# Patient Record
Sex: FEMALE | Race: BLACK OR AFRICAN AMERICAN | NOT HISPANIC OR LATINO | Employment: OTHER | URBAN - METROPOLITAN AREA
[De-identification: names, ages, dates, MRNs, and addresses within clinical notes are randomized per-mention and may not be internally consistent; named-entity substitution may affect disease eponyms.]

---

## 2017-01-27 DIAGNOSIS — Z12.39 BREAST CANCER SCREENING, HIGH RISK PATIENT: Primary | ICD-10-CM

## 2017-07-07 ENCOUNTER — HISTORICAL (OUTPATIENT)
Dept: ADMINISTRATIVE | Facility: HOSPITAL | Age: 62
End: 2017-07-07

## 2017-07-07 LAB
ABS NEUT (OLG): 7.14 X10(3)/MCL (ref 2.1–9.2)
ALBUMIN SERPL-MCNC: 2 GM/DL (ref 3.4–5)
ALBUMIN/GLOB SERPL: 0.5 {RATIO}
ALP SERPL-CCNC: 120 UNIT/L (ref 38–126)
ALT SERPL-CCNC: 16 UNIT/L (ref 12–78)
AST SERPL-CCNC: 29 UNIT/L (ref 15–37)
BASOPHILS # BLD AUTO: 0 X10(3)/MCL (ref 0–0.2)
BASOPHILS NFR BLD AUTO: 0 %
BILIRUB SERPL-MCNC: 0.4 MG/DL (ref 0.2–1)
BILIRUBIN DIRECT+TOT PNL SERPL-MCNC: 0.2 MG/DL (ref 0–0.2)
BILIRUBIN DIRECT+TOT PNL SERPL-MCNC: 0.2 MG/DL (ref 0–0.8)
BUN SERPL-MCNC: 23 MG/DL (ref 7–18)
CALCIUM SERPL-MCNC: 8.4 MG/DL (ref 8.5–10.1)
CHLORIDE SERPL-SCNC: 110 MMOL/L (ref 98–107)
CO2 SERPL-SCNC: 28 MMOL/L (ref 21–32)
CREAT SERPL-MCNC: 1.07 MG/DL (ref 0.55–1.02)
EOSINOPHIL # BLD AUTO: 0.3 X10(3)/MCL (ref 0–0.9)
EOSINOPHIL NFR BLD AUTO: 2 %
ERYTHROCYTE [DISTWIDTH] IN BLOOD BY AUTOMATED COUNT: 15.5 % (ref 11.5–17)
ERYTHROCYTE [SEDIMENTATION RATE] IN BLOOD: 11 MM/HR (ref 0–20)
EST. AVERAGE GLUCOSE BLD GHB EST-MCNC: NORMAL MG/DL
GLOBULIN SER-MCNC: 3.7 GM/DL (ref 2.4–3.5)
GLUCOSE SERPL-MCNC: 92 MG/DL (ref 74–106)
HBA1C MFR BLD: <3.5 % (ref 4.2–6.3)
HCT VFR BLD AUTO: 30.5 % (ref 37–47)
HGB BLD-MCNC: 9.1 GM/DL (ref 12–16)
LYMPHOCYTES # BLD AUTO: 2.2 X10(3)/MCL (ref 0.6–4.6)
LYMPHOCYTES NFR BLD AUTO: 20 %
MCH RBC QN AUTO: 28.6 PG (ref 27–31)
MCHC RBC AUTO-ENTMCNC: 29.8 GM/DL (ref 33–36)
MCV RBC AUTO: 95.9 FL (ref 80–94)
MONOCYTES # BLD AUTO: 1.1 X10(3)/MCL (ref 0.1–1.3)
MONOCYTES NFR BLD AUTO: 10 %
NEUTROPHILS # BLD AUTO: 7.14 X10(3)/MCL (ref 1.4–7.9)
NEUTROPHILS NFR BLD AUTO: 66 %
PLATELET # BLD AUTO: 211 X10(3)/MCL (ref 130–400)
PMV BLD AUTO: 8.4 FL (ref 9.4–12.4)
POTASSIUM SERPL-SCNC: 3.8 MMOL/L (ref 3.5–5.1)
PROT SERPL-MCNC: 5.7 GM/DL (ref 6.4–8.2)
RBC # BLD AUTO: 3.18 X10(6)/MCL (ref 4.2–5.4)
SODIUM SERPL-SCNC: 145 MMOL/L (ref 136–145)
WBC # SPEC AUTO: 10.8 X10(3)/MCL (ref 4.5–11.5)

## 2017-07-11 PROBLEM — I82.411 ACUTE DEEP VEIN THROMBOSIS (DVT) OF FEMORAL VEIN OF RIGHT LOWER EXTREMITY: Status: ACTIVE | Noted: 2017-07-11

## 2017-07-12 PROBLEM — E78.5 HLD (HYPERLIPIDEMIA): Chronic | Status: ACTIVE | Noted: 2017-07-12

## 2017-07-12 PROBLEM — I10 ESSENTIAL HYPERTENSION: Chronic | Status: ACTIVE | Noted: 2017-07-12

## 2017-07-12 PROBLEM — E66.9 OBESE: Status: ACTIVE | Noted: 2017-07-12

## 2017-07-12 PROBLEM — R65.10 SIRS (SYSTEMIC INFLAMMATORY RESPONSE SYNDROME): Status: ACTIVE | Noted: 2017-07-12

## 2017-07-14 PROBLEM — D63.8 ANEMIA OF CHRONIC DISEASE: Status: ACTIVE | Noted: 2017-07-14

## 2017-07-14 PROBLEM — R19.7 DIARRHEA: Status: ACTIVE | Noted: 2017-07-14

## 2017-07-15 PROBLEM — E87.6 HYPOKALEMIA: Status: ACTIVE | Noted: 2017-07-15

## 2017-07-18 ENCOUNTER — HISTORICAL (OUTPATIENT)
Dept: ADMINISTRATIVE | Facility: HOSPITAL | Age: 62
End: 2017-07-18

## 2017-07-25 ENCOUNTER — HISTORICAL (OUTPATIENT)
Dept: ADMINISTRATIVE | Facility: HOSPITAL | Age: 62
End: 2017-07-25

## 2017-08-01 ENCOUNTER — HISTORICAL (OUTPATIENT)
Dept: ADMINISTRATIVE | Facility: HOSPITAL | Age: 62
End: 2017-08-01

## 2017-08-09 ENCOUNTER — HISTORICAL (OUTPATIENT)
Dept: ADMINISTRATIVE | Facility: HOSPITAL | Age: 62
End: 2017-08-09

## 2017-08-09 PROBLEM — R10.32 BILATERAL GROIN PAIN: Status: ACTIVE | Noted: 2017-08-09

## 2017-08-09 PROBLEM — R10.31 BILATERAL GROIN PAIN: Status: ACTIVE | Noted: 2017-08-09

## 2017-08-16 ENCOUNTER — HISTORICAL (OUTPATIENT)
Dept: ADMINISTRATIVE | Facility: HOSPITAL | Age: 62
End: 2017-08-16

## 2017-08-16 LAB — FERRITIN SERPL-MCNC: 94.2 NG/ML (ref 8–388)

## 2017-08-23 ENCOUNTER — HISTORICAL (OUTPATIENT)
Dept: ADMINISTRATIVE | Facility: HOSPITAL | Age: 62
End: 2017-08-23

## 2017-09-06 ENCOUNTER — HISTORICAL (OUTPATIENT)
Dept: ADMINISTRATIVE | Facility: HOSPITAL | Age: 62
End: 2017-09-06

## 2017-10-06 ENCOUNTER — HOSPITAL ENCOUNTER (OUTPATIENT)
Dept: RADIOLOGY | Facility: HOSPITAL | Age: 62
Discharge: HOME | End: 2017-10-06
Attending: OBSTETRICS & GYNECOLOGY
Payer: MEDICARE

## 2017-10-06 VITALS — HEIGHT: 65 IN | WEIGHT: 257 LBS | BODY MASS INDEX: 42.82 KG/M2

## 2017-10-06 DIAGNOSIS — Z12.31 VISIT FOR SCREENING MAMMOGRAM: ICD-10-CM

## 2017-10-06 PROCEDURE — 77067 SCR MAMMO BI INCL CAD: CPT | Mod: 26,,, | Performed by: RADIOLOGY

## 2017-10-06 PROCEDURE — 77067 SCR MAMMO BI INCL CAD: CPT | Mod: TC

## 2018-05-21 PROBLEM — D17.1 LIPOMA OF BUTTOCK: Status: ACTIVE | Noted: 2018-05-21

## 2018-10-10 DIAGNOSIS — Z12.39 SCREENING FOR MALIGNANT NEOPLASM OF BREAST: Primary | ICD-10-CM

## 2018-10-19 ENCOUNTER — HOSPITAL ENCOUNTER (OUTPATIENT)
Dept: RADIOLOGY | Facility: HOSPITAL | Age: 63
Discharge: HOME | End: 2018-10-19
Payer: MEDICARE

## 2018-10-19 DIAGNOSIS — Z12.39 SCREENING FOR MALIGNANT NEOPLASM OF BREAST: ICD-10-CM

## 2018-10-19 PROCEDURE — 77067 SCR MAMMO BI INCL CAD: CPT | Mod: TC

## 2018-10-19 PROCEDURE — 77067 SCR MAMMO BI INCL CAD: CPT | Mod: 26,,, | Performed by: RADIOLOGY

## 2019-06-19 PROBLEM — D50.9 IRON DEFICIENCY ANEMIA: Status: ACTIVE | Noted: 2019-06-19

## 2019-10-07 DIAGNOSIS — Z12.31 ENCOUNTER FOR SCREENING MAMMOGRAM FOR MALIGNANT NEOPLASM OF BREAST: Primary | ICD-10-CM

## 2020-10-06 PROBLEM — G30.0 EARLY ONSET ALZHEIMER'S DEMENTIA WITHOUT BEHAVIORAL DISTURBANCE: Chronic | Status: ACTIVE | Noted: 2020-10-06

## 2020-10-06 PROBLEM — F02.80 EARLY ONSET ALZHEIMER'S DEMENTIA WITHOUT BEHAVIORAL DISTURBANCE: Chronic | Status: ACTIVE | Noted: 2020-10-06

## 2020-10-06 PROBLEM — N17.9 AKI (ACUTE KIDNEY INJURY): Status: ACTIVE | Noted: 2020-10-06

## 2020-10-06 PROBLEM — R42 DIZZINESS: Status: ACTIVE | Noted: 2020-10-06

## 2020-10-08 PROBLEM — N17.9 AKI (ACUTE KIDNEY INJURY): Status: RESOLVED | Noted: 2020-10-06 | Resolved: 2020-10-08

## 2020-10-12 ENCOUNTER — TELEPHONE (OUTPATIENT)
Dept: VASCULAR SURGERY | Facility: CLINIC | Age: 65
End: 2020-10-12

## 2020-10-12 DIAGNOSIS — Z12.31 ENCOUNTER FOR SCREENING MAMMOGRAM FOR MALIGNANT NEOPLASM OF BREAST: Primary | ICD-10-CM

## 2020-10-12 NOTE — TELEPHONE ENCOUNTER
Contacted patient's daughter Padmini in response to message. Notified Padmini that nurse has not yet seen referral come through fax, but that staff can call back to schedule appt when referral received. States patient has already had imaging done at [facility]. Notified daughter that since pt has had previous imaging done nurse would like to have fax in hand to make sure that appropriate studies will be ordered prior to appt.  ----- Message from Erin Curran sent at 10/12/2020  2:25 PM CDT -----  Regarding: NP Appointment  Contact: Dipak Santiago Patient Daugter  Calling to see if patient can be scheduled.  She was diagnosed with Aneurism and Carotid Artery.  Contact daughter at [phone].  Referral with be faxed over on today.

## 2020-10-13 DIAGNOSIS — I72.0 CAROTID ARTERY ANEURYSM: Primary | ICD-10-CM

## 2020-10-21 ENCOUNTER — TELEPHONE (OUTPATIENT)
Dept: VASCULAR SURGERY | Facility: CLINIC | Age: 65
End: 2020-10-21

## 2020-11-24 ENCOUNTER — HISTORICAL (OUTPATIENT)
Dept: ADMINISTRATIVE | Facility: HOSPITAL | Age: 65
End: 2020-11-24

## 2020-11-24 LAB
ABS NEUT (OLG): 2.11 X10(3)/MCL (ref 2.1–9.2)
ALBUMIN SERPL-MCNC: 2.6 GM/DL (ref 3.4–4.8)
ALBUMIN/GLOB SERPL: 0.8 RATIO (ref 1.1–2)
ALP SERPL-CCNC: 136 UNIT/L (ref 40–150)
ALT SERPL-CCNC: 20 UNIT/L (ref 0–55)
AST SERPL-CCNC: 35 UNIT/L (ref 5–34)
AT III ACT/NOR PPP CHRO: 90 % (ref 82–139)
BASOPHILS # BLD AUTO: 0 X10(3)/MCL (ref 0–0.2)
BASOPHILS NFR BLD AUTO: 0.5 %
BILIRUB SERPL-MCNC: 0.5 MG/DL
BILIRUBIN DIRECT+TOT PNL SERPL-MCNC: 0.2 MG/DL (ref 0–0.8)
BILIRUBIN DIRECT+TOT PNL SERPL-MCNC: 0.3 MG/DL (ref 0–0.5)
BUN SERPL-MCNC: 19.2 MG/DL (ref 9.8–20.1)
CALCIUM SERPL-MCNC: 7.7 MG/DL (ref 8.4–10.2)
CHLORIDE SERPL-SCNC: 118 MMOL/L (ref 98–107)
CO2 SERPL-SCNC: 20 MMOL/L (ref 23–31)
CREAT SERPL-MCNC: 1.08 MG/DL (ref 0.55–1.02)
EOSINOPHIL # BLD AUTO: 0.1 X10(3)/MCL (ref 0–0.9)
EOSINOPHIL NFR BLD AUTO: 2.4 %
ERYTHROCYTE [DISTWIDTH] IN BLOOD BY AUTOMATED COUNT: 16.4 % (ref 11.5–17)
FERRITIN SERPL-MCNC: 21.72 NG/ML (ref 4.63–204)
GLOBULIN SER-MCNC: 3.4 GM/DL (ref 2.4–3.5)
GLUCOSE SERPL-MCNC: 65 MG/DL (ref 82–115)
HCT VFR BLD AUTO: 31.6 % (ref 37–47)
HGB BLD-MCNC: 9.7 GM/DL (ref 12–16)
IRON SATN MFR SERPL: 14 % (ref 20–50)
IRON SERPL-MCNC: 34 UG/DL (ref 50–170)
LYMPHOCYTES # BLD AUTO: 2.8 X10(3)/MCL (ref 0.6–4.6)
LYMPHOCYTES NFR BLD AUTO: 47 %
MCH RBC QN AUTO: 28.7 PG (ref 27–31)
MCHC RBC AUTO-ENTMCNC: 30.7 GM/DL (ref 33–36)
MCV RBC AUTO: 93.5 FL (ref 80–94)
MONOCYTES # BLD AUTO: 0.8 X10(3)/MCL (ref 0.1–1.3)
MONOCYTES NFR BLD AUTO: 14.1 %
NEUTROPHILS # BLD AUTO: 2.1 X10(3)/MCL (ref 2.1–9.2)
NEUTROPHILS NFR BLD AUTO: 35.8 %
PLATELET # BLD AUTO: 255 X10(3)/MCL (ref 130–400)
PMV BLD AUTO: 9.1 FL (ref 9.4–12.4)
POTASSIUM SERPL-SCNC: 5.2 MMOL/L (ref 3.5–5.1)
PROT SERPL-MCNC: 6 GM/DL (ref 5.8–7.6)
RBC # BLD AUTO: 3.38 X10(6)/MCL (ref 4.2–5.4)
SODIUM SERPL-SCNC: 144 MMOL/L (ref 136–145)
TIBC SERPL-MCNC: 207 UG/DL (ref 70–310)
TIBC SERPL-MCNC: 241 UG/DL (ref 250–450)
TRANSFERRIN SERPL-MCNC: 211 MG/DL (ref 173–360)
VIT B12 SERPL-MCNC: 1591 PG/ML (ref 213–816)
WBC # SPEC AUTO: 5.9 X10(3)/MCL (ref 4.5–11.5)

## 2020-12-04 ENCOUNTER — HISTORICAL (OUTPATIENT)
Dept: ADMINISTRATIVE | Facility: HOSPITAL | Age: 65
End: 2020-12-04

## 2020-12-08 ENCOUNTER — HISTORICAL (OUTPATIENT)
Dept: ADMINISTRATIVE | Facility: HOSPITAL | Age: 65
End: 2020-12-08

## 2020-12-08 LAB
CREAT UR-MCNC: 28.2 MG/DL (ref 45–106)
PROT UR STRIP-MCNC: <6.8 MG/DL
PROT/CREAT UR-RTO: <0 MG/DL

## 2021-01-05 ENCOUNTER — HISTORICAL (OUTPATIENT)
Dept: ADMINISTRATIVE | Facility: HOSPITAL | Age: 66
End: 2021-01-05

## 2021-01-05 LAB
ABS NEUT (OLG): 3.4 X10(3)/MCL (ref 2.1–9.2)
ANTINUCLEAR ANTIBODY SCREEN (OHS): NEGATIVE
BASOPHILS # BLD AUTO: 0 X10(3)/MCL (ref 0–0.2)
BASOPHILS NFR BLD AUTO: 0.2 %
DSDNA ANTIBODY (OHS): NEGATIVE
EOSINOPHIL # BLD AUTO: 0 X10(3)/MCL (ref 0–0.9)
EOSINOPHIL NFR BLD AUTO: 0.8 %
ERYTHROCYTE [DISTWIDTH] IN BLOOD BY AUTOMATED COUNT: 21 % (ref 11.5–17)
FERRITIN SERPL-MCNC: 34.52 NG/ML (ref 4.63–204)
HCT VFR BLD AUTO: 31.6 % (ref 37–47)
HGB BLD-MCNC: 9.7 GM/DL (ref 12–16)
IRON SATN MFR SERPL: 38 % (ref 20–50)
IRON SERPL-MCNC: 76 UG/DL (ref 50–170)
LYMPHOCYTES # BLD AUTO: 2 X10(3)/MCL (ref 0.6–4.6)
LYMPHOCYTES NFR BLD AUTO: 32.8 %
MCH RBC QN AUTO: 29.6 PG (ref 27–31)
MCHC RBC AUTO-ENTMCNC: 30.7 GM/DL (ref 33–36)
MCV RBC AUTO: 96.3 FL (ref 80–94)
MONOCYTES # BLD AUTO: 0.7 X10(3)/MCL (ref 0.1–1.3)
MONOCYTES NFR BLD AUTO: 11 %
NEUTROPHILS # BLD AUTO: 3.4 X10(3)/MCL (ref 2.1–9.2)
NEUTROPHILS NFR BLD AUTO: 55 %
PLATELET # BLD AUTO: 243 X10(3)/MCL (ref 130–400)
PMV BLD AUTO: 8.6 FL (ref 9.4–12.4)
RBC # BLD AUTO: 3.28 X10(6)/MCL (ref 4.2–5.4)
TIBC SERPL-MCNC: 122 UG/DL (ref 70–310)
TIBC SERPL-MCNC: 198 UG/DL (ref 250–450)
TRANSFERRIN SERPL-MCNC: 158 MG/DL (ref 173–360)
WBC # SPEC AUTO: 6.2 X10(3)/MCL (ref 4.5–11.5)

## 2021-01-13 ENCOUNTER — HOSPITAL ENCOUNTER (OUTPATIENT)
Dept: RADIOLOGY | Facility: HOSPITAL | Age: 66
Discharge: HOME | End: 2021-01-13
Attending: FAMILY MEDICINE
Payer: MEDICARE

## 2021-01-13 VITALS — BODY MASS INDEX: 37.32 KG/M2 | HEIGHT: 65 IN | WEIGHT: 224 LBS

## 2021-01-13 DIAGNOSIS — Z12.31 ENCOUNTER FOR SCREENING MAMMOGRAM FOR MALIGNANT NEOPLASM OF BREAST: ICD-10-CM

## 2021-01-13 PROCEDURE — 77067 SCR MAMMO BI INCL CAD: CPT | Mod: TC

## 2021-01-13 PROCEDURE — 77067 SCR MAMMO BI INCL CAD: CPT | Mod: 26,,, | Performed by: RADIOLOGY

## 2021-01-13 PROCEDURE — 77063 BREAST TOMOSYNTHESIS BI: CPT | Mod: 26,,, | Performed by: RADIOLOGY

## 2021-01-13 PROCEDURE — 77067 MAMMO DIGITAL SCREENING BILAT WITH TOMO: ICD-10-PCS | Mod: 26,,, | Performed by: RADIOLOGY

## 2021-01-13 PROCEDURE — 77063 MAMMO DIGITAL SCREENING BILAT WITH TOMO: ICD-10-PCS | Mod: 26,,, | Performed by: RADIOLOGY

## 2021-01-14 ENCOUNTER — HISTORICAL (OUTPATIENT)
Dept: INFUSION THERAPY | Facility: HOSPITAL | Age: 66
End: 2021-01-14

## 2021-01-21 ENCOUNTER — HISTORICAL (OUTPATIENT)
Dept: INFUSION THERAPY | Facility: HOSPITAL | Age: 66
End: 2021-01-21

## 2021-02-02 PROBLEM — I82.4Y3 ACUTE DEEP VEIN THROMBOSIS (DVT) OF PROXIMAL VEIN OF BOTH LOWER EXTREMITIES: Status: ACTIVE | Noted: 2021-02-02

## 2021-02-03 PROBLEM — E11.9 TYPE 2 DIABETES MELLITUS, WITHOUT LONG-TERM CURRENT USE OF INSULIN: Status: ACTIVE | Noted: 2021-02-03

## 2021-02-03 PROBLEM — I82.4Z3 DVT, LOWER EXTREMITY, DISTAL, ACUTE, BILATERAL: Status: ACTIVE | Noted: 2021-02-03

## 2021-02-03 PROBLEM — Z00.00 HEALTH CARE MAINTENANCE: Status: ACTIVE | Noted: 2021-02-03

## 2021-02-03 PROBLEM — K76.0 NAFLD (NONALCOHOLIC FATTY LIVER DISEASE): Status: ACTIVE | Noted: 2021-02-03

## 2021-02-03 PROBLEM — E66.09 CLASS 2 OBESITY DUE TO EXCESS CALORIES IN ADULT: Status: ACTIVE | Noted: 2017-07-12

## 2021-02-03 PROBLEM — I50.32 CHRONIC HEART FAILURE WITH PRESERVED EJECTION FRACTION (HFPEF): Status: ACTIVE | Noted: 2021-02-03

## 2021-02-03 PROBLEM — F11.20 UNCOMPLICATED OPIOID DEPENDENCE: Status: ACTIVE | Noted: 2021-02-03

## 2021-02-03 PROBLEM — D64.9 NORMOCYTIC ANEMIA: Status: ACTIVE | Noted: 2021-02-03

## 2021-02-03 PROBLEM — I82.509 CHRONIC DEEP VEIN THROMBOSIS (DVT): Status: ACTIVE | Noted: 2021-02-03

## 2021-02-03 PROBLEM — G45.9 TIA (TRANSIENT ISCHEMIC ATTACK): Status: ACTIVE | Noted: 2021-02-03

## 2021-02-03 PROBLEM — E83.51 HYPOCALCEMIA: Status: ACTIVE | Noted: 2021-02-03

## 2021-02-03 PROBLEM — R74.01 TRANSAMINITIS: Status: ACTIVE | Noted: 2021-02-03

## 2021-02-05 PROBLEM — R55 SYNCOPE: Status: ACTIVE | Noted: 2021-02-05

## 2021-02-05 PROBLEM — I20.89 ANGINAL EQUIVALENT: Status: ACTIVE | Noted: 2021-02-05

## 2021-02-11 ENCOUNTER — HISTORICAL (OUTPATIENT)
Dept: ADMINISTRATIVE | Facility: HOSPITAL | Age: 66
End: 2021-02-11

## 2021-02-11 LAB
ABS NEUT (OLG): 2.38 X10(3)/MCL (ref 2.1–9.2)
ALBUMIN SERPL-MCNC: 2.1 GM/DL (ref 3.4–4.8)
ALBUMIN/GLOB SERPL: 0.7 RATIO (ref 1.1–2)
ALP SERPL-CCNC: 118 UNIT/L (ref 40–150)
ALT SERPL-CCNC: 18 UNIT/L (ref 0–55)
AST SERPL-CCNC: 45 UNIT/L (ref 5–34)
BASOPHILS # BLD AUTO: 0 X10(3)/MCL (ref 0–0.2)
BASOPHILS NFR BLD AUTO: 0.6 %
BILIRUB SERPL-MCNC: 0.3 MG/DL
BILIRUBIN DIRECT+TOT PNL SERPL-MCNC: 0.1 MG/DL (ref 0–0.8)
BILIRUBIN DIRECT+TOT PNL SERPL-MCNC: 0.2 MG/DL (ref 0–0.5)
BUN SERPL-MCNC: 12.1 MG/DL (ref 9.8–20.1)
CALCIUM SERPL-MCNC: 6.6 MG/DL (ref 8.4–10.2)
CHLORIDE SERPL-SCNC: 118 MMOL/L (ref 98–107)
CO2 SERPL-SCNC: 19 MMOL/L (ref 23–31)
CREAT SERPL-MCNC: 0.83 MG/DL (ref 0.55–1.02)
EOSINOPHIL # BLD AUTO: 0.1 X10(3)/MCL (ref 0–0.9)
EOSINOPHIL NFR BLD AUTO: 1.2 %
ERYTHROCYTE [DISTWIDTH] IN BLOOD BY AUTOMATED COUNT: 17.5 % (ref 11.5–17)
EST CREAT CLEARANCE SER (OHS): 60.71 ML/MIN
FERRITIN SERPL-MCNC: 386.77 NG/ML (ref 4.63–204)
FOLATE SERPL-MCNC: 14.8 NG/ML (ref 7–31.4)
GLOBULIN SER-MCNC: 3.2 GM/DL (ref 2.4–3.5)
GLUCOSE SERPL-MCNC: 136 MG/DL (ref 82–115)
HAPTOGLOB SERPL-MCNC: <8 MG/DL (ref 63–273)
HCT VFR BLD AUTO: 31.7 % (ref 37–47)
HGB BLD-MCNC: 9.8 GM/DL (ref 12–16)
IRON SATN MFR SERPL: 37 % (ref 20–50)
IRON SERPL-MCNC: 53 UG/DL (ref 50–170)
LYMPHOCYTES # BLD AUTO: 2 X10(3)/MCL (ref 0.6–4.6)
LYMPHOCYTES NFR BLD AUTO: 39.3 %
MCH RBC QN AUTO: 30.8 PG (ref 27–31)
MCHC RBC AUTO-ENTMCNC: 30.9 GM/DL (ref 33–36)
MCV RBC AUTO: 99.7 FL (ref 80–94)
MONOCYTES # BLD AUTO: 0.6 X10(3)/MCL (ref 0.1–1.3)
MONOCYTES NFR BLD AUTO: 11.2 %
NEUTROPHILS # BLD AUTO: 2.4 X10(3)/MCL (ref 2.1–9.2)
NEUTROPHILS NFR BLD AUTO: 47.5 %
PLATELET # BLD AUTO: 195 X10(3)/MCL (ref 130–400)
PMV BLD AUTO: 9.5 FL (ref 9.4–12.4)
POTASSIUM SERPL-SCNC: 3.8 MMOL/L (ref 3.5–5.1)
PROT SERPL-MCNC: 5.3 GM/DL (ref 5.8–7.6)
RBC # BLD AUTO: 3.18 X10(6)/MCL (ref 4.2–5.4)
RET# (OHS): 0.11 X10^6/ML (ref 0.02–0.08)
RETICULOCYTE COUNT AUTOMATED (OLG): 3.5 % (ref 1.1–2.1)
SODIUM SERPL-SCNC: 144 MMOL/L (ref 136–145)
TIBC SERPL-MCNC: 143 UG/DL (ref 250–450)
TIBC SERPL-MCNC: 90 UG/DL (ref 70–310)
TRANSFERRIN SERPL-MCNC: 113 MG/DL (ref 173–360)
TSH SERPL-ACNC: 1.22 UIU/ML (ref 0.35–4.94)
WBC # SPEC AUTO: 5 X10(3)/MCL (ref 4.5–11.5)

## 2021-02-12 LAB — LDH SERPL-CCNC: 340 UNIT/L (ref 140–271)

## 2021-03-04 PROBLEM — R55 SYNCOPE AND COLLAPSE: Status: ACTIVE | Noted: 2021-03-04

## 2021-03-22 ENCOUNTER — HISTORICAL (OUTPATIENT)
Dept: ADMINISTRATIVE | Facility: HOSPITAL | Age: 66
End: 2021-03-22

## 2021-03-22 LAB
ABS NEUT (OLG): 2.99 X10(3)/MCL (ref 2.1–9.2)
ALBUMIN % SPEP (OHS): 43.17 % (ref 48.1–59.5)
ALBUMIN SERPL-MCNC: 2.8 GM/DL (ref 3.4–4.8)
ALBUMIN/GLOB SERPL: 0.8 RATIO (ref 1.1–2)
ALPHA 1 GLOB (OHS): 0.23 GM/DL (ref 0–0.4)
ALPHA 1 GLOB% (OHS): 3.65 % (ref 2.3–4.9)
ALPHA 2 GLOB % (OHS): 10.62 % (ref 6.9–13)
ALPHA 2 GLOB (OHS): 0.68 GM/DL (ref 0.4–1)
BASOPHILS # BLD AUTO: 0 X10(3)/MCL (ref 0–0.2)
BASOPHILS NFR BLD AUTO: 0.5 %
BETA GLOB% (OHS): 20.22 % (ref 13.8–19.7)
EOSINOPHIL # BLD AUTO: 0.1 X10(3)/MCL (ref 0–0.9)
EOSINOPHIL NFR BLD AUTO: 1.8 %
ERYTHROCYTE [DISTWIDTH] IN BLOOD BY AUTOMATED COUNT: 15 % (ref 11.5–17)
FERRITIN SERPL-MCNC: 385.48 NG/ML (ref 4.63–204)
GAMMA GLOBULIN % (OHS): 22.35 % (ref 10.1–21.9)
GAMMA GLOBULIN (OHS): 1.43 GM/DL (ref 0.4–1.8)
GLOBULIN SER-MCNC: 3.6 GM/DL (ref 2.4–3.5)
HCT VFR BLD AUTO: 34.8 % (ref 37–47)
HGB BLD-MCNC: 10.6 GM/DL (ref 12–16)
IRON SATN MFR SERPL: 29 % (ref 20–50)
IRON SERPL-MCNC: 43 UG/DL (ref 50–170)
LDH SERPL-CCNC: 337 UNIT/L (ref 140–271)
LYMPHOCYTES # BLD AUTO: 1.8 X10(3)/MCL (ref 0.6–4.6)
LYMPHOCYTES NFR BLD AUTO: 33 %
M SPIKE % (OHS): ABNORMAL
M SPIKE (OHS): ABNORMAL
MCH RBC QN AUTO: 30.9 PG (ref 27–31)
MCHC RBC AUTO-ENTMCNC: 30.5 GM/DL (ref 33–36)
MCV RBC AUTO: 101.5 FL (ref 80–94)
MONOCYTES # BLD AUTO: 0.6 X10(3)/MCL (ref 0.1–1.3)
MONOCYTES NFR BLD AUTO: 10.2 %
NEUTROPHILS # BLD AUTO: 3 X10(3)/MCL (ref 2.1–9.2)
NEUTROPHILS NFR BLD AUTO: 54.5 %
PLATELET # BLD AUTO: 213 X10(3)/MCL (ref 130–400)
PMV BLD AUTO: 8.8 FL (ref 9.4–12.4)
PROT SERPL-MCNC: 6.4 GM/DL (ref 5.8–7.6)
RBC # BLD AUTO: 3.43 X10(6)/MCL (ref 4.2–5.4)
SPE INTERPRETATION (OHS): ABNORMAL
TIBC SERPL-MCNC: 106 UG/DL (ref 70–310)
TIBC SERPL-MCNC: 149 UG/DL (ref 250–450)
TRANSFERRIN SERPL-MCNC: 123 MG/DL (ref 173–360)
WBC # SPEC AUTO: 5.5 X10(3)/MCL (ref 4.5–11.5)

## 2021-05-10 ENCOUNTER — HISTORICAL (OUTPATIENT)
Dept: ADMINISTRATIVE | Facility: HOSPITAL | Age: 66
End: 2021-05-10

## 2021-05-10 PROBLEM — Z00.00 HEALTH CARE MAINTENANCE: Status: RESOLVED | Noted: 2021-02-03 | Resolved: 2021-05-10

## 2021-05-10 LAB
ABS NEUT (OLG): 4.82 X10(3)/MCL (ref 2.1–9.2)
BASOPHILS # BLD AUTO: 0 X10(3)/MCL (ref 0–0.2)
BASOPHILS NFR BLD AUTO: 0.3 %
EOSINOPHIL # BLD AUTO: 0.1 X10(3)/MCL (ref 0–0.9)
EOSINOPHIL NFR BLD AUTO: 1.2 %
ERYTHROCYTE [DISTWIDTH] IN BLOOD BY AUTOMATED COUNT: 16.8 % (ref 11.5–17)
FERRITIN SERPL-MCNC: 263.87 NG/ML (ref 4.63–204)
HAPTOGLOB SERPL-MCNC: <8 MG/DL (ref 63–273)
HCT VFR BLD AUTO: 33.7 % (ref 37–47)
HGB BLD-MCNC: 10.3 GM/DL (ref 12–16)
IRON SATN MFR SERPL: 17 % (ref 20–50)
IRON SERPL-MCNC: 28 UG/DL (ref 50–170)
LDH SERPL-CCNC: 333 UNIT/L (ref 140–271)
LYMPHOCYTES # BLD AUTO: 2 X10(3)/MCL (ref 0.6–4.6)
LYMPHOCYTES NFR BLD AUTO: 26.6 %
MCH RBC QN AUTO: 30.7 PG (ref 27–31)
MCHC RBC AUTO-ENTMCNC: 30.6 GM/DL (ref 33–36)
MCV RBC AUTO: 100.6 FL (ref 80–94)
MONOCYTES # BLD AUTO: 0.7 X10(3)/MCL (ref 0.1–1.3)
MONOCYTES NFR BLD AUTO: 8.7 %
NEUTROPHILS # BLD AUTO: 4.8 X10(3)/MCL (ref 2.1–9.2)
NEUTROPHILS NFR BLD AUTO: 62.8 %
PLATELET # BLD AUTO: 230 X10(3)/MCL (ref 130–400)
PMV BLD AUTO: 9 FL (ref 9.4–12.4)
RBC # BLD AUTO: 3.35 X10(6)/MCL (ref 4.2–5.4)
RET# (OHS): 0.11 X10^6/ML (ref 0.02–0.08)
RETICULOCYTE COUNT AUTOMATED (OLG): 3.1 % (ref 1.1–2.1)
TIBC SERPL-MCNC: 141 UG/DL (ref 70–310)
TIBC SERPL-MCNC: 169 UG/DL (ref 250–450)
TRANSFERRIN SERPL-MCNC: 142 MG/DL (ref 173–360)
WBC # SPEC AUTO: 7.7 X10(3)/MCL (ref 4.5–11.5)

## 2021-11-15 DIAGNOSIS — Z12.31 ENCOUNTER FOR SCREENING MAMMOGRAM FOR MALIGNANT NEOPLASM OF BREAST: Primary | ICD-10-CM

## 2022-01-24 ENCOUNTER — HOSPITAL ENCOUNTER (OUTPATIENT)
Dept: RADIOLOGY | Facility: HOSPITAL | Age: 67
Discharge: HOME | End: 2022-01-24
Attending: FAMILY MEDICINE
Payer: MEDICARE

## 2022-01-24 VITALS — HEIGHT: 65 IN | BODY MASS INDEX: 39.49 KG/M2 | WEIGHT: 237 LBS

## 2022-01-24 DIAGNOSIS — Z12.31 ENCOUNTER FOR SCREENING MAMMOGRAM FOR MALIGNANT NEOPLASM OF BREAST: ICD-10-CM

## 2022-01-24 PROCEDURE — 77067 SCR MAMMO BI INCL CAD: CPT | Mod: 26,,, | Performed by: RADIOLOGY

## 2022-01-24 PROCEDURE — 77063 MAMMO DIGITAL SCREENING BILAT WITH TOMO: ICD-10-PCS | Mod: 26,,, | Performed by: RADIOLOGY

## 2022-01-24 PROCEDURE — 77067 SCR MAMMO BI INCL CAD: CPT | Mod: TC

## 2022-01-24 PROCEDURE — 77063 BREAST TOMOSYNTHESIS BI: CPT | Mod: TC

## 2022-01-24 PROCEDURE — 77063 BREAST TOMOSYNTHESIS BI: CPT | Mod: 26,,, | Performed by: RADIOLOGY

## 2022-01-24 PROCEDURE — 77067 MAMMO DIGITAL SCREENING BILAT WITH TOMO: ICD-10-PCS | Mod: 26,,, | Performed by: RADIOLOGY

## 2022-04-30 NOTE — PROGRESS NOTES
Patient:   Cherry Santiago             MRN: [id number]            FIN: [id number]               Age:   65 years     Sex:  Female     :  1955   Associated Diagnoses:   Unexplained weight loss; History of DVT in adulthood; Acute embolism and thrombosis of right popliteal vein; Iron deficiency anemia; Hemolytic anemia   Author:   Rebecca Soliman MD      Vascular surgeon: Dr. Kuldip Kaur  PCP: Dr. Amarilys Romero in [place], [place]    1. h/o Recurrent RLE DVT--, ,     Work-up:  10/23/20--antiphospholipid antibody panel--beta 2 glycoprotein IgG, IgA, IgM all <9, phosphatidylserine antibody IgM <20, IgG <10, cardiolipin IgG <14, IgA <11, IgM <12, protein C activity 66%, Protein S activity 42%, homocysteine normal, Factor V Leiden negative, lupus anticoagulant negative.  20--Prothrombin Gene mutation negative, Protein C functional 45% (low), Free Protein S 18%, Functional Protein S <8%, Total Protein S 43% (all low), antithrombin III 90% (normal).  Imaging:  CT C/A/P done 20 to r/o occult malignancy showed no CT evidence of malignancy in the chest, abdomen or pelvis, mild dilatation of the esophagus with a small hiatal hernia.    Treatment history:  RLE  following DARCY/left oophorectomy, treated with Coumadin, changed to Xarelto  RLE  recurrence following back surgery, changed to Eliquis  RLE 10/2020 recurrence, unprovoked while on Eliquis, changed to Lovenox bridge to Coumadin.   RLE 2021 recurrence, unprovoked while on Coumadin. Obtaining records from [facility].     2. Anemia Combination Iron Deficiency and Mark negative hemolysis--Diagnosed 20      Procedures:  1. EGD done by Dr. Urbano Sandhu 3/2/21--normal duodenum, hiatal hernia, biopsy with mild reactive gastropathy, no active inflammation, no H. Pylori, Grade A esophagitis at GE junction, may be some blood loss from hiatal hernia/Eduardo's erosions.  2. Colonoscopy done by Dr. Urbano Sandhu 3/11/21--mild  diverticulosis, otherwise normal and no source of GI bleeding.    Treatment history:  Injectafer x 2 doses on 1/14/21 and 1/21/21.     Current treatment:   Coumadin since 10/2020--recommend to continue for life.  Oral iron twice daily.   Folic acid 1mg daily started 3/22/21      Visit Information   Visit type:  Scheduled follow-up.    Accompanied by:  daughter.       Chief Complaint   3/22/2021 9:39 CDT       back/side pain        Interval History   Current complaint.   Patient presents for follow-up of DVT, anemia. She reports still having intermittent syncopal episodes but work-up has been negative. Patient reports no further blood clots and remains on Coumadin. GI work-up showed hiatal hernia and mild diverticulosis, possible blood loss could have been from Eduardo ulcerations but no definite source. Further work-up revealed elevated LDH/retic and low haptoglobin with negative Mark c/w hemolysis. Patient did not receive folic acid prescription as of yet. She continues with low back pain which is not new and she is s/p surgery in the past with Dr. Toribio. Her daughter plans to contact him soon for appointment.      Review of Systems   Constitutional:  Fatigue, No further weight loss, No fever, No chills, No weakness.    Eye:  No recent visual problem.    Ear/Nose/Mouth/Throat:  No decreased hearing, No nasal congestion, No sore throat.    Respiratory:  No shortness of breath, No cough, No wheezing.    Cardiovascular:  Peripheral edema (RLE from chronic DVT), No chest pain, No palpitations.    Gastrointestinal:  No nausea, No vomiting, No diarrhea, No constipation, No heartburn, No abdominal pain.    Hematology/Lymphatics:  No bruising tendency, No bleeding tendency.    Endocrine:  Cold intolerance.    Musculoskeletal:  No joint pain     Back pain: In the lower region, h/o back surgery.    Integumentary:  No rash, No skin lesion.    Neurologic:  Alert and oriented X4, No confusion, No headache.    Psychiatric:   No anxiety, No depression.    All other systems are negative      Health Status   Allergies:    Allergic Reactions (Selected)  No Known Medication Allergies   Current medications:  (Selected)   Prescriptions  Prescribed  ferrous sulfate 325 mg (65 mg elemental iron) oral enteric coated tablet: See Instructions, TAKE 1 TABLET BY MOUTH TWICE A DAY, # 180 tab(s), 0 Refill(s), Pharmacy: [id number], 165, cm, Height/Length Dosing, 02/11/21 13:03:00 CST, 100.5, kg, Weight Dosing, 02/11/21 13:03:00 CST  Documented Medications  Documented  Hysingla ER 20 mg oral tablet, extended release: 20 mg = 1 tab(s), Oral, q24hr  amLODIPine 10 mg oral tablet: 10 mg = 1 tab(s), Oral, Daily  carvedilol 6.25 mg oral tablet: 6.25 mg = 1 tab(s), Oral, BID  chlorzoxazone 500 mg oral tablet: 500 mg = 1 tab(s), Oral, TID  furosemide 20 mg oral tablet: 20 mg = 1 tab(s), Oral, Daily  memantine 28 mg oral capsule, extended release: 28 mg = 1 cap(s), Oral, Daily  potassium chloride 20 mEq oral TABLET extended release: 20 mEq = 1 tab(s), Oral, Daily  pregabalin 150 mg oral capsule: 150 mg = 1 cap(s), Oral, BID  timolol maleate 0.5% ophthalmic solution: 1 drop(s), OPTH, BID  warfarin 5 mg oral tablet: 5 mg = 1 tab(s), Oral, Daily   Problem list:    All Problems  Obesity / SNOMED CT 8004608366 / Probable  Acute embolism and thrombosis of right popliteal vein / SNOMED CT 069389569 / Confirmed  Anemia / SNOMED CT 994343879 / Confirmed,    Active Problems (3)  Acute embolism and thrombosis of right popliteal vein   Anemia   Obesity         Histories   Past Medical History:    No active or resolved past medical history items have been selected or recorded.   Family History:    Father  Pancreas cancer.......  Mother  Breast cancer  Brother    History is negative.     Procedure history:    Burn nerves on left side lower back on 12/1/2020 at 65 Years.  Mammogram in 2019 at 64 Years.  Colonoscopy in 2015 at 60 Years.   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        Physical Examination   Vital Signs   3/22/2021 9:39 CDT       Temperature Oral          36.8 DegC                             Temperature Oral (calculated)             98.24 DegF                             Peripheral Pulse Rate     62 bpm                             SpO2                      99 %                             Systolic Blood Pressure   145 mmHg  HI                             Diastolic Blood Pressure  95 mmHg  HI     General:  Alert and oriented, No acute distress, pleasant black female.    Eye:  Normal conjunctiva, Vision unchanged.    HENT:  Normocephalic, Normal hearing, Oral mucosa is moist.    Neck:  Supple, Non-tender, No jugular venous distention, No lymphadenopathy, No thyromegaly.    Respiratory:  Lungs are clear to auscultation, Respirations are non-labored, Breath sounds are equal.    Cardiovascular:  Normal rate, Regular rhythm, No murmur, No gallop.    Gastrointestinal:  Soft, Non-tender, Non-distended, Normal bowel sounds, No organomegaly.    Lymphatics:  No lymphadenopathy neck, axilla, groin.    Musculoskeletal:  Normal range of motion, Normal strength, No deformity, RLE +1 pitting edema. LLE trace edema, in a wheelchair.    Integumentary:  Warm,  Intact, No rash.    Neurologic:  Alert, Oriented, Normal sensory, Normal motor function.    Psychiatric:  Cooperative, Appropriate mood & affect.    Cognition and Speech:  Oriented, Speech clear and coherent, Functional cognition intact.    ECOG Performance Scale: 2 - Capable of all self-care but unable to carry out any work activities. Up and about greater than 50 percent of waking hours.      Review / Management   Results review:  Lab results   3/22/2021 9:23 CDT       WBC                       5.5 x10(3)/mcL                             RBC                       3.43 x10(6)/mcL  LOW                             Hgb                       10.6 gm/dL  LOW                             Hct                       34.8 %  LOW                             Platelet                  213 x10(3)/mcL                             MCV                       101.5 fL  HI                             MCH                       30.9 pg                             MCHC                      30.5 gm/dL  LOW                             RDW                       15.0 %                             MPV                       8.8 fL  LOW                             Abs Neut                  2.99 x10(3)/mcL                             NEUT%                     54.5 %  NA                             NEUT#                     3.0 x10(3)/mcL                             LYMPH%                    33.0 %  NA                             LYMPH#                    1.8 x10(3)/mcL                             MONO%                     10.2 %  NA                             MONO#                     0.6 x10(3)/mcL                             EOS%                      1.8 %  NA                             EOS#                      0.1 x10(3)/mcL                             BASO%                     0.5 %  NA                             BASO#                     0.0 x10(3)/mcL  .       Impression and Plan   Diagnosis     Unexplained weight loss (KYP26-UO R63.4).      History of DVT in adulthood (RSQ86-LM Z86.718).     Acute embolism and thrombosis of right popliteal vein (NFZ10-ZK I82.431).     Iron deficiency anemia (JCR82-MD D50).     Hemolytic anemia (ULQ62-QR D58.9).     Plan   Patient with recurrent RLE DVT.  Most recent while on Eliquis, now changed to Coumadin.  Hypercoagulable work-up showed Protein C and S levels low, this was off Coumadin.  Repeat levels show Protein S levels very low. Suspect she likely has Protein S deficiency though difficult to tell while on Coumadin.  Albumin noted to be low with mild renal insufficiency. Spot urine protein/creatinine was normal.     CT done for weight loss 12/4/20 and r/o occult malignancy showed no cancer but +hiatal hernia.  EGD/colonoscopy done 3/2020 by Dr. Urbano Sandhu showed hiatal hernia and mild diverticulosis, possible some blood loss from Eduardo erosions but no definite source.  Completed Injectafer x 2 doses on 1/21/21 with no response.  Further work-up showed nina negative hemolysis.  Sending [facility] work-up and also checking SPEP.  Anemia is improved.  Will start oral folic acid daily.  RTC 4 weeks for follow-up with repeat labs.  She did have recent cardiology work-up and hospitalization for syncope which was unremarkable.  She plans to see neurology soon.    All questions answered at this time.      Rebecca Soliman MD

## 2022-04-30 NOTE — PROGRESS NOTES
Patient:   Cherry Santiago             MRN: [id number]            FIN: [id number]               Age:   65 years     Sex:  Female     :  1955   Associated Diagnoses:   Unexplained weight loss; History of DVT in adulthood; Acute embolism and thrombosis of right popliteal vein; Iron deficiency anemia   Author:   Christian Best      Vascular surgeon: Dr. Kuldip Kaur  PCP: Dr. Amarilys Romero in [place], [place]    1. h/o Recurrent RLE DVT--, ,     Work-up:  10/23/20--antiphospholipid antibody panel--beta 2 glycoprotein IgG, IgA, IgM all <9, phosphatidylserine antibody IgM <20, IgG <10, cardiolipin IgG <14, IgA <11, IgM <12, protein C activity 66%, Protein S activity 42%, homocysteine normal, Factor V Leiden negative, lupus anticoagulant negative.  20--Prothrombin Gene mutation negative, Protein C functional 45% (low), Free Protein S 18%, Functional Protein S <8%, Total Protein S 43% (all low), antithrombin III 90% (normal).  Imaging:  CT C/A/P done 20 to r/o occult malignancy showed no CT evidence of malignancy in the chest, abdomen or pelvis, mild dilatation of the esophagus with a small hiatal hernia.    Treatment history:  RLE  following DARCY/left oophorectomy, treated with Coumadin, changed to Xarelto  RLE  recurrence following back surgery, changed to Eliquis  RLE 10/2020 recurrence, unprovoked while on Eliquis, changed to Lovenox bridge to Coumadin    2. Iron Deficiency Anemia--Diagnosed 20    Current treatment:   Coumadin since 10/2020--recommend to continue for life.  Oral iron twice daily.       Visit Information   Visit type:  Scheduled follow-up.    Accompanied by:  daughter.       Chief Complaint   2021 11:14 CST       No c/o        Interval History   Current complaint.   Patient presents for follow-up of RLE DVT, Protein S deficiency and iron deficiency anemia. Her daughter is on the phone. Labs showed moderate iron deficiency anemia and she was  started on oral iron twice daily which she is taking. She was referred to GI for the iron deficiency anemia and because of findings of hiatal hernia on CT scans. However, the GI physician did not accept her insurance (Medicaid). Repeat labs today show anemia unchanged with Hgb of 9.7 g/dL. Iron studies and ARIELLE are still pending. We discussed referral to [facility] and IV iron infusion and she is in agreement. Her only complaint today is cold intolerance. She is tolerating the oral iron without any problems. No other problems reported today.       Review of Systems   Constitutional:  Fatigue, +weight loss, No fever, No chills, No weakness.    Eye:  No recent visual problem.    Ear/Nose/Mouth/Throat:  No decreased hearing, No nasal congestion, No sore throat.    Respiratory:  No shortness of breath, No cough, No wheezing.    Cardiovascular:  Peripheral edema (RLE from chronic DVT), No chest pain, No palpitations.    Gastrointestinal:  Heartburn, No nausea, No vomiting, No diarrhea, No constipation, No abdominal pain.    Hematology/Lymphatics:  No bruising tendency, No bleeding tendency.    Endocrine:  Cold intolerance.    Musculoskeletal:  No back pain, No joint pain.    Integumentary:  No rash, No skin lesion.    Neurologic:  Alert and oriented X4, No confusion, No headache.    Psychiatric:  No anxiety, No depression.    All other systems are negative      Health Status   Allergies:    Allergic Reactions (Selected)  No Known Medication Allergies   Current medications:  (Selected)   Prescriptions  Prescribed  ferrous sulfate 325 mg (65 mg elemental iron) oral delayed release tablet: 325 mcmol/L = 1 tab(s), Oral, BID, # 60 tab(s), 3 Refill(s), Pharmacy: [facility] #[id number], 165, cm, Height/Length Dosing, 12/08/20 11:05:00 CST, 105, kg, Weight Dosing, 12/08/20 11:05:00 CST  Documented Medications  Documented  Hysingla ER 20 mg oral tablet, extended release: 20 mg = 1 tab(s), Oral, q24hr  amLODIPine 10 mg oral tablet:  10 mg = 1 tab(s), Oral, Daily  carvedilol 6.25 mg oral tablet: 6.25 mg = 1 tab(s), Oral, BID  chlorzoxazone 500 mg oral tablet: 500 mg = 1 tab(s), Oral, TID  furosemide 20 mg oral tablet: 20 mg = 1 tab(s), Oral, Daily  memantine 28 mg oral capsule, extended release: 28 mg = 1 cap(s), Oral, Daily  potassium chloride 20 mEq oral TABLET extended release: 20 mEq = 1 tab(s), Oral, Daily  pregabalin 150 mg oral capsule: 150 mg = 1 cap(s), Oral, BID  timolol maleate 0.5% ophthalmic solution: 1 drop(s), OPTH, BID  warfarin 5 mg oral tablet: 5 mg = 1 tab(s), Oral, Daily      Histories   Past Medical History:    No active or resolved past medical history items have been selected or recorded.   Family History:    Father  Pancreas cancer.......  Mother  Breast cancer  Brother    History is negative.     Procedure history:    Burn nerves on left side lower back on 12/1/2020 at 65 Years.  Mammogram in 2019 at 64 Years.  Colonoscopy in 2015 at 60 Years.   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        Physical Examination   Vital Signs   1/5/2021 11:14 CST       Temperature Oral          37.1 DegC                             Temperature Oral (calculated)             98.78 DegF                             Peripheral Pulse Rate     64 bpm                             SpO2                      99 %                             Systolic Blood Pressure   135 mmHg                             Diastolic Blood Pressure  85 mmHg     General:  Alert and oriented, No acute distress, pleasant black female.    Eye:  Normal conjunctiva, Vision unchanged.    HENT:  Normocephalic, Normal hearing, Oral mucosa is moist.    Neck:  Supple, Non-tender, No jugular venous distention, No lymphadenopathy, No thyromegaly.    Respiratory:  Lungs are clear to  auscultation, Respirations are non-labored, Breath sounds are equal.    Cardiovascular:  Normal rate, Regular rhythm, No murmur, No gallop.    Gastrointestinal:  Soft, Non-tender, Non-distended, Normal bowel sounds, No organomegaly.    Lymphatics:  No lymphadenopathy neck, axilla, groin.    Musculoskeletal:  Normal range of motion, Normal strength, No deformity, Normal gait, RLE with mild edema, compression stocking in place, LLE trace edema.    Integumentary:  Warm, Intact, No rash.    Neurologic:  Alert, Oriented, Normal sensory, Normal motor function.    Psychiatric:  Cooperative, Appropriate mood & affect.    Cognition and Speech:  Oriented, Speech clear and coherent.    ECOG Performance Scale: 1- Strenuous physical activity restricted; fully ambulatory and able to carry out light work.      Review / Management   Results review:  Lab results   1/5/2021 11:11 CST       WBC                       6.2 x10(3)/mcL                             RBC                       3.28 x10(6)/mcL  LOW                             Hgb                       9.7 gm/dL  LOW                             Hct                       31.6 %  LOW                             Platelet                  243 x10(3)/mcL                             MCV                       96.3 fL  HI                             MCH                       29.6 pg                             MCHC                      30.7 gm/dL  LOW                             RDW                       21.0 %  HI                             MPV                       8.6 fL  LOW                             Abs Neut                  3.40 x10(3)/mcL                             NEUT%                     55.0 %  NA                             NEUT#                     3.4 x10(3)/mcL                             LYMPH%                    32.8 %  NA                             LYMPH#                    2.0 x10(3)/mcL                             MONO%                     11.0 %  NA                              MONO#                     0.7 x10(3)/mcL                             EOS%                      0.8 %  NA                             EOS#                      0.0 x10(3)/mcL                             BASO%                     0.2 %  NA                             BASO#                     0.0 x10(3)/mcL  .       Impression and Plan   Diagnosis     Unexplained weight loss (XNE07-HN R63.4).     History of DVT in adulthood (ZPQ39-ZC Z86.718).     Acute embolism and thrombosis of right popliteal vein (WHC68-SH I82.431).     Iron deficiency anemia (MOF91-NQ D50).     Plan   Patient with recurrent RLE DVT.  Most recent while on Eliquis, now changed to Coumadin.  Hypercoagulable work-up showed Protein C and S levels low, this was off Coumadin.  Repeat levels show Protein S levels very low. Suspect she likely has Protein S deficiency though difficult to tell while on Coumadin.  Albumin noted to be low with mild renal insufficiency. Spot urine protein/creatinine was normal.     CT done for weight loss and r/o occult malignancy showed no cancer but +hiatal hernia.  Sent referral to GI for [facility] for EGD/colonoscopy. Last colonoscopy was 5 years ago in [place]. However, GI physician did not accept her insurance so will refer to [facility] clinic.   Labs also showed iron deficiency anemia. Started oral iron twice daily.   CBC today shows anemia is stable at 9.7 g/dL. Iron panel and ARIELLE pending, will follow-up.   Patient likely needs IV iron since her iron levels were noted to be low at her last appointment and no change in anemia with oral iron. Plan for Injectafer x 2 doses to start on 1/7/21 if approved by insurance.   Will have her follow-up in 6 weeks with labs.   All questions answered at this time.      Christian Sifuentes ALMA

## 2022-04-30 NOTE — PROGRESS NOTES
Patient:   Cherry Santiago             MRN: [id number]            FIN: [id number]               Age:   65 years     Sex:  Female     :  1955   Associated Diagnoses:   Unexplained weight loss; History of DVT in adulthood; Acute embolism and thrombosis of right popliteal vein; Iron deficiency anemia; Hemolytic anemia   Author:   Christian Best      Vascular surgeon: Dr. Kuldip Kaur  PCP: Dr. Amarilys Romero in [place], [place]    1. h/o Recurrent RLE DVT--, ,     Work-up:  10/23/20--antiphospholipid antibody panel--beta 2 glycoprotein IgG, IgA, IgM all <9, phosphatidylserine antibody IgM <20, IgG <10, cardiolipin IgG <14, IgA <11, IgM <12, protein C activity 66%, Protein S activity 42%, homocysteine normal, Factor V Leiden negative, lupus anticoagulant negative.  20--Prothrombin Gene mutation negative, Protein C functional 45% (low), Free Protein S 18%, Functional Protein S <8%, Total Protein S 43% (all low), antithrombin III 90% (normal).  Imaging:  CT C/A/P done 20 to r/o occult malignancy showed no CT evidence of malignancy in the chest, abdomen or pelvis, mild dilatation of the esophagus with a small hiatal hernia.    Treatment history:  RLE  following DARCY/left oophorectomy, treated with Coumadin, changed to Xarelto  RLE  recurrence following back surgery, changed to Eliquis  RLE 10/2020 recurrence, unprovoked while on Eliquis, changed to Lovenox bridge to Coumadin.   RLE 2021 recurrence, unprovoked while on Coumadin. Obtaining records from [facility].     2. Anemia Combination Iron Deficiency and Mark negative hemolysis--Diagnosed 20  Elevated LDH/retic and low haptoglobin with negative Mark c/w hemolysis.   Spep negative. PNH negative.     Procedures:  1. EGD done by Dr. Urbano Sandhu 3/2/21--normal duodenum, hiatal hernia, biopsy with mild reactive gastropathy, no active inflammation, no H. Pylori, Grade A esophagitis at GE junction, may be some  blood loss from hiatal hernia/Eduardo's erosions.  2. Colonoscopy done by Dr. Urbano Sandhu 3/11/21--mild diverticulosis, otherwise normal and no source of GI bleeding.    Treatment history:  Injectafer x 2 doses on 1/14/21 and 1/21/21.     Current treatment:   Coumadin since 10/2020--recommend to continue for life.  Oral iron twice daily.   Folic acid 1mg daily started 3/22/21      Visit Information   Visit type:  Scheduled follow-up.    Accompanied by:  daughter.       Chief Complaint   5/10/2021 14:25 CDT      back pain        Interval History   Current complaint.   Patient presents for follow-up of DVT, anemia. Her daughter is with her today. Patient reports no further blood clots and remains on Coumadin. GI work-up showed hiatal hernia and mild diverticulosis, possible blood loss could have been from Eduardo ulcerations but no definite source. Further work-up revealed elevated LDH/retic and low haptoglobin with negative Mark c/w hemolysis. She is on the folic acid and oral iron daily now. Denies any new DVT since her last one in January 2021. Denies any new medications since her last appointment. PNH and Spep all negative. No other problems reported.       Review of Systems   Constitutional:  Fatigue, No further weight loss, No fever, No chills, No weakness.    Eye:  No recent visual problem.    Ear/Nose/Mouth/Throat:  No decreased hearing, No nasal congestion, No sore throat.    Respiratory:  No shortness of breath, No cough, No wheezing.    Cardiovascular:  Peripheral edema (RLE from chronic DVT), No chest pain, No palpitations.    Gastrointestinal:  No nausea, No vomiting, No diarrhea, No constipation, No heartburn, No abdominal pain.    Hematology/Lymphatics:  No bruising tendency, No bleeding tendency.    Endocrine:  Cold intolerance.    Musculoskeletal:  No joint pain     Back pain: In the lower region, h/o back surgery.    Integumentary:  No rash, No skin lesion.    Neurologic:  Alert and oriented X4,  No confusion, No headache.    Psychiatric:  No anxiety, No depression.    All other systems are negative      Health Status   Allergies:    Allergic Reactions (Selected)  No Known Medication Allergies   Current medications:  (Selected)   Prescriptions  Prescribed  ferrous sulfate 325 mg (65 mg elemental iron) oral enteric coated tablet: See Instructions, TAKE 1 TABLET BY MOUTH TWICE A DAY, # 180 tab(s), 0 Refill(s), Pharmacy: [facility] STORE [id number], 165, cm, Height/Length Dosing, 02/11/21 13:03:00 CST, 100.5, kg, Weight Dosing, 02/11/21 13:03:00 CST  folic acid 1 mg oral tablet: 1 mg = 1 tab(s), Oral, Daily, # 30 tab(s), 6 Refill(s), Pharmacy: [facility]/pharmacy #[id number], 165, cm, Height/Length Dosing, 03/22/21 9:44:00 CDT, 102.7, kg, Weight Dosing, 03/22/21 9:44:00 CDT  Documented Medications  Documented  Hysingla ER 20 mg oral tablet, extended release: 20 mg = 1 tab(s), Oral, q24hr  amLODIPine 10 mg oral tablet: 10 mg = 1 tab(s), Oral, Daily  carvedilol 6.25 mg oral tablet: 6.25 mg = 1 tab(s), Oral, BID  chlorzoxazone 500 mg oral tablet: 500 mg = 1 tab(s), Oral, TID  furosemide 20 mg oral tablet: 20 mg = 1 tab(s), Oral, Daily  memantine 28 mg oral capsule, extended release: 28 mg = 1 cap(s), Oral, Daily  potassium chloride 20 mEq oral TABLET extended release: 20 mEq = 1 tab(s), Oral, Daily  pregabalin 150 mg oral capsule: 150 mg = 1 cap(s), Oral, BID  timolol maleate 0.5% ophthalmic solution: 1 drop(s), OPTH, BID  warfarin 5 mg oral tablet: 5 mg = 1 tab(s), Oral, Daily      Histories   Past Medical History:    No active or resolved past medical history items have been selected or recorded.   Family History:    Father  Pancreas cancer.......  Mother  Breast cancer  Brother    History is negative.     Procedure history:    Burn nerves on left side lower back on 12/1/2020 at 65 Years.  Mammogram in 2019 at 64 Years.  Colonoscopy in 2015 at 60 Years.   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        Physical Examination   Vital Signs   5/10/2021 14:25 CDT      Temperature Oral          37 DegC                             Temperature Oral (calculated)             98.60 DegF                             Peripheral Pulse Rate     86 bpm                             SpO2                      99 %                             Systolic Blood Pressure   137 mmHg                             Diastolic Blood Pressure  85 mmHg     General:  Alert and oriented, No acute distress, pleasant black female.    Eye:  Normal conjunctiva, Vision unchanged.    HENT:  Normocephalic, Normal hearing, Oral mucosa is moist.    Neck:  Supple, Non-tender, No jugular venous distention, No lymphadenopathy, No thyromegaly.    Respiratory:  Lungs are clear to auscultation, Respirations are non-labored, Breath sounds are equal.    Cardiovascular:  Normal rate, Regular rhythm, No murmur, No gallop.         Edema: Bilateral, Lower extremity, 2+, Pitting, R > L, chronic.    Gastrointestinal:  Soft, Non-tender, Non-distended, Normal bowel sounds, No organomegaly.    Lymphatics:  No lymphadenopathy neck, axilla, groin.    Musculoskeletal:  Normal range of motion, Normal strength, No deformity,      Mobility/ gait: Able to walk with minimal assistance, Able to walk with a cane.    Integumentary:  Warm, Intact, No rash.    Neurologic:  Alert, Oriented, Normal sensory, Normal motor function.    Psychiatric:  Cooperative, Appropriate mood & affect.    Cognition and Speech:  Oriented, Speech clear and coherent, Functional cognition intact.    ECOG Performance Scale: 2 - Capable of all self-care but unable to carry out any work activities. Up and about greater than 50 percent of waking hours.      Review / Management   Results review:  Lab results   5/10/2021 14:24 CDT      WBC                        7.7 x10(3)/mcL                             RBC                       3.35 x10(6)/mcL  LOW                             Hgb                       10.3 gm/dL  LOW                             Hct                       33.7 %  LOW                             Platelet                  230 x10(3)/mcL                             MCV                       100.6 fL  HI                             MCH                       30.7 pg                             MCHC                      30.6 gm/dL  LOW                             RDW                       16.8 %                             MPV                       9.0 fL  LOW                             Abs Neut                  4.82 x10(3)/mcL                             NEUT%                     62.8 %  NA                             NEUT#                     4.8 x10(3)/mcL                             LYMPH%                    26.6 %  NA                             LYMPH#                    2.0 x10(3)/mcL                             MONO%                     8.7 %  NA                             MONO#                     0.7 x10(3)/mcL                             EOS%                      1.2 %  NA                             EOS#                      0.1 x10(3)/mcL                             BASO%                     0.3 %  NA                             BASO#                     0.0 x10(3)/mcL  .       Impression and Plan   Diagnosis     Unexplained weight loss (DYE37-SJ R63.4).     History of DVT in adulthood (BOM00-BF Z86.718).     Acute embolism and thrombosis of right popliteal vein (PNT10-VG I82.431).     Iron deficiency anemia (OHE08-YW D50).     Hemolytic anemia (FPN74-IK D58.9).     Plan   Patient with recurrent RLE DVT.  Most recent while on Eliquis, now changed to Coumadin.  Hypercoagulable work-up showed Protein C and S levels low, this was off Coumadin.  Repeat levels show Protein S levels very low. Suspect she likely has Protein S deficiency though  difficult to tell while on Coumadin.  Albumin noted to be low with mild renal insufficiency. Spot urine protein/creatinine was normal.     CT done for weight loss 12/4/20 and r/o occult malignancy showed no cancer but +hiatal hernia.  EGD/colonoscopy done 3/2020 by Dr. Urbano Sandhu showed hiatal hernia and mild diverticulosis, possible some blood loss from Eduardo erosions but no definite source.  Completed Injectafer x 2 doses on 1/21/21 with no response.  Further work-up showed Mark negative hemolysis.  She did have cardiology work-up and hospitalization for syncope which was unremarkable.  PNH work-up and  SPEP all negative.   Remains on folic acid daily.     Repeat CBC today shows Hgb stable at 10.3 g/dL.   Haptoglobin, Retic and LDH are still pending today. Glucose dehydrogenase added, pending.   Will continue to follow-up closely. Will see her back in 3 months.   All questions answered at this time.      Christian Sifuentes, SALMA

## 2022-04-30 NOTE — PROGRESS NOTES
Patient:   Cherry Santiago             MRN: [id number]            FIN: [id number]               Age:   65 years     Sex:  Female     :  1955   Associated Diagnoses:   Unexplained weight loss; History of DVT in adulthood; Acute embolism and thrombosis of right popliteal vein   Author:   Rebecca Soliman MD      Referring physician: Dr. Kuldip Kaur  Reason for Referral: h/ DVT, abnormal hypercoagulable panel    PCP: Dr. Amarilys Romero in [place], [place]    h/o Recurrent RLE DVT--, ,     Work-up:  10/23/20--antiphospholipid antibody panel--beta 2 glycoprotein IgG, IgA, IgM all <9, phosphatidylserine antibody IgM <20, IgG <10, cardiolipin IgG <14, IgA <11, IgM <12, protein C activity 66%, Protein S activity 42%, homocysteine normal, Factor V Leiden negative, lupus anticoagulant negative.    Treatment history:  RLE  following DARCY/left oophorectomy, treated with Coumadin, changed to Xarelto  RLE  recurrence following back surgery, changed to Eliquis  RLE 10/2020 recurrence, unprovoked while on Eliquis, changed to Lovenox bridge to Coumadin    Current treatment: Coumadin since 10/2020      Visit Information   Visit type:  New patient evaluation.    Accompanied by:  daughter.       Chief Complaint   2020 13:39 CST     No c/c        Interval History   Current complaint.   64 yo bf found to have recurrence of RLE DVT. She was seeing Dr. Kaur for follow-up of a carotid aneurysm and was having some RLE pain. US in his office showed acute DVT of the right popliteal vein. Patient was originally diagnosed with RLE DVT in  following a hysterectomy. At that time she was treated with coumadin then changed to Xarelto. She reports having recurrence of DVT in  following a back surgery and was changed to Eliquis which she has been on since that time. Due to DVT found in office with Dr. Kaur in 10/2020, patient was changed to Lovenox bridge to coumadin. Since being on coumadin, her RLE pain is  much better. Hypercoagulable work-up showed Protein C and S levels low. Patient reports having a MMG in 2019 due for one now and had colonoscopy about 5 years ago. Reports an unintentional weight loss of about 10-12 lbs over the last several months with intermittent abdominal pain. Patient's mother had breast cancer and her father had pancreatic cancer and prostate cancer.      Review of Systems   Constitutional:  +weight loss, No fever, No chills, No weakness, No fatigue.    Eye:  No recent visual problem.    Ear/Nose/Mouth/Throat:  No decreased hearing, No nasal congestion, No sore throat.    Respiratory:  No shortness of breath, No cough, No wheezing.    Cardiovascular:  No chest pain, No palpitations, No peripheral edema.    Gastrointestinal:  No nausea, No vomiting, No diarrhea, No constipation     Abdominal pain: Upper quadrant.    Hematology/Lymphatics:  No bruising tendency, No bleeding tendency.    Musculoskeletal:  No back pain, No joint pain.    Integumentary:  No rash, No skin lesion.    Neurologic:  No confusion, No headache.    Psychiatric:  No anxiety, No depression.    All other systems are negative      Health Status   Allergies:    Allergies (1) Active Reaction  No Known Medication Allergies None Documented     Current medications:  (Selected)   Documented Medications  Documented  Eliquis Starter Pack for Treatment of DVT and PE 5 mg oral tablet: 5 mg = 1 tab(s), Oral, Daily  Hysingla ER 20 mg oral tablet, extended release: 20 mg = 1 tab(s), Oral, q24hr  amLODIPine 10 mg oral tablet: 10 mg = 1 tab(s), Oral, Daily  carvedilol 6.25 mg oral tablet: 6.25 mg = 1 tab(s), Oral, BID  chlorzoxazone 500 mg oral tablet: 500 mg = 1 tab(s), Oral, TID  furosemide 20 mg oral tablet: 20 mg = 1 tab(s), Oral, Daily  memantine 28 mg oral capsule, extended release: 28 mg = 1 cap(s), Oral, Daily  potassium chloride 20 mEq oral TABLET extended release: 20 mEq = 1 tab(s), Oral, Daily  pregabalin 150 mg oral capsule: 150  mg = 1 cap(s), Oral, BID  timolol maleate 0.5% ophthalmic solution: 1 drop(s), OPTH, BID  warfarin 5 mg oral tablet: 5 mg = 1 tab(s), Oral, Daily   Problem list:    Active Problems (1)  Obesity         Histories   Past Medical History:    No active or resolved past medical history items have been selected or recorded.   Family History:    Father  Pancreas cancer.......  Mother  Breast cancer  Brother    History is negative.     Procedure history:    Mammogram in 2019 at 64 Years.  Colonoscopy in 2015 at 60 Years.   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        Physical Examination      Vital Signs (last 24 hrs)_____  Last Charted___________  Temp Oral     36.9 DegC  (NOV 24 13:34)  Heart Rate Peripheral   60 bpm  (NOV 24 13:34)  Resp Rate         19 br/min  (NOV 24 13:34)  SBP      122 mmHg  (NOV 24 13:34)  DBP      72 mmHg  (NOV 24 13:34)  SpO2      98 %  (NOV 24 13:34)  Weight      104.2 kg  (NOV 24 13:34)  Height      165 cm  (NOV 24 13:34)  BMI      38.27  (NOV 24 13:34)     General:  Alert and oriented, No acute distress, pleasant black female.    Eye:  Vision unchanged.    HENT:  Normocephalic, Normal hearing, Oral mucosa is moist.    Neck:  Supple, No jugular venous distention, No lymphadenopathy, No thyromegaly.    Respiratory:  Lungs are clear to auscultation, Breath sounds are equal.    Cardiovascular:  Normal rate, Regular rhythm, No murmur, No gallop, Normal peripheral perfusion.    Gastrointestinal:  Soft,  Non-tender, Non-distended, Normal bowel sounds, No organomegaly.    Lymphatics:  No lymphadenopathy neck, axilla, groin.    Musculoskeletal:  Normal range of motion, Normal strength, No deformity, Normal gait, RLE with mild edema, compression stocking in place, LLE no edema.    Integumentary:  Warm, Intact.    Neurologic:  Alert, Oriented, Normal sensory, Normal motor function.    Psychiatric:  Cooperative, Appropriate mood & affect.       Review / Management   Laboratory Results   Pending      Impression and Plan   Diagnosis     Unexplained weight loss (EQE67-WJ R63.4).     History of DVT in adulthood (DKH15-FL Z86.718).     Acute embolism and thrombosis of right popliteal vein (AEI28-EM I82.431).     Plan   Patient with recurrent RLE DVT.  Most recent while on Eliquis, now changed to coumadin.  Hypercoagulable work-up showed Protein C and S levels low, this was off coumadin.  Will repeat levels though likely to be low again on coumadin. I don't know that we will know the answer to this since she really can never stop coumadin. Will also check antithrombin III and prothrombin gene mutation since these were not done.  Patient is due for MMG, last colonoscopy 5 years ago.  Recent unexplained weight loss so will obtain CT C/A/P to r/o occult malignancy, +FH pancreatic cancer.    RTC 2 weeks for follow-up of testing.  In any case, would recommend to continue coumadin for life regardless of testing due to DVT recurrent X 3.    All questions answered at this time.    Rebecca Soliman MD

## 2022-04-30 NOTE — PROGRESS NOTES
Patient:   Cherry Santiago             MRN: [id number]            FIN: [id number]               Age:   65 years     Sex:  Female     :  1955   Associated Diagnoses:   Unexplained weight loss; History of DVT in adulthood; Acute embolism and thrombosis of right popliteal vein; Iron deficiency anemia   Author:   Rebecca Soliman MD      Vascular surgeon: Dr. Kuldip Kaur  PCP: Dr. Amarilys Romero in [place], [place]    1. h/o Recurrent RLE DVT--, ,     Work-up:  10/23/20--antiphospholipid antibody panel--beta 2 glycoprotein IgG, IgA, IgM all <9, phosphatidylserine antibody IgM <20, IgG <10, cardiolipin IgG <14, IgA <11, IgM <12, protein C activity 66%, Protein S activity 42%, homocysteine normal, Factor V Leiden negative, lupus anticoagulant negative.  20--Prothrombin Gene mutation negative, Protein C functional 45% (low), Free Protein S 18%, Functional Protein S <8%, Total Protein S 43% (all low), antithrombin III 90% (normal).  Imaging:  CT C/A/P done 20 to r/o occult malignancy showed no CT evidence of malignancy in the chest, abdomen or pelvis, mild dilatation of the esophagus with a small hiatal hernia.    Treatment history:  RLE  following DARCY/left oophorectomy, treated with Coumadin, changed to Xarelto  RLE  recurrence following back surgery, changed to Eliquis  RLE 10/2020 recurrence, unprovoked while on Eliquis, changed to Lovenox bridge to Coumadin    2. Iron Deficiency Anemia--Diagnosed 20    Current treatment: Coumadin since 10/2020--recommend to continue for life      Visit Information   Visit type:  Scheduled follow-up.    Accompanied by:  daughter.       Chief Complaint       2020 11:02 CST      Due for a colonoscopy and a mammogram.    2020 11:00 CST      Due for a colonoscopy and a mammogram.        Interval History   Current complaint.   Patient presents for follow-up of RLE DVT and hypercoagulable work-up. Protein C levels low and Protein S levels  extremely low. Suspect she likely has Protein S deficiency but she is on Coumadin. CT C/A/P done for weight loss showed no malignancy but she did have hiatal hernia. Labs showed moderate iron deficiency anemia. She had colonoscopy but was 5 years ago by GI in [place]. She would prefer to see a GI here in [place]. She has never taken iron pills before. She does have some LUQ pain and frequent heartburn. Discussed referral to GI here for EGD/colonoscopy. Also noted on her labs was low albumin so will check spot urine protein/creatinine.      Review of Systems   Constitutional:  +weight loss, No fever, No chills, No weakness, No fatigue.    Eye:  No recent visual problem.    Ear/Nose/Mouth/Throat:  No decreased hearing, No nasal congestion, No sore throat.    Respiratory:  No shortness of breath, No cough, No wheezing.    Cardiovascular:  Peripheral edema (RLE from chronic DVT), No chest pain, No palpitations.    Gastrointestinal:  Heartburn, No nausea, No vomiting, No diarrhea, No constipation.         Abdominal pain: Upper quadrant.    Hematology/Lymphatics:  No bruising tendency, No bleeding tendency.    Musculoskeletal:  No back pain, No joint pain.    Integumentary:  No rash, No skin lesion.    Neurologic:  No confusion, No headache.    Psychiatric:  No anxiety, No depression.    All other systems are negative      Health Status   Allergies:    Allergies (1) Active Reaction  No Known Medication Allergies None Documented     Current medications:  (Selected)   Documented Medications  Documented  Hysingla ER 20 mg oral tablet, extended release: 20 mg = 1 tab(s), Oral, q24hr  amLODIPine 10 mg oral tablet: 10 mg = 1 tab(s), Oral, Daily  carvedilol 6.25 mg oral tablet: 6.25 mg = 1 tab(s), Oral, BID  chlorzoxazone 500 mg oral tablet: 500 mg = 1 tab(s), Oral, TID  furosemide 20 mg oral tablet: 20 mg = 1 tab(s), Oral, Daily  memantine 28 mg oral capsule, extended release: 28 mg = 1 cap(s), Oral, Daily  potassium chloride  20 mEq oral TABLET extended release: 20 mEq = 1 tab(s), Oral, Daily  pregabalin 150 mg oral capsule: 150 mg = 1 cap(s), Oral, BID  timolol maleate 0.5% ophthalmic solution: 1 drop(s), OPTH, BID  warfarin 5 mg oral tablet: 5 mg = 1 tab(s), Oral, Daily   Problem list:    Active Problems (2)  Acute embolism and thrombosis of right popliteal vein   Obesity         Histories   Past Medical History:    No active or resolved past medical history items have been selected or recorded.   Family History:    Father  Pancreas cancer.......  Mother  Breast cancer  Brother    History is negative.     Procedure history:    Burn nerves on left side lower back on 12/1/2020 at 65 Years.  Mammogram in 2019 at 64 Years.  Colonoscopy in 2015 at 60 Years.   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        Physical Examination      Vital Signs (last 24 hrs)_____  Last Charted___________  Temp Oral     37.0 DegC  (DEC 08 11:02)  Heart Rate Peripheral   68 bpm  (DEC 08 11:02)  Resp Rate         19 br/min  (DEC 08 11:02)  SBP      110 mmHg  (DEC 08 11:02)  DBP      76 mmHg  (DEC 08 11:02)  SpO2      96 %  (DEC 08 11:02)  Weight      105.0 kg  (DEC 08 11:02)  Height      165 cm  (DEC 08 11:02)  BMI      38.57  (DEC 08 11:02)     General:  Alert and oriented, No acute distress, pleasant black female.    Eye:  Vision unchanged.    HENT:  Normocephalic, Normal hearing, Oral mucosa is moist.    Neck:  Supple, No jugular venous distention, No lymphadenopathy,  No thyromegaly.    Respiratory:  Lungs are clear to auscultation, Breath sounds are equal.    Cardiovascular:  Normal rate, Regular rhythm, No murmur, No gallop, Normal peripheral perfusion.    Gastrointestinal:  Soft, Non-tender, Non-distended, Normal bowel sounds, No organomegaly.    Lymphatics:  No lymphadenopathy neck, axilla, groin.    Musculoskeletal:  Normal range of motion, Normal strength, No deformity, Normal gait, RLE with mild edema, compression stocking in place, LLE no edema.    Integumentary:  Warm, Intact.    Neurologic:  Alert, Oriented, Normal sensory, Normal motor function.    Psychiatric:  Cooperative, Appropriate mood & affect.       Review / Management   Laboratory Results   Last 10 Days Lab Results : PowerNote Discrete Results   11/24/2020 15:36 CST     Est Creat Clearance Ser   46.66 mL/min    11/24/2020 13:57 CST     WBC                       5.9 x10(3)/mcL                             RBC                       3.38 x10(6)/mcL  LOW                             Hgb                       9.7 gm/dL  LOW                             Hct                       31.6 %  LOW                             Platelet                  255 x10(3)/mcL                             MCV                       93.5 fL                             MCH                       28.7 pg                             MCHC                      30.7 gm/dL  LOW                             RDW                       16.4 %                             MPV                       9.1 fL  LOW                             Abs Neut                  2.11 x10(3)/mcL                             NEUT%                     35.8 %  NA                             NEUT#                     2.1 x10(3)/mcL                             LYMPH%                    47.0 %  NA                             LYMPH#                    2.8 x10(3)/mcL                             MONO%                     14.1 %  NA                             MONO#                      0.8 x10(3)/mcL                             EOS%                      2.4 %  NA                             EOS#                      0.1 x10(3)/mcL                             BASO%                     0.5 %  NA                             BASO#                     0.0 x10(3)/mcL                             Antithromb III            90 %                             Protein C FunctLC         45 %                             Protein S-Func-LC         <8 %                             Protein S Total-LC        43 %                             Protein S Free-LC         18 %                             Sodium Lvl                144 mmol/L                             Potassium Lvl             5.2 mmol/L  HI                             Chloride                  118 mmol/L  HI                             CO2                       20 mmol/L  LOW                             Calcium Lvl               7.7 mg/dL  LOW                             Glucose Lvl               65 mg/dL  LOW                             BUN                       19.2 mg/dL                             Creatinine                1.08 mg/dL  HI                             eGFR-AA                   >60  NA                             eGFR-BEE                  54 mL/min/1.73 m2  NA                             Bili Total                0.5 mg/dL                             Bili Direct               0.3 mg/dL                             Bili Indirect             0.20 mg/dL                             AST                       35 unit/L  HI                             ALT                       20 unit/L                             Alk Phos                  136 unit/L                             Total Protein             6.0 gm/dL                             Albumin Lvl               2.6 gm/dL  LOW                             Globulin                  3.4 gm/dL                             A/G Ratio                 0.8 ratio  LOW                             Iron  Lvl                  34 ug/dL  LOW                             Transferrin               211 mg/dL                             TIBC                      241 ug/dL  LOW                             Iron Sat                  14 %  LOW                             Ferritin Lvl              21.72 ng/mL                             Vitamin B12 Lvl           1,591 pg/mL  HI                             Factor II DNA-LC          Comment                             UIBC                      207 ug/dL           Impression and Plan   Diagnosis     Unexplained weight loss (LHL43-CD R63.4).     History of DVT in adulthood (UPC86-XR Z86.718).     Acute embolism and thrombosis of right popliteal vein (GSB66-DI I82.431).     Iron deficiency anemia (RIV95-KU D50).     Plan   Patient with recurrent RLE DVT.  Most recent while on Eliquis, now changed to coumadin.  Hypercoagulable work-up showed Protein C and S levels low, this was off coumadin.  Repeat levels show Protein S levels very low. Suspect she likely has Protein S deficiency though difficult to tell while on Coumadin.    Labs showed KRISTAL.   CT done for weight loss and r/o occult malignancy showed no cancer but +hiatal hernia.  Will send referral to GI for KRISTAL for EGD/colonoscopy. Last colonoscopy was 5 years ago in [place].    Will start oral iron BID.  RTC 4 weeks for follow-up with repeat labs.  Albumin noted to be low with mild renal insufficiency. Will check spot urine protein/creatinine. May need nephrology referral.  Will also check ARIELLE on RTC.    All questions answered at this time.    Rebecca Soliman MD

## 2022-04-30 NOTE — PROGRESS NOTES
Patient:   Cherry Santiago             MRN: [id number]            FIN: [id number]               Age:   65 years     Sex:  Female     :  1955   Associated Diagnoses:   Unexplained weight loss; History of DVT in adulthood; Acute embolism and thrombosis of right popliteal vein; Iron deficiency anemia   Author:   Christian Best      Vascular surgeon: Dr. Kuldip Kaur  PCP: Dr. Amarilys Romero in [place], [place]    1. h/o Recurrent RLE DVT--, ,     Work-up:  10/23/20--antiphospholipid antibody panel--beta 2 glycoprotein IgG, IgA, IgM all <9, phosphatidylserine antibody IgM <20, IgG <10, cardiolipin IgG <14, IgA <11, IgM <12, protein C activity 66%, Protein S activity 42%, homocysteine normal, Factor V Leiden negative, lupus anticoagulant negative.  20--Prothrombin Gene mutation negative, Protein C functional 45% (low), Free Protein S 18%, Functional Protein S <8%, Total Protein S 43% (all low), antithrombin III 90% (normal).  Imaging:  CT C/A/P done 20 to r/o occult malignancy showed no CT evidence of malignancy in the chest, abdomen or pelvis, mild dilatation of the esophagus with a small hiatal hernia.    Treatment history:  RLE  following ADRCY/left oophorectomy, treated with Coumadin, changed to Xarelto  RLE  recurrence following back surgery, changed to Eliquis  RLE 10/2020 recurrence, unprovoked while on Eliquis, changed to Lovenox bridge to Coumadin.   RLE 2021 recurrence, unprovoked while on Coumadin. Obtaining records from [facility].     2. Iron Deficiency Anemia--Diagnosed 20  Injectafer x 2 doses on 21 and 21.     Current treatment:   Coumadin since 10/2020--recommend to continue for life.  Oral iron twice daily.       Visit Information   Visit type:  Scheduled follow-up.    Accompanied by:  daughter.       Chief Complaint   2021 12:55 CST      No c/o        Interval History   Current complaint.   Patient presents for follow-up of RLE  DVT, Protein S deficiency and iron deficiency anemia. Her daughter is with her today. Completed 2 doses of Injectafer on 1/21/21. CBC today the same at 9.8 g/dL. Iron studies still pending. She has not heard from GI about an appointment. Mentions she was admitted again at [facility] for another DVT in her right LE and a syncopal episode. She could not tell me if her INR was therapeutic. She was bridged with Lovenox while in the hospital and is back on the Coumadin. Will obtain records. Will also add additional labs today for more thorough anemia evaluation. Noted 2 lbs weight loss since last appointment. No other problems reported today.       Review of Systems   Constitutional:  Fatigue, +weight loss, No fever, No chills, No weakness.    Eye:  No recent visual problem.    Ear/Nose/Mouth/Throat:  No decreased hearing, No nasal congestion, No sore throat.    Respiratory:  No shortness of breath, No cough, No wheezing.    Cardiovascular:  Peripheral edema (RLE from chronic DVT), No chest pain, No palpitations.    Gastrointestinal:  Heartburn, No nausea, No vomiting, No diarrhea, No constipation, No abdominal pain.    Hematology/Lymphatics:  No bruising tendency, No bleeding tendency.    Endocrine:  Cold intolerance.    Musculoskeletal:  No back pain, No joint pain.    Integumentary:  No rash, No skin lesion.    Neurologic:  Alert and oriented X4, No confusion, No headache.    Psychiatric:  No anxiety, No depression.    All other systems are negative      Health Status   Allergies:    Allergic Reactions (Selected)  No Known Medication Allergies   Current medications:  (Selected)   Prescriptions  Prescribed  ferrous sulfate 325 mg (65 mg elemental iron) oral delayed release tablet: 325 mcmol/L = 1 tab(s), Oral, BID, # 60 tab(s), 3 Refill(s), Pharmacy: [facility]/pharmacy #[id number], 165, cm, Height/Length Dosing, 12/08/20 11:05:00 CST, 105, kg, Weight Dosing, 12/08/20 11:05:00 CST  Documented  Medications  Documented  Hysingla ER 20 mg oral tablet, extended release: 20 mg = 1 tab(s), Oral, q24hr  amLODIPine 10 mg oral tablet: 10 mg = 1 tab(s), Oral, Daily  carvedilol 6.25 mg oral tablet: 6.25 mg = 1 tab(s), Oral, BID  chlorzoxazone 500 mg oral tablet: 500 mg = 1 tab(s), Oral, TID  furosemide 20 mg oral tablet: 20 mg = 1 tab(s), Oral, Daily  memantine 28 mg oral capsule, extended release: 28 mg = 1 cap(s), Oral, Daily  potassium chloride 20 mEq oral TABLET extended release: 20 mEq = 1 tab(s), Oral, Daily  pregabalin 150 mg oral capsule: 150 mg = 1 cap(s), Oral, BID  timolol maleate 0.5% ophthalmic solution: 1 drop(s), OPTH, BID  warfarin 5 mg oral tablet: 5 mg = 1 tab(s), Oral, Daily      Histories   Past Medical History:    No active or resolved past medical history items have been selected or recorded.   Family History:    Father  Pancreas cancer.......  Mother  Breast cancer  Brother    History is negative.     Procedure history:    Burn nerves on left side lower back on 12/1/2020 at 65 Years.  Mammogram in 2019 at 64 Years.  Colonoscopy in 2015 at 60 Years.   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        Physical Examination   Vital Signs   2/11/2021 12:55 CST      Temperature Oral          37.1 DegC                             Temperature Oral (calculated)             98.78 DegF                             Peripheral Pulse Rate     76 bpm                             SpO2                      99 %                             Systolic Blood Pressure   109 mmHg                             Diastolic Blood Pressure  69 mmHg     General:  Alert and oriented, No acute distress, pleasant black female.    Eye:  Normal conjunctiva, Vision unchanged.    HENT:  Normocephalic, Normal hearing, Oral mucosa is  moist.    Neck:  Supple, Non-tender, No jugular venous distention, No lymphadenopathy, No thyromegaly.    Respiratory:  Lungs are clear to auscultation, Respirations are non-labored, Breath sounds are equal.    Cardiovascular:  Normal rate, Regular rhythm, No murmur, No gallop.    Gastrointestinal:  Soft, Non-tender, Non-distended, Normal bowel sounds, No organomegaly.    Lymphatics:  No lymphadenopathy neck, axilla, groin.    Musculoskeletal:  Normal range of motion, Normal strength, No deformity, Normal gait, RLE +1 pitting edema. LLE trace edema.    Integumentary:  Warm, Intact, No rash.    Neurologic:  Alert, Oriented, Normal sensory, Normal motor function.    Psychiatric:  Cooperative, Appropriate mood & affect.    Cognition and Speech:  Oriented, Speech clear and coherent, Functional cognition intact.    ECOG Performance Scale: 2 - Capable of all self-care but unable to carry out any work activities. Up and about greater than 50 percent of waking hours.      Review / Management   Results review:  Lab results   2/11/2021 12:38 CST      WBC                       5.0 x10(3)/mcL                             RBC                       3.18 x10(6)/mcL  LOW                             Hgb                       9.8 gm/dL  LOW                             Hct                       31.7 %  LOW                             Platelet                  195 x10(3)/mcL                             MCV                       99.7 fL  HI                             MCH                       30.8 pg                             MCHC                      30.9 gm/dL  LOW                             RDW                       17.5 %  HI                             MPV                       9.5 fL                             Abs Neut                  2.38 x10(3)/mcL                             NEUT%                     47.5 %  NA                             NEUT#                     2.4 x10(3)/mcL                             LYMPH%                     39.3 %  NA                             LYMPH#                    2.0 x10(3)/mcL                             MONO%                     11.2 %  NA                             MONO#                     0.6 x10(3)/mcL                             EOS%                      1.2 %  NA                             EOS#                      0.1 x10(3)/mcL                             BASO%                     0.6 %  NA                             BASO#                     0.0 x10(3)/mcL  .       Impression and Plan   Diagnosis     Unexplained weight loss (FZC39-KF R63.4).     History of DVT in adulthood (QRK03-FK Z86.718).     Acute embolism and thrombosis of right popliteal vein (GUN96-GC I82.431).     Iron deficiency anemia (PPM40-CQ D50).     Plan   Patient with recurrent RLE DVT.  Most recent while on Eliquis, now changed to Coumadin.  Hypercoagulable work-up showed Protein C and S levels low, this was off Coumadin.  Repeat levels show Protein S levels very low. Suspect she likely has Protein S deficiency though difficult to tell while on Coumadin.  Albumin noted to be low with mild renal insufficiency. Spot urine protein/creatinine was normal.     CT done for weight loss and r/o occult malignancy showed no cancer but +hiatal hernia.  Sent referral to GI for KRISTAL for EGD/colonoscopy. Last colonoscopy was 5 years ago in [place]. However, GI physician did not accept her insurance so she has been referred to [facility]. She does have medicaid and medicare so will try again at [facility].   Completed Injectafer x 2 doses on 1/21/21.   CBC today shows macrocytic anemia is stable at 9.8 g/dL. Iron panel still pending.    Will add on additional labs including - retic count, TSH, haptoglobin, Folate level, Mark and peripheral smear.   Patient admitted again for recurrent RLE DVT. Will obtain records.   Will have her follow-up in 4 weeks with repeat CBC.   All questions answered at this time.      Christian  Bear, SALMA

## 2022-08-08 DIAGNOSIS — R56.9 SEIZURE: Primary | ICD-10-CM

## 2022-08-19 ENCOUNTER — LAB VISIT (OUTPATIENT)
Dept: LAB | Facility: HOSPITAL | Age: 67
End: 2022-08-19
Attending: PAIN MEDICINE
Payer: MEDICARE

## 2022-08-19 DIAGNOSIS — R52 GENERALIZED PAIN: ICD-10-CM

## 2022-08-19 DIAGNOSIS — R68.89 MECHANICAL AND MOTOR PROBLEMS WITH INTERNAL ORGANS: ICD-10-CM

## 2022-08-19 DIAGNOSIS — R79.9 ABNORMAL BLOOD CHEMISTRY: Primary | ICD-10-CM

## 2022-08-19 LAB
BACTERIA #/AREA URNS HPF: NEGATIVE /HPF
BASOPHILS # BLD AUTO: 0.02 K/UL (ref 0–0.2)
BASOPHILS NFR BLD: 0.4 % (ref 0–1.9)
BILIRUB UR QL STRIP: NEGATIVE
CLARITY UR: CLEAR
COLOR UR: YELLOW
CRP SERPL-MCNC: 1.5 MG/DL (ref 0–0.75)
DIFFERENTIAL METHOD: ABNORMAL
EOSINOPHIL # BLD AUTO: 0.1 K/UL (ref 0–0.5)
EOSINOPHIL NFR BLD: 1.9 % (ref 0–8)
ERYTHROCYTE [DISTWIDTH] IN BLOOD BY AUTOMATED COUNT: 15.3 % (ref 11.5–14.5)
ERYTHROCYTE [SEDIMENTATION RATE] IN BLOOD: 8 MM/HR (ref 0–20)
GLUCOSE UR QL STRIP: NEGATIVE
HCT VFR BLD AUTO: 31 % (ref 37–48.5)
HGB BLD-MCNC: 9.7 G/DL (ref 12–16)
HGB UR QL STRIP: NEGATIVE
HYALINE CASTS #/AREA URNS LPF: 3.9 /LPF
IMM GRANULOCYTES # BLD AUTO: 0.02 K/UL (ref 0–0.04)
IMM GRANULOCYTES NFR BLD AUTO: 0.4 % (ref 0–0.5)
KETONES UR QL STRIP: NEGATIVE
LEUKOCYTE ESTERASE UR QL STRIP: ABNORMAL
LYMPHOCYTES # BLD AUTO: 1.9 K/UL (ref 1–4.8)
LYMPHOCYTES NFR BLD: 34.6 % (ref 18–48)
MCH RBC QN AUTO: 30.1 PG (ref 27–31)
MCHC RBC AUTO-ENTMCNC: 31.3 G/DL (ref 32–36)
MCV RBC AUTO: 96 FL (ref 82–98)
MICROSCOPIC COMMENT: ABNORMAL
MONOCYTES # BLD AUTO: 0.6 K/UL (ref 0.3–1)
MONOCYTES NFR BLD: 11.8 % (ref 4–15)
NEUTROPHILS # BLD AUTO: 2.7 K/UL (ref 1.8–7.7)
NEUTROPHILS NFR BLD: 50.9 % (ref 38–73)
NITRITE UR QL STRIP: NEGATIVE
NRBC BLD-RTO: 0 /100 WBC
PH UR STRIP: 5 [PH] (ref 5–8)
PLATELET # BLD AUTO: 305 K/UL (ref 150–450)
PMV BLD AUTO: 8.2 FL (ref 9.2–12.9)
PROT UR QL STRIP: NEGATIVE
RBC # BLD AUTO: 3.22 M/UL (ref 4–5.4)
RBC #/AREA URNS HPF: 0 /HPF (ref 0–4)
SP GR UR STRIP: 1.01 (ref 1–1.03)
SQUAMOUS #/AREA URNS HPF: 1 /HPF
URN SPEC COLLECT METH UR: ABNORMAL
UROBILINOGEN UR STRIP-ACNC: NEGATIVE EU/DL
WBC # BLD AUTO: 5.35 K/UL (ref 3.9–12.7)
WBC #/AREA URNS HPF: 4 /HPF (ref 0–5)

## 2022-08-19 PROCEDURE — 85025 COMPLETE CBC W/AUTO DIFF WBC: CPT | Performed by: PAIN MEDICINE

## 2022-08-19 PROCEDURE — 85651 RBC SED RATE NONAUTOMATED: CPT | Performed by: PAIN MEDICINE

## 2022-08-19 PROCEDURE — 36415 COLL VENOUS BLD VENIPUNCTURE: CPT | Performed by: PAIN MEDICINE

## 2022-08-19 PROCEDURE — 81000 URINALYSIS NONAUTO W/SCOPE: CPT | Performed by: PAIN MEDICINE

## 2022-08-19 PROCEDURE — 86140 C-REACTIVE PROTEIN: CPT | Performed by: PAIN MEDICINE

## 2022-08-22 PROBLEM — M54.9 INTRACTABLE BACK PAIN: Status: ACTIVE | Noted: 2022-08-22

## 2022-08-23 PROBLEM — E43 SEVERE PROTEIN-CALORIE MALNUTRITION: Status: ACTIVE | Noted: 2022-08-23

## 2022-09-17 ENCOUNTER — LAB VISIT (OUTPATIENT)
Dept: LAB | Facility: HOSPITAL | Age: 67
End: 2022-09-17
Attending: FAMILY MEDICINE
Payer: MEDICARE

## 2022-09-17 DIAGNOSIS — R19.7 DIARRHEA OF PRESUMED INFECTIOUS ORIGIN: ICD-10-CM

## 2022-09-17 DIAGNOSIS — R05.1 ACUTE COUGH: Primary | ICD-10-CM

## 2022-09-17 DIAGNOSIS — R53.83 FATIGUE: ICD-10-CM

## 2022-09-17 LAB
ALBUMIN SERPL BCP-MCNC: 1.8 G/DL (ref 3.5–5.2)
ALP SERPL-CCNC: 107 U/L (ref 55–135)
ALT SERPL W/O P-5'-P-CCNC: 20 U/L (ref 10–44)
ANION GAP SERPL CALC-SCNC: 7 MMOL/L (ref 8–16)
AST SERPL-CCNC: 49 U/L (ref 10–40)
BASOPHILS # BLD AUTO: 0.01 K/UL (ref 0–0.2)
BASOPHILS NFR BLD: 0.3 % (ref 0–1.9)
BILIRUB SERPL-MCNC: 0.4 MG/DL (ref 0.1–1)
BUN SERPL-MCNC: 14 MG/DL (ref 8–23)
CALCIUM SERPL-MCNC: 7.7 MG/DL (ref 8.7–10.5)
CHLORIDE SERPL-SCNC: 119 MMOL/L (ref 95–110)
CO2 SERPL-SCNC: 22 MMOL/L (ref 23–29)
CREAT SERPL-MCNC: 1.7 MG/DL (ref 0.5–1.4)
DIFFERENTIAL METHOD: ABNORMAL
EOSINOPHIL # BLD AUTO: 0 K/UL (ref 0–0.5)
EOSINOPHIL NFR BLD: 1.4 % (ref 0–8)
ERYTHROCYTE [DISTWIDTH] IN BLOOD BY AUTOMATED COUNT: 15.8 % (ref 11.5–14.5)
EST. GFR  (NO RACE VARIABLE): 32.7 ML/MIN/1.73 M^2
GLUCOSE SERPL-MCNC: 77 MG/DL (ref 70–110)
HCT VFR BLD AUTO: 31.2 % (ref 37–48.5)
HGB BLD-MCNC: 10.2 G/DL (ref 12–16)
IMM GRANULOCYTES # BLD AUTO: 0 K/UL (ref 0–0.04)
IMM GRANULOCYTES NFR BLD AUTO: 0 % (ref 0–0.5)
LYMPHOCYTES # BLD AUTO: 1.2 K/UL (ref 1–4.8)
LYMPHOCYTES NFR BLD: 42.5 % (ref 18–48)
MCH RBC QN AUTO: 29.9 PG (ref 27–31)
MCHC RBC AUTO-ENTMCNC: 32.7 G/DL (ref 32–36)
MCV RBC AUTO: 92 FL (ref 82–98)
MONOCYTES # BLD AUTO: 0.4 K/UL (ref 0.3–1)
MONOCYTES NFR BLD: 13.9 % (ref 4–15)
NEUTROPHILS # BLD AUTO: 1.2 K/UL (ref 1.8–7.7)
NEUTROPHILS NFR BLD: 41.9 % (ref 38–73)
NRBC BLD-RTO: 0 /100 WBC
PLATELET # BLD AUTO: 211 K/UL (ref 150–450)
PMV BLD AUTO: 9.7 FL (ref 9.2–12.9)
POTASSIUM SERPL-SCNC: 2.9 MMOL/L (ref 3.5–5.1)
PROT SERPL-MCNC: 5.1 G/DL (ref 6–8.4)
RBC # BLD AUTO: 3.41 M/UL (ref 4–5.4)
SODIUM SERPL-SCNC: 148 MMOL/L (ref 136–145)
WBC # BLD AUTO: 2.87 K/UL (ref 3.9–12.7)

## 2022-09-17 PROCEDURE — 36415 COLL VENOUS BLD VENIPUNCTURE: CPT | Performed by: FAMILY MEDICINE

## 2022-09-17 PROCEDURE — 85025 COMPLETE CBC W/AUTO DIFF WBC: CPT | Performed by: FAMILY MEDICINE

## 2022-09-17 PROCEDURE — 80053 COMPREHEN METABOLIC PANEL: CPT | Performed by: FAMILY MEDICINE

## 2022-09-17 PROCEDURE — 87449 NOS EACH ORGANISM AG IA: CPT | Performed by: FAMILY MEDICINE

## 2022-09-18 LAB
C DIFF GDH STL QL: NEGATIVE
C DIFF TOX A+B STL QL IA: NEGATIVE

## 2022-09-21 PROBLEM — E87.6 HYPOPOTASSEMIA: Status: ACTIVE | Noted: 2022-09-21

## 2022-09-26 ENCOUNTER — TELEPHONE (OUTPATIENT)
Dept: OBSTETRICS AND GYNECOLOGY | Facility: CLINIC | Age: 67
End: 2022-09-26
Payer: MEDICARE

## 2022-09-26 ENCOUNTER — LAB VISIT (OUTPATIENT)
Dept: LAB | Facility: HOSPITAL | Age: 67
End: 2022-09-26
Attending: FAMILY MEDICINE
Payer: MEDICARE

## 2022-09-26 DIAGNOSIS — R53.83 FATIGUE: Primary | ICD-10-CM

## 2022-09-26 LAB
ANION GAP SERPL CALC-SCNC: 5 MMOL/L (ref 8–16)
BASOPHILS # BLD AUTO: 0.02 K/UL (ref 0–0.2)
BASOPHILS NFR BLD: 0.5 % (ref 0–1.9)
BUN SERPL-MCNC: 11 MG/DL (ref 8–23)
CALCIUM SERPL-MCNC: 7.6 MG/DL (ref 8.7–10.5)
CHLORIDE SERPL-SCNC: 122 MMOL/L (ref 95–110)
CO2 SERPL-SCNC: 23 MMOL/L (ref 23–29)
CREAT SERPL-MCNC: 1.2 MG/DL (ref 0.5–1.4)
DIFFERENTIAL METHOD: ABNORMAL
EOSINOPHIL # BLD AUTO: 0.1 K/UL (ref 0–0.5)
EOSINOPHIL NFR BLD: 1.8 % (ref 0–8)
ERYTHROCYTE [DISTWIDTH] IN BLOOD BY AUTOMATED COUNT: 15.7 % (ref 11.5–14.5)
EST. GFR  (NO RACE VARIABLE): 49.6 ML/MIN/1.73 M^2
GLUCOSE SERPL-MCNC: 72 MG/DL (ref 70–110)
HCT VFR BLD AUTO: 27.9 % (ref 37–48.5)
HGB BLD-MCNC: 9.2 G/DL (ref 12–16)
IMM GRANULOCYTES # BLD AUTO: 0.01 K/UL (ref 0–0.04)
IMM GRANULOCYTES NFR BLD AUTO: 0.2 % (ref 0–0.5)
LYMPHOCYTES # BLD AUTO: 1.8 K/UL (ref 1–4.8)
LYMPHOCYTES NFR BLD: 40.3 % (ref 18–48)
MCH RBC QN AUTO: 28.8 PG (ref 27–31)
MCHC RBC AUTO-ENTMCNC: 33 G/DL (ref 32–36)
MCV RBC AUTO: 87 FL (ref 82–98)
MONOCYTES # BLD AUTO: 0.6 K/UL (ref 0.3–1)
MONOCYTES NFR BLD: 14.3 % (ref 4–15)
NEUTROPHILS # BLD AUTO: 1.9 K/UL (ref 1.8–7.7)
NEUTROPHILS NFR BLD: 42.9 % (ref 38–73)
NRBC BLD-RTO: 0 /100 WBC
PLATELET # BLD AUTO: 156 K/UL (ref 150–450)
PMV BLD AUTO: 10 FL (ref 9.2–12.9)
POTASSIUM SERPL-SCNC: 3.1 MMOL/L (ref 3.5–5.1)
RBC # BLD AUTO: 3.2 M/UL (ref 4–5.4)
SODIUM SERPL-SCNC: 150 MMOL/L (ref 136–145)
WBC # BLD AUTO: 4.42 K/UL (ref 3.9–12.7)

## 2022-09-26 PROCEDURE — 80048 BASIC METABOLIC PNL TOTAL CA: CPT | Performed by: FAMILY MEDICINE

## 2022-09-26 PROCEDURE — 36415 COLL VENOUS BLD VENIPUNCTURE: CPT | Performed by: FAMILY MEDICINE

## 2022-09-26 PROCEDURE — 85025 COMPLETE CBC W/AUTO DIFF WBC: CPT | Performed by: FAMILY MEDICINE

## 2022-09-26 NOTE — TELEPHONE ENCOUNTER
Called pt to schedule- no answer    ----- Message from Nakia Valdes sent at 9/26/2022  2:35 PM CDT -----  Good Afternoon Dr. Hernandez has put in a referral for this patient to see someone at [facility]. Can We please contact patient with next available appointment.     Thanks,   Nakia Valdes

## 2022-09-29 ENCOUNTER — TELEPHONE (OUTPATIENT)
Dept: OBSTETRICS AND GYNECOLOGY | Facility: CLINIC | Age: 67
End: 2022-09-29
Payer: MEDICARE

## 2022-09-29 NOTE — TELEPHONE ENCOUNTER
----- Message from Dayron Ignacio sent at 9/29/2022  9:54 AM CDT -----  Regarding: CAll BAck  Name of Who is Calling: Padmini Villeda              What is the request in detail: Padmini requesting a (NP) appointment nothing available in system also a referral is in the system .Please assist              Can the clinic reply by [facility]: No              What Number to [facility] Back [facility]: Padmini ph. [phone]

## 2022-09-29 NOTE — TELEPHONE ENCOUNTER
Left message on voicemail stating that  is not accepting New pts but we can get her set up with another provider in this office.

## 2022-10-06 ENCOUNTER — LAB VISIT (OUTPATIENT)
Dept: LAB | Facility: HOSPITAL | Age: 67
End: 2022-10-06
Attending: FAMILY MEDICINE
Payer: MEDICARE

## 2022-10-06 DIAGNOSIS — I11.0 HYPERTENSIVE HEART DISEASE WITH CONGESTIVE HEART FAILURE: Primary | ICD-10-CM

## 2022-10-06 LAB
ANION GAP SERPL CALC-SCNC: 5 MMOL/L (ref 8–16)
BUN SERPL-MCNC: 14 MG/DL (ref 8–23)
CALCIUM SERPL-MCNC: 8.1 MG/DL (ref 8.7–10.5)
CHLORIDE SERPL-SCNC: 125 MMOL/L (ref 95–110)
CO2 SERPL-SCNC: 18 MMOL/L (ref 23–29)
CREAT SERPL-MCNC: 1.6 MG/DL (ref 0.5–1.4)
EST. GFR  (NO RACE VARIABLE): 35.1 ML/MIN/1.73 M^2
GLUCOSE SERPL-MCNC: 77 MG/DL (ref 70–110)
POTASSIUM SERPL-SCNC: 4.3 MMOL/L (ref 3.5–5.1)
SODIUM SERPL-SCNC: 148 MMOL/L (ref 136–145)

## 2022-10-06 PROCEDURE — 80048 BASIC METABOLIC PNL TOTAL CA: CPT | Performed by: FAMILY MEDICINE

## 2022-10-06 PROCEDURE — 36415 COLL VENOUS BLD VENIPUNCTURE: CPT | Performed by: FAMILY MEDICINE

## 2022-10-07 PROBLEM — E86.0 DEHYDRATION: Status: ACTIVE | Noted: 2022-10-07

## 2022-10-07 PROBLEM — N18.30 STAGE 3 CHRONIC KIDNEY DISEASE: Status: ACTIVE | Noted: 2022-10-07

## 2022-10-08 PROBLEM — E11.9 TYPE 2 DIABETES MELLITUS, WITHOUT LONG-TERM CURRENT USE OF INSULIN: Chronic | Status: ACTIVE | Noted: 2021-02-03

## 2022-10-08 PROBLEM — E63.9 INADEQUATE DIETARY ENERGY INTAKE: Status: ACTIVE | Noted: 2022-10-08

## 2022-10-10 PROBLEM — E83.42 HYPOMAGNESEMIA: Status: ACTIVE | Noted: 2022-10-10

## 2022-10-10 PROBLEM — K52.9 SECRETORY DIARRHEA: Status: ACTIVE | Noted: 2017-07-14

## 2022-10-13 PROBLEM — R19.4 CHANGE IN BOWEL HABITS: Status: ACTIVE | Noted: 2022-10-13

## 2022-10-13 PROBLEM — Z79.899 POLYPHARMACY: Status: ACTIVE | Noted: 2022-10-13

## 2022-10-13 PROBLEM — K52.832 LYMPHOCYTIC COLITIS: Status: ACTIVE | Noted: 2022-10-07

## 2022-10-13 PROBLEM — N18.31 STAGE 3A CHRONIC KIDNEY DISEASE: Status: ACTIVE | Noted: 2022-10-07

## 2022-10-13 PROBLEM — I73.9 PAD (PERIPHERAL ARTERY DISEASE): Status: ACTIVE | Noted: 2022-10-13

## 2022-10-13 PROBLEM — Z86.718 HISTORY OF DVT (DEEP VEIN THROMBOSIS): Status: ACTIVE | Noted: 2022-10-13

## 2022-10-13 PROBLEM — E87.20 METABOLIC ACIDEMIA: Status: ACTIVE | Noted: 2022-10-13

## 2022-10-13 PROBLEM — E55.9 VITAMIN D DEFICIENCY: Status: ACTIVE | Noted: 2022-10-13

## 2022-10-14 PROBLEM — I50.43 ACUTE ON CHRONIC COMBINED SYSTOLIC AND DIASTOLIC HEART FAILURE: Status: ACTIVE | Noted: 2022-10-14

## 2022-10-14 PROBLEM — E16.2 HYPOGLYCEMIA: Status: ACTIVE | Noted: 2022-10-14

## 2022-10-15 PROBLEM — E34.0 DIARRHEA CONCURRENT WITH AND DUE TO CARCINOID SYNDROME: Status: ACTIVE | Noted: 2022-10-07

## 2022-10-15 PROBLEM — K52.9 DIARRHEA CONCURRENT WITH AND DUE TO CARCINOID SYNDROME: Status: ACTIVE | Noted: 2022-10-07

## 2022-10-16 PROBLEM — I95.89 CHRONIC HYPOTENSION: Status: ACTIVE | Noted: 2022-10-16

## 2022-10-28 ENCOUNTER — LAB VISIT (OUTPATIENT)
Dept: LAB | Facility: HOSPITAL | Age: 67
End: 2022-10-28
Attending: FAMILY MEDICINE
Payer: MEDICARE

## 2022-10-28 DIAGNOSIS — N18.31 CHRONIC KIDNEY DISEASE (CKD) STAGE G3A/A1, MODERATELY DECREASED GLOMERULAR FILTRATION RATE (GFR) BETWEEN 45-59 ML/MIN/1.73 SQUARE METER AND ALBUMINURIA CREATININE RATIO LESS THAN 30 MG/G: Primary | ICD-10-CM

## 2022-10-28 DIAGNOSIS — E34.0 CARCINOID SYNDROME: ICD-10-CM

## 2022-10-28 LAB
ANION GAP SERPL CALC-SCNC: 8 MMOL/L (ref 8–16)
BUN SERPL-MCNC: 12 MG/DL (ref 8–23)
CALCIUM SERPL-MCNC: 7.4 MG/DL (ref 8.7–10.5)
CHLORIDE SERPL-SCNC: 113 MMOL/L (ref 95–110)
CO2 SERPL-SCNC: 26 MMOL/L (ref 23–29)
CREAT SERPL-MCNC: 1.7 MG/DL (ref 0.5–1.4)
EST. GFR  (NO RACE VARIABLE): 32.7 ML/MIN/1.73 M^2
GLUCOSE SERPL-MCNC: 71 MG/DL (ref 70–110)
POTASSIUM SERPL-SCNC: 2.5 MMOL/L (ref 3.5–5.1)
SODIUM SERPL-SCNC: 147 MMOL/L (ref 136–145)

## 2022-10-28 PROCEDURE — 80048 BASIC METABOLIC PNL TOTAL CA: CPT | Performed by: FAMILY MEDICINE

## 2022-10-28 PROCEDURE — 36415 COLL VENOUS BLD VENIPUNCTURE: CPT | Performed by: FAMILY MEDICINE

## 2022-10-31 ENCOUNTER — LAB VISIT (OUTPATIENT)
Dept: LAB | Facility: HOSPITAL | Age: 67
End: 2022-10-31
Attending: FAMILY MEDICINE
Payer: MEDICARE

## 2022-10-31 DIAGNOSIS — N18.30 CHRONIC KIDNEY DISEASE, STAGE III (MODERATE): Primary | ICD-10-CM

## 2022-10-31 LAB
ANION GAP SERPL CALC-SCNC: 11 MMOL/L (ref 8–16)
BUN SERPL-MCNC: 13 MG/DL (ref 8–23)
CALCIUM SERPL-MCNC: 7.5 MG/DL (ref 8.7–10.5)
CHLORIDE SERPL-SCNC: 122 MMOL/L (ref 95–110)
CO2 SERPL-SCNC: 20 MMOL/L (ref 23–29)
CREAT SERPL-MCNC: 1.8 MG/DL (ref 0.5–1.4)
EST. GFR  (NO RACE VARIABLE): 30.5 ML/MIN/1.73 M^2
GLUCOSE SERPL-MCNC: 108 MG/DL (ref 70–110)
POTASSIUM SERPL-SCNC: 2.3 MMOL/L (ref 3.5–5.1)
SODIUM SERPL-SCNC: 153 MMOL/L (ref 136–145)

## 2022-10-31 PROCEDURE — 36415 COLL VENOUS BLD VENIPUNCTURE: CPT | Performed by: FAMILY MEDICINE

## 2022-10-31 PROCEDURE — 80048 BASIC METABOLIC PNL TOTAL CA: CPT | Performed by: FAMILY MEDICINE

## 2022-11-01 ENCOUNTER — TELEPHONE (OUTPATIENT)
Dept: HEMATOLOGY/ONCOLOGY | Facility: CLINIC | Age: 67
End: 2022-11-01
Payer: MEDICARE

## 2022-11-03 ENCOUNTER — TELEPHONE (OUTPATIENT)
Dept: SURGERY | Facility: CLINIC | Age: 67
End: 2022-11-03
Payer: MEDICARE

## 2022-11-04 ENCOUNTER — TELEPHONE (OUTPATIENT)
Dept: GYNECOLOGIC ONCOLOGY | Facility: CLINIC | Age: 67
End: 2022-11-04
Payer: MEDICARE

## 2022-11-04 PROBLEM — N89.8 VAGINAL CYST: Status: ACTIVE | Noted: 2022-11-04

## 2022-11-04 NOTE — TELEPHONE ENCOUNTER
----- Message from Shilpa Barnes MA sent at 11/4/2022  3:40 PM CDT -----    ----- Message -----  From: Nakia Valdes  Sent: 11/4/2022   3:36 PM CDT  To: Melida Hanson RN, Bob Ferrer Staff    Good Afternoon, Dr. Hernandez has put in a referral for the patient to see Dr. Rojas. Can we please contact patient with next available appointment.     Thanks ,   Nakia Valdes

## 2022-11-04 NOTE — TELEPHONE ENCOUNTER
Spoke with our patient daughter about her insurance, schedule appointment she voiced understanding of the date, time and location. All questions answered appointment mail. Provider Scheduling Coord.  Gynecologic Oncology MA/PAR /Preceptor Abhijit Fair

## 2022-11-06 ENCOUNTER — HOSPITAL ENCOUNTER (INPATIENT)
Facility: HOSPITAL | Age: 67
LOS: 18 days | Discharge: HOME HEALTH | DRG: 391 | End: 2022-11-25
Attending: EMERGENCY MEDICINE | Admitting: HOSPITALIST
Payer: MEDICARE

## 2022-11-06 DIAGNOSIS — D64.9 NORMOCYTIC ANEMIA: ICD-10-CM

## 2022-11-06 DIAGNOSIS — I95.9 HYPOTENSION, UNSPECIFIED HYPOTENSION TYPE: ICD-10-CM

## 2022-11-06 DIAGNOSIS — R11.10 VOMITING: ICD-10-CM

## 2022-11-06 DIAGNOSIS — E83.39 HYPOPHOSPHATEMIA: ICD-10-CM

## 2022-11-06 DIAGNOSIS — E43 SEVERE PROTEIN-CALORIE MALNUTRITION: ICD-10-CM

## 2022-11-06 DIAGNOSIS — N18.31 STAGE 3A CHRONIC KIDNEY DISEASE: ICD-10-CM

## 2022-11-06 DIAGNOSIS — Z79.899 POLYPHARMACY: ICD-10-CM

## 2022-11-06 DIAGNOSIS — I95.1 ORTHOSTATIC HYPOTENSION: ICD-10-CM

## 2022-11-06 DIAGNOSIS — K90.9 DIARRHEA DUE TO MALABSORPTION: ICD-10-CM

## 2022-11-06 DIAGNOSIS — Z20.89 CRYPTOSPORIDIUM EXPOSURE: ICD-10-CM

## 2022-11-06 DIAGNOSIS — K52.9 DIARRHEA CONCURRENT WITH AND DUE TO CARCINOID SYNDROME: ICD-10-CM

## 2022-11-06 DIAGNOSIS — I73.9 PAD (PERIPHERAL ARTERY DISEASE): ICD-10-CM

## 2022-11-06 DIAGNOSIS — I95.9 HYPOTENSION: ICD-10-CM

## 2022-11-06 DIAGNOSIS — N30.00 ACUTE CYSTITIS WITHOUT HEMATURIA: ICD-10-CM

## 2022-11-06 DIAGNOSIS — R68.89 MULTIPLE COMPLAINTS: ICD-10-CM

## 2022-11-06 DIAGNOSIS — K76.0 NAFLD (NONALCOHOLIC FATTY LIVER DISEASE): ICD-10-CM

## 2022-11-06 DIAGNOSIS — E87.6 HYPOKALEMIA: Primary | ICD-10-CM

## 2022-11-06 DIAGNOSIS — E83.42 HYPOMAGNESEMIA: ICD-10-CM

## 2022-11-06 DIAGNOSIS — R19.7 DIARRHEA DUE TO MALABSORPTION: ICD-10-CM

## 2022-11-06 DIAGNOSIS — I48.0 PAF (PAROXYSMAL ATRIAL FIBRILLATION): ICD-10-CM

## 2022-11-06 DIAGNOSIS — E34.0 DIARRHEA CONCURRENT WITH AND DUE TO CARCINOID SYNDROME: ICD-10-CM

## 2022-11-06 DIAGNOSIS — E83.51 HYPOCALCEMIA: ICD-10-CM

## 2022-11-06 DIAGNOSIS — K52.9 CHRONIC DIARRHEA: ICD-10-CM

## 2022-11-06 DIAGNOSIS — R42 DIZZINESS: ICD-10-CM

## 2022-11-06 DIAGNOSIS — R19.7 NAUSEA VOMITING AND DIARRHEA: ICD-10-CM

## 2022-11-06 DIAGNOSIS — M54.9 INTRACTABLE BACK PAIN: ICD-10-CM

## 2022-11-06 DIAGNOSIS — I10 ESSENTIAL HYPERTENSION: Chronic | ICD-10-CM

## 2022-11-06 DIAGNOSIS — R11.2 NAUSEA VOMITING AND DIARRHEA: ICD-10-CM

## 2022-11-06 DIAGNOSIS — R06.02 SOB (SHORTNESS OF BREATH): ICD-10-CM

## 2022-11-06 PROBLEM — N39.0 UTI (URINARY TRACT INFECTION): Status: ACTIVE | Noted: 2022-11-06

## 2022-11-06 PROBLEM — R56.9 SEIZURES: Status: ACTIVE | Noted: 2022-11-06

## 2022-11-06 PROBLEM — E11.9 TYPE 2 DIABETES MELLITUS, WITHOUT LONG-TERM CURRENT USE OF INSULIN: Chronic | Status: RESOLVED | Noted: 2021-02-03 | Resolved: 2022-11-06

## 2022-11-06 LAB
ALBUMIN SERPL BCP-MCNC: 1.5 G/DL (ref 3.5–5.2)
ALBUMIN SERPL BCP-MCNC: 1.5 G/DL (ref 3.5–5.2)
ALP SERPL-CCNC: 89 U/L (ref 55–135)
ALT SERPL W/O P-5'-P-CCNC: 10 U/L (ref 10–44)
ANION GAP SERPL CALC-SCNC: 8 MMOL/L (ref 8–16)
ANION GAP SERPL CALC-SCNC: 9 MMOL/L (ref 8–16)
AST SERPL-CCNC: 40 U/L (ref 10–40)
BACTERIA #/AREA URNS AUTO: ABNORMAL /HPF
BASOPHILS # BLD AUTO: 0.02 K/UL (ref 0–0.2)
BASOPHILS NFR BLD: 0.3 % (ref 0–1.9)
BILIRUB SERPL-MCNC: 0.9 MG/DL (ref 0.1–1)
BILIRUB UR QL STRIP: NEGATIVE
BNP SERPL-MCNC: 82 PG/ML (ref 0–99)
BUN SERPL-MCNC: 13 MG/DL (ref 8–23)
BUN SERPL-MCNC: 13 MG/DL (ref 8–23)
CALCIUM SERPL-MCNC: 7 MG/DL (ref 8.7–10.5)
CALCIUM SERPL-MCNC: 7 MG/DL (ref 8.7–10.5)
CHLORIDE SERPL-SCNC: 117 MMOL/L (ref 95–110)
CHLORIDE SERPL-SCNC: 119 MMOL/L (ref 95–110)
CLARITY UR REFRACT.AUTO: ABNORMAL
CO2 SERPL-SCNC: 16 MMOL/L (ref 23–29)
CO2 SERPL-SCNC: 18 MMOL/L (ref 23–29)
COLOR UR AUTO: YELLOW
CREAT SERPL-MCNC: 1.5 MG/DL (ref 0.5–1.4)
CREAT SERPL-MCNC: 1.6 MG/DL (ref 0.5–1.4)
D DIMER PPP IA.FEU-MCNC: 0.71 MG/L FEU
DIFFERENTIAL METHOD: ABNORMAL
EOSINOPHIL # BLD AUTO: 0 K/UL (ref 0–0.5)
EOSINOPHIL NFR BLD: 0 % (ref 0–8)
ERYTHROCYTE [DISTWIDTH] IN BLOOD BY AUTOMATED COUNT: 20.4 % (ref 11.5–14.5)
EST. GFR  (NO RACE VARIABLE): 35.1 ML/MIN/1.73 M^2
EST. GFR  (NO RACE VARIABLE): 38 ML/MIN/1.73 M^2
GLUCOSE SERPL-MCNC: 115 MG/DL (ref 70–110)
GLUCOSE SERPL-MCNC: 95 MG/DL (ref 70–110)
GLUCOSE UR QL STRIP: NEGATIVE
HCT VFR BLD AUTO: 27.5 % (ref 37–48.5)
HCV AB SERPL QL IA: NORMAL
HGB BLD-MCNC: 9.2 G/DL (ref 12–16)
HGB UR QL STRIP: ABNORMAL
HYALINE CASTS UR QL AUTO: 5 /LPF
IMM GRANULOCYTES # BLD AUTO: 0.05 K/UL (ref 0–0.04)
IMM GRANULOCYTES NFR BLD AUTO: 0.7 % (ref 0–0.5)
KETONES UR QL STRIP: NEGATIVE
LEUKOCYTE ESTERASE UR QL STRIP: ABNORMAL
LIPASE SERPL-CCNC: <4 U/L (ref 4–60)
LYMPHOCYTES # BLD AUTO: 1 K/UL (ref 1–4.8)
LYMPHOCYTES NFR BLD: 14.2 % (ref 18–48)
MAGNESIUM SERPL-MCNC: 1.5 MG/DL (ref 1.6–2.6)
MCH RBC QN AUTO: 29.6 PG (ref 27–31)
MCHC RBC AUTO-ENTMCNC: 33.5 G/DL (ref 32–36)
MCV RBC AUTO: 88 FL (ref 82–98)
MICROSCOPIC COMMENT: ABNORMAL
MONOCYTES # BLD AUTO: 0.7 K/UL (ref 0.3–1)
MONOCYTES NFR BLD: 10.1 % (ref 4–15)
NEUTROPHILS # BLD AUTO: 5.4 K/UL (ref 1.8–7.7)
NEUTROPHILS NFR BLD: 74.7 % (ref 38–73)
NITRITE UR QL STRIP: NEGATIVE
NRBC BLD-RTO: 0 /100 WBC
PH UR STRIP: 6 [PH] (ref 5–8)
PHOSPHATE SERPL-MCNC: 2.6 MG/DL (ref 2.7–4.5)
PLATELET # BLD AUTO: 228 K/UL (ref 150–450)
PMV BLD AUTO: 10.5 FL (ref 9.2–12.9)
POTASSIUM SERPL-SCNC: 2.4 MMOL/L (ref 3.5–5.1)
POTASSIUM SERPL-SCNC: 2.4 MMOL/L (ref 3.5–5.1)
PROT SERPL-MCNC: 4.6 G/DL (ref 6–8.4)
PROT UR QL STRIP: NEGATIVE
PTH-INTACT SERPL-MCNC: 113.6 PG/ML (ref 9–77)
RBC # BLD AUTO: 3.11 M/UL (ref 4–5.4)
RBC #/AREA URNS AUTO: 29 /HPF (ref 0–4)
SODIUM SERPL-SCNC: 143 MMOL/L (ref 136–145)
SODIUM SERPL-SCNC: 144 MMOL/L (ref 136–145)
SP GR UR STRIP: 1.01 (ref 1–1.03)
SQUAMOUS #/AREA URNS AUTO: 2 /HPF
TROPONIN I SERPL DL<=0.01 NG/ML-MCNC: 0.02 NG/ML (ref 0–0.03)
TROPONIN I SERPL DL<=0.01 NG/ML-MCNC: 0.02 NG/ML (ref 0–0.03)
URN SPEC COLLECT METH UR: ABNORMAL
WBC # BLD AUTO: 7.16 K/UL (ref 3.9–12.7)
WBC #/AREA URNS AUTO: >100 /HPF (ref 0–5)
WBC CLUMPS UR QL AUTO: ABNORMAL

## 2022-11-06 PROCEDURE — 80053 COMPREHEN METABOLIC PANEL: CPT | Performed by: EMERGENCY MEDICINE

## 2022-11-06 PROCEDURE — 87088 URINE BACTERIA CULTURE: CPT | Performed by: EMERGENCY MEDICINE

## 2022-11-06 PROCEDURE — 93010 ELECTROCARDIOGRAM REPORT: CPT | Mod: ,,, | Performed by: INTERNAL MEDICINE

## 2022-11-06 PROCEDURE — 96365 THER/PROPH/DIAG IV INF INIT: CPT

## 2022-11-06 PROCEDURE — 87449 NOS EACH ORGANISM AG IA: CPT | Performed by: HOSPITALIST

## 2022-11-06 PROCEDURE — 85025 COMPLETE CBC W/AUTO DIFF WBC: CPT | Performed by: EMERGENCY MEDICINE

## 2022-11-06 PROCEDURE — 25000003 PHARM REV CODE 250: Performed by: HOSPITALIST

## 2022-11-06 PROCEDURE — 87086 URINE CULTURE/COLONY COUNT: CPT | Performed by: EMERGENCY MEDICINE

## 2022-11-06 PROCEDURE — G0378 HOSPITAL OBSERVATION PER HR: HCPCS

## 2022-11-06 PROCEDURE — 87186 SC STD MICRODIL/AGAR DIL: CPT | Performed by: EMERGENCY MEDICINE

## 2022-11-06 PROCEDURE — 80069 RENAL FUNCTION PANEL: CPT | Performed by: HOSPITALIST

## 2022-11-06 PROCEDURE — 81001 URINALYSIS AUTO W/SCOPE: CPT | Performed by: EMERGENCY MEDICINE

## 2022-11-06 PROCEDURE — 83880 ASSAY OF NATRIURETIC PEPTIDE: CPT | Performed by: EMERGENCY MEDICINE

## 2022-11-06 PROCEDURE — 83735 ASSAY OF MAGNESIUM: CPT | Performed by: EMERGENCY MEDICINE

## 2022-11-06 PROCEDURE — 96361 HYDRATE IV INFUSION ADD-ON: CPT

## 2022-11-06 PROCEDURE — 99291 PR CRITICAL CARE, E/M 30-74 MINUTES: ICD-10-PCS | Mod: ,,, | Performed by: EMERGENCY MEDICINE

## 2022-11-06 PROCEDURE — 99291 CRITICAL CARE FIRST HOUR: CPT

## 2022-11-06 PROCEDURE — 99291 CRITICAL CARE FIRST HOUR: CPT | Mod: ,,, | Performed by: EMERGENCY MEDICINE

## 2022-11-06 PROCEDURE — 93010 EKG 12-LEAD: ICD-10-PCS | Mod: ,,, | Performed by: INTERNAL MEDICINE

## 2022-11-06 PROCEDURE — 87077 CULTURE AEROBIC IDENTIFY: CPT | Performed by: EMERGENCY MEDICINE

## 2022-11-06 PROCEDURE — 84484 ASSAY OF TROPONIN QUANT: CPT | Mod: 91 | Performed by: EMERGENCY MEDICINE

## 2022-11-06 PROCEDURE — 93005 ELECTROCARDIOGRAM TRACING: CPT

## 2022-11-06 PROCEDURE — 83970 ASSAY OF PARATHORMONE: CPT | Performed by: EMERGENCY MEDICINE

## 2022-11-06 PROCEDURE — 96366 THER/PROPH/DIAG IV INF ADDON: CPT

## 2022-11-06 PROCEDURE — 96367 TX/PROPH/DG ADDL SEQ IV INF: CPT

## 2022-11-06 PROCEDURE — 63600175 PHARM REV CODE 636 W HCPCS: Performed by: EMERGENCY MEDICINE

## 2022-11-06 PROCEDURE — 96375 TX/PRO/DX INJ NEW DRUG ADDON: CPT

## 2022-11-06 PROCEDURE — 99220 PR INITIAL OBSERVATION CARE,LEVL III: ICD-10-PCS | Mod: ,,, | Performed by: HOSPITALIST

## 2022-11-06 PROCEDURE — 99220 PR INITIAL OBSERVATION CARE,LEVL III: CPT | Mod: ,,, | Performed by: HOSPITALIST

## 2022-11-06 PROCEDURE — 89055 LEUKOCYTE ASSESSMENT FECAL: CPT | Performed by: HOSPITALIST

## 2022-11-06 PROCEDURE — 86803 HEPATITIS C AB TEST: CPT | Performed by: PHYSICIAN ASSISTANT

## 2022-11-06 PROCEDURE — 85379 FIBRIN DEGRADATION QUANT: CPT | Performed by: HOSPITALIST

## 2022-11-06 PROCEDURE — 87427 SHIGA-LIKE TOXIN AG IA: CPT | Mod: 59 | Performed by: HOSPITALIST

## 2022-11-06 PROCEDURE — 25000003 PHARM REV CODE 250: Performed by: EMERGENCY MEDICINE

## 2022-11-06 PROCEDURE — 87046 STOOL CULTR AEROBIC BACT EA: CPT | Performed by: HOSPITALIST

## 2022-11-06 PROCEDURE — 83690 ASSAY OF LIPASE: CPT | Performed by: EMERGENCY MEDICINE

## 2022-11-06 PROCEDURE — 87045 FECES CULTURE AEROBIC BACT: CPT | Performed by: HOSPITALIST

## 2022-11-06 RX ORDER — SODIUM CHLORIDE 9 MG/ML
1000 INJECTION, SOLUTION INTRAVENOUS
Status: COMPLETED | OUTPATIENT
Start: 2022-11-06 | End: 2022-11-06

## 2022-11-06 RX ORDER — DICYCLOMINE HYDROCHLORIDE 10 MG/1
20 CAPSULE ORAL
Status: COMPLETED | OUTPATIENT
Start: 2022-11-06 | End: 2022-11-06

## 2022-11-06 RX ORDER — GLUCAGON 1 MG
1 KIT INJECTION
Status: DISCONTINUED | OUTPATIENT
Start: 2022-11-06 | End: 2022-11-25 | Stop reason: HOSPADM

## 2022-11-06 RX ORDER — ASPIRIN 81 MG/1
81 TABLET ORAL DAILY
Status: DISCONTINUED | OUTPATIENT
Start: 2022-11-07 | End: 2022-11-25 | Stop reason: HOSPADM

## 2022-11-06 RX ORDER — ATORVASTATIN CALCIUM 20 MG/1
40 TABLET, FILM COATED ORAL NIGHTLY
Status: DISCONTINUED | OUTPATIENT
Start: 2022-11-06 | End: 2022-11-25 | Stop reason: HOSPADM

## 2022-11-06 RX ORDER — MAGNESIUM SULFATE HEPTAHYDRATE 40 MG/ML
2 INJECTION, SOLUTION INTRAVENOUS ONCE
Status: COMPLETED | OUTPATIENT
Start: 2022-11-06 | End: 2022-11-06

## 2022-11-06 RX ORDER — IBUPROFEN 200 MG
24 TABLET ORAL
Status: DISCONTINUED | OUTPATIENT
Start: 2022-11-06 | End: 2022-11-25 | Stop reason: HOSPADM

## 2022-11-06 RX ORDER — METOPROLOL TARTRATE 25 MG/1
25 TABLET, FILM COATED ORAL 2 TIMES DAILY
Status: DISCONTINUED | OUTPATIENT
Start: 2022-11-06 | End: 2022-11-19

## 2022-11-06 RX ORDER — ONDANSETRON 2 MG/ML
4 INJECTION INTRAMUSCULAR; INTRAVENOUS
Status: COMPLETED | OUTPATIENT
Start: 2022-11-06 | End: 2022-11-06

## 2022-11-06 RX ORDER — LANOLIN ALCOHOL/MO/W.PET/CERES
1 CREAM (GRAM) TOPICAL DAILY
Status: DISCONTINUED | OUTPATIENT
Start: 2022-11-07 | End: 2022-11-25 | Stop reason: HOSPADM

## 2022-11-06 RX ORDER — NALOXONE HCL 0.4 MG/ML
0.02 VIAL (ML) INJECTION
Status: DISCONTINUED | OUTPATIENT
Start: 2022-11-06 | End: 2022-11-25 | Stop reason: HOSPADM

## 2022-11-06 RX ORDER — IBUPROFEN 200 MG
16 TABLET ORAL
Status: DISCONTINUED | OUTPATIENT
Start: 2022-11-06 | End: 2022-11-25 | Stop reason: HOSPADM

## 2022-11-06 RX ORDER — PANTOPRAZOLE SODIUM 40 MG/1
40 TABLET, DELAYED RELEASE ORAL DAILY
Status: DISCONTINUED | OUTPATIENT
Start: 2022-11-07 | End: 2022-11-09

## 2022-11-06 RX ORDER — POTASSIUM CHLORIDE 7.45 MG/ML
10 INJECTION INTRAVENOUS
Status: COMPLETED | OUTPATIENT
Start: 2022-11-06 | End: 2022-11-06

## 2022-11-06 RX ORDER — LEVETIRACETAM 500 MG/1
500 TABLET ORAL 2 TIMES DAILY
Status: DISCONTINUED | OUTPATIENT
Start: 2022-11-06 | End: 2022-11-14

## 2022-11-06 RX ORDER — POTASSIUM CHLORIDE 20 MEQ/1
40 TABLET, EXTENDED RELEASE ORAL ONCE
Status: COMPLETED | OUTPATIENT
Start: 2022-11-06 | End: 2022-11-06

## 2022-11-06 RX ORDER — MAGNESIUM SULFATE HEPTAHYDRATE 40 MG/ML
2 INJECTION, SOLUTION INTRAVENOUS ONCE
Status: DISCONTINUED | OUTPATIENT
Start: 2022-11-06 | End: 2022-11-06

## 2022-11-06 RX ADMIN — POTASSIUM CHLORIDE EXTENDED-RELEASE 40 MEQ: 1500 TABLET ORAL at 05:11

## 2022-11-06 RX ADMIN — ONDANSETRON 4 MG: 2 INJECTION INTRAMUSCULAR; INTRAVENOUS at 11:11

## 2022-11-06 RX ADMIN — DICYCLOMINE HYDROCHLORIDE 20 MG: 10 CAPSULE ORAL at 01:11

## 2022-11-06 RX ADMIN — CEFTRIAXONE 1 G: 1 INJECTION, SOLUTION INTRAVENOUS at 01:11

## 2022-11-06 RX ADMIN — POTASSIUM CHLORIDE EXTENDED-RELEASE 40 MEQ: 1500 TABLET ORAL at 07:11

## 2022-11-06 RX ADMIN — POTASSIUM CHLORIDE 10 MEQ: 7.46 INJECTION, SOLUTION INTRAVENOUS at 03:11

## 2022-11-06 RX ADMIN — MAGNESIUM SULFATE 2 G: 2 INJECTION INTRAVENOUS at 05:11

## 2022-11-06 RX ADMIN — APIXABAN 5 MG: 5 TABLET, FILM COATED ORAL at 10:11

## 2022-11-06 RX ADMIN — SODIUM CHLORIDE 1000 ML: 0.9 INJECTION, SOLUTION INTRAVENOUS at 01:11

## 2022-11-06 RX ADMIN — LEVETIRACETAM 500 MG: 500 TABLET, FILM COATED ORAL at 10:11

## 2022-11-06 NOTE — ED PROVIDER NOTES
Encounter Date: 11/6/2022       History     Chief Complaint   Patient presents with    Diarrhea     Since aug 26 ,        67-year-old past medical history of recently diagnosed carcinoid syndrome, AFib on Eliquis, CHF, arthritis, GERD, glaucoma, hyperlipidemia, hypertension, prior MI, TIA presenting with persistent diarrhea.  Patient mentions having diarrhea intermittently since August 2022 more recently diarrhea has become worse over past few days has been having BM of loose stool every 1-2 hours.  Patient also endorses associated nausea with intermittent vomiting and lower cramping abdominal pain. Currently 5/10 with no aggravating alleviating factors.+  dizziness ,fatigue and shortness of breath.  Of note symptoms concerned likely secondary to carcinoid syndrome patient evaluated by Heme-Onc at [facility] with PET scan done recently showing foci in pancreatic head and tail.  As per patient and patient's daughter at bedside referred to [facility] for further workup, patient has scheduled appointment at Heme-Onc at [facility] this upcoming week.CT scan done 11/4 noted for enterocolitis.         Review of patient's allergies indicates:  No Known Allergies  Past Medical History:   Diagnosis Date    Anticoagulant long-term use     Arthritis     Atrial fibrillation     CHF (congestive heart failure)     Diabetes mellitus     Type2 resolved after weight loss surgery    DVT (deep venous thrombosis)     Encounter for blood transfusion     GERD (gastroesophageal reflux disease)     Glaucoma     Hypercholesterolemia     Hyperlipemia     Hypertension     Memory changes     Myocardial infarction     Renal failure     resolved    TIA (transient ischemic attack)      Past Surgical History:   Procedure Laterality Date    BACK SURGERY  06/21/2017    lumbar fusion 6/21/17 and surgery for hematoma to site on 6/26/17    CHOLECYSTECTOMY  1978    COLONOSCOPY N/A 10/3/2022    Procedure: COLONOSCOPY;  Surgeon:  Jourdan Parks MD;  Location: [facility] ENDO;  Service: General;  Laterality: N/A;    COLONOSCOPY N/A 10/11/2022    Procedure: COLONOSCOPY;  Surgeon: Stephen Cooper MD;  Location: [facility] ENDO;  Service: Endoscopy;  Laterality: N/A;    ESOPHAGOGASTRODUODENOSCOPY N/A 10/3/2022    Procedure: EGD (ESOPHAGOGASTRODUODENOSCOPY);  Surgeon: Jourdan Parks MD;  Location: [facility] ENDO;  Service: General;  Laterality: N/A;    GASTRIC BYPASS      HYSTERECTOMY  2012    JOINT REPLACEMENT      TKR    LEFT HEART CATHETERIZATION Left 2/5/2021    Procedure: Left heart cath;  Surgeon: Shane Pacheco MD;  Location: [facility] CATH;  Service: Cardiovascular;  Laterality: Left;    PELVIC LAPAROSCOPY Left     OOPH    RENAL BIOPSY N/A 10/5/2022    Procedure: BIOPSY, KIDNEY;  Surgeon: Velia Diagnostic Provider;  Location: [facility] OR;  Service: General;  Laterality: N/A;    RIGHT HEART CATHETERIZATION Right 2/5/2021    Procedure: INSERTION, CATHETER, RIGHT HEART. via left radial and left brachial;  Surgeon: Shane Pacheco MD;  Location: [facility] CATH;  Service: Cardiovascular;  Laterality: Right;    TOTAL ABDOMINAL HYSTERECTOMY W/ BILATERAL SALPINGOOPHORECTOMY       Family History   Problem Relation Age of Onset    Arthritis Mother     Breast cancer Mother     Heart disease Mother     Hypertension Mother     Cancer Father     Heart disease Father     Hypertension Father     Diabetes Daughter      Social History     Tobacco Use    Smoking status: Never    Smokeless tobacco: Never   Substance Use Topics    Alcohol use: Yes     Comment: occasional    Drug use: No     Review of Systems   Constitutional:  Positive for fatigue. Negative for fever.   HENT:  Negative for congestion.    Respiratory:  Positive for shortness of breath. Negative for cough.    Cardiovascular:  Negative for chest pain.   Gastrointestinal:  Positive for abdominal pain, diarrhea, nausea and vomiting.   Genitourinary:  Negative for dysuria.   Musculoskeletal: Negative.    Skin:  Negative  for color change.   Neurological:  Negative for dizziness.   Psychiatric/Behavioral: Negative.       Physical Exam     Initial Vitals [11/06/22 1052]   BP Pulse Resp Temp SpO2   111/63 61 18 98.7 °F (37.1 °C) 99 %      MAP       --         Physical Exam    Constitutional: She appears well-developed and well-nourished. She is not diaphoretic. No distress.   HENT:   Head: Normocephalic and atraumatic.   Eyes: Conjunctivae and EOM are normal. Pupils are equal, round, and reactive to light. Right eye exhibits no discharge. Left eye exhibits no discharge. No scleral icterus.   Neck: Neck supple.   Normal range of motion.  Cardiovascular:  Normal rate and regular rhythm.     Exam reveals no friction rub.       No murmur heard.  Pulmonary/Chest: Breath sounds normal. No respiratory distress. She has no wheezes. She has no rales.   Abdominal: Abdomen is soft. Bowel sounds are normal. She exhibits no distension. There is abdominal tenderness.   Mild tenderness palpation lower abdomen. There is no rebound and no guarding.   Musculoskeletal:         General: Normal range of motion.      Cervical back: Normal range of motion and neck supple.     Neurological: She is alert and oriented to person, place, and time. No cranial nerve deficit or sensory deficit. GCS score is 15. GCS eye subscore is 4. GCS verbal subscore is 5. GCS motor subscore is 6.   Skin: Skin is warm and dry. No erythema. No pallor.   Psychiatric: She has a normal mood and affect. Her behavior is normal. Judgment and thought content normal.       ED Course   Procedures  Labs Reviewed   CBC W/ AUTO DIFFERENTIAL - Abnormal; Notable for the following components:       Result Value    RBC 3.11 (*)     Hemoglobin 9.2 (*)     Hematocrit 27.5 (*)     RDW 20.4 (*)     Immature Granulocytes 0.7 (*)     Immature Grans (Abs) 0.05 (*)     Gran % 74.7 (*)     Lymph % 14.2 (*)     All other components within normal limits   URINALYSIS, REFLEX TO URINE CULTURE - Abnormal;  Notable for the following components:    Appearance, UA Hazy (*)     Occult Blood UA 3+ (*)     Leukocytes, UA 3+ (*)     All other components within normal limits    Narrative:     Specimen Source->Urine   URINALYSIS MICROSCOPIC - Abnormal; Notable for the following components:    RBC, UA 29 (*)     WBC, UA >100 (*)     WBC Clumps, UA Few (*)     Hyaline Casts, UA 5 (*)     All other components within normal limits    Narrative:     Specimen Source->Urine   COMPREHENSIVE METABOLIC PANEL - Abnormal; Notable for the following components:    Potassium 2.4 (*)     Chloride 117 (*)     CO2 18 (*)     Glucose 115 (*)     Creatinine 1.6 (*)     Calcium 7.0 (*)     Total Protein 4.6 (*)     Albumin 1.5 (*)     eGFR 35.1 (*)     All other components within normal limits   MAGNESIUM - Abnormal; Notable for the following components:    Magnesium 1.5 (*)     All other components within normal limits   CULTURE, URINE   HEPATITIS C ANTIBODY    Narrative:     Release to patient->Immediate   TROPONIN I   B-TYPE NATRIURETIC PEPTIDE   LIPASE   LIPASE    Narrative:     add on cmp lipase and mg per dr.rushi pace /order#[id number]  [id number]  [id number] @ 11/06/2022  14:02    TROPONIN I   HIV 1 / 2 ANTIBODY   D DIMER, QUANTITATIVE   RENAL FUNCTION PANEL   ISTAT CHEM8          Imaging Results              X-Ray Chest AP Portable (Final result)  Result time 11/06/22 12:17:06      Final result by Jarvis Delaney MD (11/06/22 12:17:06)                   Impression:      Grossly stable chronic findings as above without detrimental change or convincing radiographic evidence of pneumonia or other source of shortness of breath on this single view, noting that early/mild viral pneumonia may be radiographically occult.      Electronically signed by: Jarvis Delaney MD  Date:    11/06/2022  Time:    12:17               Narrative:    EXAMINATION:  XR CHEST AP PORTABLE    CLINICAL HISTORY:  Shortness of breath    TECHNIQUE:  Single frontal view of the  chest was performed.    COMPARISON:  Chest radiograph 10/31/2022 and CT abdomen and pelvis 11/04/2022    FINDINGS:  Patient is slightly rotated.  Resolution is limited by body habitus with underpenetration.    Cardiomediastinal silhouette is midline and prominent without evidence of failure, stable.  Similar nonspecific elevation of the right hemidiaphragm.  Pulmonary vasculature and hilar contours are within normal limits.    Left basilar platelike scarring versus atelectasis unchanged.  The lungs are otherwise well expanded without large consolidation, pleural effusion or pneumothorax.    Osseous structures appear stable without acute process seen.  PA and lateral views can be obtained.                                       Medications   potassium chloride SA CR tablet 40 mEq (has no administration in time range)   magnesium sulfate 2g in water 50mL IVPB (premix) (has no administration in time range)   magnesium sulfate 2g in water 50mL IVPB (premix) (has no administration in time range)   potassium chloride SA CR tablet 40 mEq (has no administration in time range)   cefTRIAXone (ROCEPHIN) 1 g/50 mL D5W IVPB (has no administration in time range)   naloxone 0.4 mg/mL injection 0.02 mg (has no administration in time range)   glucose chewable tablet 16 g (has no administration in time range)   glucose chewable tablet 24 g (has no administration in time range)   glucagon (human recombinant) injection 1 mg (has no administration in time range)   dextrose 10% bolus 125 mL (has no administration in time range)   dextrose 10% bolus 250 mL (has no administration in time range)   ondansetron injection 4 mg (4 mg Intravenous Given 11/6/22 1145)   0.9%  NaCl infusion (0 mLs Intravenous Stopped 11/6/22 1423)   dicyclomine capsule 20 mg (20 mg Oral Given 11/6/22 1334)   cefTRIAXone (ROCEPHIN) 1 g/50 mL D5W IVPB (0 g Intravenous Stopped 11/6/22 1422)   potassium chloride 10 mEq in 100 mL IVPB (10 mEq Intravenous New Bag 11/6/22  2531)     Medical Decision Making:   History:   Old Medical Records: I decided to obtain old medical records.  Initial Assessment:   67-year-old presenting with persistent diarrhea since August 2022 recently diagnosed carcinoid syndrome.  Differential Diagnosis:   DX includes dehydration, electrolyte derangement, gastroenteritis, gastritis, carcinoid syndrome, ACS, CHF, XAVI  Clinical Tests:   Lab Tests: Ordered and Reviewed  Radiological Study: Ordered and Reviewed  Medical Tests: Reviewed and Ordered  ED Management:  Plan:  Obtain CBC, CMP, tropes, BNP, chest x-ray, fluids and reassess.          Attending Attestation:         Attending Critical Care:   Critical Care Times:   Direct Patient Care (initial evaluation, reassessments, and time considering the case)................................................................15 minutes.   Additional History from reviewing old medical records or taking additional history from the family, EMS, PCP, etc.......................6 minutes.   Ordering, Reviewing, and Interpreting Diagnostic Studies...............................................................................................................7 minutes.   Documentation..................................................................................................................................................................................6 minutes.   Consultation with the patient's family directly relating to the patient's condition, care, and DNR status (when patient unable)......5 minutes.   ==============================================================  Total Critical Care Time - exclusive of procedural time: 39 minutes.  ==============================================================  Critical Care Condition: potentially life-threatening         ED Course as of 11/06/22 1616   Sun Nov 06, 2022   1405 Hemoglobin(!): 9.2  Hgb at baseline  [DC]   1405 WBC, UA(!): >100  UTI noted, given ceftriaxone  [DC]    1528 Potassium(!!): 2.4 [DC]   1528 Creatinine(!): 1.6 [DC]   1528 Calcium(!): 7.0  Noted electrolyte derangement plan for hospital medicine for further evaluation. [DC]      ED Course User Index  [DC] Caty Villar Jr., MD                 Clinical Impression:   Final diagnoses:  [R68.89] Multiple complaints  [R42] Dizziness  [R06.02] SOB (shortness of breath)  [E87.6] Hypokalemia (Primary)  [E83.42] Hypomagnesemia  [R11.2, R19.7] Nausea vomiting and diarrhea        ED Disposition Condition    Observation                 Caty Villar Jr., MD  11/06/22 1207       Caty Villar Jr., MD  11/06/22 1615       Caty Villar Jr., MD  11/06/22 1612

## 2022-11-06 NOTE — ASSESSMENT & PLAN NOTE
seems to be at baseline. Creatine stable for now. BMP reviewed- noted Estimated Creatinine Clearance: 37.5 mL/min (A) (based on SCr of 1.6 mg/dL (H)). according to latest data. Monitor UOP and serial BMP and adjust therapy as needed. Renally dose meds.    Recent Labs   Lab 11/01/22  0515 11/04/22  1052 11/06/22  1435   BUN 14 16 13   CREATININE 1.61* 1.77* 1.6*

## 2022-11-06 NOTE — H&P
[facility] - Emergency Dept  [facility]  History & Physical    Patient Name: Cherry Santiago  MRN: [id number]  Patient Class: OP- Observation  Admission Date: 11/6/2022  Attending Physician: Kyle Vera MD   Primary Care Provider: Amarilys Romero MD         Patient information was obtained from patient, past medical records and ER records.     Subjective:     Principal Problem:Diarrhea concurrent with and due to carcinoid syndrome    Chief Complaint:   Chief Complaint   Patient presents with    Diarrhea     Since aug 26 ,         HPI: Cherry Pozo is a 67-year-old past medical history of recently diagnosed carcinoid syndrome, AFib on Eliquis, CHF, arthritis, GERD, glaucoma, hyperlipidemia, hypertension, prior MI, TIA presenting with persistent diarrhea.      AAOx 3 . accompanied by the daughter at the bedside. Patient mentions having diarrhea intermittently since August 2022 more recently diarrhea has become worse over past few days -several episodes daily  associated nausea with intermittent vomiting. c/o lower cramping abdominal pain - 5/10.   symptoms concerned likely though to be secondary to carcinoid syndrome patient evaluated by Heme-Onc at [facility] with PET scan done 10/18 - Focal nodular foci of radiotracer uptake at the pancreatic tail and near the region of the pancreatic head above liver background activity could indicate somatostatin receptor avid lesions, but no corresponding mass is seen on CT portion of the study.  Recommend follow-up contrast enhanced pancreas protocol CT to further assess. 5HIAA levels on 10/21 WNL . CT AP W contrast done 11/4 shows No pancreatic lesion and Enterocolitis,. recent EGD -Non-bleeding gastric ulcers with no stigmata of  bleeding. Biopsied. Treated with a monopolar probe. colonoscopy with biopsy -No significant pathologic abnormality. No morphologic evidence of active inflammatory bowel disease, microscopic colitis, or  neoplasia. Patient was  referred to [facility] for further workup and has scheduled appointment at Heme-Onc at [facility] this upcoming week. . c/o  dizziness,  and shortness of breath on minimal exertion    ER course -electrolyte derangement with hypokalemia to 2.4, hypomagsemia of 1.5 . replaced . UA + started on IV cefriaxone       Past Medical History:   Diagnosis Date    Anticoagulant long-term use      Arthritis      Atrial fibrillation      CHF (congestive heart failure)      Diabetes mellitus       Type2 resolved after weight loss surgery    DVT (deep venous thrombosis)      Encounter for blood transfusion      GERD (gastroesophageal reflux disease)      Glaucoma      Hypercholesterolemia      Hyperlipemia      Hypertension      Memory changes      Myocardial infarction      Renal failure       resolved    TIA (transient ischemic attack)              Past Surgical History:   Procedure Laterality Date    BACK SURGERY   06/21/2017     lumbar fusion 6/21/17 and surgery for hematoma to site on 6/26/17    CHOLECYSTECTOMY   1978    COLONOSCOPY N/A 10/3/2022     Procedure: COLONOSCOPY;  Surgeon: Jourdan Parks MD;  Location: [facility];  Service: General;  Laterality: N/A;    COLONOSCOPY N/A 10/11/2022     Procedure: COLONOSCOPY;  Surgeon: Stephen Cooper MD;  Location: [facility];  Service: Endoscopy;  Laterality: N/A;    ESOPHAGOGASTRODUODENOSCOPY N/A 10/3/2022     Procedure: EGD (ESOPHAGOGASTRODUODENOSCOPY);  Surgeon: Jourdan Parks MD;  Location: [facility];  Service: General;  Laterality: N/A;    GASTRIC BYPASS        HYSTERECTOMY   2012    JOINT REPLACEMENT         TKR    LEFT HEART CATHETERIZATION Left 2/5/2021     Procedure: Left heart cath;  Surgeon: Shane Pacheco MD;  Location: [facility] CATH;  Service: Cardiovascular;  Laterality: Left;    PELVIC LAPAROSCOPY Left       OOPH    RENAL BIOPSY N/A 10/5/2022     Procedure: BIOPSY, KIDNEY;  Surgeon: Velia Diagnostic Provider;  Location: [facility] OR;   Service: General;  Laterality: N/A;    RIGHT HEART CATHETERIZATION Right 2/5/2021     Procedure: INSERTION, CATHETER, RIGHT HEART. via left radial and left brachial;  Surgeon: Shane Pacheco MD;  Location: [facility] CATH;  Service: Cardiovascular;  Laterality: Right;    TOTAL ABDOMINAL HYSTERECTOMY W/ BILATERAL SALPINGOOPHORECTOMY                Family History   Problem Relation Age of Onset    Arthritis Mother      Breast cancer Mother      Heart disease Mother      Hypertension Mother      Cancer Father      Heart disease Father      Hypertension Father      Diabetes Daughter        Social History            Tobacco Use    Smoking status: Never    Smokeless tobacco: Never   Substance Use Topics    Alcohol use: Yes       Comment: occasional    Drug use: No          Review of Systems:   Pain scale:    Constitutional:  fever,  chills, headache, vision loss, hearing loss, weight loss 30lb , Generalized weakness, falls, loss of smell, loss of taste, poor appetite,  sore throat  Respiratory: cough, shortness of breath.   Cardiovascular: chest pain, dizziness, palpitations, orthopnea, swelling of feet, syncope  Gastrointestinal: nausea, vomiting, abdominal pain, diarrhea, black stool,  blood in stool, change in bowel habits  Genitourinary: hematuria, dysuria, urgency, frequency  Integument/Breast: rash,  pruritis  Hematologic/Lymphatic: easy bruising, lymphadenopathy  Musculoskeletal: arthralgias , myalgias, back pain, neck pain, knee pain  Neurological: confusion, seizures, tremors, slurred speech  Behavioral/Psych:  depression, anxiety, auditory or visual hallucinations     OBJECTIVE:     Physical Exam:  Body mass index is 32.45 kg/m².    Constitutional: Appears well-developed and well-nourished. obesity  Head: Normocephalic and atraumatic.   Neck: Normal range of motion. Neck supple.   Cardiovascular: Normal heart rate.  Regular heart rhythm.  Pulmonary/Chest: Effort normal.   Abdominal: No distension. +  tenderness-  LLQ  Musculoskeletal: Normal range of motion. ++edema.   Neurological: Alert and oriented to person, place, and time.   Skin: Skin is warm and dry.   Psychiatric: Normal mood and affect. Behavior is normal.                  Vital Signs  Temp: 98.7 °F (37.1 °C) (11/06/22 1052)  Pulse: 102 (11/06/22 1732)  Resp: 18 (11/06/22 1722)  BP: 113/65 (11/06/22 1732)  SpO2: 100 % (11/06/22 1732)     24 Hour VS Range    Temp:  [98.7 °F (37.1 °C)]   Pulse:  []   Resp:  [18-20]   BP: (111-138)/(54-78)   SpO2:  [96 %-100 %]     Intake/Output Summary (Last 24 hours) at 11/6/2022 1925  Last data filed at 11/6/2022 1701  Gross per 24 hour   Intake 100 ml   Output no documentation   Net 100 ml         I/O This Shift:  No intake/output data recorded.    Wt Readings from Last 3 Encounters:   11/06/22 88.5 kg (195 lb)   11/01/22 95.4 kg (210 lb 5.1 oz)   10/07/22 89.7 kg (197 lb 12.8 oz)       I have personally reviewed the vitals and recorded Intake/Output     Laboratory/Diagnostic Data:    CBC/Anemia Labs: Coags:    Recent Labs   Lab 11/01/22 0515 11/04/22  1052 11/06/22  1251   WBC 6.10 8.10 7.16   HGB 8.6* 9.0* 9.2*   HCT 26.1* 27.5* 27.5*    211 228   MCV 88 89 88   RDW 17.2* 16.8* 20.4*    No results for input(s): PT, INR, APTT in the last 168 hours.     Chemistries: ABG:   Recent Labs   Lab 10/31/22  2013 11/01/22  0515 11/04/22  1052 11/06/22  1435    142 141 144   K 2.3* 3.6 2.5* 2.4*   * 117* 114* 117*   CO2 22* 22* 21* 18*   BUN 13 14 16 13   CREATININE 1.77* 1.61* 1.77* 1.6*   CALCIUM 7.8* 7.5* 7.7* 7.0*   PROT 5.4* 5.0*  --  4.6*   BILITOT 0.7 0.9  --  0.9   ALKPHOS 105 96  --  89   ALT 21 18  --  10   AST 61* 61*  --  40   MG 1.7  --   --  1.5*    No results for input(s): PH, PCO2, PO2, HCO3, POCSATURATED, BE in the last 168 hours.     POCT Glucose: HbA1c:    No results for input(s): POCTGLUCOSE in the last 168 hours. Hemoglobin A1C   Date Value Ref Range Status   10/13/2022 4.7 4.0 - 6.0 % Final    04/12/2021 4.1 (L) 4.7 - 5.6 % Final     Hemoglobin A1c   Date Value Ref Range Status   07/07/2017 <3.50 4.2 - 6.3 % Final        Cardiac Enzymes: Ejection Fractions:    Recent Labs     11/06/22  1251 11/06/22  1435   TROPONINI 0.016 0.019    No results found for: EF       Recent Labs   Lab 11/06/22  1157   COLORU Yellow   APPEARANCEUA Hazy*   PHUR 6.0   SPECGRAV 1.015   PROTEINUA Negative   GLUCUA Negative   KETONESU Negative   BILIRUBINUA Negative   OCCULTUA 3+*   NITRITE Negative   LEUKOCYTESUR 3+*   RBCUA 29*   WBCUA >100*   BACTERIA Occasional   SQUAMEPITHEL 2   HYALINECASTS 5*       Procalcitonin (ng/mL)   Date Value   10/12/2022 0.15   08/26/2022 0.13   08/22/2022 0.09     Lactate (Lactic Acid) (mmol/L)   Date Value   10/12/2022 1.7   07/12/2017 1.0     BNP (pg/mL)   Date Value   11/06/2022 82     CRP   Date Value   10/12/2022 18.0 mg/L (H)   10/07/2022 24.4 mg/L (H)   08/22/2022 19.0 mg/L (H)   08/19/2022 1.50 mg/dL (H)     Sed Rate   Date Value   10/12/2022 <1 mm/Hr   10/07/2022 <1 mm/Hr   08/19/2022 8 mm/Hr   07/08/2019 22 mm/Hr   07/07/2017 11 mm/hr     D-Dimer   Date Value   11/06/2022 0.71 mg/L FEU (H)   02/25/2021 2.21 ug/mL FEU (H)   02/03/2021 1.19 ug/mL FEU (H)     Ferritin (ng/mL)   Date Value   09/21/2022 272.0 (H)   08/27/2022 262.0   06/19/2019 14 (L)   07/12/2017 106.0     Ferritin Level (ng/mL)   Date Value   05/10/2021 263.87 (H)   03/22/2021 385.48 (H)   02/11/2021 386.77 (H)   01/05/2021 34.52   11/24/2020 21.72   08/16/2017 94.2     LD (U/L)   Date Value   08/23/2022 235     Lactate Dehydrogenase (unit/L)   Date Value   05/10/2021 333 (H)   03/22/2021 337 (H)   02/12/2021 340 (H)     Troponin I (ng/mL)   Date Value   11/06/2022 0.019   11/06/2022 0.016   10/31/2022 0.022   05/11/2022 0.015   03/05/2021 0.016   03/04/2021 <0.012   03/04/2021 0.016   02/06/2021 0.138 ()     Results for orders placed or performed during the hospital encounter of 08/22/22   Vitamin D   Result Value Ref  Range    Vit D, 25-Hydroxy <12.8 (L) 30.0 - 100.0 ng/mL   Results for orders placed or performed in visit on 08/04/11   Vitamin D 25 hydroxy   Result Value Ref Range    Vit D, 25-Hydroxy 13 (L) 30 - 100 ng/mL     SARS-CoV2 (COVID-19) Qualitative PCR (no units)   Date Value   10/16/2020 NOT DETECTED     SARS-CoV-2 RNA, Amplification, Qual (no units)   Date Value   05/11/2022 Negative   03/01/2021 Negative   02/05/2021 Negative       Microbiology labs for the last week  Microbiology Results (last 7 days)       Procedure Component Value Units Date/Time    Clostridium difficile EIA [[id number]]     Order Status: No result Specimen: Stool     Stool culture [[id number]]     Order Status: No result Specimen: Stool     Urine culture [[id number]] Collected: 11/06/22 1157    Order Status: No result Specimen: Urine Updated: 11/06/22 1304            Reviewed and noted in plan where applicable- Please see chart for full lab data.    Lines/Drains:       Peripheral IV - Single Lumen 11/06/22 1300 22 G Left;Posterior;Proximal Forearm (Active)   Number of days: 0       Imaging      No results found for this or any previous visit.      X-Ray Chest AP Portable  Narrative: EXAMINATION:  XR CHEST AP PORTABLE    CLINICAL HISTORY:  Shortness of breath    TECHNIQUE:  Single frontal view of the chest was performed.    COMPARISON:  Chest radiograph 10/31/2022 and CT abdomen and pelvis 11/04/2022    FINDINGS:  Patient is slightly rotated.  Resolution is limited by body habitus with underpenetration.    Cardiomediastinal silhouette is midline and prominent without evidence of failure, stable.  Similar nonspecific elevation of the right hemidiaphragm.  Pulmonary vasculature and hilar contours are within normal limits.    Left basilar platelike scarring versus atelectasis unchanged.  The lungs are otherwise well expanded without large consolidation, pleural effusion or pneumothorax.    Osseous structures appear stable without acute process seen.   PA and lateral views can be obtained.  Impression: Grossly stable chronic findings as above without detrimental change or convincing radiographic evidence of pneumonia or other source of shortness of breath on this single view, noting that early/mild viral pneumonia may be radiographically occult.    Electronically signed by: Jarvis Delaney MD  Date:    11/06/2022  Time:    12:17    EKG- sinus tachycardia with PACs    Labs, Imaging, EKG and Diagnostic results from 11/6/2022 were reviewed.    Medications:  Medication list was reviewed and changes noted under Assessment/Plan.  No current facility-administered medications on file prior to encounter.     Current Outpatient Medications on File Prior to Encounter   Medication Sig Dispense Refill    apixaban (ELIQUIS) 5 mg Tab Take 1 tablet (5 mg total) by mouth 2 (two) times daily.      aspirin (ECOTRIN) 81 MG EC tablet Take 81 mg by mouth once daily.      atorvastatin (LIPITOR) 40 MG tablet Take 1 tablet (40 mg total) by mouth every evening. (Patient not taking: Reported on 11/4/2022) 90 tablet 3    citric acid-potassium citrate (POLYCITRA) 1,100-334 mg/5 mL solution potassium citrate-citric acid 1,100 mg-334 mg/5 mL oral solution   TAKE 5 MLS (10 MEQ TOTAL) BY MOUTH ONCE. FOR 1 DOSE ONLY      diphenoxylate-atropine 2.5-0.025 mg (LOMOTIL) 2.5-0.025 mg per tablet Take 1 tablet by mouth 4 (four) times daily as needed for Diarrhea (unreleived with Imodium). (Patient not taking: Reported on 11/4/2022) 30 tablet 0    ferrous sulfate (FEOSOL) 325 mg (65 mg iron) Tab tablet Take 325 mg by mouth daily with breakfast.      furosemide (LASIX) 20 MG tablet Take 20 mg by mouth once daily.      levETIRAcetam (KEPPRA) 250 MG Tab Take 500 mg by mouth 2 (two) times daily.      loperamide (IMODIUM) 2 mg capsule Take 1 capsule (2 mg total) by mouth 4 (four) times daily as needed for Diarrhea. (Patient not taking: Reported on 11/4/2022) 180 capsule 0    metoclopramide HCl (REGLAN) 10 MG  tablet Take 1 tablet (10 mg total) by mouth every 6 (six) hours as needed (Nausea). (Patient not taking: Reported on 11/4/2022) 14 tablet 0    metoprolol tartrate (LOPRESSOR) 25 MG tablet Take 1 tablet (25 mg total) by mouth 2 (two) times daily. (Patient not taking: Reported on 11/4/2022) 60 tablet 11    midodrine (PROAMATINE) 5 MG Tab Take 1 tablet (5 mg total) by mouth 3 (three) times daily with meals. 90 tablet 0    pantoprazole (PROTONIX) 40 MG tablet Take 40 mg by mouth once daily.      potassium chloride SA (K-DUR,KLOR-CON) 20 MEQ tablet Take 1 tablet (20 mEq total) by mouth 2 (two) times daily. 60 tablet 0    sodium bicarbonate 650 MG tablet Take 1 tablet (650 mg total) by mouth 2 (two) times daily. for 5 days 10 tablet 0    timolol maleate 0.5% (TIMOPTIC) 0.5 % Drop Place 1 drop into both eyes 2 (two) times daily.       Scheduled Medications:  apixaban, 5 mg, Oral, BID  [START ON 11/7/2022] aspirin, 81 mg, Oral, Daily  atorvastatin, 40 mg, Oral, QHS  [START ON 11/7/2022] cefTRIAXone (ROCEPHIN) IVPB, 1 g, Intravenous, Q24H  [START ON 11/7/2022] ferrous sulfate, 1 tablet, Oral, Daily  levETIRAcetam, 500 mg, Oral, BID  metoprolol tartrate, 25 mg, Oral, BID  [START ON 11/7/2022] pantoprazole, 40 mg, Oral, Daily  potassium chloride, 40 mEq, Oral, Once    PRN: dextrose 10%, dextrose 10%, glucagon (human recombinant), glucose, glucose, naloxone  Infusions:   Estimated Creatinine Clearance: 37.5 mL/min (A) (based on SCr of 1.6 mg/dL (H)).  Assessment/Plan:     * Carcinoid syndrome related chronic diarrhea   ? 67-year-old past medical history of recently diagnosed carcinoid syndrome,  having diarrhea intermittently since August 2022 more recently diarrhea has become worse over past few days -several episodes daily  associated nausea with intermittent vomiting.     symptoms concerned likely though to be secondary to carcinoid syndrome patient evaluated by Heme-Onc at [facility] with PET scan done  10/18 - Focal nodular foci of radiotracer uptake at the pancreatic tail and near the region of the pancreatic head above liver background activity could indicate somatostatin receptor avid lesions, but no corresponding mass is seen on CT portion of the study.  Recommend follow-up contrast enhanced pancreas protocol CT to further assess. 5HIAA levels on 10/21 WNL . CT AP W contrast done 11/4 shows No pancreatic lesion and Enterocolitis,. recent EGD -Non-bleeding gastric ulcers with no stigmata of  bleeding. Biopsied. Treated with a monopolar probe. colonoscopy with biopsy -No significant pathologic abnormality. No morphologic evidence of active inflammatory bowel disease, microscopic colitis, or neoplasia. Patient was  referred to [facility] for further workup and has scheduled appointment at Heme-Onc at [facility] this upcoming week.    obtain stool studies    Seizures  continue with keppra BID      UTI (urinary tract infection)     urinalysis positive . Culture, Urine pending  continue with anitbiotic IV ceftriaxone      Hypomagnesemia  replaced      Stage 3a chronic kidney disease  seems to be at baseline. Creatine stable for now. BMP reviewed- noted Estimated Creatinine Clearance: 37.5 mL/min (A) (based on SCr of 1.6 mg/dL (H)). according to latest data. Monitor UOP and serial BMP and adjust therapy as needed. Renally dose meds.    Recent Labs   Lab 11/01/22  0515 11/04/22  1052 11/06/22  1435   BUN 14 16 13   CREATININE 1.61* 1.77* 1.6*          Chronic heart failure with preserved ejection fraction (HFpEF) NICM NYHA2   Patient admitted without  signs and symptoms of CHF exacerbation    Results for orders placed or performed during the hospital encounter of 11/06/22   Brain natriuretic peptide   Result Value Ref Range    BNP 82 0 - 99 pg/mL   . Telemetry monitoring. Strict input/ Output monitor, Daily weights.    denies chestpain.  Obtain serial troponins.  Cardiac Enzymes trend  Recent Labs   Lab  10/31/22  2013 11/06/22  1251   TROPONINI 0.022 0.016     No results found for this or any previous visit.  echo 10/7/22 left ventricle is normal in size. Global left ventricular   systolic function is normal. The left ventricular ejection fraction is   55%. Left ventricular diastolic function is abnormal (stage I impaired   relaxation). Noted left ventricular hypertrophy. Concentric left   ventricular hypertrophy is present. It is mild.     4. Mild (1+) tricuspid regurgitation.   5. The right ventricle is normal in size. Right ventricular systolic   function is normal.          Chronic deep vein thrombosis (DVT): right common femoral vein  DVT sonogram 8/22 and 10/22 negative for DVT      Dizziness  likely secondary to diarrhea. orthostasis. IV hydration      Hypokalemia    - Patient with potassium Recent Labs   Lab 11/01/22 0515 11/04/22 1052 11/06/22  1435   K 3.6 2.5* 2.4*   . replaced. monitor    Anemia of chronic disease    Patient's with Normocytic anemia.. Hemoglobin stable. Etiology likely due to chronic disease .  Current CBC reviewed-  Recent Labs   Lab 11/01/22 0515 11/04/22 1052 11/06/22  1251   HGB 8.6* 9.0* 9.2*    Monitor CBC and transfuse if H/H drops below 7/21.       Essential hypertension    Chronic, controlled.  Latest blood pressure and vitals reviewed-   Temp:  [98.7 °F (37.1 °C)]   Pulse:  []   Resp:  [18-20]   BP: (111-138)/(54-78)   SpO2:  [96 %-100 %] .   Home meds for hypertension were reviewed and hold furosemide and metoprolol for now as with diarrhea. PRN meds if BP> 180/110 mm HG      Class 1 obesity due to excess calories with serious comorbidity and body mass index (BMI) of 32.0 to 32.9 in adult  Body mass index is 32.45 kg/m². Morbid obesity complicates all aspects of disease management from diagnostic modalities to treatment. Weight loss encouraged       VTE Risk Mitigation (From admission, onward)           Ordered     apixaban tablet 5 mg  2 times daily          11/06/22 1711     IP VTE HIGH RISK PATIENT  Once         11/06/22 1614     Place sequential compression device  Until discontinued         11/06/22 1614                       Kyle Vera MD  Department of Hospital Medicine   [facility] - Emergency Dept

## 2022-11-06 NOTE — ASSESSMENT & PLAN NOTE
Body mass index is 32.45 kg/m². Morbid obesity complicates all aspects of disease management from diagnostic modalities to treatment. Weight loss encouraged

## 2022-11-06 NOTE — ASSESSMENT & PLAN NOTE
Patient's with Normocytic anemia.. Hemoglobin stable. Etiology likely due to chronic disease .  Current CBC reviewed-  Recent Labs   Lab 11/01/22  0515 11/04/22  1052 11/06/22  1251   HGB 8.6* 9.0* 9.2*    Monitor CBC and transfuse if H/H drops below 7/21.

## 2022-11-06 NOTE — ASSESSMENT & PLAN NOTE
- Patient with potassium Recent Labs   Lab 11/01/22  0515 11/04/22  1052 11/06/22  1435   K 3.6 2.5* 2.4*   . replaced. monitor

## 2022-11-06 NOTE — ASSESSMENT & PLAN NOTE
? 67-year-old past medical history of recently diagnosed carcinoid syndrome,  having diarrhea intermittently since August 2022 more recently diarrhea has become worse over past few days -several episodes daily  associated nausea with intermittent vomiting.     symptoms concerned likely though to be secondary to carcinoid syndrome patient evaluated by [facility] at [facility] with PET scan done 10/18 - Focal nodular foci of radiotracer uptake at the pancreatic tail and near the region of the pancreatic head above liver background activity could indicate somatostatin receptor avid lesions, but no corresponding mass is seen on CT portion of the study.  Recommend follow-up contrast enhanced pancreas protocol CT to further assess. 5HIAA levels on 10/21 WNL . CT AP W contrast done 11/4 shows No pancreatic lesion and Enterocolitis,. recent EGD -Non-bleeding gastric ulcers with no stigmata of  bleeding. Biopsied. Treated with a monopolar probe. colonoscopy with biopsy -No significant pathologic abnormality. No morphologic evidence of active inflammatory bowel disease, microscopic colitis, or neoplasia. Patient was  referred to [facility] for further workup and has scheduled appointment at [facility] at [facility] this upcoming week.    obtain stool studies

## 2022-11-06 NOTE — Clinical Note
Diagnosis: Hypokalemia [606241]   Future Attending Provider: LAMONTE JIMENEZ [[id number]]   Admitting Provider:: LAMONTE JIMENEZ [[id number]]

## 2022-11-06 NOTE — ASSESSMENT & PLAN NOTE
Chronic, controlled.  Latest blood pressure and vitals reviewed-   Temp:  [98.7 °F (37.1 °C)]   Pulse:  []   Resp:  [18-20]   BP: (111-138)/(54-78)   SpO2:  [96 %-100 %] .   Home meds for hypertension were reviewed and hold furosemide and metoprolol for now as with diarrhea. PRN meds if BP> 180/110 mm HG

## 2022-11-06 NOTE — ASSESSMENT & PLAN NOTE
Patient admitted without  signs and symptoms of CHF exacerbation    Results for orders placed or performed during the hospital encounter of 11/06/22   Brain natriuretic peptide   Result Value Ref Range    BNP 82 0 - 99 pg/mL   . Telemetry monitoring. Strict input/ Output monitor, Daily weights.    denies chestpain.  Obtain serial troponins.  Cardiac Enzymes trend  Recent Labs   Lab 10/31/22  2013 11/06/22  1251   TROPONINI 0.022 0.016     No results found for this or any previous visit.  echo 10/7/22 left ventricle is normal in size. Global left ventricular   systolic function is normal. The left ventricular ejection fraction is   55%. Left ventricular diastolic function is abnormal (stage I impaired   relaxation). Noted left ventricular hypertrophy. Concentric left   ventricular hypertrophy is present. It is mild.     4. Mild (1+) tricuspid regurgitation.   5. The right ventricle is normal in size. Right ventricular systolic   function is normal.

## 2022-11-06 NOTE — HPI
Cherry Pozo is a 67-year-old past medical history of recently diagnosed carcinoid syndrome, AFib on Eliquis, CHF, arthritis, GERD, glaucoma, hyperlipidemia, hypertension, prior MI, TIA presenting with persistent diarrhea.      AAOx 3 . accompanied by the daughter at the bedside. Patient mentions having diarrhea intermittently since August 2022 more recently diarrhea has become worse over past few days -several episodes daily  associated nausea with intermittent vomiting. c/o lower cramping abdominal pain - 5/10.   symptoms concerned likely though to be secondary to carcinoid syndrome patient evaluated by Heme-Onc at [facility] with PET scan done 10/18 - Focal nodular foci of radiotracer uptake at the pancreatic tail and near the region of the pancreatic head above liver background activity could indicate somatostatin receptor avid lesions, but no corresponding mass is seen on CT portion of the study.  Recommend follow-up contrast enhanced pancreas protocol CT to further assess. 5HIAA levels on 10/21 WNL . CT AP W contrast done 11/4 shows No pancreatic lesion and Enterocolitis,. recent EGD -Non-bleeding gastric ulcers with no stigmata of  bleeding. Biopsied. Treated with a monopolar probe. colonoscopy with biopsy -No significant pathologic abnormality. No morphologic evidence of active inflammatory bowel disease, microscopic colitis, or neoplasia. Patient was  referred to [facility] for further workup and has scheduled appointment at Heme-Onc at [facility] this upcoming week. . c/o  dizziness,  and shortness of breath on minimal exertion    ER course -electrolyte derangement with hypokalemia to 2.4, hypomagsemia of 1.5 . replaced . UA + started on IV cefriaxone

## 2022-11-06 NOTE — ASSESSMENT & PLAN NOTE
Patient's FSGs are not controlled on current hypoglycemics.   Last A1c reviewed-   Lab Results   Component Value Date    HGBA1C 4.7 10/13/2022    HGBA1C 4.1 (L) 04/12/2021    HGBA1C <3.50 07/07/2017     Will hold PO hypoglycemics .controlled

## 2022-11-07 PROBLEM — I95.9 HYPOTENSION: Status: ACTIVE | Noted: 2022-11-07

## 2022-11-07 PROBLEM — I95.1 ORTHOSTATIC HYPOTENSION: Status: ACTIVE | Noted: 2022-11-07

## 2022-11-07 LAB
25(OH)D3+25(OH)D2 SERPL-MCNC: 11 NG/ML (ref 30–96)
ALBUMIN SERPL BCP-MCNC: 1.5 G/DL (ref 3.5–5.2)
ALBUMIN SERPL BCP-MCNC: 1.5 G/DL (ref 3.5–5.2)
ALP SERPL-CCNC: 91 U/L (ref 55–135)
ALT SERPL W/O P-5'-P-CCNC: 10 U/L (ref 10–44)
ANION GAP SERPL CALC-SCNC: 11 MMOL/L (ref 8–16)
ANION GAP SERPL CALC-SCNC: 12 MMOL/L (ref 8–16)
AST SERPL-CCNC: 39 U/L (ref 10–40)
BASOPHILS # BLD AUTO: 0.02 K/UL (ref 0–0.2)
BASOPHILS NFR BLD: 0.4 % (ref 0–1.9)
BILIRUB SERPL-MCNC: 0.7 MG/DL (ref 0.1–1)
BUN SERPL-MCNC: 12 MG/DL (ref 8–23)
BUN SERPL-MCNC: 12 MG/DL (ref 8–23)
C DIFF GDH STL QL: NEGATIVE
C DIFF TOX A+B STL QL IA: NEGATIVE
CA-I BLDV-SCNC: 1.12 MMOL/L (ref 1.06–1.42)
CALCIUM SERPL-MCNC: 7.4 MG/DL (ref 8.7–10.5)
CALCIUM SERPL-MCNC: 7.7 MG/DL (ref 8.7–10.5)
CHLORIDE SERPL-SCNC: 119 MMOL/L (ref 95–110)
CHLORIDE SERPL-SCNC: 122 MMOL/L (ref 95–110)
CO2 SERPL-SCNC: 14 MMOL/L (ref 23–29)
CO2 SERPL-SCNC: 17 MMOL/L (ref 23–29)
CREAT SERPL-MCNC: 1.6 MG/DL (ref 0.5–1.4)
CREAT SERPL-MCNC: 1.7 MG/DL (ref 0.5–1.4)
DIFFERENTIAL METHOD: ABNORMAL
EOSINOPHIL # BLD AUTO: 0 K/UL (ref 0–0.5)
EOSINOPHIL NFR BLD: 0.4 % (ref 0–8)
ERYTHROCYTE [DISTWIDTH] IN BLOOD BY AUTOMATED COUNT: 19 % (ref 11.5–14.5)
EST. GFR  (NO RACE VARIABLE): 32.7 ML/MIN/1.73 M^2
EST. GFR  (NO RACE VARIABLE): 35.1 ML/MIN/1.73 M^2
GLUCOSE SERPL-MCNC: 101 MG/DL (ref 70–110)
GLUCOSE SERPL-MCNC: 68 MG/DL (ref 70–110)
HCT VFR BLD AUTO: 24 % (ref 37–48.5)
HGB BLD-MCNC: 8 G/DL (ref 12–16)
IMM GRANULOCYTES # BLD AUTO: 0.03 K/UL (ref 0–0.04)
IMM GRANULOCYTES NFR BLD AUTO: 0.6 % (ref 0–0.5)
LYMPHOCYTES # BLD AUTO: 1.3 K/UL (ref 1–4.8)
LYMPHOCYTES NFR BLD: 26.5 % (ref 18–48)
MAGNESIUM SERPL-MCNC: 1.9 MG/DL (ref 1.6–2.6)
MCH RBC QN AUTO: 29.2 PG (ref 27–31)
MCHC RBC AUTO-ENTMCNC: 33.3 G/DL (ref 32–36)
MCV RBC AUTO: 88 FL (ref 82–98)
MONOCYTES # BLD AUTO: 0.7 K/UL (ref 0.3–1)
MONOCYTES NFR BLD: 13.7 % (ref 4–15)
NEUTROPHILS # BLD AUTO: 2.9 K/UL (ref 1.8–7.7)
NEUTROPHILS NFR BLD: 58.4 % (ref 38–73)
NRBC BLD-RTO: 0 /100 WBC
PHOSPHATE SERPL-MCNC: 2.6 MG/DL (ref 2.7–4.5)
PHOSPHATE SERPL-MCNC: 2.6 MG/DL (ref 2.7–4.5)
PLATELET # BLD AUTO: 217 K/UL (ref 150–450)
PMV BLD AUTO: 9.8 FL (ref 9.2–12.9)
POTASSIUM SERPL-SCNC: 2.5 MMOL/L (ref 3.5–5.1)
POTASSIUM SERPL-SCNC: 3.7 MMOL/L (ref 3.5–5.1)
PROT SERPL-MCNC: 4.7 G/DL (ref 6–8.4)
RBC # BLD AUTO: 2.74 M/UL (ref 4–5.4)
SODIUM SERPL-SCNC: 147 MMOL/L (ref 136–145)
SODIUM SERPL-SCNC: 148 MMOL/L (ref 136–145)
WBC # BLD AUTO: 4.95 K/UL (ref 3.9–12.7)
WBC #/AREA STL HPF: NORMAL /[HPF]

## 2022-11-07 PROCEDURE — 82306 VITAMIN D 25 HYDROXY: CPT | Performed by: HOSPITALIST

## 2022-11-07 PROCEDURE — 87329 GIARDIA AG IA: CPT | Performed by: HOSPITALIST

## 2022-11-07 PROCEDURE — 99223 1ST HOSP IP/OBS HIGH 75: CPT | Mod: GC,,, | Performed by: INTERNAL MEDICINE

## 2022-11-07 PROCEDURE — 63600175 PHARM REV CODE 636 W HCPCS: Mod: JB | Performed by: STUDENT IN AN ORGANIZED HEALTH CARE EDUCATION/TRAINING PROGRAM

## 2022-11-07 PROCEDURE — 63600175 PHARM REV CODE 636 W HCPCS: Performed by: STUDENT IN AN ORGANIZED HEALTH CARE EDUCATION/TRAINING PROGRAM

## 2022-11-07 PROCEDURE — 36410 VNPNXR 3YR/> PHY/QHP DX/THER: CPT

## 2022-11-07 PROCEDURE — 85025 COMPLETE CBC W/AUTO DIFF WBC: CPT | Performed by: HOSPITALIST

## 2022-11-07 PROCEDURE — C1751 CATH, INF, PER/CENT/MIDLINE: HCPCS

## 2022-11-07 PROCEDURE — 36415 COLL VENOUS BLD VENIPUNCTURE: CPT | Performed by: HOSPITALIST

## 2022-11-07 PROCEDURE — 83735 ASSAY OF MAGNESIUM: CPT | Performed by: HOSPITALIST

## 2022-11-07 PROCEDURE — 99223 PR INITIAL HOSPITAL CARE,LEVL III: ICD-10-PCS | Mod: GC,,, | Performed by: INTERNAL MEDICINE

## 2022-11-07 PROCEDURE — 82330 ASSAY OF CALCIUM: CPT | Performed by: HOSPITALIST

## 2022-11-07 PROCEDURE — 63600175 PHARM REV CODE 636 W HCPCS: Performed by: HOSPITALIST

## 2022-11-07 PROCEDURE — 20600001 HC STEP DOWN PRIVATE ROOM

## 2022-11-07 PROCEDURE — 80053 COMPREHEN METABOLIC PANEL: CPT | Performed by: HOSPITALIST

## 2022-11-07 PROCEDURE — 25000003 PHARM REV CODE 250: Performed by: HOSPITALIST

## 2022-11-07 PROCEDURE — 99233 PR SUBSEQUENT HOSPITAL CARE,LEVL III: ICD-10-PCS | Mod: ,,, | Performed by: HOSPITALIST

## 2022-11-07 PROCEDURE — 99233 SBSQ HOSP IP/OBS HIGH 50: CPT | Mod: ,,, | Performed by: HOSPITALIST

## 2022-11-07 PROCEDURE — 96372 THER/PROPH/DIAG INJ SC/IM: CPT | Performed by: STUDENT IN AN ORGANIZED HEALTH CARE EDUCATION/TRAINING PROGRAM

## 2022-11-07 PROCEDURE — 80069 RENAL FUNCTION PANEL: CPT | Performed by: HOSPITALIST

## 2022-11-07 PROCEDURE — 76937 US GUIDE VASCULAR ACCESS: CPT

## 2022-11-07 PROCEDURE — 84100 ASSAY OF PHOSPHORUS: CPT | Performed by: HOSPITALIST

## 2022-11-07 RX ORDER — OCTREOTIDE ACETATE 100 UG/ML
200 INJECTION, SOLUTION INTRAVENOUS; SUBCUTANEOUS EVERY 12 HOURS
Status: DISCONTINUED | OUTPATIENT
Start: 2022-11-07 | End: 2022-11-11

## 2022-11-07 RX ORDER — POTASSIUM CHLORIDE 20 MEQ/1
40 TABLET, EXTENDED RELEASE ORAL ONCE
Status: COMPLETED | OUTPATIENT
Start: 2022-11-07 | End: 2022-11-07

## 2022-11-07 RX ORDER — SODIUM CHLORIDE, SODIUM LACTATE, POTASSIUM CHLORIDE, CALCIUM CHLORIDE 600; 310; 30; 20 MG/100ML; MG/100ML; MG/100ML; MG/100ML
INJECTION, SOLUTION INTRAVENOUS CONTINUOUS
Status: DISCONTINUED | OUTPATIENT
Start: 2022-11-07 | End: 2022-11-07

## 2022-11-07 RX ORDER — POTASSIUM CHLORIDE 7.45 MG/ML
10 INJECTION INTRAVENOUS
Status: COMPLETED | OUTPATIENT
Start: 2022-11-07 | End: 2022-11-07

## 2022-11-07 RX ORDER — SODIUM BICARBONATE 650 MG/1
1300 TABLET ORAL 2 TIMES DAILY
Status: DISCONTINUED | OUTPATIENT
Start: 2022-11-07 | End: 2022-11-07

## 2022-11-07 RX ORDER — SODIUM,POTASSIUM PHOSPHATES 280-250MG
2 POWDER IN PACKET (EA) ORAL
Status: COMPLETED | OUTPATIENT
Start: 2022-11-07 | End: 2022-11-07

## 2022-11-07 RX ORDER — CHOLECALCIFEROL (VITAMIN D3) 25 MCG
2000 TABLET ORAL DAILY
Status: DISCONTINUED | OUTPATIENT
Start: 2022-11-07 | End: 2022-11-25 | Stop reason: HOSPADM

## 2022-11-07 RX ADMIN — POTASSIUM CHLORIDE 40 MEQ: 1500 TABLET, EXTENDED RELEASE ORAL at 01:11

## 2022-11-07 RX ADMIN — SODIUM CHLORIDE 500 ML: 0.9 INJECTION, SOLUTION INTRAVENOUS at 04:11

## 2022-11-07 RX ADMIN — PANTOPRAZOLE SODIUM 40 MG: 40 TABLET, DELAYED RELEASE ORAL at 08:11

## 2022-11-07 RX ADMIN — POTASSIUM CHLORIDE 40 MEQ: 1500 TABLET, EXTENDED RELEASE ORAL at 08:11

## 2022-11-07 RX ADMIN — CEFTRIAXONE 1 G: 1 INJECTION, SOLUTION INTRAVENOUS at 08:11

## 2022-11-07 RX ADMIN — ASPIRIN 81 MG: 81 TABLET, COATED ORAL at 08:11

## 2022-11-07 RX ADMIN — OCTREOTIDE ACETATE 200 MCG: 100 INJECTION, SOLUTION INTRAVENOUS; SUBCUTANEOUS at 09:11

## 2022-11-07 RX ADMIN — OCTREOTIDE ACETATE 200 MCG: 100 INJECTION, SOLUTION INTRAVENOUS; SUBCUTANEOUS at 02:11

## 2022-11-07 RX ADMIN — CHOLECALCIFEROL TAB 25 MCG (1000 UNIT) 2000 UNITS: 25 TAB at 12:11

## 2022-11-07 RX ADMIN — SODIUM CHLORIDE, SODIUM LACTATE, POTASSIUM CHLORIDE, AND CALCIUM CHLORIDE 1000 ML: .6; .31; .03; .02 INJECTION, SOLUTION INTRAVENOUS at 05:11

## 2022-11-07 RX ADMIN — SODIUM CHLORIDE, SODIUM LACTATE, POTASSIUM CHLORIDE, AND CALCIUM CHLORIDE 500 ML: .6; .31; .03; .02 INJECTION, SOLUTION INTRAVENOUS at 08:11

## 2022-11-07 RX ADMIN — POTASSIUM CHLORIDE 10 MEQ: 7.46 INJECTION, SOLUTION INTRAVENOUS at 10:11

## 2022-11-07 RX ADMIN — POTASSIUM & SODIUM PHOSPHATES POWDER PACK 280-160-250 MG 2 PACKET: 280-160-250 PACK at 04:11

## 2022-11-07 RX ADMIN — LEVETIRACETAM 500 MG: 500 TABLET, FILM COATED ORAL at 08:11

## 2022-11-07 RX ADMIN — POTASSIUM CHLORIDE 10 MEQ: 7.46 INJECTION, SOLUTION INTRAVENOUS at 11:11

## 2022-11-07 RX ADMIN — POTASSIUM CHLORIDE 10 MEQ: 7.46 INJECTION, SOLUTION INTRAVENOUS at 12:11

## 2022-11-07 RX ADMIN — APIXABAN 5 MG: 5 TABLET, FILM COATED ORAL at 08:11

## 2022-11-07 RX ADMIN — FERROUS SULFATE TAB 325 MG (65 MG ELEMENTAL FE) 1 EACH: 325 (65 FE) TAB at 08:11

## 2022-11-07 RX ADMIN — ATORVASTATIN CALCIUM 40 MG: 20 TABLET, FILM COATED ORAL at 08:11

## 2022-11-07 RX ADMIN — METOPROLOL TARTRATE 25 MG: 25 TABLET, FILM COATED ORAL at 08:11

## 2022-11-07 RX ADMIN — SODIUM BICARBONATE 650 MG TABLET 1300 MG: at 08:11

## 2022-11-07 RX ADMIN — SODIUM BICARBONATE: 84 INJECTION, SOLUTION INTRAVENOUS at 09:11

## 2022-11-07 RX ADMIN — POTASSIUM & SODIUM PHOSPHATES POWDER PACK 280-160-250 MG 2 PACKET: 280-160-250 PACK at 11:11

## 2022-11-07 RX ADMIN — SODIUM CHLORIDE, SODIUM LACTATE, POTASSIUM CHLORIDE, AND CALCIUM CHLORIDE 1000 ML: .6; .31; .03; .02 INJECTION, SOLUTION INTRAVENOUS at 08:11

## 2022-11-07 RX ADMIN — SODIUM CHLORIDE, SODIUM LACTATE, POTASSIUM CHLORIDE, AND CALCIUM CHLORIDE: .6; .31; .03; .02 INJECTION, SOLUTION INTRAVENOUS at 12:11

## 2022-11-07 NOTE — ASSESSMENT & PLAN NOTE
DVT sonogram 8/22 and 10/22 negative for DVT  11/7 DVT sonogram -Nonocclusive thrombus/DVT in the superior-mid right femoral vein.continue eliquis . S/p IVC filter

## 2022-11-07 NOTE — SUBJECTIVE & OBJECTIVE
Oncology Treatment Plan:   [No matching plan found]    Medications:  Continuous Infusions:   lactated ringers 75 mL/hr at 11/07/22 1244     Scheduled Meds:   apixaban  5 mg Oral BID    aspirin  81 mg Oral Daily    atorvastatin  40 mg Oral QHS    cefTRIAXone (ROCEPHIN) IVPB  1 g Intravenous Q24H    ferrous sulfate  1 tablet Oral Daily    levETIRAcetam  500 mg Oral BID    metoprolol tartrate  25 mg Oral BID    octreotide  200 mcg Subcutaneous Q12H    pantoprazole  40 mg Oral Daily    potassium, sodium phosphates  2 packet Oral QID (AC & HS)    sodium bicarbonate  1,300 mg Oral BID    vitamin D  2,000 Units Oral Daily     PRN Meds:dextrose 10%, dextrose 10%, glucagon (human recombinant), glucose, glucose, naloxone     Review of patient's allergies indicates:  No Known Allergies     Past Medical History:   Diagnosis Date    Anticoagulant long-term use     Arthritis     Atrial fibrillation     CHF (congestive heart failure)     Diabetes mellitus     Type2 resolved after weight loss surgery    DVT (deep venous thrombosis)     Encounter for blood transfusion     GERD (gastroesophageal reflux disease)     Glaucoma     Hypercholesterolemia     Hyperlipemia     Hypertension     Memory changes     Myocardial infarction     Renal failure     resolved    TIA (transient ischemic attack)      Past Surgical History:   Procedure Laterality Date    BACK SURGERY  06/21/2017    lumbar fusion 6/21/17 and surgery for hematoma to site on 6/26/17    CHOLECYSTECTOMY  1978    COLONOSCOPY N/A 10/3/2022    Procedure: COLONOSCOPY;  Surgeon: Jourdan Parks MD;  Location: [facility];  Service: General;  Laterality: N/A;    COLONOSCOPY N/A 10/11/2022    Procedure: COLONOSCOPY;  Surgeon: Stephen Cooper MD;  Location: [facility];  Service: Endoscopy;  Laterality: N/A;    ESOPHAGOGASTRODUODENOSCOPY N/A 10/3/2022    Procedure: EGD (ESOPHAGOGASTRODUODENOSCOPY);  Surgeon: Jourdan Parks MD;  Location: [facility];  Service: General;  Laterality:  N/A;    GASTRIC BYPASS      HYSTERECTOMY  2012    JOINT REPLACEMENT      TKR    LEFT HEART CATHETERIZATION Left 2/5/2021    Procedure: Left heart cath;  Surgeon: Shane Pacheco MD;  Location: [facility] CATH;  Service: Cardiovascular;  Laterality: Left;    PELVIC LAPAROSCOPY Left     OOPH    RENAL BIOPSY N/A 10/5/2022    Procedure: BIOPSY, KIDNEY;  Surgeon: Velia Diagnostic Provider;  Location: [facility] OR;  Service: General;  Laterality: N/A;    RIGHT HEART CATHETERIZATION Right 2/5/2021    Procedure: INSERTION, CATHETER, RIGHT HEART. via left radial and left brachial;  Surgeon: Shane Pacheco MD;  Location: [facility] CATH;  Service: Cardiovascular;  Laterality: Right;    TOTAL ABDOMINAL HYSTERECTOMY W/ BILATERAL SALPINGOOPHORECTOMY       Family History       Problem Relation (Age of Onset)    Arthritis Mother    Breast cancer Mother    Cancer Father    Diabetes Daughter    Heart disease Mother, Father    Hypertension Mother, Father          Tobacco Use    Smoking status: Never    Smokeless tobacco: Never   Substance and Sexual Activity    Alcohol use: Yes     Comment: occasional    Drug use: No    Sexual activity: Not Currently       Review of Systems   Constitutional:  Positive for appetite change and unexpected weight change. Negative for chills, fatigue and fever.   HENT:  Negative for congestion and dental problem.    Eyes:  Negative for photophobia and visual disturbance.   Respiratory:  Negative for cough and shortness of breath.    Cardiovascular:  Negative for chest pain and leg swelling.   Gastrointestinal:  Positive for abdominal pain, diarrhea, nausea and vomiting.   Genitourinary:  Negative for difficulty urinating and dysuria.   Musculoskeletal:  Negative for arthralgias and back pain.   Skin:  Negative for color change and rash.   Neurological:  Negative for light-headedness and headaches.   Hematological:  Negative for adenopathy. Does not bruise/bleed easily.   Psychiatric/Behavioral:  Negative for agitation and  behavioral problems.    Objective:     Vital Signs (Most Recent):  Temp: 97.9 °F (36.6 °C) (11/07/22 1124)  Pulse: 72 (11/07/22 1138)  Resp: 18 (11/07/22 1124)  BP: (!) 98/57 (11/07/22 1124)  SpO2: 100 % (11/07/22 1124)   Vital Signs (24h Range):  Temp:  [97.9 °F (36.6 °C)-98.3 °F (36.8 °C)] 97.9 °F (36.6 °C)  Pulse:  [] 72  Resp:  [16-18] 18  SpO2:  [97 %-100 %] 100 %  BP: ()/(54-78) 98/57     Weight: 88.5 kg (195 lb)  Body mass index is 32.45 kg/m².  Body surface area is 2.01 meters squared.      Intake/Output Summary (Last 24 hours) at 11/7/2022 1439  Last data filed at 11/7/2022 1412  Gross per 24 hour   Intake 1010 ml   Output 600 ml   Net 410 ml       Physical Exam  Constitutional:       General: She is not in acute distress.     Appearance: She is well-developed.   HENT:      Head: Normocephalic and atraumatic.      Nose: Nose normal. No congestion.      Mouth/Throat:      Mouth: Mucous membranes are dry.      Pharynx: No oropharyngeal exudate.   Eyes:      General: No scleral icterus.     Extraocular Movements: Extraocular movements intact.   Cardiovascular:      Rate and Rhythm: Normal rate and regular rhythm.      Heart sounds: No murmur heard.  Pulmonary:      Effort: Pulmonary effort is normal. No respiratory distress.      Breath sounds: Normal breath sounds.   Abdominal:      General: There is no distension.      Palpations: Abdomen is soft.      Tenderness: There is abdominal tenderness (mid, lower abdomen).   Musculoskeletal:         General: No swelling. Normal range of motion.      Cervical back: Normal range of motion and neck supple.   Skin:     General: Skin is warm and dry.   Neurological:      Mental Status: She is alert and oriented to person, place, and time. Mental status is at baseline.   Psychiatric:         Mood and Affect: Mood normal.         Behavior: Behavior normal.       Significant Labs:   CBC:   Recent Labs   Lab 11/06/22  1251 11/07/22  0652   WBC 7.16 4.95   HGB  9.2* 8.0*   HCT 27.5* 24.0*    217    and CMP:   Recent Labs   Lab 11/06/22  1435 11/06/22  2045 11/07/22  0652    143 147*   K 2.4* 2.4* 2.5*   * 119* 119*   CO2 18* 16* 17*   * 95 68*   BUN 13 13 12   CREATININE 1.6* 1.5* 1.6*   CALCIUM 7.0* 7.0* 7.4*   PROT 4.6*  --  4.7*   ALBUMIN 1.5* 1.5* 1.5*   BILITOT 0.9  --  0.7   ALKPHOS 89  --  91   AST 40  --  39   ALT 10  --  10   ANIONGAP 9 8 11       Diagnostic Results:  I have reviewed all pertinent imaging results/findings within the past 24 hours.

## 2022-11-07 NOTE — PLAN OF CARE
[facility] (Hospital)  Initial Discharge Assessment       Primary Care Provider: Amarilys Romero MD    Admission Diagnosis: Hypokalemia [E87.6]  Hypomagnesemia [E83.42]  Dizziness [R42]  SOB (shortness of breath) [R06.02]  Multiple complaints [R68.89]  Nausea vomiting and diarrhea [R11.2, R19.7]    Admission Date: 11/6/2022  Expected Discharge Date: 11/9/2022         Payor: [employer]ET[employer] MANAGED MEDICARE / Plan: AETNA MEDICARE DUAL DSNP / Product Type: Medicare Advantage /     Extended Emergency Contact Information  Primary Emergency Contact: Padmini Santiago   [place]  Mobile Phone: [phone]  Relation: Daughter    Discharge Plan A: Home with family  Discharge Plan B: Home Health      [id number] - [place], [place] - [address] [address]  [address] [address]  Phone: [phone] Fax: [phone]      Initial Assessment (most recent)       Adult Discharge Assessment - 11/07/22 1036          Discharge Assessment    Confirmed/corrected address, phone number and insurance Yes     Confirmed Demographics Correct on Facesheet     Source of Information patient;family     When was your last doctors appointment? 10/24/22     Communicated MELYSSA with patient/caregiver Date not available/Unable to determine     Reason For Admission hypokalemia     Lives With child(selena), adult;grandchild(selena)     Facility Arrived From: home     Do you have help at home or someone to help you manage your care at home? Yes     Who are your caregiver(s) and their phone number(s)? santosh [place] [phone]     Prior to hospitilization cognitive status: No Deficits     Current cognitive status: No Deficits     Walking or Climbing Stairs Difficulty ambulation difficulty, dependent     Mobility Management FWW, WC     Dressing/Bathing Difficulty bathing difficulty, assistance 1 person     Do you have any problems with: Errands/Grocery     Home Accessibility wheelchair accessible     Home Layout Able to live on 1st  floor     Equipment Currently Used at Home wheelchair;walker, standard;hospital bed     Readmission within 30 days? Yes     Patient currently being followed by outpatient case management? No     Do you currently have service(s) that help you manage your care at home? No     Do you take prescription medications? Yes     Do you have prescription coverage? Yes     Do you have any problems affording any of your prescribed medications? No     Is the patient taking medications as prescribed? yes     Who is going to help you get home at discharge? Daughter Padmini     How do you get to doctors appointments? family or friend will provide     Are you on dialysis? No     Do you take coumadin? No   Eliquis    Discharge Plan A Home with family     Discharge Plan B Home Health     Discharge Plan discussed with: Adult children;Patient        Physical Activity    On average, how many days per week do you engage in moderate to strenuous exercise (like a brisk walk)? 0 days     On average, how many minutes do you engage in exercise at this level? 0 min        Financial Resource Strain    How hard is it for you to pay for the very basics like food, housing, medical care, and heating? Not hard at all        Housing Stability    In the last 12 months, was there a time when you were not able to pay the mortgage or rent on time? Yes     In the last 12 months, how many places have you lived? 1     In the last 12 months, was there a time when you did not have a steady place to sleep or slept in a shelter (including now)? No        Transportation Needs    In the past 12 months, has lack of transportation kept you from medical appointments or from getting medications? No     In the past 12 months, has lack of transportation kept you from meetings, work, or from getting things needed for daily living? No        Food Insecurity    Within the past 12 months, you worried that your food would run out before you got the money to buy more. Never  true     Within the past 12 months, the food you bought just didn't last and you didn't have money to get more. Never true        Relationship/Environment    Name(s) of Who Lives With Patient Daughter Padmini [phone]                     SW met with patient and her dtr Padmini at bedside.  Patient was transferred from home due to persistent diarrhea.  She as last seen at [facility], where she was diagnosed with Carcinoid syndrome.  Patient lives in a mobile home with her dtr Padmini and her grandtr.  There are no steps, she has a ramp leading up to the home.  Patient has a FWW, WC and hospital bed at home.  Her dtr cares for her at home and she previously received RN services through [facility].  They checked her vitals and took labs if needed.  Patient will likely discharge home with her daughter with HH.

## 2022-11-07 NOTE — ASSESSMENT & PLAN NOTE
- Patient with potassium   Recent Labs   Lab 11/06/22  1435 11/06/22  2045 11/07/22  0652   K 2.4* 2.4* 2.5*   . replaced. monitor

## 2022-11-07 NOTE — HOSPITAL COURSE
67-year-old past medical history of recently diagnosed carcinoid syndrome, having diarrhea intermittently since August 2022 more recently diarrhea has become worse over past few days -several episodes daily  associated nausea with intermittent vomiting. 9/30 CT AP showed partial small bowel resection. There are some thickened segments of large and small bowel, some with adjacent mesenteric edema. Symptoms concerned likely though to be secondary to carcinoid syndrome patient evaluated by Heme-Onc at [facility] with PET scan done 10/18 - Focal nodular foci of radiotracer uptake at the pancreatic tail and near the region of the pancreatic head above liver background activity could indicate somatostatin receptor avid lesions, but no corresponding mass is seen on CT portion of the study.  Recommend follow-up contrast enhanced pancreas protocol CT to further assess. 5HIAA levels on 10/21 WNL. CT AP W contrast done 11/4 shows No pancreatic lesion and Enterocolitis,. recent EGD -Non-bleeding gastric ulcers with no stigmata of  bleeding. Biopsy neg for H pylori. Treated with a monopolar probe. Colonoscopy with biopsy -No significant pathologic abnormality. No morphologic evidence of active inflammatory bowel disease, microscopic colitis, or neoplasia. Patient was  referred to [facility] for further workup and has scheduled appointment at [facility] at [facility] this upcoming week. Chromogranin A elevated at 2000s. C diff negative. Fecal fat level normal. Fecal elastase low. AES consulted for EUS and potential biopsy given that her PET scan showed uptake but no mass was seen on CT pancreas protocol. Started and discontinued octreotide since it failed to help. Repeat cryptosporidium Ag negative. 11/9 EUS - sleeve gastrectomy was found, characterized by healthy appearing mucosa. Widely patent duodenoenterostomy, characterized  by healthy appearing mucosa was found. few cystic lesions were seen in  the pancreatic head, genu of the pancreas and pancreatic body highly suspicious for a branched intraductal papillary mucinous neoplasm. Quite small for sampling. Pancreatic head and pancreatic tail showed no mass. Oncology reviewed results on 11/12 and feel patient with low suspicion for neuroendocrine tumor at this point. CT chest with contrast 11/13 significant for subcentimeter nodules bilaterally. Surgery oncology consulted - testing thus far not convincing for need for surgical exploration. Will present to tumor board 11/21. Chomogranin A level elevated due to PPI. Somatostatin level normal. Tryptase negative. Normal IgG and negative Tissue Transglutaminase Iga. Insulin and proinsulin levels normal. VIPoma level normal  Will need repeat gastrin and chromogranin level once off PPI for at least 2-4 weeks. EGD and small bowel biopsies done 11/17 and pending. Increase questran to TID. Continue loperamide 2 mg TID. Add lomotil 1 cap QID. Improved to 1-2 BMs a day. Still improved when questran discontinued. Will see how she does off loperamide. Patient with Low fecal elastase and ferrtin slightly elevated 272 and high iron saturation 83. Discussed with GI - will discuss significance with GI. As responding to anti-diarrheal medication, not need to start pancreatic enzymes at this time. Follow up with GI as outpatient after discharge. Patient with intermittent hypotension treated with fluids. . Patient intermittently treated with furosemide due to symptomatic BLE peripheral edema.   Diarrhea eventually stopped.  Patient was taken off of TPN and tolerated PO.

## 2022-11-07 NOTE — ASSESSMENT & PLAN NOTE
11/7 orthostatics + with drop in SBP to 80s on standing. RL bolus 1L and 75ml/h .SBP in 70-80s this PM on IVF. NS bolus 500ml x1. critical care consulted

## 2022-11-07 NOTE — HPI
Ms. Cherry Santiago is a 67 year old woman with A.fib, HFpEF who presented on 11/06 with persistent diarrhea. She was admitted to hospital medicine for IVF and electrolyte replacement. She is currently being worked up for possible neuroendocrine tumor. She states that she has been having diarrhea since the last week of August. It has been worsening over the past couple of months. She has been having more than 10 episodes of diarrhea per day. She states that it is yellow colored with no blood. She has associated occasional abdominal cramping, nausea and vomiting. She has lost about 40lbs in the past month. She has been seen Dr. Hernandez at [facility]. Dr. Hernandez has been working her up for possible neuroendocrine tumor. Patient had normal 5-HIAA levels with elevated chromogramin A level at 2000. She also had a PET 68 Ga scan on 11/03 that showed focular nodular uptake at pancreatic tail and head of pancreatic tail, however, dedicated CT pancreas did not show a mass. Patient's family history is notable for mother who had breast cancer, father had pancreatic/prostate, an aunt had RCC and another aunt had colon cancer. Patient denies smoking or history of smoking.     Medical Oncology was consulted for possible neuroendocrine tumor.

## 2022-11-07 NOTE — CONSULTS
Placed 18 g x 8 cm midline in Right cephalic vein using realtime ultrasound guidance.  Lot#[id number].  Max dwell date 12/6/22.  Imagery recorded and saved.

## 2022-11-07 NOTE — ED NOTES
Pt placed on telemetry box [id number] . Rhythm and rate confirmed by telemetry technician: *Afib* at a rate of ##

## 2022-11-07 NOTE — CONSULTS
[facility]  Advanced Endoscopy Service  Consult Note    Patient Name: Cherry Santiago  MRN: [id number]  Admission Date: 11/6/2022  Hospital Length of Stay: 0 days  Code Status: Full Code   Attending Provider: Kyle Vera MD   Consulting Provider: Raquel Noyola MD  Primary Care Physician: Amarilys Romero MD  Principal Problem:Diarrhea concurrent with and due to carcinoid syndrome    Inpatient consult to Advanced Endoscopy Service (AES)  Consult performed by: Raquel Noyola MD  Consult ordered by: Kyle Vera MD      Subjective:     HPI: Cherry Santiago is a 67 y.o. female with history of afib on eliquis, gastric bypass, CHF, CAD, who presents for diarrhea.  She initially started having diarrhea in August, reports having over 10 bowel movements a day, all watery, no blood.  Associated with fatigue and 40 lb weight loss over the past month.  Associated with nausea but no vomiting.  No abdominal pain.  She was evaluated outpatient and was diagnosed with carcinoid syndrome based on elevated chromogranin.  PET-CT scan showed hypermetabolic area in the pancreas.  No correlating mass in the pancreas on CT.  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 They recommended another CT scan and to be seen by oncology at [facility] however the patient presented to the ED with hypokalemia and dehydration.   AES consulted for evaluation of the pancreas and potential sampling.       Past Medical History:   Diagnosis Date    Anticoagulant long-term use     Arthritis     Atrial fibrillation     CHF (congestive heart failure)     Diabetes mellitus     Type2 resolved after weight loss surgery    DVT (deep venous thrombosis)     Encounter for blood transfusion     GERD (gastroesophageal reflux disease)     Glaucoma     Hypercholesterolemia     Hyperlipemia     Hypertension     Memory changes     Myocardial infarction     Renal failure      resolved    TIA (transient ischemic attack)        Past Surgical History:   Procedure Laterality Date    BACK SURGERY  06/21/2017    lumbar fusion 6/21/17 and surgery for hematoma to site on 6/26/17    CHOLECYSTECTOMY  1978    COLONOSCOPY N/A 10/3/2022    Procedure: COLONOSCOPY;  Surgeon: Jourdan Parks MD;  Location: [facility] ENDO;  Service: General;  Laterality: N/A;    COLONOSCOPY N/A 10/11/2022    Procedure: COLONOSCOPY;  Surgeon: Stephen Cooper MD;  Location: [facility] ENDO;  Service: Endoscopy;  Laterality: N/A;    ESOPHAGOGASTRODUODENOSCOPY N/A 10/3/2022    Procedure: EGD (ESOPHAGOGASTRODUODENOSCOPY);  Surgeon: Jourdan Parks MD;  Location: [facility] ENDO;  Service: General;  Laterality: N/A;    GASTRIC BYPASS      HYSTERECTOMY  2012    JOINT REPLACEMENT      TKR    LEFT HEART CATHETERIZATION Left 2/5/2021    Procedure: Left heart cath;  Surgeon: Shane Pacheco MD;  Location: [facility] CATH;  Service: Cardiovascular;  Laterality: Left;    PELVIC LAPAROSCOPY Left     OOPH    RENAL BIOPSY N/A 10/5/2022    Procedure: BIOPSY, KIDNEY;  Surgeon: Velia Diagnostic Provider;  Location: [facility] OR;  Service: General;  Laterality: N/A;    RIGHT HEART CATHETERIZATION Right 2/5/2021    Procedure: INSERTION, CATHETER, RIGHT HEART. via left radial and left brachial;  Surgeon: Shane Pacheco MD;  Location: [facility] CATH;  Service: Cardiovascular;  Laterality: Right;    TOTAL ABDOMINAL HYSTERECTOMY W/ BILATERAL SALPINGOOPHORECTOMY         Family History   Problem Relation Age of Onset    Arthritis Mother     Breast cancer Mother     Heart disease Mother     Hypertension Mother     Cancer Father     Heart disease Father     Hypertension Father     Diabetes Daughter        Social History     Socioeconomic History    Marital status: Single   Tobacco Use    Smoking status: Never    Smokeless tobacco: Never   Substance and Sexual Activity    Alcohol use: Yes     Comment: occasional    Drug use: No    Sexual activity: Not Currently     Social  Determinants of Health     Financial Resource Strain: Low Risk     Difficulty of Paying Living Expenses: Not hard at all   Food Insecurity: No Food Insecurity    Worried About Running Out of Food in the Last Year: Never true    Ran Out of Food in the Last Year: Never true   Transportation Needs: No Transportation Needs    Lack of Transportation (Medical): No    Lack of Transportation (Non-Medical): No   Physical Activity: Inactive    Days of Exercise per Week: 0 days    Minutes of Exercise per Session: 0 min   Stress: No Stress Concern Present    Feeling of Stress : Not at all   Social Connections: Moderately Integrated    Frequency of Communication with Friends and Family: More than three times a week    Frequency of Social Gatherings with Friends and Family: More than three times a week    Attends Voodoo Services: More than 4 times per year    Active Member of Clubs or Organizations: Yes    Attends Club or Organization Meetings: More than 4 times per year    Marital Status: Never    Housing Stability: High Risk    Unable to Pay for Housing in the Last Year: Yes    Number of Places Lived in the Last Year: 1    Unstable Housing in the Last Year: No       No current facility-administered medications on file prior to encounter.     Current Outpatient Medications on File Prior to Encounter   Medication Sig Dispense Refill    apixaban (ELIQUIS) 5 mg Tab Take 1 tablet (5 mg total) by mouth 2 (two) times daily.      aspirin (ECOTRIN) 81 MG EC tablet Take 81 mg by mouth once daily.      ferrous sulfate (FEOSOL) 325 mg (65 mg iron) Tab tablet Take 325 mg by mouth daily with breakfast.      furosemide (LASIX) 20 MG tablet Take 20 mg by mouth once daily.      levETIRAcetam (KEPPRA) 250 MG Tab Take 500 mg by mouth 2 (two) times daily.      loperamide (IMODIUM) 2 mg capsule Take 1 capsule (2 mg total) by mouth 4 (four) times daily as needed for Diarrhea. 180 capsule 0    midodrine (PROAMATINE) 5 MG Tab Take 1 tablet  (5 mg total) by mouth 3 (three) times daily with meals. 90 tablet 0    pantoprazole (PROTONIX) 40 MG tablet Take 40 mg by mouth once daily.      potassium chloride SA (K-DUR,KLOR-CON) 20 MEQ tablet Take 1 tablet (20 mEq total) by mouth 2 (two) times daily. 60 tablet 0    timolol maleate 0.5% (TIMOPTIC) 0.5 % Drop Place 1 drop into both eyes 2 (two) times daily.      atorvastatin (LIPITOR) 40 MG tablet Take 1 tablet (40 mg total) by mouth every evening. (Patient not taking: Reported on 11/4/2022) 90 tablet 3    citric acid-potassium citrate (POLYCITRA) 1,100-334 mg/5 mL solution potassium citrate-citric acid 1,100 mg-334 mg/5 mL oral solution   TAKE 5 MLS (10 MEQ TOTAL) BY MOUTH ONCE. FOR 1 DOSE ONLY      diphenoxylate-atropine 2.5-0.025 mg (LOMOTIL) 2.5-0.025 mg per tablet Take 1 tablet by mouth 4 (four) times daily as needed for Diarrhea (unreleived with Imodium). (Patient not taking: Reported on 11/4/2022) 30 tablet 0    metoclopramide HCl (REGLAN) 10 MG tablet Take 1 tablet (10 mg total) by mouth every 6 (six) hours as needed (Nausea). (Patient not taking: Reported on 11/4/2022) 14 tablet 0    metoprolol tartrate (LOPRESSOR) 25 MG tablet Take 1 tablet (25 mg total) by mouth 2 (two) times daily. (Patient not taking: Reported on 11/4/2022) 60 tablet 11    sodium bicarbonate 650 MG tablet Take 1 tablet (650 mg total) by mouth 2 (two) times daily. for 5 days 10 tablet 0       Review of patient's allergies indicates:  No Known Allergies    Review of Systems   Constitutional:  Positive for malaise/fatigue and weight loss. Negative for chills and fever.   HENT: Negative.     Eyes: Negative.    Respiratory: Negative.     Cardiovascular: Negative.    Gastrointestinal:  Positive for diarrhea and nausea. Negative for abdominal pain, blood in stool and vomiting.   Genitourinary: Negative.    Musculoskeletal: Negative.    Neurological: Negative.    Psychiatric/Behavioral: Negative.        Objective:     Vitals:    11/07/22  1138   BP:    Pulse: 72   Resp:    Temp:          Constitutional:  not in acute distress and well developed  HENT: Head: Normal, normocephalic, atraumatic.  Eyes: conjunctiva clear and sclera nonicteric  Cardiovascular: regular rate and rhythm  Respiratory: normal chest expansion & respiratory effort   and no accessory muscle use  GI: soft, mild epigastric tenderness  Musculoskeletal: no muscular tenderness noted  Skin: normal color  Neurological: alert, oriented x3  Psychiatric: mood and affect are within normal limits, pt is a good historian; no memory problems were noted    Significant Labs:  Recent Labs   Lab 11/04/22  1052 11/06/22  1251 11/07/22  0652   HGB 9.0* 9.2* 8.0*       Lab Results   Component Value Date    WBC 4.95 11/07/2022    HGB 8.0 (L) 11/07/2022    HCT 24.0 (L) 11/07/2022    MCV 88 11/07/2022     11/07/2022       Lab Results   Component Value Date     (H) 11/07/2022    K 2.5 (LL) 11/07/2022     (H) 11/07/2022    CO2 17 (L) 11/07/2022    BUN 12 11/07/2022    CREATININE 1.6 (H) 11/07/2022    CALCIUM 7.4 (L) 11/07/2022    ANIONGAP 11 11/07/2022    ESTGFRAFRICA 51 (A) 05/11/2022    EGFRNONAA 44 (A) 05/11/2022       Lab Results   Component Value Date    ALT 10 11/07/2022    AST 39 11/07/2022    ALKPHOS 91 11/07/2022    BILITOT 0.7 11/07/2022       Lab Results   Component Value Date    INR 1.1 09/30/2022    INR 2.4 (H) 05/19/2021    INR 1.1 03/04/2021       Significant Imaging:  Reviewed pertinent radiology findings.       Assessment/Plan:     Cherry Santiago is a 67 y.o. female with history of afib on eliquis, gastric bypass, CHF, CAD, who presents for diarrhea.    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 Hypermetabolic focus in the pancreatic head and  tail although no corresponding mass on CT. Will plan on further evaluation and sampling with EUS, however the history of gastric bypass may render than challenging. We may be able to sample the pancreatic tail. Will attempt that on Wednesday after 48 hours off eliquis.    Recommendations:  - please discontinue eliquis   - will tentatively plan for EUS on Wednesday  - NPO at midnight on Tuesday  - agree with heme/onc consult    Thank you for involving us in the care of Cherry Santiago. Please call with any additional questions, concerns or changes in the patient's clinical status. We will continue to follow.    Raquel Noyola MD  Gastroenterology Fellow PGY VI   [facility]

## 2022-11-07 NOTE — ASSESSMENT & PLAN NOTE
Patient is a 67 year old woman who presents with persistent diarrhea, currently being worked up for possible pancreatic neuroendocrine tumor. Other differentials include VIPoma, insulinoma.     Recommendations:  - Will obtain labs to evaluate for VIPoma, insulinoma  - Please obtain AES consulted for possible EUS/biopsy as patient with PET 68Ga uptake at pancreatic tail and head of pancreatic head but no mass corresponding on CT  - As patient with symptomatic diarrhea, will begin octreotide 200mcg BID and adjust as needed  - Follow up with Neuroendocrine specialist upon discharge

## 2022-11-07 NOTE — PROGRESS NOTES
Armando Jenkins - Oncology ([facility])  [facility] Medicine  Progress Note    Patient Name: Cherry Santiago  MRN: [id number]  Patient Class: IP- Inpatient   Admission Date: 11/6/2022  Length of Stay: 0 days  Attending Physician: Kyle Vera MD  Primary Care Provider: Amarilys Romero MD        Subjective:     Principal Problem:Diarrhea concurrent with and due to carcinoid syndrome        HPI:  Cherry Pozo is a 67-year-old past medical history of recently diagnosed carcinoid syndrome, AFib on Eliquis, CHF, arthritis, GERD, glaucoma, hyperlipidemia, hypertension, prior MI, TIA presenting with persistent diarrhea.      AAOx 3 . accompanied by the daughter at the bedside. Patient mentions having diarrhea intermittently since August 2022 more recently diarrhea has become worse over past few days -several episodes daily  associated nausea with intermittent vomiting. c/o lower cramping abdominal pain - 5/10.   symptoms concerned likely though to be secondary to carcinoid syndrome patient evaluated by Heme-Onc at [facility] with PET scan done 10/18 - Focal nodular foci of radiotracer uptake at the pancreatic tail and near the region of the pancreatic head above liver background activity could indicate somatostatin receptor avid lesions, but no corresponding mass is seen on CT portion of the study.  Recommend follow-up contrast enhanced pancreas protocol CT to further assess. 5HIAA levels on 10/21 WNL . CT AP W contrast done 11/4 shows No pancreatic lesion and Enterocolitis,. recent EGD -Non-bleeding gastric ulcers with no stigmata of  bleeding. Biopsied. Treated with a monopolar probe. colonoscopy with biopsy -No significant pathologic abnormality. No morphologic evidence of active inflammatory bowel disease, microscopic colitis, or neoplasia. Patient was  referred to [facility] for further workup and has scheduled appointment at Heme-Onc at [facility] this upcoming week. . c/o   dizziness,  and shortness of breath on minimal exertion    ER course -electrolyte derangement with hypokalemia to 2.4, hypomagsemia of 1.5 . replaced . UA + started on IV cefriaxone       Overview/Hospital Course:  11/7 Chromogranin A elevated at 2000s.  stool studies in process. DVT sonogram -Nonocclusive thrombus/DVT in the superior-mid right femoral vein. K replaced.  corrected calcium of 8.4.  PTH elevated 114 .started on sodium bicarbonate 1300mg BID .orthostatics + with drop in SBP to 80s on standing. RL bolus 1L and 75ml/h . C diff negative. UC with GRAM NEGATIVE PAIGE   >100,000 cfu/ml ,Identification and susceptibility pending. Hematology working her up for neuroendocrine tumor vs VIPoma.  AES consulted for EUS and potential biopsy given that her PET scan showed uptake but no mass was seen on CT pancreas protocol. discontinued eliquis.  plan for EUS on Wednesday.  started start octreotide.  SBP in 70-80s this PM on IVF. NS bolus 500ml x1. critical care consulted . ID consulted for positive cryptosporidium antigen          Review of Systems:   Pain scale:  Constitutional:  fever,  chills, headache, vision loss, hearing loss, weight loss 30lb , Generalized weakness, falls, loss of smell, loss of taste, poor appetite,  sore throat  Respiratory: cough, shortness of breath.   Cardiovascular: chest pain, dizziness, palpitations, orthopnea, swelling of feet, syncope  Gastrointestinal: nausea, vomiting, abdominal pain, diarrhea, black stool,  blood in stool, change in bowel habits  Genitourinary: hematuria, dysuria, urgency, frequency  Integument/Breast: rash,  pruritis  Hematologic/Lymphatic: easy bruising, lymphadenopathy  Musculoskeletal: arthralgias , myalgias, back pain, neck pain, knee pain  Neurological: confusion, seizures, tremors, slurred speech  Behavioral/Psych:  depression, anxiety, auditory or visual hallucinations     OBJECTIVE:     Physical Exam:  Body mass index is 32.45 kg/m².    Constitutional:  Appears well-developed and well-nourished. obesity  Head: Normocephalic and atraumatic.   Neck: Normal range of motion. Neck supple.   Cardiovascular: Normal heart rate.  Regular heart rhythm.  Pulmonary/Chest: Effort normal.   Abdominal: No distension. +  tenderness- LLQ  Musculoskeletal: Normal range of motion. ++edema.   Neurological: Alert and oriented to person, place, and time.   Skin: Skin is warm and dry.   Psychiatric: Normal mood and affect. Behavior is normal.       Vital Signs  Temp: 97.2 °F (36.2 °C) (11/07/22 1627)  Pulse: 85 (11/07/22 1732)  Resp: 18 (11/07/22 1732)  BP: (Abnormal) 94/52 (11/07/22 1732)  SpO2: 96 % (11/07/22 1732)     24 Hour VS Range    Temp:  [97.2 °F (36.2 °C)-98.5 °F (36.9 °C)]   Pulse:  []   Resp:  [16-18]   BP: ()/(48-65)   SpO2:  [96 %-100 %]     Intake/Output Summary (Last 24 hours) at 11/7/2022 1847  Last data filed at 11/7/2022 1743  Gross per 24 hour   Intake 1290 ml   Output 750 ml   Net 540 ml         I/O This Shift:  I/O this shift:  In: 1240 [P.O.:1240]  Out: 750 [Urine:750]    Wt Readings from Last 3 Encounters:   11/06/22 88.5 kg (195 lb)   11/01/22 95.4 kg (210 lb 5.1 oz)   10/07/22 89.7 kg (197 lb 12.8 oz)       I have personally reviewed the vitals and recorded Intake/Output     Laboratory/Diagnostic Data:    CBC/Anemia Labs: Coags:    Recent Labs   Lab 11/04/22  1052 11/06/22  1251 11/07/22  0652   WBC 8.10 7.16 4.95   HGB 9.0* 9.2* 8.0*   HCT 27.5* 27.5* 24.0*    228 217   MCV 89 88 88   RDW 16.8* 20.4* 19.0*    No results for input(s): PT, INR, APTT in the last 168 hours.     Chemistries: ABG:   Recent Labs   Lab 10/31/22  2013 11/01/22  0515 11/04/22  1052 11/06/22  1435 11/06/22  2045 11/07/22  0652 11/07/22  1550    142   < > 144 143 147* 148*   K 2.3* 3.6   < > 2.4* 2.4* 2.5* 3.7   * 117*   < > 117* 119* 119* 122*   CO2 22* 22*   < > 18* 16* 17* 14*   BUN 13 14   < > 13 13 12 12   CREATININE 1.77* 1.61*   < > 1.6* 1.5* 1.6*  1.7*   CALCIUM 7.8* 7.5*   < > 7.0* 7.0* 7.4* 7.7*   PROT 5.4* 5.0*  --  4.6*  --  4.7*  --    BILITOT 0.7 0.9  --  0.9  --  0.7  --    ALKPHOS 105 96  --  89  --  91  --    ALT 21 18  --  10  --  10  --    AST 61* 61*  --  40  --  39  --    MG 1.7  --   --  1.5*  --  1.9  --    PHOS  --   --   --   --  2.6* 2.6* 2.6*    < > = values in this interval not displayed.    No results for input(s): PH, PCO2, PO2, HCO3, POCSATURATED, BE in the last 168 hours.     POCT Glucose: HbA1c:    No results for input(s): POCTGLUCOSE in the last 168 hours. Hemoglobin A1C   Date Value Ref Range Status   10/13/2022 4.7 4.0 - 6.0 % Final   04/12/2021 4.1 (L) 4.7 - 5.6 % Final     Hemoglobin A1c   Date Value Ref Range Status   07/07/2017 <3.50 4.2 - 6.3 % Final        Cardiac Enzymes: Ejection Fractions:    Recent Labs     11/06/22  1251 11/06/22  1435   TROPONINI 0.016 0.019    No results found for: EF       Recent Labs   Lab 11/06/22  1157   COLORU Yellow   APPEARANCEUA Hazy*   PHUR 6.0   SPECGRAV 1.015   PROTEINUA Negative   GLUCUA Negative   KETONESU Negative   BILIRUBINUA Negative   OCCULTUA 3+*   NITRITE Negative   LEUKOCYTESUR 3+*   RBCUA 29*   WBCUA >100*   BACTERIA Occasional   SQUAMEPITHEL 2   HYALINECASTS 5*       Procalcitonin (ng/mL)   Date Value   10/12/2022 0.15   08/26/2022 0.13   08/22/2022 0.09     Lactate (Lactic Acid) (mmol/L)   Date Value   10/12/2022 1.7   07/12/2017 1.0     BNP (pg/mL)   Date Value   11/06/2022 82     CRP   Date Value   10/12/2022 18.0 mg/L (H)   10/07/2022 24.4 mg/L (H)   08/22/2022 19.0 mg/L (H)   08/19/2022 1.50 mg/dL (H)     Sed Rate   Date Value   10/12/2022 <1 mm/Hr   10/07/2022 <1 mm/Hr   08/19/2022 8 mm/Hr   07/08/2019 22 mm/Hr   07/07/2017 11 mm/hr     D-Dimer   Date Value   11/06/2022 0.71 mg/L FEU (H)   02/25/2021 2.21 ug/mL FEU (H)   02/03/2021 1.19 ug/mL FEU (H)     Ferritin (ng/mL)   Date Value   09/21/2022 272.0 (H)   08/27/2022 262.0   06/19/2019 14 (L)   07/12/2017 106.0      Ferritin Level (ng/mL)   Date Value   05/10/2021 263.87 (H)   03/22/2021 385.48 (H)   02/11/2021 386.77 (H)   01/05/2021 34.52   11/24/2020 21.72   08/16/2017 94.2     LD (U/L)   Date Value   08/23/2022 235     Lactate Dehydrogenase (unit/L)   Date Value   05/10/2021 333 (H)   03/22/2021 337 (H)   02/12/2021 340 (H)     Troponin I (ng/mL)   Date Value   11/06/2022 0.019   11/06/2022 0.016   10/31/2022 0.022   05/11/2022 0.015   03/05/2021 0.016   03/04/2021 <0.012   03/04/2021 0.016   02/06/2021 0.138 (HH)     Results for orders placed or performed during the hospital encounter of 11/06/22   Vitamin D   Result Value Ref Range    Vit D, 25-Hydroxy 11 (L) 30 - 96 ng/mL   Results for orders placed or performed during the hospital encounter of 08/22/22   Vitamin D   Result Value Ref Range    Vit D, 25-Hydroxy <12.8 (L) 30.0 - 100.0 ng/mL   Results for orders placed or performed in visit on 08/04/11   Vitamin D 25 hydroxy   Result Value Ref Range    Vit D, 25-Hydroxy 13 (L) 30 - 100 ng/mL     SARS-CoV2 (COVID-19) Qualitative PCR (no units)   Date Value   10/16/2020 NOT DETECTED     SARS-CoV-2 RNA, Amplification, Qual (no units)   Date Value   05/11/2022 Negative   03/01/2021 Negative   02/05/2021 Negative       Microbiology labs for the last week  Microbiology Results (last 7 days)       Procedure Component Value Units Date/Time    Urine culture [[id number]]  (Abnormal) Collected: 11/06/22 1157    Order Status: Completed Specimen: Urine Updated: 11/07/22 1017     Urine Culture, Routine GRAM NEGATIVE PAIGE  >100,000 cfu/ml  Identification and susceptibility pending      Narrative:      Specimen Source->Urine    Clostridium difficile EIA [[id number]] Collected: 11/06/22 2030    Order Status: Completed Specimen: Stool Updated: 11/07/22 0250     C. diff Antigen Negative     C difficile Toxins A+B, EIA Negative     Comment: Testing not recommended for children <24 months old.       Stool culture [[id number]] Collected:  11/06/22 2030    Order Status: Sent Specimen: Stool Updated: 11/06/22 2053    E. coli 0157 antigen [[id number]] Collected: 11/06/22 2030    Order Status: No result Specimen: Stool Updated: 11/06/22 2053            Reviewed and noted in plan where applicable- Please see chart for full lab data.    Lines/Drains:       Peripheral IV - Single Lumen 11/06/22 1300 22 G Left;Posterior;Proximal Forearm (Active)   Number of days: 0       Imaging  ECG Results              EKG 12-lead (Final result)  Result time 11/07/22 12:42:19      Final result by Interface, Lab In [facility] (11/07/22 12:42:19)               Narrative:    Test Reason : R68.89,    Vent. Rate : 113 BPM     Atrial Rate : 113 BPM     P-R Int : 164 ms          QRS Dur : 106 ms      QT Int : 334 ms       P-R-T Axes : 051 -37 164 degrees     QTc Int : 458 ms    Sinus tachycardia with Premature atrial complexes  Left axis deviation  Possible Anterior infarct (cited on or before 31-OCT-2022)  Abnormal ECG  When compared with ECG of 31-OCT-2022 19:33,  Premature atrial complexes are now Present  Confirmed by Nadja Barney MD ([id number]) on 11/7/2022 12:42:07 PM    Referred By: [email or web address]   SELF           Confirmed By:Nadja Barney MD                                  No results found for this or any previous visit.      US Lower Extremity Veins Bilateral  Narrative: EXAMINATION:  US LOWER EXTREMITY VEINS BILATERAL    CLINICAL HISTORY:  edema;    TECHNIQUE:  Duplex and color flow Doppler and dynamic compression was performed of the bilateral lower extremity veins was performed.    COMPARISON:  None    FINDINGS:  Technically difficult study.    Right thigh veins: Nonocclusive thrombus/DVT in the superior-mid right femoral vein.    Right common femoral vein is patent.  The popliteal, upper greater saphenous, and profunda femoral veins are patent.    Right calf veins: The visualized calf veins are patent.    Left thigh veins: The common femoral, femoral, popliteal, upper  greater saphenous, and deep femoral veins are patent and free of thrombus. The veins are normally compressible and have normal phasic flow and augmentation response.    Left calf veins: The visualized calf veins are patent.    Miscellaneous: None  Impression: Nonocclusive thrombus/DVT in the superior-mid right femoral vein.  Recommend follow-up.    This report was flagged in Epic as abnormal.    Electronically signed by: Rafita Brand  Date:    11/06/2022  Time:    19:42  X-Ray Chest AP Portable  Narrative: EXAMINATION:  XR CHEST AP PORTABLE    CLINICAL HISTORY:  Shortness of breath    TECHNIQUE:  Single frontal view of the chest was performed.    COMPARISON:  Chest radiograph 10/31/2022 and CT abdomen and pelvis 11/04/2022    FINDINGS:  Patient is slightly rotated.  Resolution is limited by body habitus with underpenetration.    Cardiomediastinal silhouette is midline and prominent without evidence of failure, stable.  Similar nonspecific elevation of the right hemidiaphragm.  Pulmonary vasculature and hilar contours are within normal limits.    Left basilar platelike scarring versus atelectasis unchanged.  The lungs are otherwise well expanded without large consolidation, pleural effusion or pneumothorax.    Osseous structures appear stable without acute process seen.  PA and lateral views can be obtained.  Impression: Grossly stable chronic findings as above without detrimental change or convincing radiographic evidence of pneumonia or other source of shortness of breath on this single view, noting that early/mild viral pneumonia may be radiographically occult.    Electronically signed by: Jarvis Delaney MD  Date:    11/06/2022  Time:    12:17      Labs, Imaging, EKG and Diagnostic results from 11/7/2022 were reviewed.    Medications:  Medication list was reviewed and changes noted under Assessment/Plan.  No current facility-administered medications on file prior to encounter.     Current Outpatient Medications on  File Prior to Encounter   Medication Sig Dispense Refill    apixaban (ELIQUIS) 5 mg Tab Take 1 tablet (5 mg total) by mouth 2 (two) times daily.      aspirin (ECOTRIN) 81 MG EC tablet Take 81 mg by mouth once daily.      ferrous sulfate (FEOSOL) 325 mg (65 mg iron) Tab tablet Take 325 mg by mouth daily with breakfast.      furosemide (LASIX) 20 MG tablet Take 20 mg by mouth once daily.      levETIRAcetam (KEPPRA) 250 MG Tab Take 500 mg by mouth 2 (two) times daily.      loperamide (IMODIUM) 2 mg capsule Take 1 capsule (2 mg total) by mouth 4 (four) times daily as needed for Diarrhea. 180 capsule 0    midodrine (PROAMATINE) 5 MG Tab Take 1 tablet (5 mg total) by mouth 3 (three) times daily with meals. 90 tablet 0    pantoprazole (PROTONIX) 40 MG tablet Take 40 mg by mouth once daily.      potassium chloride SA (K-DUR,KLOR-CON) 20 MEQ tablet Take 1 tablet (20 mEq total) by mouth 2 (two) times daily. 60 tablet 0    timolol maleate 0.5% (TIMOPTIC) 0.5 % Drop Place 1 drop into both eyes 2 (two) times daily.      atorvastatin (LIPITOR) 40 MG tablet Take 1 tablet (40 mg total) by mouth every evening. (Patient not taking: Reported on 11/4/2022) 90 tablet 3    citric acid-potassium citrate (POLYCITRA) 1,100-334 mg/5 mL solution potassium citrate-citric acid 1,100 mg-334 mg/5 mL oral solution   TAKE 5 MLS (10 MEQ TOTAL) BY MOUTH ONCE. FOR 1 DOSE ONLY      diphenoxylate-atropine 2.5-0.025 mg (LOMOTIL) 2.5-0.025 mg per tablet Take 1 tablet by mouth 4 (four) times daily as needed for Diarrhea (unreleived with Imodium). (Patient not taking: Reported on 11/4/2022) 30 tablet 0    metoclopramide HCl (REGLAN) 10 MG tablet Take 1 tablet (10 mg total) by mouth every 6 (six) hours as needed (Nausea). (Patient not taking: Reported on 11/4/2022) 14 tablet 0    metoprolol tartrate (LOPRESSOR) 25 MG tablet Take 1 tablet (25 mg total) by mouth 2 (two) times daily. (Patient not taking: Reported on 11/4/2022) 60 tablet 11    sodium bicarbonate  650 MG tablet Take 1 tablet (650 mg total) by mouth 2 (two) times daily. for 5 days 10 tablet 0     Scheduled Medications:  aspirin, 81 mg, Oral, Daily  atorvastatin, 40 mg, Oral, QHS  cefTRIAXone (ROCEPHIN) IVPB, 1 g, Intravenous, Q24H  ferrous sulfate, 1 tablet, Oral, Daily  levETIRAcetam, 500 mg, Oral, BID  metoprolol tartrate, 25 mg, Oral, BID  octreotide, 200 mcg, Subcutaneous, Q12H  pantoprazole, 40 mg, Oral, Daily  sodium bicarbonate, 1,300 mg, Oral, BID  vitamin D, 2,000 Units, Oral, Daily    PRN: dextrose 10%, dextrose 10%, glucagon (human recombinant), glucose, glucose, naloxone  Infusions:       Estimated Creatinine Clearance: 35.3 mL/min (A) (based on SCr of 1.7 mg/dL (H)).    Assessment/Plan:      * Carcinoid syndrome related chronic diarrhea   ? 67-year-old past medical history of recently diagnosed carcinoid syndrome,  having diarrhea intermittently since August 2022 more recently diarrhea has become worse over past few days -several episodes daily  associated nausea with intermittent vomiting.     symptoms concerned likely though to be secondary to carcinoid syndrome patient evaluated by Heme[facility]Onc at [facility] with PET scan done 10/18 - Focal nodular foci of radiotracer uptake at the pancreatic tail and near the region of the pancreatic head above liver background activity could indicate somatostatin receptor avid lesions, but no corresponding mass is seen on CT portion of the study.  Recommend follow-up contrast enhanced pancreas protocol CT to further assess. 5HIAA levels on 10/21 WNL . CT AP W contrast done 11/4 shows No pancreatic lesion and Enterocolitis,. recent EGD -Non-bleeding gastric ulcers with no stigmata of  bleeding. Biopsied. Treated with a monopolar probe. colonoscopy with biopsy -No significant pathologic abnormality. No morphologic evidence of active inflammatory bowel disease, microscopic colitis, or neoplasia. Patient was  referred to [facility] for further  workup and has scheduled appointment at Heme-Onc at [facility] this upcoming week.    obtain stool studies  11/7 Chromogranin A elevated at 2000s.  stool studies in process. C diff negative. Hematology working her up for neuroendocrine tumor vs VIPoma.  AES consulted for EUS and potential biopsy given that her PET scan showed uptake but no mass was seen on CT pancreas protocol.discontinued eliquis.  plan for EUS on Wednesday.  started octreotide.      Orthostatic hypotension  11/7 orthostatics + with drop in SBP to 80s on standing. RL bolus 1L and 75ml/h .SBP in 70-80s this PM on IVF. NS bolus 500ml x1. critical care consulted       Seizures  continue with keppra BID      UTI (urinary tract infection)     urinalysis positive . Culture, Urine pending  continue with anitbiotic IV ceftriaxone      Hypomagnesemia  replaced      Stage 3a chronic kidney disease  seems to be at baseline. Creatine stable for now. BMP reviewed- noted Estimated Creatinine Clearance: 37.5 mL/min (A) (based on SCr of 1.6 mg/dL (H)). according to latest data. Monitor UOP and serial BMP and adjust therapy as needed. Renally dose meds.    Recent Labs   Lab 11/01/22  0515 11/04/22  1052 11/06/22  1435   BUN 14 16 13   CREATININE 1.61* 1.77* 1.6*          Hypocalcemia  11/6   corrected calcium of 8.4.  PTH elevated 114        Chronic heart failure with preserved ejection fraction (HFpEF) NICM NYHA2   Patient admitted without  signs and symptoms of CHF exacerbation    Results for orders placed or performed during the hospital encounter of 11/06/22   Brain natriuretic peptide   Result Value Ref Range    BNP 82 0 - 99 pg/mL   . Telemetry monitoring. Strict input/ Output monitor, Daily weights.    denies chestpain.  Obtain serial troponins.  Cardiac Enzymes trend  Recent Labs   Lab 10/31/22  2013 11/06/22  1251   TROPONINI 0.022 0.016     No results found for this or any previous visit.  echo 10/7/22 left ventricle is normal in size. Global  left ventricular   systolic function is normal. The left ventricular ejection fraction is   55%. Left ventricular diastolic function is abnormal (stage I impaired   relaxation). Noted left ventricular hypertrophy. Concentric left   ventricular hypertrophy is present. It is mild.     4. Mild (1+) tricuspid regurgitation.   5. The right ventricle is normal in size. Right ventricular systolic   function is normal.          Chronic deep vein thrombosis (DVT): right common femoral vein  DVT sonogram 8/22 and 10/22 negative for DVT  11/7 DVT sonogram -Nonocclusive thrombus/DVT in the superior-mid right femoral vein.continue eliquis . S/p IVC filter     Dizziness  likely secondary to diarrhea. orthostasis. IV hydration      Hypokalemia    - Patient with potassium   Recent Labs   Lab 11/06/22  1435 11/06/22  2045 11/07/22  0652   K 2.4* 2.4* 2.5*   . replaced. monitor    Anemia of chronic disease    Patient's with Normocytic anemia.. Hemoglobin stable. Etiology likely due to chronic disease .  Current CBC reviewed-  Recent Labs   Lab 11/01/22  0515 11/04/22  1052 11/06/22  1251   HGB 8.6* 9.0* 9.2*    Monitor CBC and transfuse if H/H drops below 7/21.       Essential hypertension    Chronic, controlled.  Latest blood pressure and vitals reviewed-   Temp:  [98.7 °F (37.1 °C)]   Pulse:  []   Resp:  [18-20]   BP: (111-138)/(54-78)   SpO2:  [96 %-100 %] .   Home meds for hypertension were reviewed and hold furosemide and metoprolol for now as with diarrhea. PRN meds if BP> 180/110 mm HG      Class 1 obesity due to excess calories with serious comorbidity and body mass index (BMI) of 32.0 to 32.9 in adult  Body mass index is 32.45 kg/m². Morbid obesity complicates all aspects of disease management from diagnostic modalities to treatment. Weight loss encouraged       VTE Risk Mitigation (From admission, onward)           Ordered     IP VTE HIGH RISK PATIENT  Once         11/06/22 1614     Place sequential compression device   Until discontinued         11/06/22 1614                    Discharge Planning   MELYSSA: 11/9/2022     Code Status: Full Code   Is the patient medically ready for discharge?: No    Reason for patient still in hospital (select all that apply): Treatment  Discharge Plan A: Home with family                  Kyle Vera MD  Department of Hospital Medicine   [facility] - Oncology ([facility])

## 2022-11-07 NOTE — CONSULTS
Armando Jenkins - Oncology ([facility])  Hematology/Oncology  Consult Note    Patient Name: Cherry Santiago  MRN: [id number]  Admission Date: 11/6/2022  Hospital Length of Stay: 0 days  Code Status: Full Code   Attending Provider: Kyle Vera MD  Consulting Provider: Sonal Larson MD  Primary Care Physician: Amarilys Romero MD  Principal Problem:Diarrhea concurrent with and due to carcinoid syndrome    Inpatient consult to Hematology/Oncology  Consult performed by: Sonal Larson MD  Consult ordered by: Kyle Vera MD        Subjective:     HPI:  Ms. Cherry Santiago is a 67 year old woman with A.fib, HFpEF who presented on 11/06 with persistent diarrhea. She was admitted to hospital medicine for IVF and electrolyte replacement. She is currently being worked up for possible neuroendocrine tumor. She states that she has been having diarrhea since the last week of August. It has been worsening over the past couple of months. She has been having more than 10 episodes of diarrhea per day. She states that it is yellow colored with no blood. She has associated occasional abdominal cramping, nausea and vomiting. She has lost about 40lbs in the past month. She has been seen Dr. Hernandez at [facility]. Dr. Hernandez has been working her up for possible neuroendocrine tumor. Patient had normal 5-HIAA levels with elevated chromogramin A level at 2000. She also had a PET 68 Ga scan on 11/03 that showed focular nodular uptake at pancreatic tail and head of pancreatic tail, however, dedicated CT pancreas did not show a mass. Patient's family history is notable for mother who had breast cancer, father had pancreatic/prostate, an aunt had RCC and another aunt had colon cancer. Patient denies smoking or history of smoking.     Medical Oncology was consulted for possible neuroendocrine tumor.      Oncology Treatment Plan:   [No matching plan found]    Medications:  Continuous Infusions:   lactated ringers 75 mL/hr at 11/07/22  1244     Scheduled Meds:   apixaban  5 mg Oral BID    aspirin  81 mg Oral Daily    atorvastatin  40 mg Oral QHS    cefTRIAXone (ROCEPHIN) IVPB  1 g Intravenous Q24H    ferrous sulfate  1 tablet Oral Daily    levETIRAcetam  500 mg Oral BID    metoprolol tartrate  25 mg Oral BID    octreotide  200 mcg Subcutaneous Q12H    pantoprazole  40 mg Oral Daily    potassium, sodium phosphates  2 packet Oral QID (AC & HS)    sodium bicarbonate  1,300 mg Oral BID    vitamin D  2,000 Units Oral Daily     PRN Meds:dextrose 10%, dextrose 10%, glucagon (human recombinant), glucose, glucose, naloxone     Review of patient's allergies indicates:  No Known Allergies     Past Medical History:   Diagnosis Date    Anticoagulant long-term use     Arthritis     Atrial fibrillation     CHF (congestive heart failure)     Diabetes mellitus     Type2 resolved after weight loss surgery    DVT (deep venous thrombosis)     Encounter for blood transfusion     GERD (gastroesophageal reflux disease)     Glaucoma     Hypercholesterolemia     Hyperlipemia     Hypertension     Memory changes     Myocardial infarction     Renal failure     resolved    TIA (transient ischemic attack)      Past Surgical History:   Procedure Laterality Date    BACK SURGERY  06/21/2017    lumbar fusion 6/21/17 and surgery for hematoma to site on 6/26/17    CHOLECYSTECTOMY  1978    COLONOSCOPY N/A 10/3/2022    Procedure: COLONOSCOPY;  Surgeon: Jourdan Parks MD;  Location: [facility] [place], [place];  Service: General;  Laterality: N/A;    COLONOSCOPY N/A 10/11/2022    Procedure: COLONOSCOPY;  Surgeon: Stephen Cooper MD;  Location: [facility] [place], [place];  Service: Endoscopy;  Laterality: N/A;    ESOPHAGOGASTRODUODENOSCOPY N/A 10/3/2022    Procedure: EGD (ESOPHAGOGASTRODUODENOSCOPY);  Surgeon: Jourdan Parks MD;  Location: [facility] [place], [place];  Service: General;  Laterality: N/A;    GASTRIC BYPASS      HYSTERECTOMY  2012    JOINT REPLACEMENT      TKR    LEFT HEART  CATHETERIZATION Left 2/5/2021    Procedure: Left heart cath;  Surgeon: Shane Pacheco MD;  Location: [facility] CATH;  Service: Cardiovascular;  Laterality: Left;    PELVIC LAPAROSCOPY Left     OOPH    RENAL BIOPSY N/A 10/5/2022    Procedure: BIOPSY, KIDNEY;  Surgeon: Velia Diagnostic Provider;  Location: [facility] OR;  Service: General;  Laterality: N/A;    RIGHT HEART CATHETERIZATION Right 2/5/2021    Procedure: INSERTION, CATHETER, RIGHT HEART. via left radial and left brachial;  Surgeon: Shane Pacheco MD;  Location: [facility] CATH;  Service: Cardiovascular;  Laterality: Right;    TOTAL ABDOMINAL HYSTERECTOMY W/ BILATERAL SALPINGOOPHORECTOMY       Family History       Problem Relation (Age of Onset)    Arthritis Mother    Breast cancer Mother    Cancer Father    Diabetes Daughter    Heart disease Mother, Father    Hypertension Mother, Father          Tobacco Use    Smoking status: Never    Smokeless tobacco: Never   Substance and Sexual Activity    Alcohol use: Yes     Comment: occasional    Drug use: No    Sexual activity: Not Currently       Review of Systems   Constitutional:  Positive for appetite change and unexpected weight change. Negative for chills, fatigue and fever.   HENT:  Negative for congestion and dental problem.    Eyes:  Negative for photophobia and visual disturbance.   Respiratory:  Negative for cough and shortness of breath.    Cardiovascular:  Negative for chest pain and leg swelling.   Gastrointestinal:  Positive for abdominal pain, diarrhea, nausea and vomiting.   Genitourinary:  Negative for difficulty urinating and dysuria.   Musculoskeletal:  Negative for arthralgias and back pain.   Skin:  Negative for color change and rash.   Neurological:  Negative for light-headedness and headaches.   Hematological:  Negative for adenopathy. Does not bruise/bleed easily.   Psychiatric/Behavioral:  Negative for agitation and behavioral problems.    Objective:     Vital Signs (Most Recent):  Temp: 97.9 °F  (36.6 °C) (11/07/22 1124)  Pulse: 72 (11/07/22 1138)  Resp: 18 (11/07/22 1124)  BP: (!) 98/57 (11/07/22 1124)  SpO2: 100 % (11/07/22 1124)   Vital Signs (24h Range):  Temp:  [97.9 °F (36.6 °C)-98.3 °F (36.8 °C)] 97.9 °F (36.6 °C)  Pulse:  [] 72  Resp:  [16-18] 18  SpO2:  [97 %-100 %] 100 %  BP: ()/(54-78) 98/57     Weight: 88.5 kg (195 lb)  Body mass index is 32.45 kg/m².  Body surface area is 2.01 meters squared.      Intake/Output Summary (Last 24 hours) at 11/7/2022 1439  Last data filed at 11/7/2022 1412  Gross per 24 hour   Intake 1010 ml   Output 600 ml   Net 410 ml       Physical Exam  Constitutional:       General: She is not in acute distress.     Appearance: She is well-developed.   HENT:      Head: Normocephalic and atraumatic.      Nose: Nose normal. No congestion.      Mouth/Throat:      Mouth: Mucous membranes are dry.      Pharynx: No oropharyngeal exudate.   Eyes:      General: No scleral icterus.     Extraocular Movements: Extraocular movements intact.   Cardiovascular:      Rate and Rhythm: Normal rate and regular rhythm.      Heart sounds: No murmur heard.  Pulmonary:      Effort: Pulmonary effort is normal. No respiratory distress.      Breath sounds: Normal breath sounds.   Abdominal:      General: There is no distension.      Palpations: Abdomen is soft.      Tenderness: There is abdominal tenderness (mid, lower abdomen).   Musculoskeletal:         General: No swelling. Normal range of motion.      Cervical back: Normal range of motion and neck supple.   Skin:     General: Skin is warm and dry.   Neurological:      Mental Status: She is alert and oriented to person, place, and time. Mental status is at baseline.   Psychiatric:         Mood and Affect: Mood normal.         Behavior: Behavior normal.       Significant Labs:   CBC:   Recent Labs   Lab 11/06/22  1251 11/07/22  0652   WBC 7.16 4.95   HGB 9.2* 8.0*   HCT 27.5* 24.0*    217    and CMP:   Recent Labs   Lab  11/06/22  1435 11/06/22  2045 11/07/22  0652    143 147*   K 2.4* 2.4* 2.5*   * 119* 119*   CO2 18* 16* 17*   * 95 68*   BUN 13 13 12   CREATININE 1.6* 1.5* 1.6*   CALCIUM 7.0* 7.0* 7.4*   PROT 4.6*  --  4.7*   ALBUMIN 1.5* 1.5* 1.5*   BILITOT 0.9  --  0.7   ALKPHOS 89  --  91   AST 40  --  39   ALT 10  --  10   ANIONGAP 9 8 11       Diagnostic Results:  I have reviewed all pertinent imaging results/findings within the past 24 hours.    Assessment/Plan:     * Carcinoid syndrome related chronic diarrhea   Patient is a 67 year old woman who presents with persistent diarrhea, currently being worked up for possible pancreatic neuroendocrine tumor. Other differentials include VIPoma, insulinoma.     Recommendations:  - Will obtain labs to evaluate for VIPoma, insulinoma  - Please obtain AES consulted for possible EUS/biopsy as patient with PET 68Ga uptake at pancreatic tail and head of pancreatic head but no mass corresponding on CT  - As patient with symptomatic diarrhea, will begin octreotide 200mcg BID and adjust as needed  - Follow up with Neuroendocrine specialist upon discharge    Discussed with Dr. Baron.    Thank you for your consult. I will follow-up with patient. Please contact us if you have any additional questions.    Sonal Larson MD  Hematology/Oncology  [facility] - Oncology ([facility])

## 2022-11-07 NOTE — ASSESSMENT & PLAN NOTE
? 67-year-old past medical history of recently diagnosed carcinoid syndrome,  having diarrhea intermittently since August 2022 more recently diarrhea has become worse over past few days -several episodes daily  associated nausea with intermittent vomiting.     symptoms concerned likely though to be secondary to carcinoid syndrome patient evaluated by Heme-Onc at [facility] with PET scan done 10/18 - Focal nodular foci of radiotracer uptake at the pancreatic tail and near the region of the pancreatic head above liver background activity could indicate somatostatin receptor avid lesions, but no corresponding mass is seen on CT portion of the study.  Recommend follow-up contrast enhanced pancreas protocol CT to further assess. 5HIAA levels on 10/21 WNL . CT AP W contrast done 11/4 shows No pancreatic lesion and Enterocolitis,. recent EGD -Non-bleeding gastric ulcers with no stigmata of  bleeding. Biopsied. Treated with a monopolar probe. colonoscopy with biopsy -No significant pathologic abnormality. No morphologic evidence of active inflammatory bowel disease, microscopic colitis, or neoplasia. Patient was  referred to [facility] for further workup and has scheduled appointment at [facility] this upcoming week.    obtain stool studies  11/7 Chromogranin A elevated at 2000s.  stool studies in process. C diff negative. Hematology working her up for neuroendocrine tumor vs VIPoma.  AES consulted for EUS and potential biopsy given that her PET scan showed uptake but no mass was seen on CT pancreas protocol.discontinued eliquis.  plan for EUS on Wednesday.  started octreotide.

## 2022-11-07 NOTE — ED NOTES
MD notified of second order of Magnesium and Potassium medications. MD states to give pt potassium.

## 2022-11-08 ENCOUNTER — TELEPHONE (OUTPATIENT)
Dept: HEMATOLOGY/ONCOLOGY | Facility: CLINIC | Age: 67
End: 2022-11-08
Payer: MEDICARE

## 2022-11-08 ENCOUNTER — ANESTHESIA EVENT (OUTPATIENT)
Dept: ENDOSCOPY | Facility: HOSPITAL | Age: 67
DRG: 391 | End: 2022-11-08
Payer: MEDICARE

## 2022-11-08 PROBLEM — Z20.89: Status: ACTIVE | Noted: 2022-11-08

## 2022-11-08 LAB
ALBUMIN SERPL BCP-MCNC: 1.4 G/DL (ref 3.5–5.2)
ALBUMIN SERPL BCP-MCNC: 1.4 G/DL (ref 3.5–5.2)
ALP SERPL-CCNC: 85 U/L (ref 55–135)
ALT SERPL W/O P-5'-P-CCNC: 8 U/L (ref 10–44)
ANION GAP SERPL CALC-SCNC: 11 MMOL/L (ref 8–16)
ANION GAP SERPL CALC-SCNC: 8 MMOL/L (ref 8–16)
AST SERPL-CCNC: 33 U/L (ref 10–40)
BACTERIA UR CULT: ABNORMAL
BASOPHILS # BLD AUTO: 0.02 K/UL (ref 0–0.2)
BASOPHILS # BLD AUTO: 0.02 K/UL (ref 0–0.2)
BASOPHILS NFR BLD: 0.4 % (ref 0–1.9)
BASOPHILS NFR BLD: 0.4 % (ref 0–1.9)
BILIRUB SERPL-MCNC: 0.5 MG/DL (ref 0.1–1)
BUN SERPL-MCNC: 9 MG/DL (ref 8–23)
BUN SERPL-MCNC: 9 MG/DL (ref 8–23)
C PEPTIDE SERPL-MCNC: 0.91 NG/ML (ref 0.78–5.19)
CALCIUM SERPL-MCNC: 7 MG/DL (ref 8.7–10.5)
CALCIUM SERPL-MCNC: 7.1 MG/DL (ref 8.7–10.5)
CHLORIDE SERPL-SCNC: 115 MMOL/L (ref 95–110)
CHLORIDE SERPL-SCNC: 118 MMOL/L (ref 95–110)
CO2 SERPL-SCNC: 18 MMOL/L (ref 23–29)
CO2 SERPL-SCNC: 19 MMOL/L (ref 23–29)
CREAT SERPL-MCNC: 1.3 MG/DL (ref 0.5–1.4)
CREAT SERPL-MCNC: 1.5 MG/DL (ref 0.5–1.4)
CRYPTOSP AG STL QL IA: NEGATIVE
DIFFERENTIAL METHOD: ABNORMAL
DIFFERENTIAL METHOD: ABNORMAL
E COLI SXT1 STL QL IA: NEGATIVE
E COLI SXT2 STL QL IA: NEGATIVE
EOSINOPHIL # BLD AUTO: 0 K/UL (ref 0–0.5)
EOSINOPHIL # BLD AUTO: 0.1 K/UL (ref 0–0.5)
EOSINOPHIL NFR BLD: 0.7 % (ref 0–8)
EOSINOPHIL NFR BLD: 1.2 % (ref 0–8)
ERYTHROCYTE [DISTWIDTH] IN BLOOD BY AUTOMATED COUNT: 19.6 % (ref 11.5–14.5)
ERYTHROCYTE [DISTWIDTH] IN BLOOD BY AUTOMATED COUNT: 19.8 % (ref 11.5–14.5)
EST. GFR  (NO RACE VARIABLE): 38 ML/MIN/1.73 M^2
EST. GFR  (NO RACE VARIABLE): 45.1 ML/MIN/1.73 M^2
G LAMBLIA AG STL QL IA: NEGATIVE
GLUCOSE SERPL-MCNC: 216 MG/DL (ref 70–110)
GLUCOSE SERPL-MCNC: 226 MG/DL (ref 70–110)
HCT VFR BLD AUTO: 21.6 % (ref 37–48.5)
HCT VFR BLD AUTO: 22.5 % (ref 37–48.5)
HGB BLD-MCNC: 7.4 G/DL (ref 12–16)
HGB BLD-MCNC: 7.5 G/DL (ref 12–16)
IMM GRANULOCYTES # BLD AUTO: 0.02 K/UL (ref 0–0.04)
IMM GRANULOCYTES # BLD AUTO: 0.04 K/UL (ref 0–0.04)
IMM GRANULOCYTES NFR BLD AUTO: 0.4 % (ref 0–0.5)
IMM GRANULOCYTES NFR BLD AUTO: 0.9 % (ref 0–0.5)
INSULIN COLLECTION INTERVAL: NORMAL
INSULIN SERPL-ACNC: <1.6 UU/ML
LYMPHOCYTES # BLD AUTO: 1.2 K/UL (ref 1–4.8)
LYMPHOCYTES # BLD AUTO: 1.3 K/UL (ref 1–4.8)
LYMPHOCYTES NFR BLD: 25.8 % (ref 18–48)
LYMPHOCYTES NFR BLD: 26.6 % (ref 18–48)
MAGNESIUM SERPL-MCNC: 1.7 MG/DL (ref 1.6–2.6)
MCH RBC QN AUTO: 29.4 PG (ref 27–31)
MCH RBC QN AUTO: 30.1 PG (ref 27–31)
MCHC RBC AUTO-ENTMCNC: 33.3 G/DL (ref 32–36)
MCHC RBC AUTO-ENTMCNC: 34.3 G/DL (ref 32–36)
MCV RBC AUTO: 88 FL (ref 82–98)
MCV RBC AUTO: 88 FL (ref 82–98)
MONOCYTES # BLD AUTO: 0.6 K/UL (ref 0.3–1)
MONOCYTES # BLD AUTO: 0.7 K/UL (ref 0.3–1)
MONOCYTES NFR BLD: 12.8 % (ref 4–15)
MONOCYTES NFR BLD: 13.2 % (ref 4–15)
NEUTROPHILS # BLD AUTO: 2.7 K/UL (ref 1.8–7.7)
NEUTROPHILS # BLD AUTO: 2.9 K/UL (ref 1.8–7.7)
NEUTROPHILS NFR BLD: 58.2 % (ref 38–73)
NEUTROPHILS NFR BLD: 59.4 % (ref 38–73)
NRBC BLD-RTO: 0 /100 WBC
NRBC BLD-RTO: 0 /100 WBC
PHOSPHATE SERPL-MCNC: 2.3 MG/DL (ref 2.7–4.5)
PHOSPHATE SERPL-MCNC: 2.3 MG/DL (ref 2.7–4.5)
PLATELET # BLD AUTO: 178 K/UL (ref 150–450)
PLATELET # BLD AUTO: 214 K/UL (ref 150–450)
PMV BLD AUTO: 9.4 FL (ref 9.2–12.9)
PMV BLD AUTO: 9.7 FL (ref 9.2–12.9)
POTASSIUM SERPL-SCNC: 2.6 MMOL/L (ref 3.5–5.1)
POTASSIUM SERPL-SCNC: 2.8 MMOL/L (ref 3.5–5.1)
PROT SERPL-MCNC: 4.3 G/DL (ref 6–8.4)
RBC # BLD AUTO: 2.46 M/UL (ref 4–5.4)
RBC # BLD AUTO: 2.55 M/UL (ref 4–5.4)
SODIUM SERPL-SCNC: 144 MMOL/L (ref 136–145)
SODIUM SERPL-SCNC: 145 MMOL/L (ref 136–145)
WBC # BLD AUTO: 4.46 K/UL (ref 3.9–12.7)
WBC # BLD AUTO: 4.93 K/UL (ref 3.9–12.7)

## 2022-11-08 PROCEDURE — 83735 ASSAY OF MAGNESIUM: CPT | Performed by: HOSPITALIST

## 2022-11-08 PROCEDURE — 84206 ASSAY OF PROINSULIN: CPT | Performed by: HOSPITALIST

## 2022-11-08 PROCEDURE — 20600001 HC STEP DOWN PRIVATE ROOM

## 2022-11-08 PROCEDURE — 83525 ASSAY OF INSULIN: CPT | Performed by: HOSPITALIST

## 2022-11-08 PROCEDURE — 80053 COMPREHEN METABOLIC PANEL: CPT | Performed by: HOSPITALIST

## 2022-11-08 PROCEDURE — 63600175 PHARM REV CODE 636 W HCPCS: Mod: JB | Performed by: STUDENT IN AN ORGANIZED HEALTH CARE EDUCATION/TRAINING PROGRAM

## 2022-11-08 PROCEDURE — 84586 ASSAY OF VIP: CPT | Performed by: HOSPITALIST

## 2022-11-08 PROCEDURE — 84307 ASSAY OF SOMATOSTATIN: CPT | Performed by: HOSPITALIST

## 2022-11-08 PROCEDURE — 99233 SBSQ HOSP IP/OBS HIGH 50: CPT | Mod: ,,, | Performed by: HOSPITALIST

## 2022-11-08 PROCEDURE — 82943 ASSAY OF GLUCAGON: CPT | Performed by: HOSPITALIST

## 2022-11-08 PROCEDURE — 99223 1ST HOSP IP/OBS HIGH 75: CPT | Mod: GC,,, | Performed by: INTERNAL MEDICINE

## 2022-11-08 PROCEDURE — 84681 ASSAY OF C-PEPTIDE: CPT | Performed by: HOSPITALIST

## 2022-11-08 PROCEDURE — 85025 COMPLETE CBC W/AUTO DIFF WBC: CPT | Mod: 91 | Performed by: HOSPITALIST

## 2022-11-08 PROCEDURE — 84100 ASSAY OF PHOSPHORUS: CPT | Performed by: HOSPITALIST

## 2022-11-08 PROCEDURE — 99223 PR INITIAL HOSPITAL CARE,LEVL III: ICD-10-PCS | Mod: GC,,, | Performed by: INTERNAL MEDICINE

## 2022-11-08 PROCEDURE — 63600175 PHARM REV CODE 636 W HCPCS: Performed by: HOSPITALIST

## 2022-11-08 PROCEDURE — 99233 PR SUBSEQUENT HOSPITAL CARE,LEVL III: ICD-10-PCS | Mod: ,,, | Performed by: HOSPITALIST

## 2022-11-08 PROCEDURE — 80069 RENAL FUNCTION PANEL: CPT | Performed by: HOSPITALIST

## 2022-11-08 PROCEDURE — 25000003 PHARM REV CODE 250: Performed by: HOSPITALIST

## 2022-11-08 RX ORDER — SODIUM,POTASSIUM PHOSPHATES 280-250MG
2 POWDER IN PACKET (EA) ORAL
Status: DISPENSED | OUTPATIENT
Start: 2022-11-08 | End: 2022-11-09

## 2022-11-08 RX ORDER — SODIUM BICARBONATE 650 MG/1
1300 TABLET ORAL 2 TIMES DAILY
Status: DISCONTINUED | OUTPATIENT
Start: 2022-11-08 | End: 2022-11-20

## 2022-11-08 RX ORDER — POTASSIUM CHLORIDE 20 MEQ/1
40 TABLET, EXTENDED RELEASE ORAL ONCE
Status: COMPLETED | OUTPATIENT
Start: 2022-11-08 | End: 2022-11-08

## 2022-11-08 RX ORDER — POTASSIUM CHLORIDE 20 MEQ/1
20 TABLET, EXTENDED RELEASE ORAL ONCE
Status: COMPLETED | OUTPATIENT
Start: 2022-11-08 | End: 2022-11-08

## 2022-11-08 RX ORDER — SODIUM,POTASSIUM PHOSPHATES 280-250MG
2 POWDER IN PACKET (EA) ORAL ONCE
Status: COMPLETED | OUTPATIENT
Start: 2022-11-08 | End: 2022-11-08

## 2022-11-08 RX ORDER — POTASSIUM CHLORIDE 20 MEQ/1
40 TABLET, EXTENDED RELEASE ORAL ONCE
Status: COMPLETED | OUTPATIENT
Start: 2022-11-09 | End: 2022-11-09

## 2022-11-08 RX ADMIN — PANTOPRAZOLE SODIUM 40 MG: 40 TABLET, DELAYED RELEASE ORAL at 08:11

## 2022-11-08 RX ADMIN — OCTREOTIDE ACETATE 200 MCG: 100 INJECTION, SOLUTION INTRAVENOUS; SUBCUTANEOUS at 08:11

## 2022-11-08 RX ADMIN — POTASSIUM & SODIUM PHOSPHATES POWDER PACK 280-160-250 MG 2 PACKET: 280-160-250 PACK at 08:11

## 2022-11-08 RX ADMIN — METOPROLOL TARTRATE 25 MG: 25 TABLET, FILM COATED ORAL at 08:11

## 2022-11-08 RX ADMIN — LEVETIRACETAM 500 MG: 500 TABLET, FILM COATED ORAL at 08:11

## 2022-11-08 RX ADMIN — POTASSIUM CHLORIDE 40 MEQ: 1500 TABLET, EXTENDED RELEASE ORAL at 04:11

## 2022-11-08 RX ADMIN — CEFTRIAXONE 1 G: 1 INJECTION, SOLUTION INTRAVENOUS at 08:11

## 2022-11-08 RX ADMIN — FERROUS SULFATE TAB 325 MG (65 MG ELEMENTAL FE) 1 EACH: 325 (65 FE) TAB at 08:11

## 2022-11-08 RX ADMIN — ATORVASTATIN CALCIUM 40 MG: 20 TABLET, FILM COATED ORAL at 08:11

## 2022-11-08 RX ADMIN — CHOLECALCIFEROL TAB 25 MCG (1000 UNIT) 2000 UNITS: 25 TAB at 08:11

## 2022-11-08 RX ADMIN — POTASSIUM CHLORIDE 40 MEQ: 1500 TABLET, EXTENDED RELEASE ORAL at 10:11

## 2022-11-08 RX ADMIN — ASPIRIN 81 MG: 81 TABLET, COATED ORAL at 08:11

## 2022-11-08 RX ADMIN — POTASSIUM CHLORIDE 40 MEQ: 1500 TABLET, EXTENDED RELEASE ORAL at 08:11

## 2022-11-08 RX ADMIN — POTASSIUM & SODIUM PHOSPHATES POWDER PACK 280-160-250 MG 2 PACKET: 280-160-250 PACK at 03:11

## 2022-11-08 RX ADMIN — SODIUM BICARBONATE 650 MG TABLET 1300 MG: at 03:11

## 2022-11-08 RX ADMIN — SODIUM BICARBONATE 650 MG TABLET 1300 MG: at 08:11

## 2022-11-08 RX ADMIN — POTASSIUM CHLORIDE 40 MEQ: 1500 TABLET, EXTENDED RELEASE ORAL at 03:11

## 2022-11-08 RX ADMIN — POTASSIUM CHLORIDE 20 MEQ: 1500 TABLET, EXTENDED RELEASE ORAL at 06:11

## 2022-11-08 RX ADMIN — OCTREOTIDE ACETATE 200 MCG: 100 INJECTION, SOLUTION INTRAVENOUS; SUBCUTANEOUS at 02:11

## 2022-11-08 NOTE — ASSESSMENT & PLAN NOTE
Undergoing further evaluation for neuroendocrine tumor/VIPoma.    -Further management per heme/onc and GI, appreciate their recs

## 2022-11-08 NOTE — ASSESSMENT & PLAN NOTE
67yoF w PMHx recently diagnosed carcinoid syndrome, + cryptosporidium Ag (10/'22), AFib on Eliquis, CHF, arthritis, GERD, glaucoma, hyperlipidemia, hypertension, prior MI, TIA presented with persistent diarrhea. Per pt, it is constant. Hypotension is likely 2/2 to such. Fluid responsive. SBP s/p 500 cc bolus was 107/60 mmHg, MAP 77. Getting another liter of LR     - Stable for the floor for now as pt is fluid responsive, not requiring supplemental oxygen.   - Would monitor diarrhea and give PRN fluid boluses to keep up with alimentary losses.   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 GI to do EUS on Wednesday. Heme/Onc recommended octreotide   - Positive Cryptosporidium since October; consider ID consult   - Please notify MICU if pt is HD unstable refractory to fluid bolus

## 2022-11-08 NOTE — HPI
67yoF w PMHx recently diagnosed carcinoid syndrome, + cryptosporidium Ag (10/'22), AFib on Eliquis, CHF, arthritis, GERD, glaucoma, hyperlipidemia, hypertension, prior MI, TIA presented with persistent diarrhea. Per ED records:      August 2022 more recently diarrhea has become worse over past few days -several episodes daily  associated nausea with intermittent vomiting. c/o lower cramping abdominal pain - 5/10.   symptoms concerned likely though to be secondary to carcinoid syndrome patient evaluated by Heme-Onc at [facility] with PET scan done 10/18 - Focal nodular foci of radiotracer uptake at the pancreatic tail and near the region of the pancreatic head above liver background activity could indicate somatostatin receptor avid lesions, but no corresponding mass is seen on CT portion of the study.  Recommend follow-up contrast enhanced pancreas protocol CT to further assess. 5HIAA levels on 10/21 WNL . CT AP W contrast done 11/4 shows No pancreatic lesion and Enterocolitis,. recent EGD -Non-bleeding gastric ulcers with no stigmata of  bleeding. Biopsied. Treated with a monopolar probe. colonoscopy with biopsy -No significant pathologic abnormality. No morphologic evidence of active inflammatory bowel disease, microscopic colitis, or neoplasia. Patient was  referred to [facility] for further workup and has scheduled appointment at Heme-Onc at [facility] this upcoming week. . c/o  dizziness,  and shortness of breath on minimal exertion     ER course -electrolyte derangement with hypokalemia to 2.4, hypomagsemia of 1.5 . replaced . UA + started on IV cefriaxone     Critical care consulted on the night of 11/19 for hypotension despite fluid resuscitation. She has had a prolonged hospital course which she has been evaluated by GI, surgical oncology, and oncology. She is to be presented to tumor board 11/21. AES consulted for EUS and potential biopsy given that her PET scan showed  uptake but no mass was seen on CT pancreas protocolCourse complicated by nonocclusive DVT - on A/C. Additionally she had positive cryptosporidium antigens. Persistent diarrhea evaluated by GI. Has had upper and lower endoscopic interventions. Fluid volume excess in the setting of severe malnutrition and malabsorption. She was treated for a Klebsiella UTI.     At the time of evaluation, BP 77/54 while lying in bed. She reports intermittent lightheadedness. She has 2+ pitting edema bilaterally, and rales on exam. Her diuresis had not been resumed. Today alone she was given IVF bolus as well as continuous IVF in addition to her TPN/Fat Emulsion. She will transfer to the ICU for vasopressor support.

## 2022-11-08 NOTE — PROGRESS NOTES
Armando Jenkins - Oncology ([facility])  [facility] Medicine  Progress Note    Patient Name: Cherry Santiago  MRN: [id number]  Patient Class: IP- Inpatient   Admission Date: 11/6/2022  Length of Stay: 1 days  Attending Physician: Kyle Vera MD  Primary Care Provider: Amarilys Romero MD        Subjective:     Principal Problem:Diarrhea concurrent with and due to carcinoid syndrome        HPI:  Cherry Pozo is a 67-year-old past medical history of recently diagnosed carcinoid syndrome, AFib on Eliquis, CHF, arthritis, GERD, glaucoma, hyperlipidemia, hypertension, prior MI, TIA presenting with persistent diarrhea.      AAOx 3 . accompanied by the daughter at the bedside. Patient mentions having diarrhea intermittently since August 2022 more recently diarrhea has become worse over past few days -several episodes daily  associated nausea with intermittent vomiting. c/o lower cramping abdominal pain - 5/10.   symptoms concerned likely though to be secondary to carcinoid syndrome patient evaluated by Heme-Onc at [facility] with PET scan done 10/18 - Focal nodular foci of radiotracer uptake at the pancreatic tail and near the region of the pancreatic head above liver background activity could indicate somatostatin receptor avid lesions, but no corresponding mass is seen on CT portion of the study.  Recommend follow-up contrast enhanced pancreas protocol CT to further assess. 5HIAA levels on 10/21 WNL . CT AP W contrast done 11/4 shows No pancreatic lesion and Enterocolitis,. recent EGD -Non-bleeding gastric ulcers with no stigmata of  bleeding. Biopsied. Treated with a monopolar probe. colonoscopy with biopsy -No significant pathologic abnormality. No morphologic evidence of active inflammatory bowel disease, microscopic colitis, or neoplasia. Patient was  referred to [facility] for further workup and has scheduled appointment at Heme-Onc at [facility] this upcoming week. . c/o   dizziness,  and shortness of breath on minimal exertion    ER course -electrolyte derangement with hypokalemia to 2.4, hypomagsemia of 1.5 . replaced . UA + started on IV cefriaxone       Overview/Hospital Course:  11/7 Chromogranin A elevated at 2000s.  stool studies in process. DVT sonogram -Nonocclusive thrombus/DVT in the superior-mid right femoral vein. K replaced.  corrected calcium of 8.4.  PTH elevated 114 .started on sodium bicarbonate 1300mg BID .orthostatics + with drop in SBP to 80s on standing. RL bolus 1L and 75ml/h . C diff negative. UC with GRAM NEGATIVE PAIGE   >100,000 cfu/ml ,Identification and susceptibility pending. Hematology working her up for neuroendocrine tumor vs VIPoma.  AES consulted for EUS and potential biopsy given that her PET scan showed uptake but no mass was seen on CT pancreas protocol. discontinued eliquis.  plan for EUS on Wednesday.  started start octreotide.  SBP in 70-80s this PM on IVF. NS bolus 500ml x1. critical care consulted . ID consulted for positive cryptosporidium antigen  11/8 UC with klebsiella . PICC team reconsulted  for access as unable to obtain labs this AM           Review of Systems:   Pain scale:  Constitutional:  fever,  chills, headache, vision loss, hearing loss, weight loss 30lb , Generalized weakness, falls, loss of smell, loss of taste, poor appetite,  sore throat  Respiratory: cough, shortness of breath.   Cardiovascular: chest pain, dizziness, palpitations, orthopnea, swelling of feet, syncope  Gastrointestinal: nausea, vomiting, abdominal pain, diarrhea, black stool,  blood in stool, change in bowel habits  Genitourinary: hematuria, dysuria, urgency, frequency  Integument/Breast: rash,  pruritis  Hematologic/Lymphatic: easy bruising, lymphadenopathy  Musculoskeletal: arthralgias , myalgias, back pain, neck pain, knee pain  Neurological: confusion, seizures, tremors, slurred speech  Behavioral/Psych:  depression, anxiety, auditory or visual  hallucinations     OBJECTIVE:     Physical Exam:  Body mass index is 32.45 kg/m².    Constitutional: Appears well-developed and well-nourished. obesity  Head: Normocephalic and atraumatic.   Neck: Normal range of motion. Neck supple.   Cardiovascular: Normal heart rate.  Regular heart rhythm.  Pulmonary/Chest: Effort normal.   Abdominal: No distension. +  tenderness- LLQ  Musculoskeletal: Normal range of motion. ++edema.   Neurological: Alert and oriented to person, place, and time.   Skin: Skin is warm and dry.   Psychiatric: Normal mood and affect. Behavior is normal.       Vital Signs  Temp: 97.6 °F (36.4 °C) (11/08/22 0719)  Pulse: 71 (11/08/22 0751)  Resp: 17 (11/08/22 0719)  BP: 113/70 (11/08/22 0719)  SpO2: 97 % (11/08/22 0719)     24 Hour VS Range    Temp:  [97.1 °F (36.2 °C)-98.5 °F (36.9 °C)]   Pulse:  [71-85]   Resp:  [16-18]   BP: ()/(48-70)   SpO2:  [95 %-100 %]     Intake/Output Summary (Last 24 hours) at 11/8/2022 0947  Last data filed at 11/8/2022 0719  Gross per 24 hour   Intake 2359.64 ml   Output 1000 ml   Net 1359.64 ml         I/O This Shift:  I/O this shift:  In: 50 [IV Piggyback:50]  Out: -     Wt Readings from Last 3 Encounters:   11/06/22 88.5 kg (195 lb)   11/01/22 95.4 kg (210 lb 5.1 oz)   10/07/22 89.7 kg (197 lb 12.8 oz)       I have personally reviewed the vitals and recorded Intake/Output     Laboratory/Diagnostic Data:    CBC/Anemia Labs: Coags:    Recent Labs   Lab 11/04/22  1052 11/06/22  1251 11/07/22  0652   WBC 8.10 7.16 4.95   HGB 9.0* 9.2* 8.0*   HCT 27.5* 27.5* 24.0*    228 217   MCV 89 88 88   RDW 16.8* 20.4* 19.0*    No results for input(s): PT, INR, APTT in the last 168 hours.     Chemistries: ABG:   Recent Labs   Lab 11/06/22  1435 11/06/22  2045 11/07/22  0652 11/07/22  1550    143 147* 148*   K 2.4* 2.4* 2.5* 3.7   * 119* 119* 122*   CO2 18* 16* 17* 14*   BUN 13 13 12 12   CREATININE 1.6* 1.5* 1.6* 1.7*   CALCIUM 7.0* 7.0* 7.4* 7.7*   PROT 4.6*   --  4.7*  --    BILITOT 0.9  --  0.7  --    ALKPHOS 89  --  91  --    ALT 10  --  10  --    AST 40  --  39  --    MG 1.5*  --  1.9  --    PHOS  --  2.6* 2.6* 2.6*    No results for input(s): PH, PCO2, PO2, HCO3, POCSATURATED, BE in the last 168 hours.     POCT Glucose: HbA1c:    No results for input(s): POCTGLUCOSE in the last 168 hours. Hemoglobin A1C   Date Value Ref Range Status   10/13/2022 4.7 4.0 - 6.0 % Final   04/12/2021 4.1 (L) 4.7 - 5.6 % Final     Hemoglobin A1c   Date Value Ref Range Status   07/07/2017 <3.50 4.2 - 6.3 % Final        Cardiac Enzymes: Ejection Fractions:    Recent Labs     11/06/22  1251 11/06/22  1435   TROPONINI 0.016 0.019    No results found for: EF       Recent Labs   Lab 11/06/22  1157   COLORU Yellow   APPEARANCEUA Hazy*   PHUR 6.0   SPECGRAV 1.015   PROTEINUA Negative   GLUCUA Negative   KETONESU Negative   BILIRUBINUA Negative   OCCULTUA 3+*   NITRITE Negative   LEUKOCYTESUR 3+*   RBCUA 29*   WBCUA >100*   BACTERIA Occasional   SQUAMEPITHEL 2   HYALINECASTS 5*       Procalcitonin (ng/mL)   Date Value   10/12/2022 0.15   08/26/2022 0.13   08/22/2022 0.09     Lactate (Lactic Acid) (mmol/L)   Date Value   10/12/2022 1.7   07/12/2017 1.0     BNP (pg/mL)   Date Value   11/06/2022 82     CRP   Date Value   10/12/2022 18.0 mg/L (H)   10/07/2022 24.4 mg/L (H)   08/22/2022 19.0 mg/L (H)   08/19/2022 1.50 mg/dL (H)     Sed Rate   Date Value   10/12/2022 <1 mm/Hr   10/07/2022 <1 mm/Hr   08/19/2022 8 mm/Hr   07/08/2019 22 mm/Hr   07/07/2017 11 mm/hr     D-Dimer   Date Value   11/06/2022 0.71 mg/L FEU (H)   02/25/2021 2.21 ug/mL FEU (H)   02/03/2021 1.19 ug/mL FEU (H)     Ferritin (ng/mL)   Date Value   09/21/2022 272.0 (H)   08/27/2022 262.0   06/19/2019 14 (L)   07/12/2017 106.0     Ferritin Level (ng/mL)   Date Value   05/10/2021 263.87 (H)   03/22/2021 385.48 (H)   02/11/2021 386.77 (H)   01/05/2021 34.52   11/24/2020 21.72   08/16/2017 94.2     LD (U/L)   Date Value   08/23/2022 235      Lactate Dehydrogenase (unit/L)   Date Value   05/10/2021 333 (H)   03/22/2021 337 (H)   02/12/2021 340 (H)     Troponin I (ng/mL)   Date Value   11/06/2022 0.019   11/06/2022 0.016   10/31/2022 0.022   05/11/2022 0.015   03/05/2021 0.016   03/04/2021 <0.012   03/04/2021 0.016   02/06/2021 0.138 (HH)     Results for orders placed or performed during the hospital encounter of 11/06/22   Vitamin D   Result Value Ref Range    Vit D, 25-Hydroxy 11 (L) 30 - 96 ng/mL   Results for orders placed or performed during the hospital encounter of 08/22/22   Vitamin D   Result Value Ref Range    Vit D, 25-Hydroxy <12.8 (L) 30.0 - 100.0 ng/mL   Results for orders placed or performed in visit on 08/04/11   Vitamin D 25 hydroxy   Result Value Ref Range    Vit D, 25-Hydroxy 13 (L) 30 - 100 ng/mL     SARS-CoV2 (COVID-19) Qualitative PCR (no units)   Date Value   10/16/2020 NOT DETECTED     SARS-CoV-2 RNA, Amplification, Qual (no units)   Date Value   05/11/2022 Negative   03/01/2021 Negative   02/05/2021 Negative       Microbiology labs for the last week  Microbiology Results (last 7 days)       Procedure Component Value Units Date/Time    Urine culture [[id number]]  (Abnormal)  (Susceptibility) Collected: 11/06/22 1157    Order Status: Completed Specimen: Urine Updated: 11/08/22 0815     Urine Culture, Routine KLEBSIELLA PNEUMONIAE  >100,000 cfu/ml      Narrative:      Specimen Source->Urine    Clostridium difficile EIA [[id number]] Collected: 11/06/22 2030    Order Status: Completed Specimen: Stool Updated: 11/07/22 0250     C. diff Antigen Negative     C difficile Toxins A+B, EIA Negative     Comment: Testing not recommended for children <24 months old.       Stool culture [[id number]] Collected: 11/06/22 2030    Order Status: Sent Specimen: Stool Updated: 11/06/22 2053    E. coli 0157 antigen [[id number]] Collected: 11/06/22 2030    Order Status: No result Specimen: Stool Updated: 11/06/22 2053            Reviewed and noted in plan  where applicable- Please see chart for full lab data.    Lines/Drains:       Peripheral IV - Single Lumen 11/06/22 1300 22 G Left;Posterior;Proximal Forearm (Active)   Number of days: 0       Imaging  ECG Results              EKG 12-lead (Final result)  Result time 11/07/22 12:42:19      Final result by Interface, Lab In [facility] (11/07/22 12:42:19)               Narrative:    Test Reason : R68.89,    Vent. Rate : 113 BPM     Atrial Rate : 113 BPM     P-R Int : 164 ms          QRS Dur : 106 ms      QT Int : 334 ms       P-R-T Axes : 051 -37 164 degrees     QTc Int : 458 ms    Sinus tachycardia with Premature atrial complexes  Left axis deviation  Possible Anterior infarct (cited on or before 31-OCT-2022)  Abnormal ECG  When compared with ECG of 31-OCT-2022 19:33,  Premature atrial complexes are now Present  Confirmed by Nadja Barney MD ([id number]) on 11/7/2022 12:42:07 PM    Referred By: [email or web address]   SELF           Confirmed By:Nadja Barney MD                                  No results found for this or any previous visit.      US Lower Extremity Veins Bilateral  Narrative: EXAMINATION:  US LOWER EXTREMITY VEINS BILATERAL    CLINICAL HISTORY:  edema;    TECHNIQUE:  Duplex and color flow Doppler and dynamic compression was performed of the bilateral lower extremity veins was performed.    COMPARISON:  None    FINDINGS:  Technically difficult study.    Right thigh veins: Nonocclusive thrombus/DVT in the superior-mid right femoral vein.    Right common femoral vein is patent.  The popliteal, upper greater saphenous, and profunda femoral veins are patent.    Right calf veins: The visualized calf veins are patent.    Left thigh veins: The common femoral, femoral, popliteal, upper greater saphenous, and deep femoral veins are patent and free of thrombus. The veins are normally compressible and have normal phasic flow and augmentation response.    Left calf veins: The visualized calf veins are patent.    Miscellaneous:  None  Impression: Nonocclusive thrombus/DVT in the superior-mid right femoral vein.  Recommend follow-up.    This report was flagged in Epic as abnormal.    Electronically signed by: Rafita Brand  Date:    11/06/2022  Time:    19:42  X-Ray Chest AP Portable  Narrative: EXAMINATION:  XR CHEST AP PORTABLE    CLINICAL HISTORY:  Shortness of breath    TECHNIQUE:  Single frontal view of the chest was performed.    COMPARISON:  Chest radiograph 10/31/2022 and CT abdomen and pelvis 11/04/2022    FINDINGS:  Patient is slightly rotated.  Resolution is limited by body habitus with underpenetration.    Cardiomediastinal silhouette is midline and prominent without evidence of failure, stable.  Similar nonspecific elevation of the right hemidiaphragm.  Pulmonary vasculature and hilar contours are within normal limits.    Left basilar platelike scarring versus atelectasis unchanged.  The lungs are otherwise well expanded without large consolidation, pleural effusion or pneumothorax.    Osseous structures appear stable without acute process seen.  PA and lateral views can be obtained.  Impression: Grossly stable chronic findings as above without detrimental change or convincing radiographic evidence of pneumonia or other source of shortness of breath on this single view, noting that early/mild viral pneumonia may be radiographically occult.    Electronically signed by: Jarvis Delaney MD  Date:    11/06/2022  Time:    12:17      Labs, Imaging, EKG and Diagnostic results from 11/8/2022 were reviewed.    Medications:  Medication list was reviewed and changes noted under Assessment/Plan.  No current facility-administered medications on file prior to encounter.     Current Outpatient Medications on File Prior to Encounter   Medication Sig Dispense Refill    apixaban (ELIQUIS) 5 mg Tab Take 1 tablet (5 mg total) by mouth 2 (two) times daily.      aspirin (ECOTRIN) 81 MG EC tablet Take 81 mg by mouth once daily.      ferrous sulfate  (FEOSOL) 325 mg (65 mg iron) Tab tablet Take 325 mg by mouth daily with breakfast.      furosemide (LASIX) 20 MG tablet Take 20 mg by mouth once daily.      levETIRAcetam (KEPPRA) 250 MG Tab Take 500 mg by mouth 2 (two) times daily.      loperamide (IMODIUM) 2 mg capsule Take 1 capsule (2 mg total) by mouth 4 (four) times daily as needed for Diarrhea. 180 capsule 0    midodrine (PROAMATINE) 5 MG Tab Take 1 tablet (5 mg total) by mouth 3 (three) times daily with meals. 90 tablet 0    pantoprazole (PROTONIX) 40 MG tablet Take 40 mg by mouth once daily.      potassium chloride SA (K-DUR,KLOR-CON) 20 MEQ tablet Take 1 tablet (20 mEq total) by mouth 2 (two) times daily. 60 tablet 0    timolol maleate 0.5% (TIMOPTIC) 0.5 % Drop Place 1 drop into both eyes 2 (two) times daily.      atorvastatin (LIPITOR) 40 MG tablet Take 1 tablet (40 mg total) by mouth every evening. (Patient not taking: Reported on 11/4/2022) 90 tablet 3    citric acid-potassium citrate (POLYCITRA) 1,100-334 mg/5 mL solution potassium citrate-citric acid 1,100 mg-334 mg/5 mL oral solution   TAKE 5 MLS (10 MEQ TOTAL) BY MOUTH ONCE. FOR 1 DOSE ONLY      diphenoxylate-atropine 2.5-0.025 mg (LOMOTIL) 2.5-0.025 mg per tablet Take 1 tablet by mouth 4 (four) times daily as needed for Diarrhea (unreleived with Imodium). (Patient not taking: Reported on 11/4/2022) 30 tablet 0    metoclopramide HCl (REGLAN) 10 MG tablet Take 1 tablet (10 mg total) by mouth every 6 (six) hours as needed (Nausea). (Patient not taking: Reported on 11/4/2022) 14 tablet 0    metoprolol tartrate (LOPRESSOR) 25 MG tablet Take 1 tablet (25 mg total) by mouth 2 (two) times daily. (Patient not taking: Reported on 11/4/2022) 60 tablet 11    sodium bicarbonate 650 MG tablet Take 1 tablet (650 mg total) by mouth 2 (two) times daily. for 5 days 10 tablet 0     Scheduled Medications:  alteplase, 2 mg, Other, Once  aspirin, 81 mg, Oral, Daily  atorvastatin, 40 mg, Oral, QHS  [START ON 11/9/2022]  cefTRIAXone (ROCEPHIN) IVPB, 1 g, Intravenous, Q24H  ferrous sulfate, 1 tablet, Oral, Daily  levETIRAcetam, 500 mg, Oral, BID  metoprolol tartrate, 25 mg, Oral, BID  octreotide, 200 mcg, Subcutaneous, Q12H  pantoprazole, 40 mg, Oral, Daily  vitamin D, 2,000 Units, Oral, Daily    PRN: dextrose 10%, dextrose 10%, glucagon (human recombinant), glucose, glucose, naloxone  Infusions:       Estimated Creatinine Clearance: 35.3 mL/min (A) (based on SCr of 1.7 mg/dL (H)).    Assessment/Plan:      * Carcinoid syndrome related chronic diarrhea   ? 67-year-old past medical history of recently diagnosed carcinoid syndrome,  having diarrhea intermittently since August 2022 more recently diarrhea has become worse over past few days -several episodes daily  associated nausea with intermittent vomiting.     symptoms concerned likely though to be secondary to carcinoid syndrome patient evaluated by Heme-Onc at [facility] with PET scan done 10/18 - Focal nodular foci of radiotracer uptake at the pancreatic tail and near the region of the pancreatic head above liver background activity could indicate somatostatin receptor avid lesions, but no corresponding mass is seen on CT portion of the study.  Recommend follow-up contrast enhanced pancreas protocol CT to further assess. 5HIAA levels on 10/21 WNL . CT AP W contrast done 11/4 shows No pancreatic lesion and Enterocolitis,. recent EGD -Non-bleeding gastric ulcers with no stigmata of  bleeding. Biopsied. Treated with a monopolar probe. colonoscopy with biopsy -No significant pathologic abnormality. No morphologic evidence of active inflammatory bowel disease, microscopic colitis, or neoplasia. Patient was  referred to [facility] for further workup and has scheduled appointment at Heme-Onc at [facility] this upcoming week.    obtain stool studies  11/7 Chromogranin A elevated at 2000s.  stool studies in process. C diff negative. Hematology working her up  for neuroendocrine tumor vs VIPoma.  AES consulted for EUS and potential biopsy given that her PET scan showed uptake but no mass was seen on CT pancreas protocol.discontinued eliquis.  plan for EUS on Wednesday.  started octreotide.      Orthostatic hypotension  11/7 orthostatics + with drop in SBP to 80s on standing. RL bolus 1L and 75ml/h .SBP in 70-80s this PM on IVF. NS bolus 500ml x1. critical care consulted       Seizures  continue with keppra BID      UTI (urinary tract infection)     urinalysis positive . Culture, Urine pending  continue with anitbiotic IV ceftriaxone  11/8 UC with klebsiella .       Hypomagnesemia  replaced      Stage 3a chronic kidney disease  seems to be at baseline. Creatine stable for now. BMP reviewed- noted Estimated Creatinine Clearance: 37.5 mL/min (A) (based on SCr of 1.6 mg/dL (H)). according to latest data. Monitor UOP and serial BMP and adjust therapy as needed. Renally dose meds.    Recent Labs   Lab 11/01/22  0515 11/04/22  1052 11/06/22  1435   BUN 14 16 13   CREATININE 1.61* 1.77* 1.6*          Hypocalcemia  11/6   corrected calcium of 8.4.  PTH elevated 114        Chronic heart failure with preserved ejection fraction (HFpEF) NICM NYHA2   Patient admitted without  signs and symptoms of CHF exacerbation    Results for orders placed or performed during the hospital encounter of 11/06/22   Brain natriuretic peptide   Result Value Ref Range    BNP 82 0 - 99 pg/mL   . Telemetry monitoring. Strict input/ Output monitor, Daily weights.    denies chestpain.  Obtain serial troponins.  Cardiac Enzymes trend  Recent Labs   Lab 10/31/22  2013 11/06/22  1251   TROPONINI 0.022 0.016     No results found for this or any previous visit.  echo 10/7/22 left ventricle is normal in size. Global left ventricular   systolic function is normal. The left ventricular ejection fraction is   55%. Left ventricular diastolic function is abnormal (stage I impaired   relaxation). Noted left ventricular  hypertrophy. Concentric left   ventricular hypertrophy is present. It is mild.     4. Mild (1+) tricuspid regurgitation.   5. The right ventricle is normal in size. Right ventricular systolic   function is normal.          Chronic deep vein thrombosis (DVT): right common femoral vein  DVT sonogram 8/22 and 10/22 negative for DVT  11/7 DVT sonogram -Nonocclusive thrombus/DVT in the superior-mid right femoral vein.continue eliquis . S/p IVC filter     Dizziness  likely secondary to diarrhea. orthostasis. IV hydration      Hypokalemia    - Patient with potassium   Recent Labs   Lab 11/06/22  1435 11/06/22  2045 11/07/22  0652   K 2.4* 2.4* 2.5*   . replaced. monitor    Anemia of chronic disease    Patient's with Normocytic anemia.. Hemoglobin stable. Etiology likely due to chronic disease .  Current CBC reviewed-  Recent Labs   Lab 11/01/22  0515 11/04/22  1052 11/06/22  1251   HGB 8.6* 9.0* 9.2*    Monitor CBC and transfuse if H/H drops below 7/21.       Essential hypertension    Chronic, controlled.  Latest blood pressure and vitals reviewed-   Temp:  [98.7 °F (37.1 °C)]   Pulse:  []   Resp:  [18-20]   BP: (111-138)/(54-78)   SpO2:  [96 %-100 %] .   Home meds for hypertension were reviewed and hold furosemide and metoprolol for now as with diarrhea. PRN meds if BP> 180/110 mm HG      Class 1 obesity due to excess calories with serious comorbidity and body mass index (BMI) of 32.0 to 32.9 in adult  Body mass index is 32.45 kg/m². Morbid obesity complicates all aspects of disease management from diagnostic modalities to treatment. Weight loss encouraged       VTE Risk Mitigation (From admission, onward)           Ordered     IP VTE HIGH RISK PATIENT  Once         11/06/22 1614     Place sequential compression device  Until discontinued         11/06/22 1614                    Discharge Planning   MELYSSA: 11/9/2022     Code Status: Full Code   Is the patient medically ready for discharge?: No    Reason for patient  still in hospital (select all that apply): Treatment  Discharge Plan A: Home with family                  Kyle Vera MD  Department of Hospital Medicine   [facility] - Oncology ([facility])

## 2022-11-08 NOTE — PLAN OF CARE
Plan of care reviewed with patient and daughter .  Fall precautions maintained, side rails up x2, call light in reach, bed in low position and locked, nonskid socks on.  Patient has had k-riders given today, potassium po and na phos packets given , and several boluses given today.  She vomited x1 today.  Received a midline in her rt forearm.  No complaints voiced.  Patient has purewic urine and stool mixed together.

## 2022-11-08 NOTE — ASSESSMENT & PLAN NOTE
67-year-old female with HTN, HF, Afib on Eliquis, CKD, GERD, chronic diarrhea since 8/2022, recently diagnosed carcinoid syndrome presents with persistent, nonbloody, mucous-like diarrhea.  She is undergoing further evaluation for neuroendocrine tumor/VIPoma by Heme/onc.  Of note, cryptosporidium Ag checked at [place] was positive on 10/13/22.  It does not appear this has been addressed or treated since then.  ID has been consulted for the cryptosporidium Ag.    Diarrhea if significant enough to cause electrolyte abnormalities, with hypokalemia and slight hypernatremia.  Cryptosporidium is generally self-limiting in immunocompetent patients, however would be worth treating as it might help alleviate, but unlikely to fully resolve, her diarrhea.    Recommendations:  -Nitazoxinide 500mg BID for 3 days  -Nonformulary and discussed with ID pharmacist.  Estimated to have inhouse in 1-2 days

## 2022-11-08 NOTE — CONSULTS
ML flushed easily and was able to give blood return.    RN instructed to flush line then tie tourniquet above ML before attempting blood collection.

## 2022-11-08 NOTE — CONSULTS
[facility] - Oncology ([facility])  Critical Care Medicine  Consult Note    Patient Name: Cherry Santiago  MRN: [id number]  Admission Date: 11/6/2022  Hospital Length of Stay: 0 days  Code Status: Full Code  Attending Physician: Kyle Vera MD   Primary Care Provider: Amarilys Romero MD   Principal Problem: Diarrhea concurrent with and due to carcinoid syndrome    Inpatient consult to Critical Care Medicine  Consult performed by: Faye Ray DO  Consult ordered by: Kyle Vera MD        Subjective:     HPI:  67yoF w PMHx recently diagnosed carcinoid syndrome, + cryptosporidium Ag (10/'22), AFib on Eliquis, CHF, arthritis, GERD, glaucoma, hyperlipidemia, hypertension, prior MI, TIA presented with persistent diarrhea. Per ED records:      August 2022 more recently diarrhea has become worse over past few days -several episodes daily  associated nausea with intermittent vomiting. c/o lower cramping abdominal pain - 5/10.   symptoms concerned likely though to be secondary to carcinoid syndrome patient evaluated by Heme-Onc at [facility] with PET scan done 10/18 - Focal nodular foci of radiotracer uptake at the pancreatic tail and near the region of the pancreatic head above liver background activity could indicate somatostatin receptor avid lesions, but no corresponding mass is seen on CT portion of the study.  Recommend follow-up contrast enhanced pancreas protocol CT to further assess. 5HIAA levels on 10/21 WNL . CT AP W contrast done 11/4 shows No pancreatic lesion and Enterocolitis,. recent EGD -Non-bleeding gastric ulcers with no stigmata of  bleeding. Biopsied. Treated with a monopolar probe. colonoscopy with biopsy -No significant pathologic abnormality. No morphologic evidence of active inflammatory bowel disease, microscopic colitis, or neoplasia. Patient was  referred to [facility] for further workup and has scheduled appointment at Heme-Onc at [facility] this upcoming  week. . c/o  dizziness,  and shortness of breath on minimal exertion     ER course -electrolyte derangement with hypokalemia to 2.4, hypomagsemia of 1.5 . replaced . UA + started on IV cefriaxone     VIP pending. Onc following. MICU consulted for hypotension (75/50), improving with IVF      Hospital/ICU Course:  No notes on file    Past Medical History:   Diagnosis Date    Anticoagulant long-term use     Arthritis     Atrial fibrillation     CHF (congestive heart failure)     Diabetes mellitus     Type2 resolved after weight loss surgery    DVT (deep venous thrombosis)     Encounter for blood transfusion     GERD (gastroesophageal reflux disease)     Glaucoma     Hypercholesterolemia     Hyperlipemia     Hypertension     Memory changes     Myocardial infarction     Renal failure     resolved    TIA (transient ischemic attack)        Past Surgical History:   Procedure Laterality Date    BACK SURGERY  06/21/2017    lumbar fusion 6/21/17 and surgery for hematoma to site on 6/26/17    CHOLECYSTECTOMY  1978    COLONOSCOPY N/A 10/3/2022    Procedure: COLONOSCOPY;  Surgeon: Jourdan Parks MD;  Location: [facility];  Service: General;  Laterality: N/A;    COLONOSCOPY N/A 10/11/2022    Procedure: COLONOSCOPY;  Surgeon: Stephen Cooper MD;  Location: [facility];  Service: Endoscopy;  Laterality: N/A;    ESOPHAGOGASTRODUODENOSCOPY N/A 10/3/2022    Procedure: EGD (ESOPHAGOGASTRODUODENOSCOPY);  Surgeon: Jourdan Parks MD;  Location: [facility];  Service: General;  Laterality: N/A;    GASTRIC BYPASS      HYSTERECTOMY  2012    JOINT REPLACEMENT      TKR    LEFT HEART CATHETERIZATION Left 2/5/2021    Procedure: Left heart cath;  Surgeon: Shane Pacheco MD;  Location: [facility] CATH;  Service: Cardiovascular;  Laterality: Left;    PELVIC LAPAROSCOPY Left     OOPH    RENAL BIOPSY N/A 10/5/2022    Procedure: BIOPSY, KIDNEY;  Surgeon: Velia Diagnostic Provider;  Location: [facility] OR;  Service: General;   Laterality: N/A;    RIGHT HEART CATHETERIZATION Right 2/5/2021    Procedure: INSERTION, CATHETER, RIGHT HEART. via left radial and left brachial;  Surgeon: Shane Pacheco MD;  Location: [facility] CATH;  Service: Cardiovascular;  Laterality: Right;    TOTAL ABDOMINAL HYSTERECTOMY W/ BILATERAL SALPINGOOPHORECTOMY         Review of patient's allergies indicates:  No Known Allergies    Family History       Problem Relation (Age of Onset)    Arthritis Mother    Breast cancer Mother    Cancer Father    Diabetes Daughter    Heart disease Mother, Father    Hypertension Mother, Father          Tobacco Use    Smoking status: Never    Smokeless tobacco: Never   Substance and Sexual Activity    Alcohol use: Yes     Comment: occasional    Drug use: No    Sexual activity: Not Currently      Review of Systems   Constitutional:  Positive for appetite change and unexpected weight change. Negative for chills, fatigue and fever.   HENT:  Negative for congestion and dental problem.    Eyes:  Negative for photophobia and visual disturbance.   Respiratory:  Negative for cough and shortness of breath.    Cardiovascular:  Negative for chest pain and leg swelling.   Gastrointestinal:  Positive for abdominal pain, diarrhea, nausea and vomiting.   Genitourinary:  Negative for difficulty urinating and dysuria.   Musculoskeletal:  Negative for arthralgias and back pain.   Skin:  Negative for color change and rash.   Neurological:  Negative for light-headedness and headaches.   Hematological:  Negative for adenopathy. Does not bruise/bleed easily.   Psychiatric/Behavioral:  Negative for agitation and behavioral problems.    Objective:     Vital Signs (Most Recent):  Temp: 97.2 °F (36.2 °C) (11/07/22 1627)  Pulse: 85 (11/07/22 1732)  Resp: 18 (11/07/22 1732)  BP: (!) 94/52 (11/07/22 1732)  SpO2: 96 % (11/07/22 1732)   Vital Signs (24h Range):  Temp:  [97.2 °F (36.2 °C)-98.5 °F (36.9 °C)] 97.2 °F (36.2 °C)  Pulse:  [] 85  Resp:  [16-18]  18  SpO2:  [96 %-100 %] 96 %  BP: ()/(48-65) 94/52   Weight: 88.5 kg (195 lb)  Body mass index is 32.45 kg/m².      Intake/Output Summary (Last 24 hours) at 11/7/2022 1812  Last data filed at 11/7/2022 1743  Gross per 24 hour   Intake 1290 ml   Output 750 ml   Net 540 ml       Physical Exam  Constitutional:       General: She is not in acute distress.     Appearance: She is well-developed.   HENT:      Head: Normocephalic and atraumatic.      Nose: Nose normal. No congestion.      Mouth/Throat:      Mouth: Mucous membranes are dry.      Pharynx: No oropharyngeal exudate.   Eyes:      General: No scleral icterus.     Extraocular Movements: Extraocular movements intact.   Cardiovascular:      Rate and Rhythm: Normal rate and regular rhythm.      Heart sounds: No murmur heard.  Pulmonary:      Effort: Pulmonary effort is normal. No respiratory distress.      Breath sounds: Normal breath sounds.   Abdominal:      General: There is no distension.      Palpations: Abdomen is soft.      Tenderness: There is abdominal tenderness (mid, lower abdomen).   Musculoskeletal:         General: No swelling. Normal range of motion.      Cervical back: Normal range of motion and neck supple.   Skin:     General: Skin is warm and dry.   Neurological:      Mental Status: She is alert and oriented to person, place, and time. Mental status is at baseline.   Psychiatric:         Mood and Affect: Mood normal.         Behavior: Behavior normal.       Vents:     Lines/Drains/Airways       Drain  Duration             Female External Urinary Catheter 11/06/22 0300 1 day              Peripheral Intravenous Line  Duration                  Midline Catheter Insertion/Assessment  - Single Lumen 11/07/22 1540 Right cephalic vein (lateral side of arm) 18g x 8cm <1 day                  Significant Labs:    CBC/Anemia Profile:  Recent Labs   Lab 11/06/22  1251 11/07/22  0652   WBC 7.16 4.95   HGB 9.2* 8.0*   HCT 27.5* 24.0*    217   MCV 88  88   RDW 20.4* 19.0*        Chemistries:  Recent Labs   Lab 11/06/22  1435 11/06/22  2045 11/07/22  0652 11/07/22  1550    143 147* 148*   K 2.4* 2.4* 2.5* 3.7   * 119* 119* 122*   CO2 18* 16* 17* 14*   BUN 13 13 12 12   CREATININE 1.6* 1.5* 1.6* 1.7*   CALCIUM 7.0* 7.0* 7.4* 7.7*   ALBUMIN 1.5* 1.5* 1.5* 1.5*   PROT 4.6*  --  4.7*  --    BILITOT 0.9  --  0.7  --    ALKPHOS 89  --  91  --    ALT 10  --  10  --    AST 40  --  39  --    MG 1.5*  --  1.9  --    PHOS  --  2.6* 2.6* 2.6*       All pertinent labs within the past 24 hours have been reviewed.    Significant Imaging: I have reviewed all pertinent imaging results/findings within the past 24 hours.      ABG  No results for input(s): PH, PO2, PCO2, HCO3, BE in the last 168 hours.  Assessment/Plan:     Cardiac/Vascular  Hypotension  67yoF w PMHx recently diagnosed carcinoid syndrome, + cryptosporidium Ag (10/'22), AFib on Eliquis, CHF, arthritis, GERD, glaucoma, hyperlipidemia, hypertension, prior MI, TIA presented with persistent diarrhea. Per pt, it is constant. Hypotension is likely 2/2 to such. Fluid responsive. SBP s/p 500 cc bolus was 107/60 mmHg, MAP 77. Getting another liter of LR     - Stable for the floor for now as pt is fluid responsive, not requiring supplemental oxygen.   - Would monitor diarrhea and give PRN fluid boluses to keep up with alimentary losses.   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 GI to do EUS on Wednesday. Heme/Onc recommended octreotide   - Positive Cryptosporidium since October; consider ID consult   - Please notify MICU if pt is HD unstable refractory to fluid bolus          Critical secondary to Patient has a condition that poses threat to life and bodily function: Hypotension, Diarrhea      Critical care was time spent personally by me on the following activities: development of treatment plan with patient or surrogate and bedside caregivers,  discussions with consultants, evaluation of patient's response to treatment, examination of patient, ordering and performing treatments and interventions, ordering and review of laboratory studies, ordering and review of radiographic studies, pulse oximetry, re-evaluation of patient's condition. This critical care time did not overlap with that of any other provider or involve time for any procedures.    Thank you for your consult. I will sign off. Please contact us if you have any additional questions.     Faye Ray, DO  Critical Care Medicine  [facility] - Oncology ([facility])

## 2022-11-08 NOTE — CONSULTS
[facility] - Oncology ([facility])  Infectious Disease  Consult Note    Patient Name: Cherry Santiago  MRN: [id number]  Admission Date: 11/6/2022  Hospital Length of Stay: 1 days  Attending Physician: Kyle Vear MD  Primary Care Provider: Amarilys Romero MD     Isolation Status: No active isolations    Patient information was obtained from patient and ER records.      Inpatient consult to Infectious Diseases  Consult performed by: Krishan Wallace MD  Consult ordered by: Kyle Vera MD        Assessment/Plan:     * Carcinoid syndrome related chronic diarrhea   Undergoing further evaluation for neuroendocrine tumor/VIPoma.    -Further management per heme/onc and GI, appreciate their recs    Cryptosporidium exposure  67-year-old female with HTN, HF, Afib on Eliquis, CKD, GERD, chronic diarrhea since 8/2022, recently diagnosed carcinoid syndrome presents with persistent, nonbloody, mucous-like diarrhea.  She is undergoing further evaluation for neuroendocrine tumor/VIPoma by Heme/onc.  Of note, cryptosporidium Ag checked at [place] was positive on 10/13/22.  It does not appear this has been addressed or treated since then.  ID has been consulted for the cryptosporidium Ag.    Diarrhea is significant enough to cause electrolyte abnormalities, with hypokalemia and slight hypernatremia.  Cryptosporidium is generally self-limiting in immunocompetent patients, however would be worth treating as it might help improve the severity of her illness.  Treatment is unlikely to fully resolve her diarrhea.    Recommendations:  -Nitazoxinide 500mg BID for 3 days  -Nonformulary and discussed with ID pharmacist.  Estimated to have inhouse in 1-2 days        Thank you for your consult. I will sign off. Please contact us if you have any additional questions.    Krishan Wallace MD  Infectious Disease  [facility] - Oncology ([facility])    Subjective:     Principal Problem: Diarrhea concurrent with and due to carcinoid syndrome    HPI:  67-year-old female with HTN, HF, Afib on Eliquis, CKD, GERD, chronic diarrhea since 8/2022, recently diagnosed carcinoid syndrome presents with persistent, nonbloody, mucous-like diarrhea.  She is undergoing further evaluation for neuroendocrine tumor/VIPoma by Heme/onc.  Also found to have Klebsiella UTI which primary team is treating with ceftriaxone.  Of note, cryptosporidium Ag checked at [place] was positive on 10/13/22.  It does not appear this has been addressed or treated since then.  ID has been consulted for the cryptosporidium Ag.    Lives in [place], [place].  Receives [place] water.  No outdoor hobbies, not recent travel, has not been in swimming pools.  No pets.  There is one 4-year-old child in the household who isn't fully potty-trained, but does not have diarrhea.      Past Medical History:   Diagnosis Date    Anticoagulant long-term use     Arthritis     Atrial fibrillation     CHF (congestive heart failure)     Diabetes mellitus     Type2 resolved after weight loss surgery    DVT (deep venous thrombosis)     Encounter for blood transfusion     GERD (gastroesophageal reflux disease)     Glaucoma     Hypercholesterolemia     Hyperlipemia     Hypertension     Memory changes     Myocardial infarction     Renal failure     resolved    TIA (transient ischemic attack)        Past Surgical History:   Procedure Laterality Date    BACK SURGERY  06/21/2017    lumbar fusion 6/21/17 and surgery for hematoma to site on 6/26/17    CHOLECYSTECTOMY  1978    COLONOSCOPY N/A 10/3/2022    Procedure: COLONOSCOPY;  Surgeon: Jourdan Parks MD;  Location: [facility];  Service: General;  Laterality: N/A;    COLONOSCOPY N/A 10/11/2022    Procedure: COLONOSCOPY;  Surgeon: Stephen Cooper MD;  Location: [facility];  Service: Endoscopy;  Laterality: N/A;    ESOPHAGOGASTRODUODENOSCOPY N/A 10/3/2022    Procedure: EGD (ESOPHAGOGASTRODUODENOSCOPY);  Surgeon: Jourdan Parks MD;  Location: [facility];   Service: General;  Laterality: N/A;    GASTRIC BYPASS      HYSTERECTOMY  2012    JOINT REPLACEMENT      TKR    LEFT HEART CATHETERIZATION Left 2/5/2021    Procedure: Left heart cath;  Surgeon: Shane Pacheco MD;  Location: [facility] CATH;  Service: Cardiovascular;  Laterality: Left;    PELVIC LAPAROSCOPY Left     OOPH    RENAL BIOPSY N/A 10/5/2022    Procedure: BIOPSY, KIDNEY;  Surgeon: Velia Diagnostic Provider;  Location: [facility] OR;  Service: General;  Laterality: N/A;    RIGHT HEART CATHETERIZATION Right 2/5/2021    Procedure: INSERTION, CATHETER, RIGHT HEART. via left radial and left brachial;  Surgeon: Shane Pacheco MD;  Location: [facility] CATH;  Service: Cardiovascular;  Laterality: Right;    TOTAL ABDOMINAL HYSTERECTOMY W/ BILATERAL SALPINGOOPHORECTOMY         Review of patient's allergies indicates:  No Known Allergies    Medications:  Medications Prior to Admission   Medication Sig    apixaban (ELIQUIS) 5 mg Tab Take 1 tablet (5 mg total) by mouth 2 (two) times daily.    aspirin (ECOTRIN) 81 MG EC tablet Take 81 mg by mouth once daily.    ferrous sulfate (FEOSOL) 325 mg (65 mg iron) Tab tablet Take 325 mg by mouth daily with breakfast.    furosemide (LASIX) 20 MG tablet Take 20 mg by mouth once daily.    levETIRAcetam (KEPPRA) 250 MG Tab Take 500 mg by mouth 2 (two) times daily.    loperamide (IMODIUM) 2 mg capsule Take 1 capsule (2 mg total) by mouth 4 (four) times daily as needed for Diarrhea.    midodrine (PROAMATINE) 5 MG Tab Take 1 tablet (5 mg total) by mouth 3 (three) times daily with meals.    pantoprazole (PROTONIX) 40 MG tablet Take 40 mg by mouth once daily.    potassium chloride SA (K-DUR,KLOR-CON) 20 MEQ tablet Take 1 tablet (20 mEq total) by mouth 2 (two) times daily.    timolol maleate 0.5% (TIMOPTIC) 0.5 % Drop Place 1 drop into both eyes 2 (two) times daily.    atorvastatin (LIPITOR) 40 MG tablet Take 1 tablet (40 mg total) by mouth every evening. (Patient not taking: Reported on  11/4/2022)    citric acid-potassium citrate (POLYCITRA) 1,100-334 mg/5 mL solution potassium citrate-citric acid 1,100 mg-334 mg/5 mL oral solution   TAKE 5 MLS (10 MEQ TOTAL) BY MOUTH ONCE. FOR 1 DOSE ONLY    diphenoxylate-atropine 2.5-0.025 mg (LOMOTIL) 2.5-0.025 mg per tablet Take 1 tablet by mouth 4 (four) times daily as needed for Diarrhea (unreleived with Imodium). (Patient not taking: Reported on 11/4/2022)    metoclopramide HCl (REGLAN) 10 MG tablet Take 1 tablet (10 mg total) by mouth every 6 (six) hours as needed (Nausea). (Patient not taking: Reported on 11/4/2022)    metoprolol tartrate (LOPRESSOR) 25 MG tablet Take 1 tablet (25 mg total) by mouth 2 (two) times daily. (Patient not taking: Reported on 11/4/2022)    sodium bicarbonate 650 MG tablet Take 1 tablet (650 mg total) by mouth 2 (two) times daily. for 5 days     Antibiotics (From admission, onward)      Start     Stop Route Frequency Ordered    11/09/22 0900  cefTRIAXone (ROCEPHIN) 1 g/50 mL D5W IVPB         11/10 0859 IV Every 24 hours (non-standard times) 11/08/22 0946          Antifungals (From admission, onward)      None          Antivirals (From admission, onward)      None             Immunization History   Administered Date(s) Administered    COVID-19 Vaccine 04/05/2021    COVID-19, MRNA, LN-S, PF (Pfizer) (Purple Cap) 12/28/2021    COVID-19, vector-nr, rS-Ad26, PF (Krystian) 03/22/2021    Influenza 03/05/2020    Influenza - Quadrivalent - High Dose - PF (65 years and older) 09/30/2020    Pneumococcal Conjugate - 13 Valent 01/25/2021, 04/04/2022    Tdap 10/05/2020       Family History       Problem Relation (Age of Onset)    Arthritis Mother    Breast cancer Mother    Cancer Father    Diabetes Daughter    Heart disease Mother, Father    Hypertension Mother, Father          Social History     Socioeconomic History    Marital status: Single   Tobacco Use    Smoking status: Never    Smokeless tobacco: Never   Substance and  Sexual Activity    Alcohol use: Yes     Comment: occasional    Drug use: No    Sexual activity: Not Currently     Social Determinants of Health     Financial Resource Strain: Low Risk     Difficulty of Paying Living Expenses: Not hard at all   Food Insecurity: No Food Insecurity    Worried About Running Out of Food in the Last Year: Never true    Ran Out of Food in the Last Year: Never true   Transportation Needs: No Transportation Needs    Lack of Transportation (Medical): No    Lack of Transportation (Non-Medical): No   Physical Activity: Inactive    Days of Exercise per Week: 0 days    Minutes of Exercise per Session: 0 min   Stress: No Stress Concern Present    Feeling of Stress : Not at all   Social Connections: Moderately Integrated    Frequency of Communication with Friends and Family: More than three times a week    Frequency of Social Gatherings with Friends and Family: More than three times a week    Attends Jehovah's witness Services: More than 4 times per year    Active Member of Clubs or Organizations: Yes    Attends Club or Organization Meetings: More than 4 times per year    Marital Status: Never    Housing Stability: High Risk    Unable to Pay for Housing in the Last Year: Yes    Number of Places Lived in the Last Year: 1    Unstable Housing in the Last Year: No     Review of Systems   Constitutional:  Negative for chills and fever.   HENT:  Negative for sore throat.    Respiratory:  Negative for cough and shortness of breath.    Cardiovascular:  Negative for chest pain.   Gastrointestinal:  Positive for diarrhea. Negative for abdominal distention, blood in stool, constipation, nausea and vomiting.   Genitourinary:  Negative for dysuria.   Skin:  Negative for rash and wound.   Allergic/Immunologic: Negative for immunocompromised state.   Neurological:  Negative for headaches.   Psychiatric/Behavioral:  Negative for behavioral problems and confusion.    Objective:     Vital Signs  (Most Recent):  Temp: 97.9 °F (36.6 °C) (11/08/22 1537)  Pulse: 73 (11/08/22 1537)  Resp: 18 (11/08/22 1537)  BP: (!) 117/58 (11/08/22 1537)  SpO2: 99 % (11/08/22 1537)   Vital Signs (24h Range):  Temp:  [97.1 °F (36.2 °C)-98 °F (36.7 °C)] 97.9 °F (36.6 °C)  Pulse:  [71-85] 73  Resp:  [16-18] 18  SpO2:  [95 %-99 %] 99 %  BP: ()/(50-70) 117/58     Weight: 88.5 kg (195 lb)  Body mass index is 32.45 kg/m².    Estimated Creatinine Clearance: 40 mL/min (A) (based on SCr of 1.5 mg/dL (H)).    Physical Exam  Constitutional:       General: She is not in acute distress.     Appearance: She is obese. She is not ill-appearing, toxic-appearing or diaphoretic.   HENT:      Head: Normocephalic and atraumatic.   Eyes:      General: No scleral icterus.        Right eye: No discharge.         Left eye: No discharge.      Extraocular Movements: Extraocular movements intact.      Conjunctiva/sclera: Conjunctivae normal.   Abdominal:      General: Abdomen is flat. There is no distension.      Palpations: Abdomen is soft.   Musculoskeletal:         General: No deformity.   Skin:     General: Skin is warm and dry.      Coloration: Skin is not jaundiced.   Neurological:      Mental Status: She is alert and oriented to person, place, and time. Mental status is at baseline.   Psychiatric:         Mood and Affect: Mood normal.         Behavior: Behavior normal.         Thought Content: Thought content normal.         Judgment: Judgment normal.       Significant Labs: All pertinent labs within the past 24 hours have been reviewed.    Significant Imaging: I have reviewed all pertinent imaging results/findings within the past 24 hours.

## 2022-11-08 NOTE — ANESTHESIA PREPROCEDURE EVALUATION
[facility]  Anesthesia Pre-Operative Evaluation         Patient Name: Cherry Santiago  YOB: 1955  MRN: [id number]    SUBJECTIVE:     Pre-operative evaluation for Procedure(s) (LRB):  ULTRASOUND, UPPER GI TRACT, ENDOSCOPIC (N/A)     11/09/2022    Cherry Santiago is a 67 y.o. female w/ recently diagnosed carcinoid syndrome, AFib on Eliquis, CHF, arthritis, GERD, glaucoma, hyperlipidemia, hypertension, prior MI, TIA presenting with persistent diarrhea. 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 Hypermetabolic focus in the pancreatic head and tail although no corresponding mass on CT.  She now presents for the above procedure.    Prev airway:   Present Prior to Hospital Arrival?: No; Placement Date: 05/21/18; Placement Time: 1044; Inserted by: CRNA; Airway Device: LMA; Mask Ventilation: Easy; Intubated: Postinduction; Airway Device Size: 4.0; Cuff Inflation: Minimal occlusive pressure; Placement Verified By: Capnometry; Complicating Factors: None; Intubation Findings: Positive EtCO2, Atraumatic/Condition of teeth unchanged; Securment: Lips; Complications: None;       Patient Active Problem List   Diagnosis    Acute deep vein thrombosis (DVT) of femoral vein of right lower extremity    Class 1 obesity due to excess calories with serious comorbidity and body mass index (BMI) of 32.0 to 32.9 in adult    SIRS (systemic inflammatory response syndrome)    Essential hypertension    Anemia of chronic disease    Hypokalemia    Bilateral groin pain    Lipoma of buttock    Iron deficiency anemia    Edema    Dizziness    Early onset Alzheimer's dementia without behavioral disturbance    Aneurysm, cerebral, nonruptured    Acute bilateral non-occlusive distal DVT    Chronic deep vein thrombosis (DVT): right common femoral vein    Chronic heart failure with preserved ejection fraction (HFpEF) NICM NYHA2     TIA (transient ischemic attack)     Normocytic anemia    Hypocalcemia    Transaminasemia    NAFLD (nonalcoholic fatty liver disease)    Uncomplicated opioid dependence    Syncope    Anginal equivalent    Syncope and collapse    Intractable back pain    Severe protein-calorie malnutrition     Hypopotassemia    Carcinoid syndrome related chronic diarrhea     Stage 3a chronic kidney disease    Inadequate dietary energy intake    Hypomagnesemia    Polypharmacy    Metabolic acidemia    Vitamin D deficiency    PAD (peripheral artery disease)    History of DVT (deep vein thrombosis)    Chronic asymptomatic hypotension    2.8 cm complex cyst of vaginal wall     UTI (urinary tract infection)    Seizures    Orthostatic hypotension    Hypotension    Cryptosporidium exposure    Hypophosphatemia       Review of patient's allergies indicates:  No Known Allergies    Current Inpatient Medications:   alteplase  2 mg Other Once    aspirin  81 mg Oral Daily    atorvastatin  40 mg Oral QHS    cefTRIAXone (ROCEPHIN) IVPB  1 g Intravenous Q24H    EPINEPHrine (PF)        ferrous sulfate  1 tablet Oral Daily    levETIRAcetam  500 mg Oral BID    metoprolol tartrate  25 mg Oral BID    nitazoxanide  500 mg Oral Q12H    octreotide  200 mcg Subcutaneous Q12H    pantoprozole (PROTONIX) IV  40 mg Intravenous Q12H    potassium, sodium phosphates  2 packet Oral QID (AC & HS)    sodium bicarbonate  1,300 mg Oral BID    vitamin D  2,000 Units Oral Daily       No current facility-administered medications on file prior to encounter.     Current Outpatient Medications on File Prior to Encounter   Medication Sig Dispense Refill    apixaban (ELIQUIS) 5 mg Tab Take 1 tablet (5 mg total) by mouth 2 (two) times daily.      aspirin (ECOTRIN) 81 MG EC tablet Take 81 mg by mouth once daily.      ferrous sulfate (FEOSOL) 325 mg (65 mg iron) Tab tablet Take 325 mg by mouth daily with breakfast.      furosemide (LASIX) 20 MG tablet Take 20 mg by mouth  once daily.      levETIRAcetam (KEPPRA) 250 MG Tab Take 500 mg by mouth 2 (two) times daily.      loperamide (IMODIUM) 2 mg capsule Take 1 capsule (2 mg total) by mouth 4 (four) times daily as needed for Diarrhea. 180 capsule 0    midodrine (PROAMATINE) 5 MG Tab Take 1 tablet (5 mg total) by mouth 3 (three) times daily with meals. 90 tablet 0    pantoprazole (PROTONIX) 40 MG tablet Take 40 mg by mouth once daily.      potassium chloride SA (K-DUR,KLOR-CON) 20 MEQ tablet Take 1 tablet (20 mEq total) by mouth 2 (two) times daily. 60 tablet 0    timolol maleate 0.5% (TIMOPTIC) 0.5 % Drop Place 1 drop into both eyes 2 (two) times daily.      atorvastatin (LIPITOR) 40 MG tablet Take 1 tablet (40 mg total) by mouth every evening. (Patient not taking: Reported on 11/4/2022) 90 tablet 3    citric acid-potassium citrate (POLYCITRA) 1,100-334 mg/5 mL solution potassium citrate-citric acid 1,100 mg-334 mg/5 mL oral solution   TAKE 5 MLS (10 MEQ TOTAL) BY MOUTH ONCE. FOR 1 DOSE ONLY      diphenoxylate-atropine 2.5-0.025 mg (LOMOTIL) 2.5-0.025 mg per tablet Take 1 tablet by mouth 4 (four) times daily as needed for Diarrhea (unreleived with Imodium). (Patient not taking: Reported on 11/4/2022) 30 tablet 0    metoclopramide HCl (REGLAN) 10 MG tablet Take 1 tablet (10 mg total) by mouth every 6 (six) hours as needed (Nausea). (Patient not taking: Reported on 11/4/2022) 14 tablet 0    metoprolol tartrate (LOPRESSOR) 25 MG tablet Take 1 tablet (25 mg total) by mouth 2 (two) times daily. (Patient not taking: Reported on 11/4/2022) 60 tablet 11    sodium bicarbonate 650 MG tablet Take 1 tablet (650 mg total) by mouth 2 (two) times daily. for 5 days 10 tablet 0       Past Surgical History:   Procedure Laterality Date    BACK SURGERY  06/21/2017    lumbar fusion 6/21/17 and surgery for hematoma to site on 6/26/17    CHOLECYSTECTOMY  1978    COLONOSCOPY N/A 10/3/2022    Procedure: COLONOSCOPY;  Surgeon: Jourdan Parks MD;   Location: [facility] ENDO;  Service: General;  Laterality: N/A;    COLONOSCOPY N/A 10/11/2022    Procedure: COLONOSCOPY;  Surgeon: Stephen Cooper MD;  Location: [facility] ENDO;  Service: Endoscopy;  Laterality: N/A;    ESOPHAGOGASTRODUODENOSCOPY N/A 10/3/2022    Procedure: EGD (ESOPHAGOGASTRODUODENOSCOPY);  Surgeon: Jourdan Parks MD;  Location: [facility] ENDO;  Service: General;  Laterality: N/A;    GASTRIC BYPASS      HYSTERECTOMY  2012    JOINT REPLACEMENT      TKR    LEFT HEART CATHETERIZATION Left 2/5/2021    Procedure: Left heart cath;  Surgeon: Shane Pacheco MD;  Location: [facility] CATH;  Service: Cardiovascular;  Laterality: Left;    PELVIC LAPAROSCOPY Left     OOPH    RENAL BIOPSY N/A 10/5/2022    Procedure: BIOPSY, KIDNEY;  Surgeon: Velia Diagnostic Provider;  Location: [facility] OR;  Service: General;  Laterality: N/A;    RIGHT HEART CATHETERIZATION Right 2/5/2021    Procedure: INSERTION, CATHETER, RIGHT HEART. via left radial and left brachial;  Surgeon: Shane Pacheco MD;  Location: [facility] CATH;  Service: Cardiovascular;  Laterality: Right;    TOTAL ABDOMINAL HYSTERECTOMY W/ BILATERAL SALPINGOOPHORECTOMY         Social History     Socioeconomic History    Marital status: Single   Tobacco Use    Smoking status: Never    Smokeless tobacco: Never   Substance and Sexual Activity    Alcohol use: Yes     Comment: occasional    Drug use: No    Sexual activity: Not Currently     Social Determinants of Health     Financial Resource Strain: Low Risk     Difficulty of Paying Living Expenses: Not hard at all   Food Insecurity: No Food Insecurity    Worried About Running Out of Food in the Last Year: Never true    Ran Out of Food in the Last Year: Never true   Transportation Needs: No Transportation Needs    Lack of Transportation (Medical): No    Lack of Transportation (Non-Medical): No   Physical Activity: Inactive    Days of Exercise per Week: 0 days    Minutes of Exercise per Session: 0 min   Stress: No  Stress Concern Present    Feeling of Stress : Not at all   Social Connections: Moderately Integrated    Frequency of Communication with Friends and Family: More than three times a week    Frequency of Social Gatherings with Friends and Family: More than three times a week    Attends Jew Services: More than 4 times per year    Active Member of Clubs or Organizations: Yes    Attends Club or Organization Meetings: More than 4 times per year    Marital Status: Never    Housing Stability: High Risk    Unable to Pay for Housing in the Last Year: Yes    Number of Places Lived in the Last Year: 1    Unstable Housing in the Last Year: No       OBJECTIVE:     Vital Signs Range (Last 24H):  Temp:  [36.6 °C (97.8 °F)-37.3 °C (99.1 °F)]   Pulse:  [68-82]   Resp:  [12-18]   BP: ()/(50-73)   SpO2:  [94 %-99 %]       CBC:   Recent Labs     11/08/22  1800 11/09/22  0405   WBC 4.93 4.52   RBC 2.55* 2.28*   HGB 7.5* 6.7*   HCT 22.5* 20.1*    191   MCV 88 88   MCH 29.4 29.4   MCHC 33.3 33.3       CMP:   Recent Labs     11/08/22  1203 11/08/22  1800 11/09/22  0405    144 145   K 2.6* 2.8* 3.4*   * 115* 120*   CO2 19* 18* 20*   BUN 9 9 8   CREATININE 1.5* 1.3 1.3   * 216* 144*   MG 1.7  --  1.6   PHOS 2.3* 2.3* 2.0*   CALCIUM 7.0* 7.1* 6.9*   ALBUMIN 1.4* 1.4* 1.3*   PROT 4.3*  --  3.9*   ALKPHOS 85  --  75   ALT 8*  --  7*   AST 33  --  29   BILITOT 0.5  --  0.5       INR:  No results for input(s): PT, INR, PROTIME, APTT in the last 72 hours.    Diagnostic Studies: No relevant studies.    EKG:   Results for orders placed or performed during the hospital encounter of 11/06/22   EKG 12-lead    Collection Time: 11/06/22 10:57 AM    Narrative    Test Reason : R68.89,    Vent. Rate : 113 BPM     Atrial Rate : 113 BPM     P-R Int : 164 ms          QRS Dur : 106 ms      QT Int : 334 ms       P-R-T Axes : 051 -37 164 degrees     QTc Int : 458 ms    Sinus tachycardia with Premature atrial  complexes  Left axis deviation  Possible Anterior infarct (cited on or before 31-OCT-2022)  Abnormal ECG  When compared with ECG of 31-OCT-2022 19:33,  Premature atrial complexes are now Present  Confirmed by Nadja Barney MD ([id number]) on 11/7/2022 12:42:07 PM    Referred By: CLAIR   SELF           Confirmed By:Nadja Barney MD        2D ECHO: 10/7/22  Hemodynamics   Heart rate: 83 beats/min   Blood pressure:  105/53 mmHg   ECG: Normal Sinus Rhythm     Final Impressions   1. The study quality is average.   2. Patient technically difficult to image due to body habitus.   3. The left ventricle is normal in size. Global left ventricular   systolic function is normal. The left ventricular ejection fraction is   55%. Left ventricular diastolic function is abnormal (stage I impaired   relaxation). Noted left ventricular hypertrophy. Concentric left   ventricular hypertrophy is present. It is mild.     4. Mild (1+) tricuspid regurgitation.   5. The right ventricle is normal in size. Right ventricular systolic   function is normal.       Intra-modality comparison (Echocardiogram)   This echocardiogram when compared with the latest echocardiogram-TTE   ([facility] [place]) dated 8/6/2019 shows:     1. Ejection fraction remains unchanged (55%).   2. Left atrium size changed from moderately increased to normal and   left ventricle size changed from mildly increased to normal is noted   in the current study.           ASSESSMENT/PLAN:         Pre-op Assessment    I have reviewed the Patient Summary Reports.     I have reviewed the Nursing Notes. I have reviewed the NPO Status.   I have reviewed the Medications.     Review of Systems  Anesthesia Hx:  No problems with previous Anesthesia Denies Hx of Anesthetic complications  History of prior surgery of interest to airway management or planning: gastric bypass. Denies Family Hx of Anesthesia complications.   Denies Personal Hx of Anesthesia complications.   Social:  Non-Smoker, No  Alcohol Use    Hematology/Oncology:         -- Denies Anemia: Denies Current/Recent Cancer   EENT/Dental:EENT/Dental Normal   Cardiovascular:   Exercise tolerance: good Hypertension Past MI Denies CAD.    Denies Dysrhythmias.   Denies Angina.  Denies CHF. no hyperlipidemia ECG has been reviewed. Hx: DVT Deep Venous Thrombosis (DVT) (Rx Eliquis), right lower extremity, Chronic Venous Insufficiency, bilateral lower extremity  Disorder of Cardiac Rhythm, Atrial Fibrillation, Paroxysmal Atrial Fibrillation, controlled on medical Rx    Pulmonary:   Denies COPD.  Denies Asthma.  Denies Sleep Apnea.    Renal/:   Denies Chronic Renal Disease.   Kidney Function/Disease  Acute Renal Failure (ARF), hx of ARF, now resolved    Hepatic/GI:   Denies GERD. Denies Liver Disease.    Musculoskeletal:   Denies Arthritis.     Neurological:   TIA, Denies CVA. Denies Neuromuscular Disease.  Denies Seizures.   Denies Chronic Pain Syndrome   Endocrine:   Denies Diabetes. Denies Hyperthyroidism.  Diabetes  Denies Obesity / BMI > 30  Dermatological:  Skin Normal    Psych:   Psychiatric History denies anxiety denies depression          Physical Exam  General: Well nourished    Airway:  Mallampati: II / I  Mouth Opening: Normal  TM Distance: Normal  Tongue: Normal  Neck ROM: Normal ROM    Dental:  Intact        Anesthesia Plan  Type of Anesthesia, risks & benefits discussed:    Anesthesia Type: MAC, Gen Natural Airway, Gen ETT  Intra-op Monitoring Plan: Standard ASA Monitors  Post Op Pain Control Plan: multimodal analgesia and IV/PO Opioids PRN  Induction:  IV  Informed Consent: Informed consent signed with the Patient and all parties understand the risks and agree with anesthesia plan.  All questions answered.   ASA Score: 3  Day of Surgery Review of History & Physical: H&P Update referred to the surgeon/provider.    Ready For Surgery From Anesthesia Perspective.     .

## 2022-11-08 NOTE — ASSESSMENT & PLAN NOTE
urinalysis positive . Culture, Urine pending  continue with anitbiotic IV ceftriaxone  11/8 UC with klebsiella; s/p x5 doses CTX - sensitive

## 2022-11-08 NOTE — SUBJECTIVE & OBJECTIVE
Past Medical History:   Diagnosis Date    Anticoagulant long-term use     Arthritis     Atrial fibrillation     CHF (congestive heart failure)     Diabetes mellitus     Type2 resolved after weight loss surgery    DVT (deep venous thrombosis)     Encounter for blood transfusion     GERD (gastroesophageal reflux disease)     Glaucoma     Hypercholesterolemia     Hyperlipemia     Hypertension     Memory changes     Myocardial infarction     Renal failure     resolved    TIA (transient ischemic attack)        Past Surgical History:   Procedure Laterality Date    BACK SURGERY  06/21/2017    lumbar fusion 6/21/17 and surgery for hematoma to site on 6/26/17    CHOLECYSTECTOMY  1978    COLONOSCOPY N/A 10/3/2022    Procedure: COLONOSCOPY;  Surgeon: Jourdan Parks MD;  Location: [facility] ENDO;  Service: General;  Laterality: N/A;    COLONOSCOPY N/A 10/11/2022    Procedure: COLONOSCOPY;  Surgeon: Stephen Cooper MD;  Location: [facility] ENDO;  Service: Endoscopy;  Laterality: N/A;    ESOPHAGOGASTRODUODENOSCOPY N/A 10/3/2022    Procedure: EGD (ESOPHAGOGASTRODUODENOSCOPY);  Surgeon: Jourdan Parks MD;  Location: [facility] ENDO;  Service: General;  Laterality: N/A;    GASTRIC BYPASS      HYSTERECTOMY  2012    JOINT REPLACEMENT      TKR    LEFT HEART CATHETERIZATION Left 2/5/2021    Procedure: Left heart cath;  Surgeon: Shane Pacheco MD;  Location: [facility] CATH;  Service: Cardiovascular;  Laterality: Left;    PELVIC LAPAROSCOPY Left     OOPH    RENAL BIOPSY N/A 10/5/2022    Procedure: BIOPSY, KIDNEY;  Surgeon: Velia Diagnostic Provider;  Location: [facility] OR;  Service: General;  Laterality: N/A;    RIGHT HEART CATHETERIZATION Right 2/5/2021    Procedure: INSERTION, CATHETER, RIGHT HEART. via left radial and left brachial;  Surgeon: Shane Pacheco MD;  Location: [facility] CATH;  Service: Cardiovascular;  Laterality: Right;    TOTAL ABDOMINAL HYSTERECTOMY W/ BILATERAL SALPINGOOPHORECTOMY         Review of patient's allergies indicates:  No Known  Allergies    Medications:  Medications Prior to Admission   Medication Sig    apixaban (ELIQUIS) 5 mg Tab Take 1 tablet (5 mg total) by mouth 2 (two) times daily.    aspirin (ECOTRIN) 81 MG EC tablet Take 81 mg by mouth once daily.    ferrous sulfate (FEOSOL) 325 mg (65 mg iron) Tab tablet Take 325 mg by mouth daily with breakfast.    furosemide (LASIX) 20 MG tablet Take 20 mg by mouth once daily.    levETIRAcetam (KEPPRA) 250 MG Tab Take 500 mg by mouth 2 (two) times daily.    loperamide (IMODIUM) 2 mg capsule Take 1 capsule (2 mg total) by mouth 4 (four) times daily as needed for Diarrhea.    midodrine (PROAMATINE) 5 MG Tab Take 1 tablet (5 mg total) by mouth 3 (three) times daily with meals.    pantoprazole (PROTONIX) 40 MG tablet Take 40 mg by mouth once daily.    potassium chloride SA (K-DUR,KLOR-CON) 20 MEQ tablet Take 1 tablet (20 mEq total) by mouth 2 (two) times daily.    timolol maleate 0.5% (TIMOPTIC) 0.5 % Drop Place 1 drop into both eyes 2 (two) times daily.    atorvastatin (LIPITOR) 40 MG tablet Take 1 tablet (40 mg total) by mouth every evening. (Patient not taking: Reported on 11/4/2022)    citric acid-potassium citrate (POLYCITRA) 1,100-334 mg/5 mL solution potassium citrate-citric acid 1,100 mg-334 mg/5 mL oral solution   TAKE 5 MLS (10 MEQ TOTAL) BY MOUTH ONCE. FOR 1 DOSE ONLY    diphenoxylate-atropine 2.5-0.025 mg (LOMOTIL) 2.5-0.025 mg per tablet Take 1 tablet by mouth 4 (four) times daily as needed for Diarrhea (unreleived with Imodium). (Patient not taking: Reported on 11/4/2022)    metoclopramide HCl (REGLAN) 10 MG tablet Take 1 tablet (10 mg total) by mouth every 6 (six) hours as needed (Nausea). (Patient not taking: Reported on 11/4/2022)    metoprolol tartrate (LOPRESSOR) 25 MG tablet Take 1 tablet (25 mg total) by mouth 2 (two) times daily. (Patient not taking: Reported on 11/4/2022)    sodium bicarbonate 650 MG tablet Take 1 tablet (650 mg total) by mouth 2 (two) times daily. for 5 days      Antibiotics (From admission, onward)      Start     Stop Route Frequency Ordered    11/09/22 0900  cefTRIAXone (ROCEPHIN) 1 g/50 mL D5W IVPB         11/10 0859 IV Every 24 hours (non-standard times) 11/08/22 0946          Antifungals (From admission, onward)      None          Antivirals (From admission, onward)      None             Immunization History   Administered Date(s) Administered    COVID-19 Vaccine 04/05/2021    COVID-19, MRNA, LN-S, PF (Pfizer) (Purple Cap) 12/28/2021    COVID-19, vector-nr, rS-Ad26, PF (ZMP) 03/22/2021    Influenza 03/05/2020    Influenza - Quadrivalent - High Dose - PF (65 years and older) 09/30/2020    Pneumococcal Conjugate - 13 Valent 01/25/2021, 04/04/2022    Tdap 10/05/2020       Family History       Problem Relation (Age of Onset)    Arthritis Mother    Breast cancer Mother    Cancer Father    Diabetes Daughter    Heart disease Mother, Father    Hypertension Mother, Father          Social History     Socioeconomic History    Marital status: Single   Tobacco Use    Smoking status: Never    Smokeless tobacco: Never   Substance and Sexual Activity    Alcohol use: Yes     Comment: occasional    Drug use: No    Sexual activity: Not Currently     Social Determinants of Health     Financial Resource Strain: Low Risk     Difficulty of Paying Living Expenses: Not hard at all   Food Insecurity: No Food Insecurity    Worried About Running Out of Food in the Last Year: Never true    Ran Out of Food in the Last Year: Never true   Transportation Needs: No Transportation Needs    Lack of Transportation (Medical): No    Lack of Transportation (Non-Medical): No   Physical Activity: Inactive    Days of Exercise per Week: 0 days    Minutes of Exercise per Session: 0 min   Stress: No Stress Concern Present    Feeling of Stress : Not at all   Social Connections: Moderately Integrated    Frequency of Communication with Friends and Family: More than three times a week    Frequency of Social  Gatherings with Friends and Family: More than three times a week    Attends Sabianism Services: More than 4 times per year    Active Member of Clubs or Organizations: Yes    Attends Club or Organization Meetings: More than 4 times per year    Marital Status: Never    Housing Stability: High Risk    Unable to Pay for Housing in the Last Year: Yes    Number of Places Lived in the Last Year: 1    Unstable Housing in the Last Year: No     Review of Systems   Constitutional:  Negative for chills and fever.   HENT:  Negative for sore throat.    Respiratory:  Negative for cough and shortness of breath.    Cardiovascular:  Negative for chest pain.   Gastrointestinal:  Positive for diarrhea. Negative for abdominal distention, blood in stool, constipation, nausea and vomiting.   Genitourinary:  Negative for dysuria.   Skin:  Negative for rash and wound.   Allergic/Immunologic: Negative for immunocompromised state.   Neurological:  Negative for headaches.   Psychiatric/Behavioral:  Negative for behavioral problems and confusion.    Objective:     Vital Signs (Most Recent):  Temp: 97.9 °F (36.6 °C) (11/08/22 1537)  Pulse: 73 (11/08/22 1537)  Resp: 18 (11/08/22 1537)  BP: (!) 117/58 (11/08/22 1537)  SpO2: 99 % (11/08/22 1537)   Vital Signs (24h Range):  Temp:  [97.1 °F (36.2 °C)-98 °F (36.7 °C)] 97.9 °F (36.6 °C)  Pulse:  [71-85] 73  Resp:  [16-18] 18  SpO2:  [95 %-99 %] 99 %  BP: ()/(50-70) 117/58     Weight: 88.5 kg (195 lb)  Body mass index is 32.45 kg/m².    Estimated Creatinine Clearance: 40 mL/min (A) (based on SCr of 1.5 mg/dL (H)).    Physical Exam  Constitutional:       General: She is not in acute distress.     Appearance: She is obese. She is not ill-appearing, toxic-appearing or diaphoretic.   HENT:      Head: Normocephalic and atraumatic.   Eyes:      General: No scleral icterus.        Right eye: No discharge.         Left eye: No discharge.      Extraocular Movements: Extraocular movements intact.       Conjunctiva/sclera: Conjunctivae normal.   Abdominal:      General: Abdomen is flat. There is no distension.      Palpations: Abdomen is soft.   Musculoskeletal:         General: No deformity.   Skin:     General: Skin is warm and dry.      Coloration: Skin is not jaundiced.   Neurological:      Mental Status: She is alert and oriented to person, place, and time. Mental status is at baseline.   Psychiatric:         Mood and Affect: Mood normal.         Behavior: Behavior normal.         Thought Content: Thought content normal.         Judgment: Judgment normal.       Significant Labs: All pertinent labs within the past 24 hours have been reviewed.    Significant Imaging: I have reviewed all pertinent imaging results/findings within the past 24 hours.

## 2022-11-08 NOTE — PLAN OF CARE
Patient A/O x4 during shift. Afebrile. VSS on RAIR. Blood pressure continues to be soft. 500ml bolus LR given this shift. Continuous IV fluids started. BM x3 this shift. Purewick changed with each BM. Patient able to turn and reposition self q2h to promote skin integrity. Family remains at bedside. Call light and personal belongings within reach. Bed locked and in lowest position throughout shift.

## 2022-11-08 NOTE — HPI
67-year-old female with HTN, HF, Afib on Eliquis, CKD, GERD, chronic diarrhea since 8/2022, recently diagnosed carcinoid syndrome presents with persistent, nonbloody, mucous-like diarrhea.  She is undergoing further evaluation for neuroendocrine tumor/VIPoma by Heme/onc.  Also found to have Klebsiella UTI which primary team is treating with ceftriaxone.  Of note, cryptosporidium Ag checked at [place] was positive on 10/13/22.  It does not appear this has been addressed or treated since then.  ID has been consulted for the cryptosporidium Ag.    Lives in [place], [place].  Receives [employer] water.  No outdoor hobbies, not recent travel, has not been in swimming pools.  No pets.  There is one 4-year-old child in the household who isn't fully potty-trained, but does not have diarrhea.

## 2022-11-09 ENCOUNTER — ANESTHESIA (OUTPATIENT)
Dept: ENDOSCOPY | Facility: HOSPITAL | Age: 67
DRG: 391 | End: 2022-11-09
Payer: MEDICARE

## 2022-11-09 PROBLEM — E83.39 HYPOPHOSPHATEMIA: Status: ACTIVE | Noted: 2022-11-09

## 2022-11-09 LAB
ABO + RH BLD: NORMAL
ALBUMIN SERPL BCP-MCNC: 1.3 G/DL (ref 3.5–5.2)
ALBUMIN SERPL BCP-MCNC: 1.5 G/DL (ref 3.5–5.2)
ALP SERPL-CCNC: 75 U/L (ref 55–135)
ALT SERPL W/O P-5'-P-CCNC: 7 U/L (ref 10–44)
ANION GAP SERPL CALC-SCNC: 5 MMOL/L (ref 8–16)
ANION GAP SERPL CALC-SCNC: 6 MMOL/L (ref 8–16)
AST SERPL-CCNC: 29 U/L (ref 10–40)
BASOPHILS # BLD AUTO: 0.02 K/UL (ref 0–0.2)
BASOPHILS # BLD AUTO: 0.03 K/UL (ref 0–0.2)
BASOPHILS NFR BLD: 0.4 % (ref 0–1.9)
BASOPHILS NFR BLD: 0.4 % (ref 0–1.9)
BILIRUB SERPL-MCNC: 0.5 MG/DL (ref 0.1–1)
BLD GP AB SCN CELLS X3 SERPL QL: NORMAL
BLD PROD TYP BPU: NORMAL
BLOOD UNIT EXPIRATION DATE: NORMAL
BLOOD UNIT TYPE CODE: 6200
BLOOD UNIT TYPE: NORMAL
BUN SERPL-MCNC: 7 MG/DL (ref 8–23)
BUN SERPL-MCNC: 8 MG/DL (ref 8–23)
CALCIUM SERPL-MCNC: 6.9 MG/DL (ref 8.7–10.5)
CALCIUM SERPL-MCNC: 7.8 MG/DL (ref 8.7–10.5)
CHLORIDE SERPL-SCNC: 119 MMOL/L (ref 95–110)
CHLORIDE SERPL-SCNC: 120 MMOL/L (ref 95–110)
CO2 SERPL-SCNC: 18 MMOL/L (ref 23–29)
CO2 SERPL-SCNC: 20 MMOL/L (ref 23–29)
CODING SYSTEM: NORMAL
CREAT SERPL-MCNC: 1.2 MG/DL (ref 0.5–1.4)
CREAT SERPL-MCNC: 1.3 MG/DL (ref 0.5–1.4)
DIFFERENTIAL METHOD: ABNORMAL
DIFFERENTIAL METHOD: ABNORMAL
DISPENSE STATUS: NORMAL
EOSINOPHIL # BLD AUTO: 0.1 K/UL (ref 0–0.5)
EOSINOPHIL # BLD AUTO: 0.1 K/UL (ref 0–0.5)
EOSINOPHIL NFR BLD: 0.6 % (ref 0–8)
EOSINOPHIL NFR BLD: 1.3 % (ref 0–8)
ERYTHROCYTE [DISTWIDTH] IN BLOOD BY AUTOMATED COUNT: 19.4 % (ref 11.5–14.5)
ERYTHROCYTE [DISTWIDTH] IN BLOOD BY AUTOMATED COUNT: 20.3 % (ref 11.5–14.5)
EST. GFR  (NO RACE VARIABLE): 45.1 ML/MIN/1.73 M^2
EST. GFR  (NO RACE VARIABLE): 49.6 ML/MIN/1.73 M^2
GLUCOSE SERPL-MCNC: 133 MG/DL (ref 70–110)
GLUCOSE SERPL-MCNC: 144 MG/DL (ref 70–110)
HCT VFR BLD AUTO: 20.1 % (ref 37–48.5)
HCT VFR BLD AUTO: 25.7 % (ref 37–48.5)
HGB BLD-MCNC: 6.7 G/DL (ref 12–16)
HGB BLD-MCNC: 8.6 G/DL (ref 12–16)
IMM GRANULOCYTES # BLD AUTO: 0.02 K/UL (ref 0–0.04)
IMM GRANULOCYTES # BLD AUTO: 0.07 K/UL (ref 0–0.04)
IMM GRANULOCYTES NFR BLD AUTO: 0.4 % (ref 0–0.5)
IMM GRANULOCYTES NFR BLD AUTO: 0.9 % (ref 0–0.5)
LYMPHOCYTES # BLD AUTO: 1.3 K/UL (ref 1–4.8)
LYMPHOCYTES # BLD AUTO: 1.3 K/UL (ref 1–4.8)
LYMPHOCYTES NFR BLD: 16.3 % (ref 18–48)
LYMPHOCYTES NFR BLD: 29 % (ref 18–48)
MAGNESIUM SERPL-MCNC: 1.6 MG/DL (ref 1.6–2.6)
MCH RBC QN AUTO: 29.4 PG (ref 27–31)
MCH RBC QN AUTO: 29.6 PG (ref 27–31)
MCHC RBC AUTO-ENTMCNC: 33.3 G/DL (ref 32–36)
MCHC RBC AUTO-ENTMCNC: 33.5 G/DL (ref 32–36)
MCV RBC AUTO: 88 FL (ref 82–98)
MCV RBC AUTO: 88 FL (ref 82–98)
MONOCYTES # BLD AUTO: 0.6 K/UL (ref 0.3–1)
MONOCYTES # BLD AUTO: 0.7 K/UL (ref 0.3–1)
MONOCYTES NFR BLD: 14.6 % (ref 4–15)
MONOCYTES NFR BLD: 7.3 % (ref 4–15)
NEUTROPHILS # BLD AUTO: 2.5 K/UL (ref 1.8–7.7)
NEUTROPHILS # BLD AUTO: 6 K/UL (ref 1.8–7.7)
NEUTROPHILS NFR BLD: 54.3 % (ref 38–73)
NEUTROPHILS NFR BLD: 74.5 % (ref 38–73)
NRBC BLD-RTO: 0 /100 WBC
NRBC BLD-RTO: 0 /100 WBC
OB PNL STL: NEGATIVE
PHOSPHATE SERPL-MCNC: 2 MG/DL (ref 2.7–4.5)
PHOSPHATE SERPL-MCNC: 2.4 MG/DL (ref 2.7–4.5)
PLATELET # BLD AUTO: 135 K/UL (ref 150–450)
PLATELET # BLD AUTO: 191 K/UL (ref 150–450)
PMV BLD AUTO: 9.5 FL (ref 9.2–12.9)
PMV BLD AUTO: 9.6 FL (ref 9.2–12.9)
POCT GLUCOSE: 125 MG/DL (ref 70–110)
POTASSIUM SERPL-SCNC: 3.4 MMOL/L (ref 3.5–5.1)
POTASSIUM SERPL-SCNC: 4.2 MMOL/L (ref 3.5–5.1)
PROT SERPL-MCNC: 3.9 G/DL (ref 6–8.4)
RBC # BLD AUTO: 2.28 M/UL (ref 4–5.4)
RBC # BLD AUTO: 2.91 M/UL (ref 4–5.4)
SODIUM SERPL-SCNC: 143 MMOL/L (ref 136–145)
SODIUM SERPL-SCNC: 145 MMOL/L (ref 136–145)
TRANS ERYTHROCYTES VOL PATIENT: NORMAL ML
WBC # BLD AUTO: 4.52 K/UL (ref 3.9–12.7)
WBC # BLD AUTO: 8.09 K/UL (ref 3.9–12.7)

## 2022-11-09 PROCEDURE — 80053 COMPREHEN METABOLIC PANEL: CPT | Performed by: HOSPITALIST

## 2022-11-09 PROCEDURE — D9220A PRA ANESTHESIA: Mod: ANES,,, | Performed by: STUDENT IN AN ORGANIZED HEALTH CARE EDUCATION/TRAINING PROGRAM

## 2022-11-09 PROCEDURE — D9220A PRA ANESTHESIA: ICD-10-PCS | Mod: CRNA,,, | Performed by: NURSE ANESTHETIST, CERTIFIED REGISTERED

## 2022-11-09 PROCEDURE — 43259 EGD US EXAM DUODENUM/JEJUNUM: CPT | Performed by: INTERNAL MEDICINE

## 2022-11-09 PROCEDURE — 20600001 HC STEP DOWN PRIVATE ROOM

## 2022-11-09 PROCEDURE — D9220A PRA ANESTHESIA: ICD-10-PCS | Mod: ANES,,, | Performed by: STUDENT IN AN ORGANIZED HEALTH CARE EDUCATION/TRAINING PROGRAM

## 2022-11-09 PROCEDURE — 85025 COMPLETE CBC W/AUTO DIFF WBC: CPT | Performed by: HOSPITALIST

## 2022-11-09 PROCEDURE — 37000008 HC ANESTHESIA 1ST 15 MINUTES: Performed by: INTERNAL MEDICINE

## 2022-11-09 PROCEDURE — 99233 SBSQ HOSP IP/OBS HIGH 50: CPT | Mod: ,,, | Performed by: HOSPITALIST

## 2022-11-09 PROCEDURE — 63600175 PHARM REV CODE 636 W HCPCS: Performed by: NURSE ANESTHETIST, CERTIFIED REGISTERED

## 2022-11-09 PROCEDURE — P9021 RED BLOOD CELLS UNIT: HCPCS | Performed by: HOSPITALIST

## 2022-11-09 PROCEDURE — C9113 INJ PANTOPRAZOLE SODIUM, VIA: HCPCS | Performed by: HOSPITALIST

## 2022-11-09 PROCEDURE — 37000009 HC ANESTHESIA EA ADD 15 MINS: Performed by: INTERNAL MEDICINE

## 2022-11-09 PROCEDURE — 84100 ASSAY OF PHOSPHORUS: CPT | Performed by: HOSPITALIST

## 2022-11-09 PROCEDURE — 25000242 PHARM REV CODE 250 ALT 637 W/ HCPCS: Performed by: INTERNAL MEDICINE

## 2022-11-09 PROCEDURE — 25000003 PHARM REV CODE 250: Performed by: HOSPITALIST

## 2022-11-09 PROCEDURE — 25000003 PHARM REV CODE 250: Performed by: NURSE ANESTHETIST, CERTIFIED REGISTERED

## 2022-11-09 PROCEDURE — 63600175 PHARM REV CODE 636 W HCPCS: Mod: JB | Performed by: STUDENT IN AN ORGANIZED HEALTH CARE EDUCATION/TRAINING PROGRAM

## 2022-11-09 PROCEDURE — 43259 EGD US EXAM DUODENUM/JEJUNUM: CPT | Mod: ,,, | Performed by: INTERNAL MEDICINE

## 2022-11-09 PROCEDURE — 82962 GLUCOSE BLOOD TEST: CPT | Performed by: INTERNAL MEDICINE

## 2022-11-09 PROCEDURE — 86920 COMPATIBILITY TEST SPIN: CPT | Performed by: HOSPITALIST

## 2022-11-09 PROCEDURE — 83735 ASSAY OF MAGNESIUM: CPT | Performed by: HOSPITALIST

## 2022-11-09 PROCEDURE — 82272 OCCULT BLD FECES 1-3 TESTS: CPT | Performed by: HOSPITALIST

## 2022-11-09 PROCEDURE — 63600175 PHARM REV CODE 636 W HCPCS: Performed by: HOSPITALIST

## 2022-11-09 PROCEDURE — D9220A PRA ANESTHESIA: Mod: CRNA,,, | Performed by: NURSE ANESTHETIST, CERTIFIED REGISTERED

## 2022-11-09 PROCEDURE — 80069 RENAL FUNCTION PANEL: CPT | Performed by: HOSPITALIST

## 2022-11-09 PROCEDURE — 99233 PR SUBSEQUENT HOSPITAL CARE,LEVL III: ICD-10-PCS | Mod: ,,, | Performed by: HOSPITALIST

## 2022-11-09 PROCEDURE — 86901 BLOOD TYPING SEROLOGIC RH(D): CPT | Performed by: HOSPITALIST

## 2022-11-09 PROCEDURE — 43259 PR ENDOSCOPIC ULTRASOUND EXAM: ICD-10-PCS | Mod: ,,, | Performed by: INTERNAL MEDICINE

## 2022-11-09 RX ORDER — SODIUM,POTASSIUM PHOSPHATES 280-250MG
2 POWDER IN PACKET (EA) ORAL
Status: COMPLETED | OUTPATIENT
Start: 2022-11-09 | End: 2022-11-10

## 2022-11-09 RX ORDER — PANTOPRAZOLE SODIUM 40 MG/10ML
40 INJECTION, POWDER, LYOPHILIZED, FOR SOLUTION INTRAVENOUS EVERY 12 HOURS
Status: DISCONTINUED | OUTPATIENT
Start: 2022-11-09 | End: 2022-11-11

## 2022-11-09 RX ORDER — SODIUM CHLORIDE 0.9 % (FLUSH) 0.9 %
10 SYRINGE (ML) INJECTION
Status: DISCONTINUED | OUTPATIENT
Start: 2022-11-09 | End: 2022-11-09 | Stop reason: HOSPADM

## 2022-11-09 RX ORDER — LIDOCAINE HCL/PF 100 MG/5ML
SYRINGE (ML) INTRAVENOUS
Status: DISCONTINUED | OUTPATIENT
Start: 2022-11-09 | End: 2022-11-09

## 2022-11-09 RX ORDER — MICONAZOLE NITRATE 2 %
POWDER (GRAM) TOPICAL 2 TIMES DAILY
Status: DISCONTINUED | OUTPATIENT
Start: 2022-11-09 | End: 2022-11-25 | Stop reason: HOSPADM

## 2022-11-09 RX ORDER — PHENYLEPHRINE HYDROCHLORIDE 10 MG/ML
INJECTION INTRAVENOUS
Status: DISCONTINUED | OUTPATIENT
Start: 2022-11-09 | End: 2022-11-09

## 2022-11-09 RX ORDER — EPINEPHRINE 1 MG/ML
INJECTION, SOLUTION, CONCENTRATE INTRAVENOUS
Status: DISPENSED
Start: 2022-11-09 | End: 2022-11-09

## 2022-11-09 RX ORDER — PROPOFOL 10 MG/ML
INJECTION, EMULSION INTRAVENOUS
Status: DISCONTINUED | OUTPATIENT
Start: 2022-11-09 | End: 2022-11-09

## 2022-11-09 RX ORDER — PROPOFOL 10 MG/ML
INJECTION, EMULSION INTRAVENOUS CONTINUOUS PRN
Status: DISCONTINUED | OUTPATIENT
Start: 2022-11-09 | End: 2022-11-09

## 2022-11-09 RX ORDER — POTASSIUM CHLORIDE 20 MEQ/1
40 TABLET, EXTENDED RELEASE ORAL ONCE
Status: COMPLETED | OUTPATIENT
Start: 2022-11-09 | End: 2022-11-09

## 2022-11-09 RX ORDER — MAGNESIUM SULFATE HEPTAHYDRATE 40 MG/ML
2 INJECTION, SOLUTION INTRAVENOUS ONCE
Status: COMPLETED | OUTPATIENT
Start: 2022-11-09 | End: 2022-11-09

## 2022-11-09 RX ORDER — HYDROCODONE BITARTRATE AND ACETAMINOPHEN 500; 5 MG/1; MG/1
TABLET ORAL
Status: DISCONTINUED | OUTPATIENT
Start: 2022-11-09 | End: 2022-11-23

## 2022-11-09 RX ORDER — DEXTROSE, SODIUM CHLORIDE, SODIUM LACTATE, POTASSIUM CHLORIDE, AND CALCIUM CHLORIDE 5; .6; .31; .03; .02 G/100ML; G/100ML; G/100ML; G/100ML; G/100ML
INJECTION, SOLUTION INTRAVENOUS CONTINUOUS
Status: DISCONTINUED | OUTPATIENT
Start: 2022-11-09 | End: 2022-11-09

## 2022-11-09 RX ORDER — CALCIUM GLUCONATE 98 MG/ML
INJECTION, SOLUTION INTRAVENOUS
Status: DISCONTINUED | OUTPATIENT
Start: 2022-11-09 | End: 2022-11-09

## 2022-11-09 RX ORDER — NITAZOXANIDE 500 MG/1
500 TABLET ORAL EVERY 12 HOURS
Status: DISPENSED | OUTPATIENT
Start: 2022-11-09 | End: 2022-11-12

## 2022-11-09 RX ADMIN — Medication 100 MG: at 01:11

## 2022-11-09 RX ADMIN — LEVETIRACETAM 500 MG: 500 TABLET, FILM COATED ORAL at 09:11

## 2022-11-09 RX ADMIN — PROPOFOL 50 MG: 10 INJECTION, EMULSION INTRAVENOUS at 01:11

## 2022-11-09 RX ADMIN — METOPROLOL TARTRATE 25 MG: 25 TABLET, FILM COATED ORAL at 09:11

## 2022-11-09 RX ADMIN — SODIUM BICARBONATE 650 MG TABLET 1300 MG: at 08:11

## 2022-11-09 RX ADMIN — PHENYLEPHRINE HYDROCHLORIDE 100 MCG: 10 INJECTION INTRAVENOUS at 01:11

## 2022-11-09 RX ADMIN — SODIUM BICARBONATE 650 MG TABLET 1300 MG: at 09:11

## 2022-11-09 RX ADMIN — CALCIUM GLUCONATE 100 MG: 98 INJECTION, SOLUTION INTRAVENOUS at 02:11

## 2022-11-09 RX ADMIN — PANTOPRAZOLE SODIUM 40 MG: 40 INJECTION, POWDER, FOR SOLUTION INTRAVENOUS at 09:11

## 2022-11-09 RX ADMIN — NITAZOXANIDE 500 MG: 500 TABLET, FILM COATED ORAL at 09:11

## 2022-11-09 RX ADMIN — PHENYLEPHRINE HYDROCHLORIDE 100 MCG: 10 INJECTION INTRAVENOUS at 02:11

## 2022-11-09 RX ADMIN — POTASSIUM & SODIUM PHOSPHATES POWDER PACK 280-160-250 MG 2 PACKET: 280-160-250 PACK at 08:11

## 2022-11-09 RX ADMIN — METOPROLOL TARTRATE 25 MG: 25 TABLET, FILM COATED ORAL at 08:11

## 2022-11-09 RX ADMIN — APIXABAN 5 MG: 5 TABLET, FILM COATED ORAL at 08:11

## 2022-11-09 RX ADMIN — CALCIUM GLUCONATE 200 MG: 98 INJECTION, SOLUTION INTRAVENOUS at 02:11

## 2022-11-09 RX ADMIN — PROPOFOL 20 MG: 10 INJECTION, EMULSION INTRAVENOUS at 01:11

## 2022-11-09 RX ADMIN — PROPOFOL 100 MCG/KG/MIN: 10 INJECTION, EMULSION INTRAVENOUS at 01:11

## 2022-11-09 RX ADMIN — PANTOPRAZOLE SODIUM 40 MG: 40 INJECTION, POWDER, FOR SOLUTION INTRAVENOUS at 08:11

## 2022-11-09 RX ADMIN — POTASSIUM & SODIUM PHOSPHATES POWDER PACK 280-160-250 MG 2 PACKET: 280-160-250 PACK at 11:11

## 2022-11-09 RX ADMIN — POTASSIUM & SODIUM PHOSPHATES POWDER PACK 280-160-250 MG 2 PACKET: 280-160-250 PACK at 04:11

## 2022-11-09 RX ADMIN — DEXTROSE, SODIUM CHLORIDE, SODIUM LACTATE, POTASSIUM CHLORIDE, AND CALCIUM CHLORIDE: 5; .6; .31; .03; .02 INJECTION, SOLUTION INTRAVENOUS at 12:11

## 2022-11-09 RX ADMIN — POTASSIUM CHLORIDE 40 MEQ: 1500 TABLET, EXTENDED RELEASE ORAL at 02:11

## 2022-11-09 RX ADMIN — SODIUM CHLORIDE: 0.9 INJECTION, SOLUTION INTRAVENOUS at 01:11

## 2022-11-09 RX ADMIN — ATORVASTATIN CALCIUM 40 MG: 20 TABLET, FILM COATED ORAL at 08:11

## 2022-11-09 RX ADMIN — LEVETIRACETAM 500 MG: 500 TABLET, FILM COATED ORAL at 08:11

## 2022-11-09 RX ADMIN — POTASSIUM CHLORIDE 40 MEQ: 1500 TABLET, EXTENDED RELEASE ORAL at 09:11

## 2022-11-09 RX ADMIN — OCTREOTIDE ACETATE 200 MCG: 100 INJECTION, SOLUTION INTRAVENOUS; SUBCUTANEOUS at 11:11

## 2022-11-09 RX ADMIN — CEFTRIAXONE 1 G: 1 INJECTION, SOLUTION INTRAVENOUS at 03:11

## 2022-11-09 RX ADMIN — CHOLECALCIFEROL TAB 25 MCG (1000 UNIT) 2000 UNITS: 25 TAB at 09:11

## 2022-11-09 RX ADMIN — FERROUS SULFATE TAB 325 MG (65 MG ELEMENTAL FE) 1 EACH: 325 (65 FE) TAB at 09:11

## 2022-11-09 RX ADMIN — MAGNESIUM SULFATE 2 G: 2 INJECTION INTRAVENOUS at 09:11

## 2022-11-09 RX ADMIN — ASPIRIN 81 MG: 81 TABLET, COATED ORAL at 09:11

## 2022-11-09 RX ADMIN — OCTREOTIDE ACETATE 200 MCG: 100 INJECTION, SOLUTION INTRAVENOUS; SUBCUTANEOUS at 08:11

## 2022-11-09 RX ADMIN — CALCIUM GLUCONATE 200 MG: 98 INJECTION, SOLUTION INTRAVENOUS at 01:11

## 2022-11-09 NOTE — ANESTHESIA POSTPROCEDURE EVALUATION
Anesthesia Post Evaluation    Patient: Cherry Santiago    Procedure(s) Performed: Procedure(s) (LRB):  ULTRASOUND, UPPER GI TRACT, ENDOSCOPIC (N/A)    Final Anesthesia Type: general      Patient location during evaluation: PACU  Patient participation: Yes- Able to Participate  Level of consciousness: awake and alert  Post-procedure vital signs: reviewed and stable  Pain management: adequate  Airway patency: patent    PONV status at discharge: No PONV  Anesthetic complications: no      Cardiovascular status: blood pressure returned to baseline and hemodynamically stable  Respiratory status: unassisted, spontaneous ventilation and room air  Hydration status: euvolemic  Follow-up not needed.          Vitals Value Taken Time   /62 11/09/22 1500   Temp 36.3 °C (97.4 °F) 11/09/22 1500   Pulse 60 11/09/22 1500   Resp 17 11/09/22 1500   SpO2 95 % 11/09/22 1500         Event Time   Out of Recovery 14:53:40         Pain/Olena Score: Olena Score: 10 (11/9/2022  2:30 PM)

## 2022-11-09 NOTE — PROVATION PATIENT INSTRUCTIONS
Discharge Summary/Instructions after an Endoscopic Procedure  Patient Name: Cherry Santiago  Patient MRN: [id number]  Patient YOB: 1955  Wednesday, November 9, 2022  Jake Corona MD  Dear patient,  As a result of recent federal legislation (The Federal Cures Act), you may   receive lab or pathology results from your procedure in your [facility]   account before your physician is able to contact you. Your physician or   their representative will relay the results to you with their   recommendations at their soonest availability.  Thank you,  RESTRICTIONS:  During your procedure today, you received medications for sedation.  These   medications may affect your judgment, balance and coordination.  Therefore,   for 24 hours, you have the following restrictions:   - DO NOT drive a car, operate machinery, make legal/financial decisions,   sign important papers or drink alcohol.    ACTIVITY:  Today: no heavy lifting, straining or running due to procedural   sedation/anesthesia.  The following day: return to full activity including work.  DIET:  Eat and drink normally unless instructed otherwise.     TREATMENT FOR COMMON SIDE EFFECTS:  - Mild abdominal pain, nausea, belching, bloating or excessive gas:  rest,   eat lightly and use a heating pad.  - Sore Throat: treat with throat lozenges and/or gargle with warm salt   water.  - Because air was used during the procedure, expelling large amounts of air   from your rectum or belching is normal.  - If a bowel prep was taken, you may not have a bowel movement for 1-3 days.    This is normal.  SYMPTOMS TO WATCH FOR AND REPORT TO YOUR PHYSICIAN:  1. Abdominal pain or bloating, other than gas cramps.  2. Chest pain.  3. Back pain.  4. Signs of infection such as: chills or fever occurring within 24 hours   after the procedure.  5. Rectal bleeding, which would show as bright red, maroon, or black stools.   (A tablespoon of blood from the rectum is not serious, especially  if   hemorrhoids are present.)  6. Vomiting.  7. Weakness or dizziness.  GO DIRECTLY TO THE NEAREST EMERGENCY ROOM IF YOU HAVE ANY OF THE FOLLOWING:      Difficulty breathing              Chills and/or fever over 101 F   Persistent vomiting and/or vomiting blood   Severe abdominal pain   Severe chest pain   Black, tarry stools   Bleeding- more than one tablespoon   Any other symptom or condition that you feel may need urgent attention  Your doctor recommends these additional instructions:  If any biopsies were taken, your doctors clinic will contact you in 1 to 2   weeks with any results.  - [facility].   - Observe patient's clinical course.   - The findings and recommendations were discussed with the patient's   family.  For questions, problems or results please call your physician - Jake Corona MD at Work:  [phone].  [facility], EMERGENCY ROOM PHONE NUMBER: [phone]  IF A COMPLICATION OR EMERGENCY SITUATION ARISES AND YOU ARE UNABLE TO REACH   YOUR PHYSICIAN - GO DIRECTLY TO THE EMERGENCY ROOM.  Jake Corona MD  11/9/2022 2:31:11 PM  This report has been verified and signed electronically.  Dear patient,  As a result of recent federal legislation (The Federal Cures Act), you may   receive lab or pathology results from your procedure in your [facility]   account before your physician is able to contact you. Your physician or   their representative will relay the results to you with their   recommendations at their soonest availability.  Thank you,  PROVATION

## 2022-11-09 NOTE — ASSESSMENT & PLAN NOTE
67-year-old past medical history of recently diagnosed carcinoid syndrome,  having diarrhea intermittently since August 2022 more recently diarrhea has become worse over past few days -several episodes daily  associated nausea with intermittent vomiting. symptoms concerned likely though to be secondary to carcinoid syndrome patient evaluated by [facility]Onc at [facility] with PET scan done 10/18 - Focal nodular foci of radiotracer uptake at the pancreatic tail and near the region of the pancreatic head above liver background activity could indicate somatostatin receptor avid lesions, but no corresponding mass is seen on CT portion of the study.  Recommend follow-up contrast enhanced pancreas protocol CT to further assess. 5HIAA levels on 10/21 WNL . CT AP W contrast done 11/4 shows No pancreatic lesion and Enterocolitis,. recent EGD -Non-bleeding gastric ulcers with no stigmata of  bleeding. Biopsied. Treated with a monopolar probe. colonoscopy with biopsy -No significant pathologic abnormality. No morphologic evidence of active inflammatory bowel disease, microscopic colitis, or neoplasia. Patient was  referred to [facility] for further workup and has scheduled appointment at [facility] this upcoming week.    11/7 Chromogranin A elevated at 2000s.  stool studies in process. C diff negative. Hematology working her up for neuroendocrine tumor vs VIPoma.  AES consulted for EUS and potential biopsy given that her PET scan showed uptake but no mass was seen on CT pancreas protocol.discontinued eliquis.  plan for EUS on Wednesday.  started octreotide.   11/9 repeat cryptosporidium Ag negative  EUS today - sleeve gastrectomy was found, characterized by  healthy appearing mucosa.Widely patent duodenoenterostomy, characterized   by healthy appearing mucosa was found. few cystic lesions were seen in the pancreatic  head, genu of the pancreas and pancreatic body highly suspicious  for  a branched intraductal papillary mucinous neoplasm.small for sampling. pancreatic head and pancreatic tail showed no mass.   11/10 - Oncology to see and review EUS; started immodium

## 2022-11-09 NOTE — H&P
History & Physical - Short Stay  Gastroenterology      SUBJECTIVE:     Procedure: EUS    Chief Complaint/Indication for Procedure: Diarrhea     History of Present Illness:  Patient is a 67 y.o. 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 Limiting factor for sampling is previous gastric bypass.   PTA Medications   Medication Sig    apixaban (ELIQUIS) 5 mg Tab Take 1 tablet (5 mg total) by mouth 2 (two) times daily.    aspirin (ECOTRIN) 81 MG EC tablet Take 81 mg by mouth once daily.    ferrous sulfate (FEOSOL) 325 mg (65 mg iron) Tab tablet Take 325 mg by mouth daily with breakfast.    furosemide (LASIX) 20 MG tablet Take 20 mg by mouth once daily.    levETIRAcetam (KEPPRA) 250 MG Tab Take 500 mg by mouth 2 (two) times daily.    loperamide (IMODIUM) 2 mg capsule Take 1 capsule (2 mg total) by mouth 4 (four) times daily as needed for Diarrhea.    midodrine (PROAMATINE) 5 MG Tab Take 1 tablet (5 mg total) by mouth 3 (three) times daily with meals.    pantoprazole (PROTONIX) 40 MG tablet Take 40 mg by mouth once daily.    potassium chloride SA (K-DUR,KLOR-CON) 20 MEQ tablet Take 1 tablet (20 mEq total) by mouth 2 (two) times daily.    timolol maleate 0.5% (TIMOPTIC) 0.5 % Drop Place 1 drop into both eyes 2 (two) times daily.    atorvastatin (LIPITOR) 40 MG tablet Take 1 tablet (40 mg total) by mouth every evening. (Patient not taking: Reported on 11/4/2022)    citric acid-potassium citrate (POLYCITRA) 1,100-334 mg/5 mL solution potassium citrate-citric acid 1,100 mg-334 mg/5 mL oral solution   TAKE 5 MLS (10 MEQ TOTAL) BY MOUTH ONCE. FOR 1 DOSE ONLY    diphenoxylate-atropine 2.5-0.025 mg (LOMOTIL) 2.5-0.025 mg per tablet Take 1 tablet by mouth 4 (four) times daily as needed for Diarrhea (unreleived with Imodium). (Patient not taking: Reported on 11/4/2022)    metoclopramide HCl (REGLAN) 10 MG tablet Take 1 tablet (10 mg total) by mouth every 6 (six) hours  as needed (Nausea). (Patient not taking: Reported on 11/4/2022)    metoprolol tartrate (LOPRESSOR) 25 MG tablet Take 1 tablet (25 mg total) by mouth 2 (two) times daily. (Patient not taking: Reported on 11/4/2022)    sodium bicarbonate 650 MG tablet Take 1 tablet (650 mg total) by mouth 2 (two) times daily. for 5 days       Review of patient's allergies indicates:  No Known Allergies     Past Medical History:   Diagnosis Date    Anticoagulant long-term use     Arthritis     Atrial fibrillation     CHF (congestive heart failure)     Diabetes mellitus     Type2 resolved after weight loss surgery    DVT (deep venous thrombosis)     Encounter for blood transfusion     GERD (gastroesophageal reflux disease)     Glaucoma     Hypercholesterolemia     Hyperlipemia     Hypertension     Memory changes     Myocardial infarction     Renal failure     resolved    TIA (transient ischemic attack)      Past Surgical History:   Procedure Laterality Date    BACK SURGERY  06/21/2017    lumbar fusion 6/21/17 and surgery for hematoma to site on 6/26/17    CHOLECYSTECTOMY  1978    COLONOSCOPY N/A 10/3/2022    Procedure: COLONOSCOPY;  Surgeon: Jourdan Parks MD;  Location: [facility];  Service: General;  Laterality: N/A;    COLONOSCOPY N/A 10/11/2022    Procedure: COLONOSCOPY;  Surgeon: Stephen Cooper MD;  Location: [facility];  Service: Endoscopy;  Laterality: N/A;    ESOPHAGOGASTRODUODENOSCOPY N/A 10/3/2022    Procedure: EGD (ESOPHAGOGASTRODUODENOSCOPY);  Surgeon: Jourdan Parks MD;  Location: Replaced by [facility] ENDO;  Service: General;  Laterality: N/A;    GASTRIC BYPASS      HYSTERECTOMY  2012    JOINT REPLACEMENT      TKR    LEFT HEART CATHETERIZATION Left 2/5/2021    Procedure: Left heart cath;  Surgeon: Shane Pacheco MD;  Location: Replaced by [facility] CATH;  Service: Cardiovascular;  Laterality: Left;    PELVIC LAPAROSCOPY Left     OOPH    RENAL BIOPSY N/A 10/5/2022    Procedure: BIOPSY, KIDNEY;  Surgeon: Velia Diagnostic Provider;  Location: Replaced by [facility] OR;   Service: General;  Laterality: N/A;    RIGHT HEART CATHETERIZATION Right 2/5/2021    Procedure: INSERTION, CATHETER, RIGHT HEART. via left radial and left brachial;  Surgeon: Shane Pacheco MD;  Location: [facility] CATH;  Service: Cardiovascular;  Laterality: Right;    TOTAL ABDOMINAL HYSTERECTOMY W/ BILATERAL SALPINGOOPHORECTOMY       Family History   Problem Relation Age of Onset    Arthritis Mother     Breast cancer Mother     Heart disease Mother     Hypertension Mother     Cancer Father     Heart disease Father     Hypertension Father     Diabetes Daughter      Social History     Tobacco Use    Smoking status: Never    Smokeless tobacco: Never   Substance Use Topics    Alcohol use: Yes     Comment: occasional    Drug use: No       Review of Systems:  Constitutional: no fever or chills  Respiratory: no cough or shortness of breath  Cardiovascular: no chest pain or palpitations    OBJECTIVE:     Vital Signs (Most Recent)  Temp: 97.9 °F (36.6 °C) (11/09/22 1230)  Pulse: 73 (11/09/22 1230)  Resp: 18 (11/09/22 1230)  BP: (!) 97/53 (11/09/22 1230)  SpO2: 96 % (11/09/22 1230)    Physical Exam:  General: well developed, well nourished  Lungs:  normal respiratory effort  Heart: regular rate, S1, S2 normal    Laboratory  CBC:   Recent Labs   Lab 11/09/22  0405   WBC 4.52   RBC 2.28*   HGB 6.7*   HCT 20.1*      MCV 88   MCH 29.4   MCHC 33.3     CMP:   Recent Labs   Lab 11/09/22  0405   *   CALCIUM 6.9*   ALBUMIN 1.3*   PROT 3.9*      K 3.4*   CO2 20*   *   BUN 8   CREATININE 1.3   ALKPHOS 75   ALT 7*   AST 29   BILITOT 0.5     Coagulation: No results for input(s): LABPROT, INR, APTT in the last 168 hours.    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 All questions have been answered and the patient voices understanding of our plan at this point. The risk of the procedure was discussed in detail which includes but not limited to bleeding, infection, perforation in some cases requiring surgery with its spectrum of complications.

## 2022-11-09 NOTE — PROGRESS NOTES
Armando Jenkins - Oncology ([facility])  [facility] Medicine  Progress Note    Patient Name: Cherry Santiago  MRN: [id number]  Patient Class: IP- Inpatient   Admission Date: 11/6/2022  Length of Stay: 2 days  Attending Physician: Kyle Vera MD  Primary Care Provider: Amarilys Romero MD        Subjective:     Principal Problem:Diarrhea concurrent with and due to carcinoid syndrome        HPI:  Cherry Pozo is a 67-year-old past medical history of recently diagnosed carcinoid syndrome, AFib on Eliquis, CHF, arthritis, GERD, glaucoma, hyperlipidemia, hypertension, prior MI, TIA presenting with persistent diarrhea.      AAOx 3 . accompanied by the daughter at the bedside. Patient mentions having diarrhea intermittently since August 2022 more recently diarrhea has become worse over past few days -several episodes daily  associated nausea with intermittent vomiting. c/o lower cramping abdominal pain - 5/10.   symptoms concerned likely though to be secondary to carcinoid syndrome patient evaluated by Heme-Onc at [facility] with PET scan done 10/18 - Focal nodular foci of radiotracer uptake at the pancreatic tail and near the region of the pancreatic head above liver background activity could indicate somatostatin receptor avid lesions, but no corresponding mass is seen on CT portion of the study.  Recommend follow-up contrast enhanced pancreas protocol CT to further assess. 5HIAA levels on 10/21 WNL . CT AP W contrast done 11/4 shows No pancreatic lesion and Enterocolitis,. recent EGD -Non-bleeding gastric ulcers with no stigmata of  bleeding. Biopsied. Treated with a monopolar probe. colonoscopy with biopsy -No significant pathologic abnormality. No morphologic evidence of active inflammatory bowel disease, microscopic colitis, or neoplasia. Patient was  referred to [facility] for further workup and has scheduled appointment at Heme-Onc at [facility] this upcoming week. . c/o   dizziness,  and shortness of breath on minimal exertion    ER course -electrolyte derangement with hypokalemia to 2.4, hypomagsemia of 1.5 . replaced . UA + started on IV cefriaxone       Overview/Hospital Course:  11/7 Chromogranin A elevated at 2000s.  stool studies in process. DVT sonogram -Nonocclusive thrombus/DVT in the superior-mid right femoral vein. K replaced.  corrected calcium of 8.4.  PTH elevated 114 .started on sodium bicarbonate 1300mg BID .orthostatics + with drop in SBP to 80s on standing. RL bolus 1L and 75ml/h . C diff negative. UC with GRAM NEGATIVE PAIGE   >100,000 cfu/ml ,Identification and susceptibility pending. Hematology working her up for neuroendocrine tumor vs VIPoma.  AES consulted for EUS and potential biopsy given that her PET scan showed uptake but no mass was seen on CT pancreas protocol. discontinued eliquis.  plan for EUS on Wednesday.  started start octreotide.  SBP in 70-80s this PM on IVF. NS bolus 500ml x1. critical care consulted .  11/8  UC with klebsiella . PICC team reconsulted  for access as unable to obtain labs this AM . ID consulted for positive cryptosporidium antigen. nitazoxinide 500mg bid for 3 days  11/9  repeat cryptosporidium Ag negative Hb at 6.7 Transfusion with 1 unit of PRBC . K replaced. EUS today - sleeve gastrectomy was found, characterized by  healthy appearing mucosa.Widely patent duodenoenterostomy, characterized   by healthy appearing mucosa was found. few cystic lesions were seen in the pancreatic  head, genu of the pancreas and pancreatic body highly suspicious  for a branched intraductal papillary mucinous neoplasm.small for sampling. pancreatic head and pancreatic tail showed no mass.           Review of Systems:   Pain scale:  Constitutional:  fever,  chills, headache, vision loss, hearing loss, weight loss 30lb , Generalized weakness, falls, loss of smell, loss of taste, poor appetite,  sore throat  Respiratory: cough, shortness of breath.    Cardiovascular: chest pain, dizziness, palpitations, orthopnea, swelling of feet, syncope  Gastrointestinal: nausea, vomiting, abdominal pain, diarrhea, black stool,  blood in stool, change in bowel habits  Genitourinary: hematuria, dysuria, urgency, frequency  Integument/Breast: rash,  pruritis  Hematologic/Lymphatic: easy bruising, lymphadenopathy  Musculoskeletal: arthralgias , myalgias, back pain, neck pain, knee pain  Neurological: confusion, seizures, tremors, slurred speech  Behavioral/Psych:  depression, anxiety, auditory or visual hallucinations     OBJECTIVE:     Physical Exam:  Body mass index is 32.45 kg/m².    Constitutional: Appears well-developed and well-nourished. obesity  Head: Normocephalic and atraumatic.   Neck: Normal range of motion. Neck supple.   Cardiovascular: Normal heart rate.  Regular heart rhythm.  Pulmonary/Chest: Effort normal.   Abdominal: No distension. +  tenderness- LLQ  Musculoskeletal: Normal range of motion. ++edema.   Neurological: Alert and oriented to person, place, and time.   Skin: Skin is warm and dry.   Psychiatric: Normal mood and affect. Behavior is normal.       Vital Signs  Temp: 97.4 °F (36.3 °C) (11/09/22 1500)  Pulse: 60 (11/09/22 1500)  Resp: 17 (11/09/22 1500)  BP: 112/62 (11/09/22 1500)  SpO2: 95 % (11/09/22 1500)     24 Hour VS Range    Temp:  [97.4 °F (36.3 °C)-99.1 °F (37.3 °C)]   Pulse:  [60-82]   Resp:  [12-20]   BP: ()/(50-73)   SpO2:  [94 %-100 %]     Intake/Output Summary (Last 24 hours) at 11/9/2022 1559  Last data filed at 11/9/2022 1419  Gross per 24 hour   Intake 667 ml   Output 850 ml   Net -183 ml         I/O This Shift:  I/O this shift:  In: 567 [Blood:267; IV Piggyback:300]  Out: -     Wt Readings from Last 3 Encounters:   11/08/22 88.5 kg (195 lb)   11/01/22 95.4 kg (210 lb 5.1 oz)   10/07/22 89.7 kg (197 lb 12.8 oz)       I have personally reviewed the vitals and recorded Intake/Output     Laboratory/Diagnostic Data:    CBC/Anemia  Labs: Coags:    Recent Labs   Lab 11/08/22  1203 11/08/22  1800 11/09/22  0405 11/09/22  1035   WBC 4.46 4.93 4.52  --    HGB 7.4* 7.5* 6.7*  --    HCT 21.6* 22.5* 20.1*  --     214 191  --    MCV 88 88 88  --    RDW 19.8* 19.6* 20.3*  --    OCCULTBLOOD  --   --   --  Negative    No results for input(s): PT, INR, APTT in the last 168 hours.     Chemistries: ABG:   Recent Labs   Lab 11/07/22  0652 11/07/22  1550 11/08/22  1203 11/08/22  1800 11/09/22  0405   *   < > 145 144 145   K 2.5*   < > 2.6* 2.8* 3.4*   *   < > 118* 115* 120*   CO2 17*   < > 19* 18* 20*   BUN 12   < > 9 9 8   CREATININE 1.6*   < > 1.5* 1.3 1.3   CALCIUM 7.4*   < > 7.0* 7.1* 6.9*   PROT 4.7*  --  4.3*  --  3.9*   BILITOT 0.7  --  0.5  --  0.5   ALKPHOS 91  --  85  --  75   ALT 10  --  8*  --  7*   AST 39  --  33  --  29   MG 1.9  --  1.7  --  1.6   PHOS 2.6*   < > 2.3* 2.3* 2.0*    < > = values in this interval not displayed.    No results for input(s): PH, PCO2, PO2, HCO3, POCSATURATED, BE in the last 168 hours.     POCT Glucose: HbA1c:    Recent Labs   Lab 11/09/22  1418   POCTGLUCOSE 125*    Hemoglobin A1C   Date Value Ref Range Status   10/13/2022 4.7 4.0 - 6.0 % Final   04/12/2021 4.1 (L) 4.7 - 5.6 % Final     Hemoglobin A1c   Date Value Ref Range Status   07/07/2017 <3.50 4.2 - 6.3 % Final        Cardiac Enzymes: Ejection Fractions:    No results for input(s): CPK, CPKMB, MB, TROPONINI in the last 72 hours.   No results found for: EF       No results for input(s): COLORU, APPEARANCEUA, PHUR, SPECGRAV, PROTEINUA, GLUCUA, KETONESU, BILIRUBINUA, OCCULTUA, NITRITE, UROBILINOGEN, LEUKOCYTESUR, RBCUA, WBCUA, BACTERIA, SQUAMEPITHEL, HYALINECASTS in the last 48 hours.    Invalid input(s): WRIGHTSUR      Procalcitonin (ng/mL)   Date Value   10/12/2022 0.15   08/26/2022 0.13   08/22/2022 0.09     Lactate (Lactic Acid) (mmol/L)   Date Value   10/12/2022 1.7   07/12/2017 1.0     BNP (pg/mL)   Date Value   11/06/2022 82     CRP    Date Value   10/12/2022 18.0 mg/L (H)   10/07/2022 24.4 mg/L (H)   08/22/2022 19.0 mg/L (H)   08/19/2022 1.50 mg/dL (H)     Sed Rate   Date Value   10/12/2022 <1 mm/Hr   10/07/2022 <1 mm/Hr   08/19/2022 8 mm/Hr   07/08/2019 22 mm/Hr   07/07/2017 11 mm/hr     D-Dimer   Date Value   11/06/2022 0.71 mg/L FEU (H)   02/25/2021 2.21 ug/mL FEU (H)   02/03/2021 1.19 ug/mL FEU (H)     Ferritin (ng/mL)   Date Value   09/21/2022 272.0 (H)   08/27/2022 262.0   06/19/2019 14 (L)   07/12/2017 106.0     Ferritin Level (ng/mL)   Date Value   05/10/2021 263.87 (H)   03/22/2021 385.48 (H)   02/11/2021 386.77 (H)   01/05/2021 34.52   11/24/2020 21.72   08/16/2017 94.2     LD (U/L)   Date Value   08/23/2022 235     Lactate Dehydrogenase (unit/L)   Date Value   05/10/2021 333 (H)   03/22/2021 337 (H)   02/12/2021 340 (H)     Troponin I (ng/mL)   Date Value   11/06/2022 0.019   11/06/2022 0.016   10/31/2022 0.022   05/11/2022 0.015   03/05/2021 0.016   03/04/2021 <0.012   03/04/2021 0.016   02/06/2021 0.138 (HH)     Results for orders placed or performed during the hospital encounter of 11/06/22   Vitamin D   Result Value Ref Range    Vit D, 25-Hydroxy 11 (L) 30 - 96 ng/mL   Results for orders placed or performed during the hospital encounter of 08/22/22   Vitamin D   Result Value Ref Range    Vit D, 25-Hydroxy <12.8 (L) 30.0 - 100.0 ng/mL   Results for orders placed or performed in visit on 08/04/11   Vitamin D 25 hydroxy   Result Value Ref Range    Vit D, 25-Hydroxy 13 (L) 30 - 100 ng/mL     SARS-CoV2 (COVID-19) Qualitative PCR (no units)   Date Value   10/16/2020 NOT DETECTED     SARS-CoV-2 RNA, Amplification, Qual (no units)   Date Value   05/11/2022 Negative   03/01/2021 Negative   02/05/2021 Negative       Microbiology labs for the last week  Microbiology Results (last 7 days)       Procedure Component Value Units Date/Time    Stool culture [[id number]] Collected: 11/06/22 2030    Order Status: Completed Specimen: Stool Updated:  11/08/22 1134     Stool Culture Nothing significant to date    E. coli 0157 antigen [[id number]] Collected: 11/06/22 2030    Order Status: Completed Specimen: Stool Updated: 11/08/22 0948     Shiga Toxin 1 E.coli Negative     Shiga Toxin 2 E.coli Negative    Urine culture [[id number]]  (Abnormal)  (Susceptibility) Collected: 11/06/22 1157    Order Status: Completed Specimen: Urine Updated: 11/08/22 0815     Urine Culture, Routine KLEBSIELLA PNEUMONIAE  >100,000 cfu/ml      Narrative:      Specimen Source->Urine    Clostridium difficile EIA [[id number]] Collected: 11/06/22 2030    Order Status: Completed Specimen: Stool Updated: 11/07/22 0250     C. diff Antigen Negative     C difficile Toxins A+B, EIA Negative     Comment: Testing not recommended for children <24 months old.               Reviewed and noted in plan where applicable- Please see chart for full lab data.    Lines/Drains:       Peripheral IV - Single Lumen 11/06/22 1300 22 G Left;Posterior;Proximal Forearm (Active)   Number of days: 0       Imaging  ECG Results              EKG 12-lead (Final result)  Result time 11/07/22 12:42:19      Final result by Interface, Lab In [facility] (11/07/22 12:42:19)               Narrative:    Test Reason : R68.89,    Vent. Rate : 113 BPM     Atrial Rate : 113 BPM     P-R Int : 164 ms          QRS Dur : 106 ms      QT Int : 334 ms       P-R-T Axes : 051 -37 164 degrees     QTc Int : 458 ms    Sinus tachycardia with Premature atrial complexes  Left axis deviation  Possible Anterior infarct (cited on or before 31-OCT-2022)  Abnormal ECG  When compared with ECG of 31-OCT-2022 19:33,  Premature atrial complexes are now Present  Confirmed by Ector BOB, Nadja ([id number]) on 11/7/2022 12:42:07 PM    Referred By: [email or web address]   SELF           Confirmed By:Nadja Barney MD                                  No results found for this or any previous visit.      US Endoscopic Ultrasound  See OP Notes for results.     IMPRESSION: See OP Notes  for results.     This procedure was auto-finalized by: Virtual Radiologist      Labs, Imaging, EKG and Diagnostic results from 11/9/2022 were reviewed.    Medications:  Medication list was reviewed and changes noted under Assessment/Plan.  No current facility-administered medications on file prior to encounter.     Current Outpatient Medications on File Prior to Encounter   Medication Sig Dispense Refill    apixaban (ELIQUIS) 5 mg Tab Take 1 tablet (5 mg total) by mouth 2 (two) times daily.      aspirin (ECOTRIN) 81 MG EC tablet Take 81 mg by mouth once daily.      ferrous sulfate (FEOSOL) 325 mg (65 mg iron) Tab tablet Take 325 mg by mouth daily with breakfast.      furosemide (LASIX) 20 MG tablet Take 20 mg by mouth once daily.      levETIRAcetam (KEPPRA) 250 MG Tab Take 500 mg by mouth 2 (two) times daily.      loperamide (IMODIUM) 2 mg capsule Take 1 capsule (2 mg total) by mouth 4 (four) times daily as needed for Diarrhea. 180 capsule 0    midodrine (PROAMATINE) 5 MG Tab Take 1 tablet (5 mg total) by mouth 3 (three) times daily with meals. 90 tablet 0    pantoprazole (PROTONIX) 40 MG tablet Take 40 mg by mouth once daily.      potassium chloride SA (K-DUR,KLOR-CON) 20 MEQ tablet Take 1 tablet (20 mEq total) by mouth 2 (two) times daily. 60 tablet 0    timolol maleate 0.5% (TIMOPTIC) 0.5 % Drop Place 1 drop into both eyes 2 (two) times daily.      atorvastatin (LIPITOR) 40 MG tablet Take 1 tablet (40 mg total) by mouth every evening. (Patient not taking: Reported on 11/4/2022) 90 tablet 3    citric acid-potassium citrate (POLYCITRA) 1,100-334 mg/5 mL solution potassium citrate-citric acid 1,100 mg-334 mg/5 mL oral solution   TAKE 5 MLS (10 MEQ TOTAL) BY MOUTH ONCE. FOR 1 DOSE ONLY      diphenoxylate-atropine 2.5-0.025 mg (LOMOTIL) 2.5-0.025 mg per tablet Take 1 tablet by mouth 4 (four) times daily as needed for Diarrhea (unreleived with Imodium). (Patient not taking: Reported on 11/4/2022) 30 tablet 0     metoclopramide HCl (REGLAN) 10 MG tablet Take 1 tablet (10 mg total) by mouth every 6 (six) hours as needed (Nausea). (Patient not taking: Reported on 11/4/2022) 14 tablet 0    metoprolol tartrate (LOPRESSOR) 25 MG tablet Take 1 tablet (25 mg total) by mouth 2 (two) times daily. (Patient not taking: Reported on 11/4/2022) 60 tablet 11    sodium bicarbonate 650 MG tablet Take 1 tablet (650 mg total) by mouth 2 (two) times daily. for 5 days 10 tablet 0     Scheduled Medications:  alteplase, 2 mg, Other, Once  aspirin, 81 mg, Oral, Daily  atorvastatin, 40 mg, Oral, QHS  EPINEPHrine (PF), , ,   ferrous sulfate, 1 tablet, Oral, Daily  levETIRAcetam, 500 mg, Oral, BID  metoprolol tartrate, 25 mg, Oral, BID  miconazole NITRATE 2 %, , Topical (Top), BID  nitazoxanide, 500 mg, Oral, Q12H  octreotide, 200 mcg, Subcutaneous, Q12H  pantoprozole (PROTONIX) IV, 40 mg, Intravenous, Q12H  potassium, sodium phosphates, 2 packet, Oral, QID (AC & HS)  sodium bicarbonate, 1,300 mg, Oral, BID  vitamin D, 2,000 Units, Oral, Daily    PRN: sodium chloride, dextrose 10%, dextrose 10%, glucagon (human recombinant), glucose, glucose, naloxone  Infusions:         Estimated Creatinine Clearance: 46.1 mL/min (based on SCr of 1.3 mg/dL).    Assessment/Plan:      * Carcinoid syndrome related chronic diarrhea   ? 67-year-old past medical history of recently diagnosed carcinoid syndrome,  having diarrhea intermittently since August 2022 more recently diarrhea has become worse over past few days -several episodes daily  associated nausea with intermittent vomiting.     symptoms concerned likely though to be secondary to carcinoid syndrome patient evaluated by Heme-Onc at [facility] with PET scan done 10/18 - Focal nodular foci of radiotracer uptake at the pancreatic tail and near the region of the pancreatic head above liver background activity could indicate somatostatin receptor avid lesions, but no corresponding mass is seen on  CT portion of the study.  Recommend follow-up contrast enhanced pancreas protocol CT to further assess. 5HIAA levels on 10/21 WNL . CT AP W contrast done 11/4 shows No pancreatic lesion and Enterocolitis,. recent EGD -Non-bleeding gastric ulcers with no stigmata of  bleeding. Biopsied. Treated with a monopolar probe. colonoscopy with biopsy -No significant pathologic abnormality. No morphologic evidence of active inflammatory bowel disease, microscopic colitis, or neoplasia. Patient was  referred to [facility] for further workup and has scheduled appointment at Heme-Onc at [facility] this upcoming week.    obtain stool studies  11/7 Chromogranin A elevated at 2000s.  stool studies in process. C diff negative. Hematology working her up for neuroendocrine tumor vs VIPoma.  AES consulted for EUS and potential biopsy given that her PET scan showed uptake but no mass was seen on CT pancreas protocol.discontinued eliquis.  plan for EUS on Wednesday.  started octreotide.   11/9 repeat cryptosporidium Ag negative  EUS today - sleeve gastrectomy was found, characterized by  healthy appearing mucosa.Widely patent duodenoenterostomy, characterized   by healthy appearing mucosa was found. few cystic lesions were seen in the pancreatic  head, genu of the pancreas and pancreatic body highly suspicious  for a branched intraductal papillary mucinous neoplasm.small for sampling. pancreatic head and pancreatic tail showed no mass.     Hypophosphatemia  replaced      Orthostatic hypotension  11/7 orthostatics + with drop in SBP to 80s on standing. RL bolus 1L and 75ml/h .SBP in 70-80s this PM on IVF. NS bolus 500ml x1. critical care consulted       Seizures  continue with keppra BID      UTI (urinary tract infection)     urinalysis positive . Culture, Urine pending  continue with anitbiotic IV ceftriaxone  11/8 UC with klebsiella .       Hypomagnesemia  replaced      Stage 3a chronic kidney disease  seems to be at  baseline. Creatine stable for now. BMP reviewed- noted Estimated Creatinine Clearance: 37.5 mL/min (A) (based on SCr of 1.6 mg/dL (H)). according to latest data. Monitor UOP and serial BMP and adjust therapy as needed. Renally dose meds.    Recent Labs   Lab 11/01/22  0515 11/04/22  1052 11/06/22  1435   BUN 14 16 13   CREATININE 1.61* 1.77* 1.6*          Hypocalcemia  11/6   corrected calcium of 8.4.  PTH elevated 114        Chronic heart failure with preserved ejection fraction (HFpEF) NICM NYHA2   Patient admitted without  signs and symptoms of CHF exacerbation    Results for orders placed or performed during the hospital encounter of 11/06/22   Brain natriuretic peptide   Result Value Ref Range    BNP 82 0 - 99 pg/mL   . Telemetry monitoring. Strict input/ Output monitor, Daily weights.    denies chestpain.  Obtain serial troponins.  Cardiac Enzymes trend  Recent Labs   Lab 10/31/22  2013 11/06/22  1251   TROPONINI 0.022 0.016     No results found for this or any previous visit.  echo 10/7/22 left ventricle is normal in size. Global left ventricular   systolic function is normal. The left ventricular ejection fraction is   55%. Left ventricular diastolic function is abnormal (stage I impaired   relaxation). Noted left ventricular hypertrophy. Concentric left   ventricular hypertrophy is present. It is mild.     4. Mild (1+) tricuspid regurgitation.   5. The right ventricle is normal in size. Right ventricular systolic   function is normal.          Chronic deep vein thrombosis (DVT): right common femoral vein  DVT sonogram 8/22 and 10/22 negative for DVT  11/7 DVT sonogram -Nonocclusive thrombus/DVT in the superior-mid right femoral vein.continue eliquis . S/p IVC filter     Dizziness  likely secondary to diarrhea. orthostasis. IV hydration      Hypokalemia    - Patient with potassium   Recent Labs   Lab 11/06/22  1435 11/06/22  2045 11/07/22  0652   K 2.4* 2.4* 2.5*   . replaced. monitor    Anemia of chronic  disease    Patient's with Normocytic anemia.. Hemoglobin stable. Etiology likely due to chronic disease .  Current CBC reviewed-    Recent Labs   Lab 11/08/22  1203 11/08/22  1800 11/09/22  0405   HGB 7.4* 7.5* 6.7*    Monitor CBC and transfuse if H/H drops below 7/21.  11/9 concern for blood loss anemia.  Hb at 6.7 Transfusion with 1 unit of PRBC       Essential hypertension    Chronic, controlled.  Latest blood pressure and vitals reviewed-   Temp:  [98.7 °F (37.1 °C)]   Pulse:  []   Resp:  [18-20]   BP: (111-138)/(54-78)   SpO2:  [96 %-100 %] .   Home meds for hypertension were reviewed and hold furosemide and metoprolol for now as with diarrhea. PRN meds if BP> 180/110 mm HG      Class 1 obesity due to excess calories with serious comorbidity and body mass index (BMI) of 32.0 to 32.9 in adult  Body mass index is 32.45 kg/m². Morbid obesity complicates all aspects of disease management from diagnostic modalities to treatment. Weight loss encouraged       VTE Risk Mitigation (From admission, onward)           Ordered     IP VTE HIGH RISK PATIENT  Once         11/06/22 1614     Place sequential compression device  Until discontinued         11/06/22 1614                    Discharge Planning   MELYSSA: 11/11/2022     Code Status: Full Code   Is the patient medically ready for discharge?: No    Reason for patient still in hospital (select all that apply): Treatment  Discharge Plan A: Home with family                  Kyle Vera MD  Department of Hospital Medicine   [facility] - Oncology ([facility])

## 2022-11-09 NOTE — PLAN OF CARE
Problem: Adult Inpatient Plan of Care  Goal: Plan of Care Review  Outcome: Ongoing, Progressing  Goal: Patient-Specific Goal (Individualized)  Outcome: Ongoing, Progressing  Goal: Absence of Hospital-Acquired Illness or Injury  Outcome: Ongoing, Progressing     Patient NPO since midnight for endoscopy this AM. Has had two BM this shift. Call light within reach, bed alarm on.  Notified care team of calcium of 6.9 this am and hemoglobin of 6.7. No new orders received at the moment, patient stable, and in no distress.

## 2022-11-09 NOTE — ASSESSMENT & PLAN NOTE
Patient's with Normocytic anemia.. Hemoglobin stable. Etiology likely due to chronic disease .  Current CBC reviewed-    Recent Labs   Lab 11/08/22  1203 11/08/22  1800 11/09/22  0405   HGB 7.4* 7.5* 6.7*    Monitor CBC and transfuse if H/H drops below 7/21.  11/9 concern for blood loss anemia.  Hb at 6.7 Transfusion with 1 unit of PRBC

## 2022-11-09 NOTE — PLAN OF CARE
Midline flushed and draws blood back. MD ordered strict I&Os on patient, so rectal tube placed this evening, pt tolerating. Pt to be NPO at midnight for endoscopy tomorrow. POC reviewed with patient; understanding verbalized. Pt. with nonskid footwear on, bed in lowest position, and locked with bed rails up x2.  Pt. instructed to call prior to getting OOB.  Pt. has call light and personal items within reach. VSS and afebrile this shift. All questions and concerns addressed at this time. Will continue to monitor.

## 2022-11-09 NOTE — NURSING TRANSFER
Nursing Transfer Note      11/9/2022     Reason patient is being transferred: INPT    Transfer To: [id number]    Transfer via stretcher    Transfer with cardiac monitoring    Transported by transport    Medicines sent: IVF    Any special needs or follow-up needed: none    Chart send with patient: Yes    Notified: daughter    Patient reassessed at: 11/9/22

## 2022-11-09 NOTE — PROGRESS NOTES
GI Post Procedure Treatment Plan    Interval history:  EUS completed.   - Normal esophagus.                          - A sleeve gastrectomy was found, characterized by                          healthy appearing mucosa.                          - Widely patent duodenoenterostomy, characterized                          by healthy appearing mucosa was found.                          - A few benign lymph nodes were visualized in the                          fareed hepatis region.                          - A few cystic lesions were seen in the pancreatic                          head, genu of the pancreas and pancreatic body.                          Tissue has not been obtained. However, the                          endosonographic appearance is highly suspicious                          for a branched intraductal papillary mucinous                          neoplasm. No mass or worrisome endosonographic                          features. Quite small for sampling.                          - Endosonographic imaging in the pancreatic head                          and pancreatic tail showed no mass.     Plan:  - advance diet as tolerated  - remainder of workup per primary team and oncology  - we will sign off, please call with questions.    Raquel Noyola MD  GI Fellow, PGY-6     Student

## 2022-11-09 NOTE — TRANSFER OF CARE
"Anesthesia Transfer of Care Note    Patient: Cherry Santiago    Procedure(s) Performed: Procedure(s) (LRB):  ULTRASOUND, UPPER GI TRACT, ENDOSCOPIC (N/A)    Patient location: PACU    Anesthesia Type: general    Transport from OR: Transported from OR on room air with adequate spontaneous ventilation    Post pain: adequate analgesia    Post assessment: no apparent anesthetic complications    Post vital signs: stable    Level of consciousness: awake    Nausea/Vomiting: no nausea/vomiting    Complications: none    Transfer of care protocol was followed      Last vitals:   Visit Vitals  BP 95/55   Pulse 64   Temp 36.6 °C (97.9 °F) (Temporal)   Resp 19   Ht 5' 5" (1.651 m)   Wt 88.5 kg (195 lb)   LMP  (LMP Unknown)   SpO2 100%   BMI 32.45 kg/m²     "

## 2022-11-09 NOTE — NURSING
Patient states she is unable to tolerate flexiseal and its causing too much pain. Notified on call care team who states it is okay to take out. Patient reports much relief after tube was taken out. Incontinence care provided and placed back diaper and new purewick.

## 2022-11-10 LAB
ALBUMIN SERPL BCP-MCNC: 1.4 G/DL (ref 3.5–5.2)
ALP SERPL-CCNC: 82 U/L (ref 55–135)
ALT SERPL W/O P-5'-P-CCNC: 8 U/L (ref 10–44)
ANION GAP SERPL CALC-SCNC: 5 MMOL/L (ref 8–16)
AST SERPL-CCNC: 33 U/L (ref 10–40)
BACTERIA STL CULT: NORMAL
BASOPHILS # BLD AUTO: 0.04 K/UL (ref 0–0.2)
BASOPHILS NFR BLD: 0.8 % (ref 0–1.9)
BILIRUB SERPL-MCNC: 0.6 MG/DL (ref 0.1–1)
BUN SERPL-MCNC: 7 MG/DL (ref 8–23)
CALCIUM SERPL-MCNC: 7.6 MG/DL (ref 8.7–10.5)
CHLORIDE SERPL-SCNC: 116 MMOL/L (ref 95–110)
CO2 SERPL-SCNC: 19 MMOL/L (ref 23–29)
CREAT SERPL-MCNC: 1.1 MG/DL (ref 0.5–1.4)
DIFFERENTIAL METHOD: ABNORMAL
EOSINOPHIL # BLD AUTO: 0.1 K/UL (ref 0–0.5)
EOSINOPHIL NFR BLD: 1.6 % (ref 0–8)
ERYTHROCYTE [DISTWIDTH] IN BLOOD BY AUTOMATED COUNT: 20 % (ref 11.5–14.5)
EST. GFR  (NO RACE VARIABLE): 55.1 ML/MIN/1.73 M^2
GLUCOSE SERPL-MCNC: 138 MG/DL (ref 70–110)
HCT VFR BLD AUTO: 24.9 % (ref 37–48.5)
HGB BLD-MCNC: 8 G/DL (ref 12–16)
IMM GRANULOCYTES # BLD AUTO: 0.04 K/UL (ref 0–0.04)
IMM GRANULOCYTES NFR BLD AUTO: 0.8 % (ref 0–0.5)
LYMPHOCYTES # BLD AUTO: 1.4 K/UL (ref 1–4.8)
LYMPHOCYTES NFR BLD: 27.6 % (ref 18–48)
MAGNESIUM SERPL-MCNC: 1.8 MG/DL (ref 1.6–2.6)
MCH RBC QN AUTO: 29.1 PG (ref 27–31)
MCHC RBC AUTO-ENTMCNC: 32.1 G/DL (ref 32–36)
MCV RBC AUTO: 91 FL (ref 82–98)
MONOCYTES # BLD AUTO: 0.7 K/UL (ref 0.3–1)
MONOCYTES NFR BLD: 13.4 % (ref 4–15)
NEUTROPHILS # BLD AUTO: 2.8 K/UL (ref 1.8–7.7)
NEUTROPHILS NFR BLD: 55.8 % (ref 38–73)
NRBC BLD-RTO: 0 /100 WBC
PHOSPHATE SERPL-MCNC: 2.4 MG/DL (ref 2.7–4.5)
PLATELET # BLD AUTO: 167 K/UL (ref 150–450)
PMV BLD AUTO: 9.6 FL (ref 9.2–12.9)
POTASSIUM SERPL-SCNC: 3.6 MMOL/L (ref 3.5–5.1)
PROT SERPL-MCNC: 4.2 G/DL (ref 6–8.4)
RBC # BLD AUTO: 2.75 M/UL (ref 4–5.4)
SODIUM SERPL-SCNC: 140 MMOL/L (ref 136–145)
WBC # BLD AUTO: 5.08 K/UL (ref 3.9–12.7)

## 2022-11-10 PROCEDURE — 63600175 PHARM REV CODE 636 W HCPCS: Performed by: HOSPITALIST

## 2022-11-10 PROCEDURE — 85025 COMPLETE CBC W/AUTO DIFF WBC: CPT | Performed by: HOSPITALIST

## 2022-11-10 PROCEDURE — 99233 SBSQ HOSP IP/OBS HIGH 50: CPT | Mod: ,,, | Performed by: INTERNAL MEDICINE

## 2022-11-10 PROCEDURE — 63600175 PHARM REV CODE 636 W HCPCS: Mod: JB | Performed by: STUDENT IN AN ORGANIZED HEALTH CARE EDUCATION/TRAINING PROGRAM

## 2022-11-10 PROCEDURE — 84100 ASSAY OF PHOSPHORUS: CPT | Performed by: HOSPITALIST

## 2022-11-10 PROCEDURE — 83735 ASSAY OF MAGNESIUM: CPT | Performed by: HOSPITALIST

## 2022-11-10 PROCEDURE — 20600001 HC STEP DOWN PRIVATE ROOM

## 2022-11-10 PROCEDURE — 25000242 PHARM REV CODE 250 ALT 637 W/ HCPCS: Performed by: INTERNAL MEDICINE

## 2022-11-10 PROCEDURE — 25000003 PHARM REV CODE 250: Performed by: INTERNAL MEDICINE

## 2022-11-10 PROCEDURE — C9113 INJ PANTOPRAZOLE SODIUM, VIA: HCPCS | Performed by: HOSPITALIST

## 2022-11-10 PROCEDURE — 25000003 PHARM REV CODE 250: Performed by: HOSPITALIST

## 2022-11-10 PROCEDURE — 99233 PR SUBSEQUENT HOSPITAL CARE,LEVL III: ICD-10-PCS | Mod: ,,, | Performed by: INTERNAL MEDICINE

## 2022-11-10 PROCEDURE — 80053 COMPREHEN METABOLIC PANEL: CPT | Performed by: HOSPITALIST

## 2022-11-10 RX ORDER — LOPERAMIDE HYDROCHLORIDE 2 MG/1
2 CAPSULE ORAL 3 TIMES DAILY
Status: DISCONTINUED | OUTPATIENT
Start: 2022-11-10 | End: 2022-11-24

## 2022-11-10 RX ADMIN — APIXABAN 5 MG: 5 TABLET, FILM COATED ORAL at 08:11

## 2022-11-10 RX ADMIN — CHOLECALCIFEROL TAB 25 MCG (1000 UNIT) 2000 UNITS: 25 TAB at 09:11

## 2022-11-10 RX ADMIN — APIXABAN 5 MG: 5 TABLET, FILM COATED ORAL at 09:11

## 2022-11-10 RX ADMIN — PANTOPRAZOLE SODIUM 40 MG: 40 INJECTION, POWDER, FOR SOLUTION INTRAVENOUS at 09:11

## 2022-11-10 RX ADMIN — MICONAZOLE NITRATE 2 % TOPICAL POWDER: at 09:11

## 2022-11-10 RX ADMIN — NITAZOXANIDE 500 MG: 500 TABLET, FILM COATED ORAL at 01:11

## 2022-11-10 RX ADMIN — OCTREOTIDE ACETATE 200 MCG: 100 INJECTION, SOLUTION INTRAVENOUS; SUBCUTANEOUS at 09:11

## 2022-11-10 RX ADMIN — LOPERAMIDE HYDROCHLORIDE 2 MG: 2 CAPSULE ORAL at 05:11

## 2022-11-10 RX ADMIN — POTASSIUM & SODIUM PHOSPHATES POWDER PACK 280-160-250 MG 2 PACKET: 280-160-250 PACK at 06:11

## 2022-11-10 RX ADMIN — LOPERAMIDE HYDROCHLORIDE 2 MG: 2 CAPSULE ORAL at 08:11

## 2022-11-10 RX ADMIN — FERROUS SULFATE TAB 325 MG (65 MG ELEMENTAL FE) 1 EACH: 325 (65 FE) TAB at 09:11

## 2022-11-10 RX ADMIN — ATORVASTATIN CALCIUM 40 MG: 20 TABLET, FILM COATED ORAL at 08:11

## 2022-11-10 RX ADMIN — OCTREOTIDE ACETATE 200 MCG: 100 INJECTION, SOLUTION INTRAVENOUS; SUBCUTANEOUS at 08:11

## 2022-11-10 RX ADMIN — SODIUM BICARBONATE 650 MG TABLET 1300 MG: at 08:11

## 2022-11-10 RX ADMIN — ASPIRIN 81 MG: 81 TABLET, COATED ORAL at 09:11

## 2022-11-10 RX ADMIN — LEVETIRACETAM 500 MG: 500 TABLET, FILM COATED ORAL at 09:11

## 2022-11-10 RX ADMIN — LEVETIRACETAM 500 MG: 500 TABLET, FILM COATED ORAL at 08:11

## 2022-11-10 RX ADMIN — MICONAZOLE NITRATE 2 % TOPICAL POWDER: at 08:11

## 2022-11-10 RX ADMIN — PANTOPRAZOLE SODIUM 40 MG: 40 INJECTION, POWDER, FOR SOLUTION INTRAVENOUS at 08:11

## 2022-11-10 RX ADMIN — SODIUM BICARBONATE 650 MG TABLET 1300 MG: at 09:11

## 2022-11-10 NOTE — PLAN OF CARE
[facility] (Hospital)  Discharge Reassessment    Primary Care Provider: Amarilys Romero MD    Expected Discharge Date: 11/11/2022    Patient not medically ready to discharge per MD.    Reassessment (most recent)       Discharge Reassessment - 11/10/22 1056          Discharge Reassessment    Assessment Type Discharge Planning Reassessment     Did the patient's condition or plan change since previous assessment? No     Discharge Plan discussed with: Patient;Adult children     Communicated MELYSSA with patient/caregiver Date not available/Unable to determine     Discharge Plan A Home with family     Discharge Plan B Home Health     DME Needed Upon Discharge  other (see comments)   TBD    Discharge Barriers Identified None     Why the patient remains in the hospital Requires continued medical care        Post-Acute Status    Discharge Delays None known at this time                   Joanne De Jesus RN     [phone]

## 2022-11-10 NOTE — ASSESSMENT & PLAN NOTE
-DVT sonogram 8/22 and 10/22 negative for DVT  -11/7 DVT sonogram -Nonocclusive thrombus/DVT in the superior-mid right femoral vein.continue eliquis . S/p IVC filter   -Restarted eliquis following EUS

## 2022-11-10 NOTE — SUBJECTIVE & OBJECTIVE
Interval History:    Reports on going BM - at least 5 BM today  Oncology to see in AM; biochemically c/w carinoid though no mass.   Started Immodium - dc'd contact precautions  Update provided to family at bedside    Review of Systems   Constitutional:  Negative for chills and fever.   HENT:  Negative for sore throat.    Respiratory:  Negative for cough and shortness of breath.    Cardiovascular:  Negative for chest pain.   Gastrointestinal:  Positive for diarrhea. Negative for abdominal distention, blood in stool, constipation, nausea and vomiting.   Genitourinary:  Negative for dysuria.   Skin:  Negative for rash and wound.   Allergic/Immunologic: Negative for immunocompromised state.   Neurological:  Negative for headaches.   Psychiatric/Behavioral:  Negative for behavioral problems and confusion.    Objective:     Vital Signs (Most Recent):  Temp: 97.8 °F (36.6 °C) (11/10/22 1527)  Pulse: 74 (11/10/22 1527)  Resp: 17 (11/10/22 1527)  BP: 107/61 (11/10/22 1527)  SpO2: 99 % (11/10/22 1527) Vital Signs (24h Range):  Temp:  [97.8 °F (36.6 °C)-98.4 °F (36.9 °C)] 97.8 °F (36.6 °C)  Pulse:  [58-84] 74  Resp:  [16-20] 17  SpO2:  [96 %-99 %] 99 %  BP: ()/(52-67) 107/61     Weight: 88.5 kg (195 lb)  Body mass index is 32.45 kg/m².    Intake/Output Summary (Last 24 hours) at 11/10/2022 1737  Last data filed at 11/10/2022 0509  Gross per 24 hour   Intake 42.35 ml   Output --   Net 42.35 ml      Physical Exam  Constitutional:       General: She is not in acute distress.     Appearance: She is obese. She is not ill-appearing, toxic-appearing or diaphoretic.   HENT:      Head: Normocephalic and atraumatic.   Eyes:      General: No scleral icterus.        Right eye: No discharge.         Left eye: No discharge.      Extraocular Movements: Extraocular movements intact.      Conjunctiva/sclera: Conjunctivae normal.   Abdominal:      General: Abdomen is flat. There is no distension.      Palpations: Abdomen is soft.    Musculoskeletal:         General: No deformity.   Skin:     General: Skin is warm and dry.      Coloration: Skin is not jaundiced.   Neurological:      Mental Status: She is alert and oriented to person, place, and time. Mental status is at baseline.   Psychiatric:         Mood and Affect: Mood normal.         Behavior: Behavior normal.         Thought Content: Thought content normal.         Judgment: Judgment normal.       Significant Labs: All pertinent labs within the past 24 hours have been reviewed.    Significant Imaging: I have reviewed all pertinent imaging results/findings within the past 24 hours.

## 2022-11-10 NOTE — PROGRESS NOTES
[facility] ([facility])  [facility] Medicine  Progress Note    Patient Name: Cherry Santiago  MRN: [id number]  Patient Class: IP- Inpatient   Admission Date: 11/6/2022  Length of Stay: 3 days  Attending Physician: Hernando Cortez MD  Primary Care Provider: Amarilys Romero MD        Subjective:     Principal Problem:Diarrhea concurrent with and due to carcinoid syndrome        HPI:  Cherry Pozo is a 67-year-old past medical history of recently diagnosed carcinoid syndrome, AFib on Eliquis, CHF, arthritis, GERD, glaucoma, hyperlipidemia, hypertension, prior MI, TIA presenting with persistent diarrhea.      AAOx 3 . accompanied by the daughter at the bedside. Patient mentions having diarrhea intermittently since August 2022 more recently diarrhea has become worse over past few days -several episodes daily  associated nausea with intermittent vomiting. c/o lower cramping abdominal pain - 5/10.   symptoms concerned likely though to be secondary to carcinoid syndrome patient evaluated by Heme[facility]Onc at [facility] with PET scan done 10/18 - Focal nodular foci of radiotracer uptake at the pancreatic tail and near the region of the pancreatic head above liver background activity could indicate somatostatin receptor avid lesions, but no corresponding mass is seen on CT portion of the study.  Recommend follow-up contrast enhanced pancreas protocol CT to further assess. 5HIAA levels on 10/21 WNL . CT AP W contrast done 11/4 shows No pancreatic lesion and Enterocolitis,. recent EGD -Non-bleeding gastric ulcers with no stigmata of  bleeding. Biopsied. Treated with a monopolar probe. colonoscopy with biopsy -No significant pathologic abnormality. No morphologic evidence of active inflammatory bowel disease, microscopic colitis, or neoplasia. Patient was  referred to [facility] for further workup and has scheduled appointment at Heme-Onc at [facility] this upcoming week. . c/o   dizziness,  and shortness of breath on minimal exertion    ER course -electrolyte derangement with hypokalemia to 2.4, hypomagsemia of 1.5 . replaced . UA + started on IV cefriaxone       Overview/Hospital Course:  After  admission - chromogranin A elevated at 2000s.  stool studies in process. DVT sonogram. Nonocclusive thrombus/DVT in the superior-mid right femoral vein. K replaced.  corrected calcium of 8.4.  PTH elevated 114 .started on sodium bicarbonate 1300mg BID .orthostatics + with drop in SBP to 80s on standing. RL bolus 1L and 75ml/h . C diff negative. UC with GRAM NEGATIVE PAIGE >100,000 cfu/ml ,Klebsiella Pneumonia.  H/O work up for neuroendocrine tumor vs VIPoma.  AES consulted for EUS and potential biopsy given that her PET scan showed uptake but no mass was seen on CT pancreas protocol. discontinued eliquis.  EUS - sleeve gastrectomy was found, characterized by healthy appearing mucosa. Widely patent duodenoenterostomy, characterized   by healthy appearing mucosa was found. few cystic lesions were seen in the pancreatic  head, genu of the pancreas and pancreatic body highly suspicious  for a branched intraductal papillary mucinous neoplasm.small for sampling. pancreatic head and pancreatic tail showed no mass. Awaiting further recs from oncology. Started octreotide. ID consulted for positive cryptosporidium antigen. nitazoxinide 500mg bid for 3 days. Repeat cryptosporidium Ag negative Hb at 6.7 Transfusion with 1 unit of PRBC with response.  Started Immodium.       Interval History:     Reports on going BM - at least 5 BM today   Oncology to see in AM; biochemically c/w carinoid though no mass.    Started Immodium - dc'd contact precautions   Update provided to family at bedside    Review of Systems   Constitutional:  Negative for chills and fever.   HENT:  Negative for sore throat.    Respiratory:  Negative for cough and shortness of breath.    Cardiovascular:  Negative for chest pain.    Gastrointestinal:  Positive for diarrhea. Negative for abdominal distention, blood in stool, constipation, nausea and vomiting.   Genitourinary:  Negative for dysuria.   Skin:  Negative for rash and wound.   Allergic/Immunologic: Negative for immunocompromised state.   Neurological:  Negative for headaches.   Psychiatric/Behavioral:  Negative for behavioral problems and confusion.    Objective:     Vital Signs (Most Recent):  Temp: 97.8 °F (36.6 °C) (11/10/22 1527)  Pulse: 74 (11/10/22 1527)  Resp: 17 (11/10/22 1527)  BP: 107/61 (11/10/22 1527)  SpO2: 99 % (11/10/22 1527) Vital Signs (24h Range):  Temp:  [97.8 °F (36.6 °C)-98.4 °F (36.9 °C)] 97.8 °F (36.6 °C)  Pulse:  [58-84] 74  Resp:  [16-20] 17  SpO2:  [96 %-99 %] 99 %  BP: ()/(52-67) 107/61     Weight: 88.5 kg (195 lb)  Body mass index is 32.45 kg/m².    Intake/Output Summary (Last 24 hours) at 11/10/2022 1737  Last data filed at 11/10/2022 0509  Gross per 24 hour   Intake 42.35 ml   Output --   Net 42.35 ml      Physical Exam  Constitutional:       General: She is not in acute distress.     Appearance: She is obese. She is not ill-appearing, toxic-appearing or diaphoretic.   HENT:      Head: Normocephalic and atraumatic.   Eyes:      General: No scleral icterus.        Right eye: No discharge.         Left eye: No discharge.      Extraocular Movements: Extraocular movements intact.      Conjunctiva/sclera: Conjunctivae normal.   Abdominal:      General: Abdomen is flat. There is no distension.      Palpations: Abdomen is soft.   Musculoskeletal:         General: No deformity.   Skin:     General: Skin is warm and dry.      Coloration: Skin is not jaundiced.   Neurological:      Mental Status: She is alert and oriented to person, place, and time. Mental status is at baseline.   Psychiatric:         Mood and Affect: Mood normal.         Behavior: Behavior normal.         Thought Content: Thought content normal.         Judgment: Judgment normal.        Significant Labs: All pertinent labs within the past 24 hours have been reviewed.    Significant Imaging: I have reviewed all pertinent imaging results/findings within the past 24 hours.      Assessment/Plan:      * Carcinoid syndrome related chronic diarrhea    67-year-old past medical history of recently diagnosed carcinoid syndrome,  having diarrhea intermittently since August 2022 more recently diarrhea has become worse over past few days -several episodes daily  associated nausea with intermittent vomiting. symptoms concerned likely though to be secondary to carcinoid syndrome patient evaluated by Heme-Onc at [facility] with PET scan done 10/18 - Focal nodular foci of radiotracer uptake at the pancreatic tail and near the region of the pancreatic head above liver background activity could indicate somatostatin receptor avid lesions, but no corresponding mass is seen on CT portion of the study.  Recommend follow-up contrast enhanced pancreas protocol CT to further assess. 5HIAA levels on 10/21 WNL . CT AP W contrast done 11/4 shows No pancreatic lesion and Enterocolitis,. recent EGD -Non-bleeding gastric ulcers with no stigmata of  bleeding. Biopsied. Treated with a monopolar probe. colonoscopy with biopsy -No significant pathologic abnormality. No morphologic evidence of active inflammatory bowel disease, microscopic colitis, or neoplasia. Patient was  referred to [facility] for further workup and has scheduled appointment at [facility] at [facility] this upcoming week.    11/7 Chromogranin A elevated at 2000s.  stool studies in process. C diff negative. Hematology working her up for neuroendocrine tumor vs VIPoma.  AES consulted for EUS and potential biopsy given that her PET scan showed uptake but no mass was seen on CT pancreas protocol.discontinued eliquis.  plan for EUS on Wednesday.  started octreotide.   11/9 repeat cryptosporidium Ag negative  EUS today - sleeve  gastrectomy was found, characterized by  healthy appearing mucosa.Widely patent duodenoenterostomy, characterized   by healthy appearing mucosa was found. few cystic lesions were seen in the pancreatic  head, genu of the pancreas and pancreatic body highly suspicious  for a branched intraductal papillary mucinous neoplasm.small for sampling. pancreatic head and pancreatic tail showed no mass.   11/10 - Oncology to see and review EUS; started immodium     Hypophosphatemia  replaced      Orthostatic hypotension  11/7 orthostatics + with drop in SBP to 80s on standing. RL bolus 1L and 75ml/h .SBP in 70-80s this PM on IVF. NS bolus 500ml x1. critical care consulted       Seizures  continue with keppra BID      UTI (urinary tract infection)     urinalysis positive . Culture, Urine pending  continue with anitbiotic IV ceftriaxone  11/8 UC with klebsiella; s/p x5 doses CTX - sensitive      Hypomagnesemia  replaced      Stage 3a chronic kidney disease  seems to be at baseline. Creatine stable for now. BMP reviewed- noted Estimated Creatinine Clearance: 37.5 mL/min (A) (based on SCr of 1.6 mg/dL (H)). according to latest data. Monitor UOP and serial BMP and adjust therapy as needed. Renally dose meds.    Recent Labs   Lab 11/01/22  0515 11/04/22  1052 11/06/22  1435   BUN 14 16 13   CREATININE 1.61* 1.77* 1.6*          Hypocalcemia  11/6   corrected calcium of 8.4.  PTH elevated 114        Chronic heart failure with preserved ejection fraction (HFpEF) NICM NYHA2   Patient admitted without  signs and symptoms of CHF exacerbation    Results for orders placed or performed during the hospital encounter of 11/06/22   Brain natriuretic peptide   Result Value Ref Range    BNP 82 0 - 99 pg/mL   . Telemetry monitoring. Strict input/ Output monitor, Daily weights.    denies chestpain.  Obtain serial troponins.  Cardiac Enzymes trend  Recent Labs   Lab 10/31/22  2013 11/06/22  1251   TROPONINI 0.022 0.016     No results found for this  or any previous visit.  echo 10/7/22 left ventricle is normal in size. Global left ventricular   systolic function is normal. The left ventricular ejection fraction is   55%. Left ventricular diastolic function is abnormal (stage I impaired   relaxation). Noted left ventricular hypertrophy. Concentric left   ventricular hypertrophy is present. It is mild.     4. Mild (1+) tricuspid regurgitation.   5. The right ventricle is normal in size. Right ventricular systolic   function is normal.          Chronic deep vein thrombosis (DVT): right common femoral vein  -DVT sonogram 8/22 and 10/22 negative for DVT  -11/7 DVT sonogram -Nonocclusive thrombus/DVT in the superior-mid right femoral vein.continue eliquis . S/p IVC filter   -Restarted eliquis following EUS    Dizziness  likely secondary to diarrhea. orthostasis. IV hydration      Hypokalemia    - Patient with potassium   Recent Labs   Lab 11/06/22  1435 11/06/22  2045 11/07/22  0652   K 2.4* 2.4* 2.5*   . replaced. monitor    Anemia of chronic disease    Patient's with Normocytic anemia.. Hemoglobin stable. Etiology likely due to chronic disease .  Current CBC reviewed-    Recent Labs   Lab 11/08/22  1203 11/08/22  1800 11/09/22  0405   HGB 7.4* 7.5* 6.7*    Monitor CBC and transfuse if H/H drops below 7/21.  11/9 concern for blood loss anemia.  Hb at 6.7 Transfusion with 1 unit of PRBC       Essential hypertension    Chronic, controlled.  Latest blood pressure and vitals reviewed-   Temp:  [98.7 °F (37.1 °C)]   Pulse:  []   Resp:  [18-20]   BP: (111-138)/(54-78)   SpO2:  [96 %-100 %] .   Home meds for hypertension were reviewed and hold furosemide and metoprolol for now as with diarrhea. PRN meds if BP> 180/110 mm HG      Class 1 obesity due to excess calories with serious comorbidity and body mass index (BMI) of 32.0 to 32.9 in adult  Body mass index is 32.45 kg/m². Morbid obesity complicates all aspects of disease management from diagnostic modalities to  treatment. Weight loss encouraged         VTE Risk Mitigation (From admission, onward)         Ordered     apixaban tablet 5 mg  2 times daily         11/09/22 1916     IP VTE HIGH RISK PATIENT  Once         11/06/22 1614     Place sequential compression device  Until discontinued         11/06/22 1614                Discharge Planning   MELYSSA: 11/11/2022     Code Status: Full Code   Is the patient medically ready for discharge?: No    Reason for patient still in hospital (select all that apply): Patient trending condition and Consult recommendations  Discharge Plan A: Home with family   Discharge Delays: None known at this time      Total visit time >35 minutes or greater with greater than 50% of time spent in counseling and coordination of care.      Hernando Cortez MD  Department of Hospital Medicine   [facility] - Oncology ([facility])

## 2022-11-10 NOTE — PLAN OF CARE
VSS with baseline low pressures. Remained free from falls. Slept well.     Problem: Adult Inpatient Plan of Care  Goal: Plan of Care Review  Outcome: Ongoing, Progressing  Goal: Patient-Specific Goal (Individualized)  Outcome: Ongoing, Progressing  Goal: Absence of Hospital-Acquired Illness or Injury  Outcome: Ongoing, Progressing  Goal: Optimal Comfort and Wellbeing  Outcome: Ongoing, Progressing  Goal: Readiness for Transition of Care  Outcome: Ongoing, Progressing     Problem: Infection  Goal: Absence of Infection Signs and Symptoms  Outcome: Ongoing, Progressing     Problem: Skin Injury Risk Increased  Goal: Skin Health and Integrity  Outcome: Ongoing, Progressing     Problem: Fall Injury Risk  Goal: Absence of Fall and Fall-Related Injury  Outcome: Ongoing, Progressing

## 2022-11-11 LAB
ANION GAP SERPL CALC-SCNC: 7 MMOL/L (ref 8–16)
BASOPHILS # BLD AUTO: 0.03 K/UL (ref 0–0.2)
BASOPHILS NFR BLD: 0.8 % (ref 0–1.9)
BUN SERPL-MCNC: 6 MG/DL (ref 8–23)
CALCIUM SERPL-MCNC: 7.4 MG/DL (ref 8.7–10.5)
CHLORIDE SERPL-SCNC: 117 MMOL/L (ref 95–110)
CO2 SERPL-SCNC: 18 MMOL/L (ref 23–29)
CREAT SERPL-MCNC: 1.1 MG/DL (ref 0.5–1.4)
DIFFERENTIAL METHOD: ABNORMAL
EOSINOPHIL # BLD AUTO: 0.1 K/UL (ref 0–0.5)
EOSINOPHIL NFR BLD: 2.2 % (ref 0–8)
ERYTHROCYTE [DISTWIDTH] IN BLOOD BY AUTOMATED COUNT: 20.1 % (ref 11.5–14.5)
EST. GFR  (NO RACE VARIABLE): 55.1 ML/MIN/1.73 M^2
GLUCAGON SERPL-MCNC: 28 PG/ML
GLUCOSE SERPL-MCNC: 111 MG/DL (ref 70–110)
HCT VFR BLD AUTO: 24.2 % (ref 37–48.5)
HGB BLD-MCNC: 7.9 G/DL (ref 12–16)
IMM GRANULOCYTES # BLD AUTO: 0.04 K/UL (ref 0–0.04)
IMM GRANULOCYTES NFR BLD AUTO: 1.1 % (ref 0–0.5)
LYMPHOCYTES # BLD AUTO: 1.2 K/UL (ref 1–4.8)
LYMPHOCYTES NFR BLD: 33.4 % (ref 18–48)
MCH RBC QN AUTO: 29.8 PG (ref 27–31)
MCHC RBC AUTO-ENTMCNC: 32.6 G/DL (ref 32–36)
MCV RBC AUTO: 91 FL (ref 82–98)
MONOCYTES # BLD AUTO: 0.5 K/UL (ref 0.3–1)
MONOCYTES NFR BLD: 14.5 % (ref 4–15)
NEUTROPHILS # BLD AUTO: 1.7 K/UL (ref 1.8–7.7)
NEUTROPHILS NFR BLD: 48 % (ref 38–73)
NRBC BLD-RTO: 0 /100 WBC
PLATELET # BLD AUTO: 168 K/UL (ref 150–450)
PMV BLD AUTO: 9.7 FL (ref 9.2–12.9)
POTASSIUM SERPL-SCNC: 3.1 MMOL/L (ref 3.5–5.1)
PROINSULIN SERPL-SCNC: 3.8 PMOL/L (ref 3.6–22)
RBC # BLD AUTO: 2.65 M/UL (ref 4–5.4)
SODIUM SERPL-SCNC: 142 MMOL/L (ref 136–145)
WBC # BLD AUTO: 3.59 K/UL (ref 3.9–12.7)

## 2022-11-11 PROCEDURE — 99233 PR SUBSEQUENT HOSPITAL CARE,LEVL III: ICD-10-PCS | Mod: ,,, | Performed by: HOSPITALIST

## 2022-11-11 PROCEDURE — 25000242 PHARM REV CODE 250 ALT 637 W/ HCPCS: Performed by: INTERNAL MEDICINE

## 2022-11-11 PROCEDURE — 63600175 PHARM REV CODE 636 W HCPCS: Mod: JB | Performed by: STUDENT IN AN ORGANIZED HEALTH CARE EDUCATION/TRAINING PROGRAM

## 2022-11-11 PROCEDURE — 25000003 PHARM REV CODE 250: Performed by: HOSPITALIST

## 2022-11-11 PROCEDURE — 63600175 PHARM REV CODE 636 W HCPCS: Performed by: HOSPITALIST

## 2022-11-11 PROCEDURE — 25000003 PHARM REV CODE 250: Performed by: INTERNAL MEDICINE

## 2022-11-11 PROCEDURE — 85025 COMPLETE CBC W/AUTO DIFF WBC: CPT | Performed by: INTERNAL MEDICINE

## 2022-11-11 PROCEDURE — 20600001 HC STEP DOWN PRIVATE ROOM

## 2022-11-11 PROCEDURE — 80048 BASIC METABOLIC PNL TOTAL CA: CPT | Performed by: INTERNAL MEDICINE

## 2022-11-11 PROCEDURE — 25000242 PHARM REV CODE 250 ALT 637 W/ HCPCS: Performed by: HOSPITALIST

## 2022-11-11 PROCEDURE — C9113 INJ PANTOPRAZOLE SODIUM, VIA: HCPCS | Performed by: HOSPITALIST

## 2022-11-11 PROCEDURE — 99233 SBSQ HOSP IP/OBS HIGH 50: CPT | Mod: ,,, | Performed by: HOSPITALIST

## 2022-11-11 RX ORDER — SODIUM,POTASSIUM PHOSPHATES 280-250MG
1 POWDER IN PACKET (EA) ORAL 2 TIMES DAILY
Status: DISCONTINUED | OUTPATIENT
Start: 2022-11-11 | End: 2022-11-18

## 2022-11-11 RX ORDER — PANTOPRAZOLE SODIUM 40 MG/1
40 TABLET, DELAYED RELEASE ORAL
Status: DISCONTINUED | OUTPATIENT
Start: 2022-11-11 | End: 2022-11-14

## 2022-11-11 RX ADMIN — FERROUS SULFATE TAB 325 MG (65 MG ELEMENTAL FE) 1 EACH: 325 (65 FE) TAB at 08:11

## 2022-11-11 RX ADMIN — MICONAZOLE NITRATE 2 % TOPICAL POWDER: at 08:11

## 2022-11-11 RX ADMIN — LOPERAMIDE HYDROCHLORIDE 2 MG: 2 CAPSULE ORAL at 09:11

## 2022-11-11 RX ADMIN — PANTOPRAZOLE SODIUM 40 MG: 40 TABLET, DELAYED RELEASE ORAL at 05:11

## 2022-11-11 RX ADMIN — LEVETIRACETAM 500 MG: 500 TABLET, FILM COATED ORAL at 08:11

## 2022-11-11 RX ADMIN — ATORVASTATIN CALCIUM 40 MG: 20 TABLET, FILM COATED ORAL at 09:11

## 2022-11-11 RX ADMIN — POTASSIUM BICARBONATE 50 MEQ: 977.5 TABLET, EFFERVESCENT ORAL at 05:11

## 2022-11-11 RX ADMIN — ASPIRIN 81 MG: 81 TABLET, COATED ORAL at 08:11

## 2022-11-11 RX ADMIN — NITAZOXANIDE 500 MG: 500 TABLET, FILM COATED ORAL at 10:11

## 2022-11-11 RX ADMIN — LOPERAMIDE HYDROCHLORIDE 2 MG: 2 CAPSULE ORAL at 08:11

## 2022-11-11 RX ADMIN — POTASSIUM & SODIUM PHOSPHATES POWDER PACK 280-160-250 MG 1 PACKET: 280-160-250 PACK at 08:11

## 2022-11-11 RX ADMIN — CHOLECALCIFEROL TAB 25 MCG (1000 UNIT) 2000 UNITS: 25 TAB at 08:11

## 2022-11-11 RX ADMIN — LEVETIRACETAM 500 MG: 500 TABLET, FILM COATED ORAL at 09:11

## 2022-11-11 RX ADMIN — LOPERAMIDE HYDROCHLORIDE 2 MG: 2 CAPSULE ORAL at 03:11

## 2022-11-11 RX ADMIN — OCTREOTIDE ACETATE 200 MCG: 100 INJECTION, SOLUTION INTRAVENOUS; SUBCUTANEOUS at 10:11

## 2022-11-11 RX ADMIN — NITAZOXANIDE 500 MG: 500 TABLET, FILM COATED ORAL at 12:11

## 2022-11-11 RX ADMIN — PANTOPRAZOLE SODIUM 40 MG: 40 INJECTION, POWDER, FOR SOLUTION INTRAVENOUS at 08:11

## 2022-11-11 RX ADMIN — SODIUM BICARBONATE 650 MG TABLET 1300 MG: at 09:11

## 2022-11-11 RX ADMIN — PSYLLIUM HUSK 1 PACKET: 3.4 POWDER ORAL at 03:11

## 2022-11-11 RX ADMIN — POTASSIUM & SODIUM PHOSPHATES POWDER PACK 280-160-250 MG 1 PACKET: 280-160-250 PACK at 10:11

## 2022-11-11 RX ADMIN — APIXABAN 5 MG: 5 TABLET, FILM COATED ORAL at 08:11

## 2022-11-11 RX ADMIN — SODIUM BICARBONATE 650 MG TABLET 1300 MG: at 08:11

## 2022-11-11 RX ADMIN — METOPROLOL TARTRATE 25 MG: 25 TABLET, FILM COATED ORAL at 09:11

## 2022-11-11 RX ADMIN — POTASSIUM BICARBONATE 50 MEQ: 977.5 TABLET, EFFERVESCENT ORAL at 10:11

## 2022-11-11 RX ADMIN — MICONAZOLE NITRATE 2 % TOPICAL POWDER: at 09:11

## 2022-11-11 RX ADMIN — APIXABAN 5 MG: 5 TABLET, FILM COATED ORAL at 09:11

## 2022-11-11 NOTE — PLAN OF CARE
Pt able to turn self in bed without assistance.  Diarrhea x3.  Voiced no other complaints.  Bed in lowest position, wheels locked, call light within reach.  Rounding q 1 hr.  Will continue to monitor.

## 2022-11-11 NOTE — ASSESSMENT & PLAN NOTE
- Patient with potassium   Recent Labs   Lab 11/09/22  1644 11/10/22  0408 11/11/22  0316   K 4.2 3.6 3.1*   . Replaced 11/11.  Monitor

## 2022-11-11 NOTE — ASSESSMENT & PLAN NOTE
seems to be at baseline. Creatine stable for now. BMP reviewed- noted Estimated Creatinine Clearance: 54.5 mL/min (based on SCr of 1.1 mg/dL). according to latest data. Monitor UOP and serial BMP and adjust therapy as needed. Renally dose meds.    Recent Labs   Lab 11/09/22  1644 11/10/22  0408 11/11/22  0316   BUN 7* 7* 6*   CREATININE 1.2 1.1 1.1

## 2022-11-11 NOTE — PLAN OF CARE
Problem: Adult Inpatient Plan of Care  Goal: Plan of Care Review  Outcome: Ongoing, Progressing     Problem: Infection  Goal: Absence of Infection Signs and Symptoms  Outcome: Ongoing, Progressing     Problem: Skin Injury Risk Increased  Goal: Skin Health and Integrity  Outcome: Ongoing, Progressing     Problem: Fall Injury Risk  Goal: Absence of Fall and Fall-Related Injury  Outcome: Ongoing, Progressing     Patient is alert and oriented x4. Patient continues to have diarrhea. Patient educated on importance of calling staff when she is soiled to prevent skin breakdown. No fever or chills this shift. Will continue to follow plan of care and modify as needed.

## 2022-11-11 NOTE — PLAN OF CARE
Medical Oncology consults    Patient is a 67 year old woman who presented on 11/06 with persistent diarrhea. Concern for neuroendocrine tumor, however, EUS did not show any mass to be biopsied.     Given this and patient with little improvement on octreotide, will discontinue octreotide. Will follow up labs to evaluate for VIPoma, insulinoma. In meantime, would evaluate other causes for elevated chromogranin. Low suspicion for neuroendocrine tumor at this time.    Discussed with Dr. Baron.    Sonal Larson MD   Hematology and Oncology Fellow, PGY IV

## 2022-11-11 NOTE — PROGRESS NOTES
Armando Jenkins - Oncology ([facility])  [facility] Medicine  Progress Note    Patient Name: Cherry Santiago  MRN: [id number]  Patient Class: IP- Inpatient   Admission Date: 11/6/2022  Length of Stay: 4 days  Attending Physician: Sonia Gramajo MD  Primary Care Provider: Amarilys Romero MD        Subjective:     Principal Problem:Diarrhea concurrent with and due to carcinoid syndrome        HPI:  Cherry Pozo is a 67-year-old past medical history of recently diagnosed carcinoid syndrome, AFib on Eliquis, CHF, arthritis, GERD, glaucoma, hyperlipidemia, hypertension, prior MI, TIA presenting with persistent diarrhea.      AAOx 3 . accompanied by the daughter at the bedside. Patient mentions having diarrhea intermittently since August 2022 more recently diarrhea has become worse over past few days -several episodes daily  associated nausea with intermittent vomiting. c/o lower cramping abdominal pain - 5/10.   symptoms concerned likely though to be secondary to carcinoid syndrome patient evaluated by Heme-Onc at [facility] with PET scan done 10/18 - Focal nodular foci of radiotracer uptake at the pancreatic tail and near the region of the pancreatic head above liver background activity could indicate somatostatin receptor avid lesions, but no corresponding mass is seen on CT portion of the study.  Recommend follow-up contrast enhanced pancreas protocol CT to further assess. 5HIAA levels on 10/21 WNL . CT AP W contrast done 11/4 shows No pancreatic lesion and Enterocolitis,. recent EGD -Non-bleeding gastric ulcers with no stigmata of  bleeding. Biopsied. Treated with a monopolar probe. colonoscopy with biopsy -No significant pathologic abnormality. No morphologic evidence of active inflammatory bowel disease, microscopic colitis, or neoplasia. Patient was  referred to [facility] for further workup and has scheduled appointment at Heme-Onc at [facility] this upcoming week. . c/o   dizziness,  and shortness of breath on minimal exertion    ER course -electrolyte derangement with hypokalemia to 2.4, hypomagsemia of 1.5 . replaced . UA + started on IV cefriaxone       Overview/Hospital Course:  After  admission - chromogranin A elevated at 2000s.  stool studies in process. DVT sonogram. Nonocclusive thrombus/DVT in the superior-mid right femoral vein. K replaced.  corrected calcium of 8.4.  PTH elevated 114 .started on sodium bicarbonate 1300mg BID .orthostatics + with drop in SBP to 80s on standing. RL bolus 1L and 75ml/h . C diff negative. UC with GRAM NEGATIVE PAIGE >100,000 cfu/ml ,Klebsiella Pneumonia.  H/O work up for neuroendocrine tumor vs VIPoma.  AES consulted for EUS and potential biopsy given that her PET scan showed uptake but no mass was seen on CT pancreas protocol. discontinued eliquis.  EUS - sleeve gastrectomy was found, characterized by healthy appearing mucosa. Widely patent duodenoenterostomy, characterized   by healthy appearing mucosa was found. few cystic lesions were seen in the pancreatic  head, genu of the pancreas and pancreatic body highly suspicious  for a branched intraductal papillary mucinous neoplasm.small for sampling. pancreatic head and pancreatic tail showed no mass. Awaiting further recs from oncology. Started octreotide. ID consulted for positive cryptosporidium antigen. nitazoxinide 500mg bid for 3 days. Repeat cryptosporidium Ag negative Hb at 6.7 Transfusion with 1 unit of PRBC with response.  Started Immodium.     11/11- UTI on levoquin, on immodium, octeotride, bicarb, suspicious tissue unable to be biopsied during EUS, colon bx neg, s/p transfusion. On apixaban for DVT. /61   Pulse 76  SpO2 95% on room air , eating 50%, good UO 1000cc, 6 BMs yesterday. Waiting on oncology recs. Discussed case w Dr. Larson.  Pt in shower doing ADLs, walking independently.      Interval History: see above    Review of Systems   Constitutional:  Negative for  activity change, chills, fatigue and fever.   HENT:  Negative for congestion, nosebleeds and trouble swallowing.    Respiratory:  Negative for apnea, cough, choking, chest tightness and shortness of breath.    Cardiovascular:  Negative for chest pain and leg swelling.   Gastrointestinal:  Positive for diarrhea. Negative for abdominal distention, abdominal pain, constipation, nausea and vomiting.   Genitourinary:  Negative for decreased urine volume, difficulty urinating, dysuria and frequency.   Musculoskeletal:  Negative for arthralgias, back pain, joint swelling, neck pain and neck stiffness.   Skin:  Negative for rash and wound.   Neurological:  Negative for dizziness, seizures, syncope, weakness, light-headedness, numbness and headaches.   Psychiatric/Behavioral:  Negative for agitation, behavioral problems, confusion, hallucinations, self-injury and sleep disturbance. The patient is not nervous/anxious.    Objective:     Vital Signs (Most Recent):  Temp: 98.2 °F (36.8 °C) (11/11/22 0846)  Pulse: 76 (11/11/22 0846)  Resp: 18 (11/11/22 0846)  BP: 108/61 (11/11/22 0846)  SpO2: 95 % (11/11/22 0846) Vital Signs (24h Range):  Temp:  [97.3 °F (36.3 °C)-98.5 °F (36.9 °C)] 98.2 °F (36.8 °C)  Pulse:  [62-84] 76  Resp:  [16-20] 18  SpO2:  [92 %-99 %] 95 %  BP: ()/(53-62) 108/61     Weight: 88.5 kg (195 lb)  Body mass index is 32.45 kg/m².    Intake/Output Summary (Last 24 hours) at 11/11/2022 0917  Last data filed at 11/11/2022 0359  Gross per 24 hour   Intake 720 ml   Output 1000 ml   Net -280 ml      Physical Exam  Constitutional:       General: She is not in acute distress.     Appearance: Normal appearance. She is obese. She is ill-appearing.   HENT:      Head: Normocephalic and atraumatic.      Nose: Nose normal.      Mouth/Throat:      Mouth: Mucous membranes are moist.   Eyes:      General: No scleral icterus.     Extraocular Movements: Extraocular movements intact.      Pupils: Pupils are equal, round, and  reactive to light.   Cardiovascular:      Rate and Rhythm: Normal rate and regular rhythm.      Pulses: Normal pulses.      Heart sounds: Normal heart sounds.   Pulmonary:      Effort: Pulmonary effort is normal.      Breath sounds: Normal breath sounds. No wheezing or rhonchi.   Chest:      Chest wall: No tenderness.   Abdominal:      General: Abdomen is flat. Bowel sounds are normal. There is no distension.      Palpations: Abdomen is soft. There is no mass.      Tenderness: There is no abdominal tenderness. There is no right CVA tenderness, left CVA tenderness, guarding or rebound.   Musculoskeletal:         General: No swelling, tenderness, deformity or signs of injury. Normal range of motion.      Cervical back: Normal range of motion and neck supple. No rigidity or tenderness.   Skin:     General: Skin is warm and dry.      Coloration: Skin is not jaundiced or pale.      Findings: No erythema or rash.   Neurological:      General: No focal deficit present.      Mental Status: She is alert and oriented to person, place, and time. Mental status is at baseline.      Cranial Nerves: No cranial nerve deficit.      Motor: No weakness.   Psychiatric:         Mood and Affect: Mood normal.         Behavior: Behavior normal.         Thought Content: Thought content normal.         Judgment: Judgment normal.       Significant Labs: All pertinent labs within the past 24 hours have been reviewed.  CBC:   Recent Labs   Lab 11/09/22  1644 11/10/22  0408 11/11/22  0316   WBC 8.09 5.08 3.59*   HGB 8.6* 8.0* 7.9*   HCT 25.7* 24.9* 24.2*   * 167 168     CMP:   Recent Labs   Lab 11/09/22  1644 11/10/22  0408 11/11/22  0316    140 142   K 4.2 3.6 3.1*   * 116* 117*   CO2 18* 19* 18*   * 138* 111*   BUN 7* 7* 6*   CREATININE 1.2 1.1 1.1   CALCIUM 7.8* 7.6* 7.4*   PROT  --  4.2*  --    ALBUMIN 1.5* 1.4*  --    BILITOT  --  0.6  --    ALKPHOS  --  82  --    AST  --  33  --    ALT  --  8*  --    ANIONGAP 6*  5* 7*       Significant Imaging: I have reviewed all pertinent imaging results/findings within the past 24 hours.      Assessment/Plan:      * Carcinoid syndrome related chronic diarrhea    67-year-old past medical history of recently diagnosed carcinoid syndrome,  having diarrhea intermittently since August 2022 more recently diarrhea has become worse over past few days -several episodes daily  associated nausea with intermittent vomiting. symptoms concerned likely though to be secondary to carcinoid syndrome patient evaluated by Heme-Onc at [facility] with PET scan done 10/18 - Focal nodular foci of radiotracer uptake at the pancreatic tail and near the region of the pancreatic head above liver background activity could indicate somatostatin receptor avid lesions, but no corresponding mass is seen on CT portion of the study.  Recommend follow-up contrast enhanced pancreas protocol CT to further assess. 5HIAA levels on 10/21 WNL . CT AP W contrast done 11/4 shows No pancreatic lesion and Enterocolitis,. recent EGD -Non-bleeding gastric ulcers with no stigmata of  bleeding. Biopsied. Treated with a monopolar probe. colonoscopy with biopsy -No significant pathologic abnormality. No morphologic evidence of active inflammatory bowel disease, microscopic colitis, or neoplasia. Patient was  referred to [facility] for further workup and has scheduled appointment at [facility] at [facility] this upcoming week.    11/7 Chromogranin A elevated at 2000s.  stool studies in process. C diff negative. Hematology working her up for neuroendocrine tumor vs VIPoma.  AES consulted for EUS and potential biopsy given that her PET scan showed uptake but no mass was seen on CT pancreas protocol.discontinued eliquis.  plan for EUS on Wednesday.  started octreotide.   11/9 repeat cryptosporidium Ag negative  EUS today - sleeve gastrectomy was found, characterized by  healthy appearing mucosa.Widely patent  duodenoenterostomy, characterized   by healthy appearing mucosa was found. few cystic lesions were seen in the pancreatic  head, genu of the pancreas and pancreatic body highly suspicious  for a branched intraductal papillary mucinous neoplasm.small for sampling. pancreatic head and pancreatic tail showed no mass.   11/10 - Oncology to see and review EUS; started immodium   11/11- on octeotride, immodium, 6 BMs yesterday, colon bx neg, repeat cryptospor neg. Consider biopsy per surgery? Waiting for oncology recs.  Add metamucil  (low dose) to absorb excess water.    UTI (urinary tract infection)   urinalysis positive . Culture, Urine pending  continue with anitbiotic IV ceftriaxone  11/8 UC with klebsiella; s/p x5 doses CTX - sensitive    11/11- on levoquin    Chronic deep vein thrombosis (DVT): right common femoral vein  -DVT sonogram 8/22 and 10/22 negative for DVT  -11/7 DVT sonogram -Nonocclusive thrombus/DVT in the superior-mid right femoral vein.continue eliquis . S/p IVC filter   -Restarted eliquis following EUS    Orthostatic hypotension  11/7 orthostatics + with drop in SBP to 80s on standing. RL bolus 1L and 75ml/h .SBP in 70-80s this PM on IVF. NS bolus 500ml x1. critical care consulted   11/11- /61 . May need intermittent bolus IVF due to volume loss with diarrhea    Hypokalemia    - Patient with potassium   Recent Labs   Lab 11/09/22  1644 11/10/22  0408 11/11/22  0316   K 4.2 3.6 3.1*   . Replaced 11/11.  Monitor        Essential hypertension  Chronic, controlled.  Latest blood pressure and vitals reviewed-   Temp:  [97.3 °F (36.3 °C)-98.5 °F (36.9 °C)]   Pulse:  [62-84]   Resp:  [16-20]   BP: ()/(53-62)   SpO2:  [92 %-99 %] .   Home meds for hypertension were reviewed and hold furosemide and metoprolol for now as with diarrhea. PRN meds if BP> 180/110 mm HG  11/11- on metoprolol 25 bid      Stage 3a chronic kidney disease  seems to be at baseline. Creatine stable for now. BMP reviewed-  noted Estimated Creatinine Clearance: 54.5 mL/min (based on SCr of 1.1 mg/dL). according to latest data. Monitor UOP and serial BMP and adjust therapy as needed. Renally dose meds.    Recent Labs   Lab 11/09/22  1644 11/10/22  0408 11/11/22  0316   BUN 7* 7* 6*   CREATININE 1.2 1.1 1.1          Chronic heart failure with preserved ejection fraction (HFpEF) NICM NYHA2   Patient admitted without  signs and symptoms of CHF exacerbation    Results for orders placed or performed during the hospital encounter of 11/06/22   Brain natriuretic peptide   Result Value Ref Range    BNP 82 0 - 99 pg/mL   . Telemetry monitoring. Strict input/ Output monitor, Daily weights.    denies chestpain.  Obtain serial troponins.  Cardiac Enzymes trend  Recent Labs   Lab 11/06/22  1251 11/06/22  1435   TROPONINI 0.016 0.019     No results found for this or any previous visit.  echo 10/7/22 left ventricle is normal in size. Global left ventricular   systolic function is normal. The left ventricular ejection fraction is   55%. Left ventricular diastolic function is abnormal (stage I impaired   relaxation). Noted left ventricular hypertrophy. Concentric left   ventricular hypertrophy is present. It is mild.     4. Mild (1+) tricuspid regurgitation.   5. The right ventricle is normal in size. Right ventricular systolic   function is normal.      11/11- on metoprolol, holding lasix due to excessive  diarrhea        Hypophosphatemia  Replaced  11/11 - Phos 2.4, neutraphos bid x 10 days      Hypomagnesemia  replaced      Hypocalcemia  11/6   corrected calcium of 8.4.  PTH elevated 114        Dizziness  likely secondary to diarrhea. orthostasis. IV hydration      Anemia of chronic disease    Patient's with Normocytic anemia.. Hemoglobin stable. Etiology likely due to chronic disease .  Current CBC reviewed-    Recent Labs   Lab 11/09/22  1644 11/10/22  0408 11/11/22  0316   HGB 8.6* 8.0* 7.9*    Monitor CBC and transfuse if H/H drops below  7/21.  11/9 concern for blood loss anemia.  Hb at 6.7 Transfusion with 1 unit of PRBC       Class 1 obesity due to excess calories with serious comorbidity and body mass index (BMI) of 32.0 to 32.9 in adult  Body mass index is 32.45 kg/m². Morbid obesity complicates all aspects of disease management from diagnostic modalities to treatment. Weight loss encouraged       Hypotension  See above      Seizures  continue with keppra BID      Cryptosporidium exposure  Repeat neg, colon biopsies neg.       VTE Risk Mitigation (From admission, onward)         Ordered     apixaban tablet 5 mg  2 times daily         11/09/22 1916     IP VTE HIGH RISK PATIENT  Once         11/06/22 1614     Place sequential compression device  Until discontinued         11/06/22 1614                Discharge Planning   MELYSSA: 11/16/2022     Code Status: Full Code   Is the patient medically ready for discharge?: No    Reason for patient still in hospital (select all that apply): Patient trending condition  Discharge Plan A: Home with family   Discharge Delays: None known at this time        Sonia Gramajo MD  Department of Hospital Medicine   [facility] - Oncology ([facility])

## 2022-11-11 NOTE — SUBJECTIVE & OBJECTIVE
Interval History: see above    Review of Systems   Constitutional:  Negative for activity change, chills, fatigue and fever.   HENT:  Negative for congestion, nosebleeds and trouble swallowing.    Respiratory:  Negative for apnea, cough, choking, chest tightness and shortness of breath.    Cardiovascular:  Negative for chest pain and leg swelling.   Gastrointestinal:  Positive for diarrhea. Negative for abdominal distention, abdominal pain, constipation, nausea and vomiting.   Genitourinary:  Negative for decreased urine volume, difficulty urinating, dysuria and frequency.   Musculoskeletal:  Negative for arthralgias, back pain, joint swelling, neck pain and neck stiffness.   Skin:  Negative for rash and wound.   Neurological:  Negative for dizziness, seizures, syncope, weakness, light-headedness, numbness and headaches.   Psychiatric/Behavioral:  Negative for agitation, behavioral problems, confusion, hallucinations, self-injury and sleep disturbance. The patient is not nervous/anxious.    Objective:     Vital Signs (Most Recent):  Temp: 98.2 °F (36.8 °C) (11/11/22 0846)  Pulse: 76 (11/11/22 0846)  Resp: 18 (11/11/22 0846)  BP: 108/61 (11/11/22 0846)  SpO2: 95 % (11/11/22 0846) Vital Signs (24h Range):  Temp:  [97.3 °F (36.3 °C)-98.5 °F (36.9 °C)] 98.2 °F (36.8 °C)  Pulse:  [62-84] 76  Resp:  [16-20] 18  SpO2:  [92 %-99 %] 95 %  BP: ()/(53-62) 108/61     Weight: 88.5 kg (195 lb)  Body mass index is 32.45 kg/m².    Intake/Output Summary (Last 24 hours) at 11/11/2022 0917  Last data filed at 11/11/2022 0359  Gross per 24 hour   Intake 720 ml   Output 1000 ml   Net -280 ml      Physical Exam  Constitutional:       General: She is not in acute distress.     Appearance: Normal appearance. She is obese. She is ill-appearing.   HENT:      Head: Normocephalic and atraumatic.      Nose: Nose normal.      Mouth/Throat:      Mouth: Mucous membranes are moist.   Eyes:      General: No scleral icterus.     Extraocular  Movements: Extraocular movements intact.      Pupils: Pupils are equal, round, and reactive to light.   Cardiovascular:      Rate and Rhythm: Normal rate and regular rhythm.      Pulses: Normal pulses.      Heart sounds: Normal heart sounds.   Pulmonary:      Effort: Pulmonary effort is normal.      Breath sounds: Normal breath sounds. No wheezing or rhonchi.   Chest:      Chest wall: No tenderness.   Abdominal:      General: Abdomen is flat. Bowel sounds are normal. There is no distension.      Palpations: Abdomen is soft. There is no mass.      Tenderness: There is no abdominal tenderness. There is no right CVA tenderness, left CVA tenderness, guarding or rebound.   Musculoskeletal:         General: No swelling, tenderness, deformity or signs of injury. Normal range of motion.      Cervical back: Normal range of motion and neck supple. No rigidity or tenderness.   Skin:     General: Skin is warm and dry.      Coloration: Skin is not jaundiced or pale.      Findings: No erythema or rash.   Neurological:      General: No focal deficit present.      Mental Status: She is alert and oriented to person, place, and time. Mental status is at baseline.      Cranial Nerves: No cranial nerve deficit.      Motor: No weakness.   Psychiatric:         Mood and Affect: Mood normal.         Behavior: Behavior normal.         Thought Content: Thought content normal.         Judgment: Judgment normal.       Significant Labs: All pertinent labs within the past 24 hours have been reviewed.  CBC:   Recent Labs   Lab 11/09/22  1644 11/10/22  0408 11/11/22  0316   WBC 8.09 5.08 3.59*   HGB 8.6* 8.0* 7.9*   HCT 25.7* 24.9* 24.2*   * 167 168     CMP:   Recent Labs   Lab 11/09/22  1644 11/10/22  0408 11/11/22  0316    140 142   K 4.2 3.6 3.1*   * 116* 117*   CO2 18* 19* 18*   * 138* 111*   BUN 7* 7* 6*   CREATININE 1.2 1.1 1.1   CALCIUM 7.8* 7.6* 7.4*   PROT  --  4.2*  --    ALBUMIN 1.5* 1.4*  --    BILITOT  --   0.6  --    ALKPHOS  --  82  --    AST  --  33  --    ALT  --  8*  --    ANIONGAP 6* 5* 7*       Significant Imaging: I have reviewed all pertinent imaging results/findings within the past 24 hours.

## 2022-11-11 NOTE — ASSESSMENT & PLAN NOTE
11/7 orthostatics + with drop in SBP to 80s on standing. RL bolus 1L and 75ml/h .SBP in 70-80s this PM on IVF. NS bolus 500ml x1. critical care consulted   11/11- /61 . May need intermittent bolus IVF due to volume loss with diarrhea

## 2022-11-11 NOTE — ASSESSMENT & PLAN NOTE
Patient admitted without  signs and symptoms of CHF exacerbation    Results for orders placed or performed during the hospital encounter of 11/06/22   Brain natriuretic peptide   Result Value Ref Range    BNP 82 0 - 99 pg/mL   . Telemetry monitoring. Strict input/ Output monitor, Daily weights.    denies chestpain.  Obtain serial troponins.  Cardiac Enzymes trend  Recent Labs   Lab 11/06/22  1251 11/06/22  1435   TROPONINI 0.016 0.019     No results found for this or any previous visit.  echo 10/7/22 left ventricle is normal in size. Global left ventricular   systolic function is normal. The left ventricular ejection fraction is   55%. Left ventricular diastolic function is abnormal (stage I impaired   relaxation). Noted left ventricular hypertrophy. Concentric left   ventricular hypertrophy is present. It is mild.     4. Mild (1+) tricuspid regurgitation.   5. The right ventricle is normal in size. Right ventricular systolic   function is normal.      11/11- on metoprolol, holding lasix due to excessive  diarrhea

## 2022-11-11 NOTE — ASSESSMENT & PLAN NOTE
Chronic, controlled.  Latest blood pressure and vitals reviewed-   Temp:  [97.3 °F (36.3 °C)-98.5 °F (36.9 °C)]   Pulse:  [62-84]   Resp:  [16-20]   BP: ()/(53-62)   SpO2:  [92 %-99 %] .   Home meds for hypertension were reviewed and hold furosemide and metoprolol for now as with diarrhea. PRN meds if BP> 180/110 mm HG  11/11- on metoprolol 25 bid

## 2022-11-11 NOTE — ASSESSMENT & PLAN NOTE
67-year-old past medical history of recently diagnosed carcinoid syndrome,  having diarrhea intermittently since August 2022 more recently diarrhea has become worse over past few days -several episodes daily  associated nausea with intermittent vomiting. symptoms concerned likely though to be secondary to carcinoid syndrome patient evaluated by Heme-Onc at [facility] with PET scan done 10/18 - Focal nodular foci of radiotracer uptake at the pancreatic tail and near the region of the pancreatic head above liver background activity could indicate somatostatin receptor avid lesions, but no corresponding mass is seen on CT portion of the study.  Recommend follow-up contrast enhanced pancreas protocol CT to further assess. 5HIAA levels on 10/21 WNL . CT AP W contrast done 11/4 shows No pancreatic lesion and Enterocolitis,. recent EGD -Non-bleeding gastric ulcers with no stigmata of  bleeding. Biopsied. Treated with a monopolar probe. colonoscopy with biopsy -No significant pathologic abnormality. No morphologic evidence of active inflammatory bowel disease, microscopic colitis, or neoplasia. Patient was  referred to [facility] for further workup and has scheduled appointment at [facility] at [facility] this upcoming week.    11/7 Chromogranin A elevated at 2000s.  stool studies in process. C diff negative. Hematology working her up for neuroendocrine tumor vs VIPoma.  AES consulted for EUS and potential biopsy given that her PET scan showed uptake but no mass was seen on CT pancreas protocol.discontinued eliquis.  plan for EUS on Wednesday.  started octreotide.   11/9 repeat cryptosporidium Ag negative  EUS today - sleeve gastrectomy was found, characterized by  healthy appearing mucosa.Widely patent duodenoenterostomy, characterized   by healthy appearing mucosa was found. few cystic lesions were seen in the pancreatic  head, genu of the pancreas and pancreatic body highly suspicious  for  a branched intraductal papillary mucinous neoplasm.small for sampling. pancreatic head and pancreatic tail showed no mass.   11/10 - Oncology to see and review EUS; started immodium   11/11- on octeotride, immodium, 6 BMs yesterday, colon bx neg, repeat cryptospor neg. Consider biopsy per surgery? Waiting for oncology recs.  Add metamucil  (low dose) to absorb excess water.

## 2022-11-11 NOTE — PLAN OF CARE
Patient AAOx4. No complaints this shift. Diarrhea continues; imodium now scheduled. Neuro checks continued Q4. Purewick in place. Tolerating regular diet. Bed in low locked position, call light and personal items within reach. Instructed to call if needed.

## 2022-11-11 NOTE — ASSESSMENT & PLAN NOTE
urinalysis positive . Culture, Urine pending  continue with anitbiotic IV ceftriaxone  11/8 UC with klebsiella; s/p x5 doses CTX - sensitive    11/11- on levoquin

## 2022-11-12 PROBLEM — K52.9 CHRONIC DIARRHEA: Status: ACTIVE | Noted: 2022-11-12

## 2022-11-12 LAB
ALBUMIN SERPL BCP-MCNC: 1.4 G/DL (ref 3.5–5.2)
ALP SERPL-CCNC: 80 U/L (ref 55–135)
ALT SERPL W/O P-5'-P-CCNC: 8 U/L (ref 10–44)
ANION GAP SERPL CALC-SCNC: 9 MMOL/L (ref 8–16)
AST SERPL-CCNC: 45 U/L (ref 10–40)
BASOPHILS # BLD AUTO: 0.03 K/UL (ref 0–0.2)
BASOPHILS NFR BLD: 0.6 % (ref 0–1.9)
BILIRUB SERPL-MCNC: 0.5 MG/DL (ref 0.1–1)
BUN SERPL-MCNC: 6 MG/DL (ref 8–23)
CALCIUM SERPL-MCNC: 7.5 MG/DL (ref 8.7–10.5)
CHLORIDE SERPL-SCNC: 120 MMOL/L (ref 95–110)
CO2 SERPL-SCNC: 16 MMOL/L (ref 23–29)
CREAT SERPL-MCNC: 1.2 MG/DL (ref 0.5–1.4)
DIFFERENTIAL METHOD: ABNORMAL
EOSINOPHIL # BLD AUTO: 0.1 K/UL (ref 0–0.5)
EOSINOPHIL NFR BLD: 1.6 % (ref 0–8)
ERYTHROCYTE [DISTWIDTH] IN BLOOD BY AUTOMATED COUNT: 19.5 % (ref 11.5–14.5)
EST. GFR  (NO RACE VARIABLE): 49.6 ML/MIN/1.73 M^2
GLUCOSE SERPL-MCNC: 108 MG/DL (ref 70–110)
HCT VFR BLD AUTO: 26.4 % (ref 37–48.5)
HGB BLD-MCNC: 8.8 G/DL (ref 12–16)
IMM GRANULOCYTES # BLD AUTO: 0.05 K/UL (ref 0–0.04)
IMM GRANULOCYTES NFR BLD AUTO: 1 % (ref 0–0.5)
LYMPHOCYTES # BLD AUTO: 1.8 K/UL (ref 1–4.8)
LYMPHOCYTES NFR BLD: 36 % (ref 18–48)
MCH RBC QN AUTO: 30.2 PG (ref 27–31)
MCHC RBC AUTO-ENTMCNC: 33.3 G/DL (ref 32–36)
MCV RBC AUTO: 91 FL (ref 82–98)
MONOCYTES # BLD AUTO: 0.7 K/UL (ref 0.3–1)
MONOCYTES NFR BLD: 14.8 % (ref 4–15)
NEUTROPHILS # BLD AUTO: 2.2 K/UL (ref 1.8–7.7)
NEUTROPHILS NFR BLD: 46 % (ref 38–73)
NRBC BLD-RTO: 0 /100 WBC
PLATELET # BLD AUTO: 163 K/UL (ref 150–450)
PMV BLD AUTO: 10.2 FL (ref 9.2–12.9)
POTASSIUM SERPL-SCNC: 3.5 MMOL/L (ref 3.5–5.1)
PROT SERPL-MCNC: 4.4 G/DL (ref 6–8.4)
RBC # BLD AUTO: 2.91 M/UL (ref 4–5.4)
SODIUM SERPL-SCNC: 145 MMOL/L (ref 136–145)
WBC # BLD AUTO: 4.86 K/UL (ref 3.9–12.7)

## 2022-11-12 PROCEDURE — 20600001 HC STEP DOWN PRIVATE ROOM

## 2022-11-12 PROCEDURE — 25500020 PHARM REV CODE 255: Performed by: HOSPITALIST

## 2022-11-12 PROCEDURE — 85025 COMPLETE CBC W/AUTO DIFF WBC: CPT | Performed by: HOSPITALIST

## 2022-11-12 PROCEDURE — 63600175 PHARM REV CODE 636 W HCPCS: Performed by: HOSPITALIST

## 2022-11-12 PROCEDURE — 99233 SBSQ HOSP IP/OBS HIGH 50: CPT | Mod: ,,, | Performed by: HOSPITALIST

## 2022-11-12 PROCEDURE — 25000242 PHARM REV CODE 250 ALT 637 W/ HCPCS: Performed by: HOSPITALIST

## 2022-11-12 PROCEDURE — 25000003 PHARM REV CODE 250: Performed by: HOSPITALIST

## 2022-11-12 PROCEDURE — 99222 PR INITIAL HOSPITAL CARE,LEVL II: ICD-10-PCS | Mod: GC,,, | Performed by: INTERNAL MEDICINE

## 2022-11-12 PROCEDURE — 99222 1ST HOSP IP/OBS MODERATE 55: CPT | Mod: GC,,, | Performed by: INTERNAL MEDICINE

## 2022-11-12 PROCEDURE — 80053 COMPREHEN METABOLIC PANEL: CPT | Performed by: HOSPITALIST

## 2022-11-12 PROCEDURE — 36415 COLL VENOUS BLD VENIPUNCTURE: CPT | Performed by: HOSPITALIST

## 2022-11-12 PROCEDURE — 25000003 PHARM REV CODE 250: Performed by: INTERNAL MEDICINE

## 2022-11-12 PROCEDURE — 99233 PR SUBSEQUENT HOSPITAL CARE,LEVL III: ICD-10-PCS | Mod: ,,, | Performed by: HOSPITALIST

## 2022-11-12 RX ORDER — SODIUM CHLORIDE, SODIUM LACTATE, POTASSIUM CHLORIDE, CALCIUM CHLORIDE 600; 310; 30; 20 MG/100ML; MG/100ML; MG/100ML; MG/100ML
INJECTION, SOLUTION INTRAVENOUS CONTINUOUS
Status: ACTIVE | OUTPATIENT
Start: 2022-11-12 | End: 2022-11-12

## 2022-11-12 RX ADMIN — LOPERAMIDE HYDROCHLORIDE 2 MG: 2 CAPSULE ORAL at 09:11

## 2022-11-12 RX ADMIN — POTASSIUM & SODIUM PHOSPHATES POWDER PACK 280-160-250 MG 1 PACKET: 280-160-250 PACK at 09:11

## 2022-11-12 RX ADMIN — LOPERAMIDE HYDROCHLORIDE 2 MG: 2 CAPSULE ORAL at 08:11

## 2022-11-12 RX ADMIN — PSYLLIUM HUSK 1 PACKET: 3.4 POWDER ORAL at 09:11

## 2022-11-12 RX ADMIN — APIXABAN 5 MG: 5 TABLET, FILM COATED ORAL at 09:11

## 2022-11-12 RX ADMIN — LEVETIRACETAM 500 MG: 500 TABLET, FILM COATED ORAL at 09:11

## 2022-11-12 RX ADMIN — LEVETIRACETAM 500 MG: 500 TABLET, FILM COATED ORAL at 08:11

## 2022-11-12 RX ADMIN — ATORVASTATIN CALCIUM 40 MG: 20 TABLET, FILM COATED ORAL at 08:11

## 2022-11-12 RX ADMIN — PANTOPRAZOLE SODIUM 40 MG: 40 TABLET, DELAYED RELEASE ORAL at 04:11

## 2022-11-12 RX ADMIN — FERROUS SULFATE TAB 325 MG (65 MG ELEMENTAL FE) 1 EACH: 325 (65 FE) TAB at 09:11

## 2022-11-12 RX ADMIN — SODIUM CHLORIDE, POTASSIUM CHLORIDE, SODIUM LACTATE AND CALCIUM CHLORIDE: 600; 310; 30; 20 INJECTION, SOLUTION INTRAVENOUS at 09:11

## 2022-11-12 RX ADMIN — PANTOPRAZOLE SODIUM 40 MG: 40 TABLET, DELAYED RELEASE ORAL at 05:11

## 2022-11-12 RX ADMIN — SODIUM BICARBONATE 650 MG TABLET 1300 MG: at 09:11

## 2022-11-12 RX ADMIN — APIXABAN 5 MG: 5 TABLET, FILM COATED ORAL at 08:11

## 2022-11-12 RX ADMIN — SODIUM BICARBONATE 650 MG TABLET 1300 MG: at 08:11

## 2022-11-12 RX ADMIN — POTASSIUM & SODIUM PHOSPHATES POWDER PACK 280-160-250 MG 1 PACKET: 280-160-250 PACK at 08:11

## 2022-11-12 RX ADMIN — IOHEXOL 75 ML: 350 INJECTION, SOLUTION INTRAVENOUS at 05:11

## 2022-11-12 RX ADMIN — LOPERAMIDE HYDROCHLORIDE 2 MG: 2 CAPSULE ORAL at 04:11

## 2022-11-12 RX ADMIN — CHOLECALCIFEROL TAB 25 MCG (1000 UNIT) 2000 UNITS: 25 TAB at 09:11

## 2022-11-12 RX ADMIN — MICONAZOLE NITRATE 2 % TOPICAL POWDER: at 08:11

## 2022-11-12 RX ADMIN — ASPIRIN 81 MG: 81 TABLET, COATED ORAL at 09:11

## 2022-11-12 RX ADMIN — METOPROLOL TARTRATE 25 MG: 25 TABLET, FILM COATED ORAL at 08:11

## 2022-11-12 NOTE — ASSESSMENT & PLAN NOTE
Chronic, controlled.  Latest blood pressure and vitals reviewed-   Temp:  [98 °F (36.7 °C)-98.3 °F (36.8 °C)]   Pulse:  [54-76]   Resp:  [16-18]   BP: (102-135)/(55-86)   SpO2:  [93 %-98 %] .   Home meds for hypertension were reviewed and hold furosemide and metoprolol for now as with diarrhea. PRN meds if BP> 180/110 mm HG  11/11- on metoprolol 25 bid

## 2022-11-12 NOTE — PROGRESS NOTES
Armando Jenkins - Oncology ([facility])  [facility] Medicine  Progress Note    Patient Name: Cherry Santiago  MRN: [id number]  Patient Class: IP- Inpatient   Admission Date: 11/6/2022  Length of Stay: 5 days  Attending Physician: Sonia Gramajo MD  Primary Care Provider: Amarilys Romero MD        Subjective:     Principal Problem:Diarrhea concurrent with and due to carcinoid syndrome        HPI:  Cherry Pozo is a 67-year-old past medical history of recently diagnosed carcinoid syndrome, AFib on Eliquis, CHF, arthritis, GERD, glaucoma, hyperlipidemia, hypertension, prior MI, TIA presenting with persistent diarrhea.      AAOx 3 . accompanied by the daughter at the bedside. Patient mentions having diarrhea intermittently since August 2022 more recently diarrhea has become worse over past few days -several episodes daily  associated nausea with intermittent vomiting. c/o lower cramping abdominal pain - 5/10.   symptoms concerned likely though to be secondary to carcinoid syndrome patient evaluated by Heme-Onc at [facility] with PET scan done 10/18 - Focal nodular foci of radiotracer uptake at the pancreatic tail and near the region of the pancreatic head above liver background activity could indicate somatostatin receptor avid lesions, but no corresponding mass is seen on CT portion of the study.  Recommend follow-up contrast enhanced pancreas protocol CT to further assess. 5HIAA levels on 10/21 WNL . CT AP W contrast done 11/4 shows No pancreatic lesion and Enterocolitis,. recent EGD -Non-bleeding gastric ulcers with no stigmata of  bleeding. Biopsied. Treated with a monopolar probe. colonoscopy with biopsy -No significant pathologic abnormality. No morphologic evidence of active inflammatory bowel disease, microscopic colitis, or neoplasia. Patient was  referred to [facility] for further workup and has scheduled appointment at Heme-Onc at [facility] this upcoming week. . c/o   dizziness,  and shortness of breath on minimal exertion    ER course -electrolyte derangement with hypokalemia to 2.4, hypomagsemia of 1.5 . replaced . UA + started on IV cefriaxone       Overview/Hospital Course:  After  admission - chromogranin A elevated at 2000s.  stool studies in process. DVT sonogram. Nonocclusive thrombus/DVT in the superior-mid right femoral vein. K replaced.  corrected calcium of 8.4.  PTH elevated 114 .started on sodium bicarbonate 1300mg BID .orthostatics + with drop in SBP to 80s on standing. RL bolus 1L and 75ml/h . C diff negative. UC with GRAM NEGATIVE PAIGE >100,000 cfu/ml ,Klebsiella Pneumonia.  H/O work up for neuroendocrine tumor vs VIPoma.  AES consulted for EUS and potential biopsy given that her PET scan showed uptake but no mass was seen on CT pancreas protocol. discontinued eliquis.  EUS - sleeve gastrectomy was found, characterized by healthy appearing mucosa. Widely patent duodenoenterostomy, characterized   by healthy appearing mucosa was found. few cystic lesions were seen in the pancreatic  head, genu of the pancreas and pancreatic body highly suspicious  for a branched intraductal papillary mucinous neoplasm.small for sampling. pancreatic head and pancreatic tail showed no mass. Awaiting further recs from oncology. Started octreotide. ID consulted for positive cryptosporidium antigen. nitazoxinide 500mg bid for 3 days. Repeat cryptosporidium Ag negative Hb at 6.7 Transfusion with 1 unit of PRBC with response.  Started Immodium.     11/11- UTI on levoquin, on immodium, octeotride, bicarb, suspicious tissue unable to be biopsied during EUS, colon bx neg, s/p transfusion. On apixaban for DVT. /61   Pulse 76  SpO2 95% on room air , eating 50%, good UO 1000cc, 6 BMs yesterday. Waiting on oncology recs. Discussed case w Dr. Larson.  Pt in shower doing ADLs, walking independently.    11/12- Oncology reports  Low suspicion for neuroendocrine tumor at this time. Lab for  VIPoma pending. Given this and patient with little improvement on octreotide, will discontinue octreotide. Will consult GI to help evaluate other causes for chronic diarrhea and elevated chromogranin, which may increase with irritable bowel disease, chronic hepatitis, liver failure, inflammatory diseases, neuroendocrine tumors, small cell lung tumors, and renal failure. Hx of gastric bypass.  Pt reported > 10 BMs, nurses reported 6. Eating 75% of meals. CT chest w contrast ordered.      No new subjective & objective note has been filed under this hospital service since the last note was generated.      Assessment/Plan:      * Carcinoid syndrome related chronic diarrhea   Diagnosis not confirmed with biopsy:   67-year-old past medical history of recently diagnosed carcinoid syndrome,  having diarrhea intermittently since August 2022 more recently diarrhea has become worse over past few days -several episodes daily  associated nausea with intermittent vomiting. symptoms concerned likely though to be secondary to carcinoid syndrome patient evaluated by Heme[facility]Onc at [facility] with PET scan done 10/18 - Focal nodular foci of radiotracer uptake at the pancreatic tail and near the region of the pancreatic head above liver background activity could indicate somatostatin receptor avid lesions, but no corresponding mass is seen on CT portion of the study.  Recommend follow-up contrast enhanced pancreas protocol CT to further assess. 5HIAA levels on 10/21 WNL .     CT AP W contrast done 11/4 shows No pancreatic lesion and Enterocolitis,. recent EGD -Non-bleeding gastric ulcers with no stigmata of  bleeding. Biopsied. Treated with a monopolar probe.     Colonoscopy with biopsy -No significant pathologic abnormality. No morphologic evidence of active inflammatory bowel disease, microscopic colitis, or neoplasia. Patient was  referred to [facility] for further workup and has scheduled appointment at Heme-Onc  at [facility] this upcoming week.    11/7 Chromogranin A elevated at 2000s.  stool studies in process. C diff negative. Hematology working her up for neuroendocrine tumor vs VIPoma.  AES consulted for EUS and potential biopsy given that her PET scan showed uptake but no mass was seen on CT pancreas protocol.discontinued eliquis.  plan for EUS on Wednesday.  started and discontinued octreotide since it failed to help  11/9 repeat cryptosporidium Ag negative  EUS today - sleeve gastrectomy was found, characterized by  healthy appearing mucosa.Widely patent duodenoenterostomy, characterized   by healthy appearing mucosa was found. few cystic lesions were seen in the pancreatic  head, genu of the pancreas and pancreatic body highly suspicious  for a branched intraductal papillary mucinous neoplasm.small for sampling. pancreatic head and pancreatic tail showed no mass.   11/10 - Oncology to see and review EUS; started immodium   11/11- on octeotride, immodium, 6 BMs yesterday, colon bx neg, repeat cryptospor neg. Consider biopsy per surgery? Waiting for oncology recs.  Add metamucil  (low dose) to absorb excess water.  11/12 - waiting on VIPoma labs. Discussed pt condition and plan w her DTR.    Chronic diarrhea  Uncertain etiology  Concern for carcinoid syndrome with high chromogranin level, but no biopsy souce can be found.   CT a/p - Partial small bowel resection.  There are some thickened segments of large and small bowel, some with adjacent mesenteric edema.  5HIAA levels on 10/21 WNL .  CT AP W contrast done 11/4 shows No pancreatic lesion and Enterocolitis,.   recent EGD -Non-bleeding gastric ulcers with no stigmata of  bleeding. Biopsied. Treated with a monopolar probe.   colonoscopy with biopsy -No significant pathologic abnormality. No morphologic evidence of active inflammatory bowel disease, microscopic colitis, or neoplasia  Colon biopsies neg 10/2022  Gastric biopsy 10/2022 - neg for h pylori  Kid  bx 10/2022 - no amyloid, + diab nephropathy    Add CT chest- will need contrast for tumor eval. RL x 500 cc.   Consult GI- Is Surgery consult for pancreatic biopsy indicated? Any other eval for diarrhea?       UTI (urinary tract infection)   urinalysis positive . Culture, Urine pending  continue with anitbiotic IV ceftriaxone  11/8 UC with klebsiella; s/p x5 doses CTX - sensitive    11/11- on levoquin    Chronic deep vein thrombosis (DVT): right common femoral vein  -DVT sonogram 8/22 and 10/22 negative for DVT  -11/7 DVT sonogram -Nonocclusive thrombus/DVT in the superior-mid right femoral vein.continue eliquis . S/p IVC filter   -Restarted eliquis following EUS    Orthostatic hypotension  11/7 orthostatics + with drop in SBP to 80s on standing. RL bolus 1L and 75ml/h .SBP in 70-80s this PM on IVF. NS bolus 500ml x1. critical care consulted   11/11- /61 . May need intermittent bolus IVF due to volume loss with diarrhea  11/12- good UO. Walking to BR. Eating 75%    Hypokalemia    - Patient with potassium   Recent Labs   Lab 11/09/22  1644 11/10/22  0408 11/11/22  0316   K 4.2 3.6 3.1*   . Replaced 11/11.  Monitor  11/12- lab pending        Essential hypertension  Chronic, controlled.  Latest blood pressure and vitals reviewed-   Temp:  [98 °F (36.7 °C)-98.3 °F (36.8 °C)]   Pulse:  [54-76]   Resp:  [16-18]   BP: (102-135)/(55-86)   SpO2:  [93 %-98 %] .   Home meds for hypertension were reviewed and hold furosemide and metoprolol for now as with diarrhea. PRN meds if BP> 180/110 mm HG  11/11- on metoprolol 25 bid      Stage 3a chronic kidney disease  seems to be at baseline. Creatine stable for now. BMP reviewed- noted Estimated Creatinine Clearance: 54.5 mL/min (based on SCr of 1.1 mg/dL). according to latest data. Monitor UOP and serial BMP and adjust therapy as needed. Renally dose meds.    Recent Labs   Lab 11/09/22  1644 11/10/22  0408 11/11/22  0316   BUN 7* 7* 6*   CREATININE 1.2 1.1 1.1          Chronic  heart failure with preserved ejection fraction (HFpEF) NICM NYHA2   Patient admitted without  signs and symptoms of CHF exacerbation    Results for orders placed or performed during the hospital encounter of 11/06/22   Brain natriuretic peptide   Result Value Ref Range    BNP 82 0 - 99 pg/mL   . Telemetry monitoring. Strict input/ Output monitor, Daily weights.    denies chestpain.  Obtain serial troponins.  Cardiac Enzymes trend  Recent Labs   Lab 11/06/22  1251 11/06/22  1435   TROPONINI 0.016 0.019     No results found for this or any previous visit.  echo 10/7/22 left ventricle is normal in size. Global left ventricular   systolic function is normal. The left ventricular ejection fraction is   55%. Left ventricular diastolic function is abnormal (stage I impaired   relaxation). Noted left ventricular hypertrophy. Concentric left   ventricular hypertrophy is present. It is mild.     4. Mild (1+) tricuspid regurgitation.   5. The right ventricle is normal in size. Right ventricular systolic   function is normal.      11/11- on metoprolol, holding lasix due to excessive  diarrhea        Hypophosphatemia  Replaced  11/11 - Phos 2.4, neutraphos bid x 10 days      Hypomagnesemia  replaced      Hypocalcemia  11/6   corrected calcium of 8.4.  PTH elevated 114        Dizziness  likely secondary to diarrhea. orthostasis. IV hydration      Anemia of chronic disease    Patient's with Normocytic anemia.. Hemoglobin stable. Etiology likely due to chronic disease .  Current CBC reviewed-    Recent Labs   Lab 11/09/22  1644 11/10/22  0408 11/11/22  0316   HGB 8.6* 8.0* 7.9*    Monitor CBC and transfuse if H/H drops below 7/21.  11/9 concern for blood loss anemia.  Hb at 6.7 Transfusion with 1 unit of PRBC       Class 1 obesity due to excess calories with serious comorbidity and body mass index (BMI) of 32.0 to 32.9 in adult  Body mass index is 32.45 kg/m². Morbid obesity complicates all aspects of disease management from  diagnostic modalities to treatment. Weight loss encouraged       Hypotension  See above      Seizures  continue with keppra BID      Cryptosporidium exposure  Repeat neg, colon biopsies neg.       VTE Risk Mitigation (From admission, onward)         Ordered     apixaban tablet 5 mg  2 times daily         11/09/22 1916     IP VTE HIGH RISK PATIENT  Once         11/06/22 1614     Place sequential compression device  Until discontinued         11/06/22 1614                Discharge Planning   MELYSSA: 11/17/2022     Code Status: Full Code   Is the patient medically ready for discharge?: No    Reason for patient still in hospital (select all that apply): Patient trending condition  Discharge Plan A: Home with family   Discharge Delays: None known at this time      Sonia Gramajo MD  Department of Hospital Medicine   [facility] - Oncology ([facility])     no

## 2022-11-12 NOTE — CONSULTS
[facility]  Gastroenterology  Consult Note    Patient Name: Cherry Santiago  MRN: [id number]  Admission Date: 11/6/2022  Hospital Length of Stay: 5 days  Code Status: Full Code   Attending Provider: Sonia Gramajo MD   Consulting Provider: Jet Paz MD  Primary Care Physician: Amarilys Romero MD  Principal Problem:Diarrhea concurrent with and due to carcinoid syndrome    Inpatient consult to Gastroenterology  Consult performed by: Jet Paz MD  Consult ordered by: Sonia Gramajo MD      Subjective:     HPI: Cherry Santiago is a 67 y.o. female with history of AF (on eliquis), CHF, arthritis, GERD, HLD, HTN, CAD, TIA who presents for chronic diarrhea. Onset abruptly on 8/26/22 after hospitalization for suspected malignancy with multiple rib fractures, L2 fracture after fall, M-spike on outside labs, unintentional weight loss, and elevated CRP. She had been recently treated for suspected urosepsis and again received abx for possible UTI for 1-2 days at that admission. ON day of discharge, she reports diarrhea started abruptly. Previously had solid, formed, brown stools 1-2 times per day. Now having liquid stools almost hourly with several nocturnal stools per night. Has lost about 40 lbs unintentionally. Reports orthostasis symptoms but otherwise denies flushing, diaphoresis, night sweats. Occasional n/v and mild LLQ pain. Denies bloody stools. Does have LE edema, fatigue, dyspnea on exertion.      There was concern for possible carcinoid syndrome when recently evaluated by [facility], PET scan 10/18 showed focal nodular foci of radiotracer uptake at pancreatic tail, could indicate somatostatin receptor avid lesions but no corresponding mass on CT portion of study. CT pancreatic protocol also without pancreatic lesion, did show enterocolitis. Recent EGD showed non-bleeding gastric ulcers, biopsies showed only mild chronic gastritis. There was concern for  amyloidosis due to acute onset renal failure and eelvated IgA and kappa light chains per outpatient heme/onc. Had renal biopsy 10/5 for possible amyloidosis, negative, showed DM nephropathy. Colonoscopy 10/11 showed diverticulosis throughout, otherwise normal, biopsies showed no pathologic abnormalities. 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 Pt presented to ED with hypokalemia and dehydration prior to further workup. Chromogranin A significantly elevated upon admission in 2000s. PTH elevated. 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 No mass in pancreatic head or tail. Octreotide was started empirically but since stopped with minimal improvement. ID was also consulted for positive cryptosporidium Ag. Tx with nitazoxinide for 3 days, repeat cryptosporidium Ag negative. Labs pending to evaluate for VIPoma and insulinoma but overall low suspicion for neuroendocrine tumor at this time per heme/onc.          Past Medical History:   Diagnosis Date    Anticoagulant long-term use     Arthritis     Atrial fibrillation     CHF (congestive heart failure)     Diabetes mellitus     Type2 resolved after weight loss surgery    DVT (deep venous thrombosis)     Encounter for blood transfusion     GERD (gastroesophageal reflux disease)     Glaucoma     Hypercholesterolemia     Hyperlipemia     Hypertension     Memory changes     Myocardial infarction     Renal failure     resolved    TIA (transient ischemic attack)        Past Surgical History:   Procedure Laterality Date    BACK SURGERY  06/21/2017    lumbar fusion 6/21/17 and surgery for hematoma to site on 6/26/17    CHOLECYSTECTOMY  1978    COLONOSCOPY N/A 10/3/2022    Procedure: COLONOSCOPY;  Surgeon: Jourdan Parks MD;  Location: [facility];  Service: General;  Laterality: N/A;    COLONOSCOPY N/A  10/11/2022    Procedure: COLONOSCOPY;  Surgeon: Stephen Cooper MD;  Location: [facility] ENDO;  Service: Endoscopy;  Laterality: N/A;    ENDOSCOPIC ULTRASOUND OF UPPER GASTROINTESTINAL TRACT N/A 11/9/2022    Procedure: ULTRASOUND, UPPER GI TRACT, ENDOSCOPIC;  Surgeon: Jake Corona MD;  Location: [facility] ENDO (2ND FLR);  Service: Endoscopy;  Laterality: N/A;    ESOPHAGOGASTRODUODENOSCOPY N/A 10/3/2022    Procedure: EGD (ESOPHAGOGASTRODUODENOSCOPY);  Surgeon: Jourdan Parks MD;  Location: [facility] ENDO;  Service: General;  Laterality: N/A;    GASTRIC BYPASS      HYSTERECTOMY  2012    JOINT REPLACEMENT      TKR    LEFT HEART CATHETERIZATION Left 2/5/2021    Procedure: Left heart cath;  Surgeon: Shane Pacheco MD;  Location: [facility] CATH;  Service: Cardiovascular;  Laterality: Left;    PELVIC LAPAROSCOPY Left     OOPH    RENAL BIOPSY N/A 10/5/2022    Procedure: BIOPSY, KIDNEY;  Surgeon: Velia Diagnostic Provider;  Location: [facility] OR;  Service: General;  Laterality: N/A;    RIGHT HEART CATHETERIZATION Right 2/5/2021    Procedure: INSERTION, CATHETER, RIGHT HEART. via left radial and left brachial;  Surgeon: Shane Pacheco MD;  Location: [facility] CATH;  Service: Cardiovascular;  Laterality: Right;    TOTAL ABDOMINAL HYSTERECTOMY W/ BILATERAL SALPINGOOPHORECTOMY         Family History   Problem Relation Age of Onset    Arthritis Mother     Breast cancer Mother     Heart disease Mother     Hypertension Mother     Cancer Father     Heart disease Father     Hypertension Father     Diabetes Daughter        Social History     Socioeconomic History    Marital status: Single   Tobacco Use    Smoking status: Never    Smokeless tobacco: Never   Substance and Sexual Activity    Alcohol use: Yes     Comment: occasional    Drug use: No    Sexual activity: Not Currently     Social Determinants of Health     Financial Resource Strain: Low Risk     Difficulty of Paying Living Expenses: Not hard at all   Food Insecurity: No Food Insecurity     Worried About Running Out of Food in the Last Year: Never true    Ran Out of Food in the Last Year: Never true   Transportation Needs: No Transportation Needs    Lack of Transportation (Medical): No    Lack of Transportation (Non-Medical): No   Physical Activity: Inactive    Days of Exercise per Week: 0 days    Minutes of Exercise per Session: 0 min   Stress: No Stress Concern Present    Feeling of Stress : Not at all   Social Connections: Moderately Integrated    Frequency of Communication with Friends and Family: More than three times a week    Frequency of Social Gatherings with Friends and Family: More than three times a week    Attends Confucianist Services: More than 4 times per year    Active Member of Clubs or Organizations: Yes    Attends Club or Organization Meetings: More than 4 times per year    Marital Status: Never    Housing Stability: High Risk    Unable to Pay for Housing in the Last Year: Yes    Number of Places Lived in the Last Year: 1    Unstable Housing in the Last Year: No       No current facility-administered medications on file prior to encounter.     Current Outpatient Medications on File Prior to Encounter   Medication Sig Dispense Refill    apixaban (ELIQUIS) 5 mg Tab Take 1 tablet (5 mg total) by mouth 2 (two) times daily.      aspirin (ECOTRIN) 81 MG EC tablet Take 81 mg by mouth once daily.      ferrous sulfate (FEOSOL) 325 mg (65 mg iron) Tab tablet Take 325 mg by mouth daily with breakfast.      furosemide (LASIX) 20 MG tablet Take 20 mg by mouth once daily.      levETIRAcetam (KEPPRA) 250 MG Tab Take 500 mg by mouth 2 (two) times daily.      loperamide (IMODIUM) 2 mg capsule Take 1 capsule (2 mg total) by mouth 4 (four) times daily as needed for Diarrhea. 180 capsule 0    midodrine (PROAMATINE) 5 MG Tab Take 1 tablet (5 mg total) by mouth 3 (three) times daily with meals. 90 tablet 0    pantoprazole (PROTONIX) 40 MG tablet Take 40 mg by mouth once daily.      potassium chloride  SA (K-DUR,KLOR-CON) 20 MEQ tablet Take 1 tablet (20 mEq total) by mouth 2 (two) times daily. 60 tablet 0    timolol maleate 0.5% (TIMOPTIC) 0.5 % Drop Place 1 drop into both eyes 2 (two) times daily.      atorvastatin (LIPITOR) 40 MG tablet Take 1 tablet (40 mg total) by mouth every evening. (Patient not taking: Reported on 11/4/2022) 90 tablet 3    citric acid-potassium citrate (POLYCITRA) 1,100-334 mg/5 mL solution potassium citrate-citric acid 1,100 mg-334 mg/5 mL oral solution   TAKE 5 MLS (10 MEQ TOTAL) BY MOUTH ONCE. FOR 1 DOSE ONLY      diphenoxylate-atropine 2.5-0.025 mg (LOMOTIL) 2.5-0.025 mg per tablet Take 1 tablet by mouth 4 (four) times daily as needed for Diarrhea (unreleived with Imodium). (Patient not taking: Reported on 11/4/2022) 30 tablet 0    metoclopramide HCl (REGLAN) 10 MG tablet Take 1 tablet (10 mg total) by mouth every 6 (six) hours as needed (Nausea). (Patient not taking: Reported on 11/4/2022) 14 tablet 0    metoprolol tartrate (LOPRESSOR) 25 MG tablet Take 1 tablet (25 mg total) by mouth 2 (two) times daily. (Patient not taking: Reported on 11/4/2022) 60 tablet 11    sodium bicarbonate 650 MG tablet Take 1 tablet (650 mg total) by mouth 2 (two) times daily. for 5 days 10 tablet 0       Review of patient's allergies indicates:  No Known Allergies    Review of Systems   Constitutional:  Positive for malaise/fatigue and weight loss. Negative for chills and fever.   HENT:  Negative for congestion and sore throat.    Eyes:  Negative for blurred vision and double vision.   Respiratory:  Positive for shortness of breath. Negative for cough.    Cardiovascular:  Negative for chest pain and palpitations.   Gastrointestinal:         See HPI   Genitourinary:  Negative for frequency and urgency.   Musculoskeletal:  Negative for joint pain and myalgias.   Skin:  Negative for itching and rash.   Neurological:  Negative for sensory change and focal weakness.      Objective:     Vitals:    11/12/22 1125    BP: (!) 153/80   Pulse: 63   Resp: 18   Temp: 97.9 °F (36.6 °C)         Constitutional:  not in acute distress and well developed  HENT: Head: Normal, normocephalic, atraumatic.  Eyes: conjunctiva clear and sclera nonicteric  Cardiovascular: regular rate and rhythm and no murmur  Respiratory: normal chest expansion & respiratory effort   and no accessory muscle use  GI: soft, non-tender, without masses or organomegaly  Musculoskeletal: no muscular tenderness noted  Skin: normal color  Neurological: alert, oriented x3  Psychiatric: mood and affect are within normal limits, pt is a good historian; no memory problems were noted      Significant Labs:  Recent Labs   Lab 11/10/22  0408 11/11/22  0316 11/12/22  1253   HGB 8.0* 7.9* 8.8*       Lab Results   Component Value Date    WBC 4.86 11/12/2022    HGB 8.8 (L) 11/12/2022    HCT 26.4 (L) 11/12/2022    MCV 91 11/12/2022     11/12/2022       Lab Results   Component Value Date     11/11/2022    K 3.1 (L) 11/11/2022     (H) 11/11/2022    CO2 18 (L) 11/11/2022    BUN 6 (L) 11/11/2022    CREATININE 1.1 11/11/2022    CALCIUM 7.4 (L) 11/11/2022    ANIONGAP 7 (L) 11/11/2022    ESTGFRAFRICA 51 (A) 05/11/2022    EGFRNONAA 44 (A) 05/11/2022       Lab Results   Component Value Date    ALT 8 (L) 11/10/2022    AST 33 11/10/2022    ALKPHOS 82 11/10/2022    BILITOT 0.6 11/10/2022       Lab Results   Component Value Date    INR 1.1 09/30/2022    INR 2.4 (H) 05/19/2021    INR 1.1 03/04/2021       Significant Imaging:  Reviewed pertinent radiology findings.       Assessment/Plan:     Cherry Santiago is a 67 y.o. female with history of history of AF (on eliquis), CHF, arthritis, GERD, HLD, HTN, CAD, TIA and concern for recently diagnosed carcinoid syndrome in setting of chronic diarrhea. GI consulted for additional workup. Hypoalbuminemia raises concern for protein-losing enteropathy. Myeloma and amyloidosis have already been explored. Unclear if BM biopsy would be  of assistance. There was concern for neuroendocrine tumor. Chromogranin A elevated >2000. Extensive workup done thus far including upper and lower endosocpy (unrevealing), elevated chromogranin A, Ga Dotatate scan showing uptake in pancreas although EUS did not show mass to be biopsied and CT panc protocol showed no pancreatic lesion. Some lab workup still pending.    Problem List:  Chronic diarrhea, concern for neuroendocrine tumor      Recommendations:  - Obtain fecal elastase  - Can check serum tryptase for mastocytosis  - Will plan for EGD in coming days to obtain small bowel biopsies (not yet done), eval for malabsorptive vs autoimmune conditions. Can perform Congo Red staining for amyloid.  - Would stop PPI and hold for one week, then recheck chromogranin and gastrin levels. PPIs may be source of elevation in both.  - Will discuss with AES if pancreatic biopsy would be possible even without  discrete lesion to target (uptake was seen on dotatate PET)    Thank you for involving us in the care of Cherry Santiago. Please call with any additional questions, concerns or changes in the patient's clinical status. We will continue to follow.     Jet Paz MD  Gastroenterology Fellow PGY IV  [facility]

## 2022-11-12 NOTE — ASSESSMENT & PLAN NOTE
Uncertain etiology  Concern for carcinoid syndrome with high chromogranin level, but no biopsy souce can be found.   CT a/p - Partial small bowel resection.  There are some thickened segments of large and small bowel, some with adjacent mesenteric edema.  5HIAA levels on 10/21 WNL .  CT AP W contrast done 11/4 shows No pancreatic lesion and Enterocolitis,.   recent EGD -Non-bleeding gastric ulcers with no stigmata of  bleeding. Biopsied. Treated with a monopolar probe.   colonoscopy with biopsy -No significant pathologic abnormality. No morphologic evidence of active inflammatory bowel disease, microscopic colitis, or neoplasia  Colon biopsies neg 10/2022  Gastric biopsy 10/2022 - neg for h pylori  Kid bx 10/2022 - no amyloid, + diab nephropathy    Add CT chest- will need contrast for tumor eval. RL x 500 cc.   Consult GI- Is Surgery consult for pancreatic biopsy indicated? Any other eval for diarrhea?

## 2022-11-12 NOTE — ASSESSMENT & PLAN NOTE
- Patient with potassium   Recent Labs   Lab 11/09/22  1644 11/10/22  0408 11/11/22  0316   K 4.2 3.6 3.1*   . Replaced 11/11.  Monitor  11/12- lab pending

## 2022-11-12 NOTE — ASSESSMENT & PLAN NOTE
Diagnosis not confirmed with biopsy:   67-year-old past medical history of recently diagnosed carcinoid syndrome,  having diarrhea intermittently since August 2022 more recently diarrhea has become worse over past few days -several episodes daily  associated nausea with intermittent vomiting. symptoms concerned likely though to be secondary to carcinoid syndrome patient evaluated by Heme[facility]Onc at [facility] with PET scan done 10/18 - Focal nodular foci of radiotracer uptake at the pancreatic tail and near the region of the pancreatic head above liver background activity could indicate somatostatin receptor avid lesions, but no corresponding mass is seen on CT portion of the study.  Recommend follow-up contrast enhanced pancreas protocol CT to further assess. 5HIAA levels on 10/21 WNL .     CT AP W contrast done 11/4 shows No pancreatic lesion and Enterocolitis,. recent EGD -Non-bleeding gastric ulcers with no stigmata of  bleeding. Biopsied. Treated with a monopolar probe.     Colonoscopy with biopsy -No significant pathologic abnormality. No morphologic evidence of active inflammatory bowel disease, microscopic colitis, or neoplasia. Patient was  referred to [facility] for further workup and has scheduled appointment at [facility] at [facility] this upcoming week.    11/7 Chromogranin A elevated at 2000s.  stool studies in process. C diff negative. Hematology working her up for neuroendocrine tumor vs VIPoma.  AES consulted for EUS and potential biopsy given that her PET scan showed uptake but no mass was seen on CT pancreas protocol.discontinued eliquis.  plan for EUS on Wednesday.  started and discontinued octreotide since it failed to help  11/9 repeat cryptosporidium Ag negative  EUS today - sleeve gastrectomy was found, characterized by  healthy appearing mucosa.Widely patent duodenoenterostomy, characterized   by healthy appearing mucosa was found. few cystic lesions were seen in  the pancreatic  head, genu of the pancreas and pancreatic body highly suspicious  for a branched intraductal papillary mucinous neoplasm.small for sampling. pancreatic head and pancreatic tail showed no mass.   11/10 - Oncology to see and review EUS; started immodium   11/11- on octeotride, immodium, 6 BMs yesterday, colon bx neg, repeat cryptospor neg. Consider biopsy per surgery? Waiting for oncology recs.  Add metamucil  (low dose) to absorb excess water.  11/12 - waiting on VIPoma labs. Discussed pt condition and plan w her DTR.

## 2022-11-12 NOTE — PLAN OF CARE
Plan of care shared with patient.  Bedside commode chair placed in room.  Pt encouraged to use commode to protect skin.  Up with assistance.  Pt educated using call light before getting up.  Flat affect and depressed mode noted.  Bed in lowest position, wheels locked, no skid socks in place.

## 2022-11-12 NOTE — ASSESSMENT & PLAN NOTE
Patient's with Normocytic anemia.. Hemoglobin stable. Etiology likely due to chronic disease .  Current CBC reviewed-    Recent Labs   Lab 11/09/22  1644 11/10/22  0408 11/11/22  0316   HGB 8.6* 8.0* 7.9*    Monitor CBC and transfuse if H/H drops below 7/21.  11/9 concern for blood loss anemia.  Hb at 6.7 Transfusion with 1 unit of PRBC

## 2022-11-12 NOTE — H&P (VIEW-ONLY)
[facility]  Gastroenterology  Consult Note    Patient Name: Cherry Santiago  MRN: [id number]  Admission Date: 11/6/2022  Hospital Length of Stay: 5 days  Code Status: Full Code   Attending Provider: Sonia Gramajo MD   Consulting Provider: Jet Paz MD  Primary Care Physician: Amarilys Romero MD  Principal Problem:Diarrhea concurrent with and due to carcinoid syndrome    Inpatient consult to Gastroenterology  Consult performed by: Jet Paz MD  Consult ordered by: Sonia Gramajo MD      Subjective:     HPI: Cherry Santiago is a 67 y.o. female with history of AF (on eliquis), CHF, arthritis, GERD, HLD, HTN, CAD, TIA who presents for chronic diarrhea. Onset abruptly on 8/26/22 after hospitalization for suspected malignancy with multiple rib fractures, L2 fracture after fall, M-spike on outside labs, unintentional weight loss, and elevated CRP. She had been recently treated for suspected urosepsis and again received abx for possible UTI for 1-2 days at that admission. ON day of discharge, she reports diarrhea started abruptly. Previously had solid, formed, brown stools 1-2 times per day. Now having liquid stools almost hourly with several nocturnal stools per night. Has lost about 40 lbs unintentionally. Reports orthostasis symptoms but otherwise denies flushing, diaphoresis, night sweats. Occasional n/v and mild LLQ pain. Denies bloody stools. Does have LE edema, fatigue, dyspnea on exertion.      There was concern for possible carcinoid syndrome when recently evaluated by [facility], PET scan 10/18 showed focal nodular foci of radiotracer uptake at pancreatic tail, could indicate somatostatin receptor avid lesions but no corresponding mass on CT portion of study. CT pancreatic protocol also without pancreatic lesion, did show enterocolitis. Recent EGD showed non-bleeding gastric ulcers, biopsies showed only mild chronic gastritis. There was concern for  amyloidosis due to acute onset renal failure and eelvated IgA and kappa light chains per outpatient heme/onc. Had renal biopsy 10/5 for possible amyloidosis, negative, showed DM nephropathy. Colonoscopy 10/11 showed diverticulosis throughout, otherwise normal, biopsies showed no pathologic abnormalities. 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 Pt presented to ED with hypokalemia and dehydration prior to further workup. Chromogranin A significantly elevated upon admission in 2000s. PTH elevated. 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 No mass in pancreatic head or tail. Octreotide was started empirically but since stopped with minimal improvement. ID was also consulted for positive cryptosporidium Ag. Tx with nitazoxinide for 3 days, repeat cryptosporidium Ag negative. Labs pending to evaluate for VIPoma and insulinoma but overall low suspicion for neuroendocrine tumor at this time per heme/onc.          Past Medical History:   Diagnosis Date    Anticoagulant long-term use     Arthritis     Atrial fibrillation     CHF (congestive heart failure)     Diabetes mellitus     Type2 resolved after weight loss surgery    DVT (deep venous thrombosis)     Encounter for blood transfusion     GERD (gastroesophageal reflux disease)     Glaucoma     Hypercholesterolemia     Hyperlipemia     Hypertension     Memory changes     Myocardial infarction     Renal failure     resolved    TIA (transient ischemic attack)        Past Surgical History:   Procedure Laterality Date    BACK SURGERY  06/21/2017    lumbar fusion 6/21/17 and surgery for hematoma to site on 6/26/17    CHOLECYSTECTOMY  1978    COLONOSCOPY N/A 10/3/2022    Procedure: COLONOSCOPY;  Surgeon: Jourdan Parks MD;  Location: [facility];  Service: General;  Laterality: N/A;    COLONOSCOPY N/A  10/11/2022    Procedure: COLONOSCOPY;  Surgeon: Stephen Cooper MD;  Location: [facility] sale to [facility] ENDO;  Service: Endoscopy;  Laterality: N/A;    ENDOSCOPIC ULTRASOUND OF UPPER GASTROINTESTINAL TRACT N/A 11/9/2022    Procedure: ULTRASOUND, UPPER GI TRACT, ENDOSCOPIC;  Surgeon: Jake Corona MD;  Location: [facility] ENDO (2ND FLR);  Service: Endoscopy;  Laterality: N/A;    ESOPHAGOGASTRODUODENOSCOPY N/A 10/3/2022    Procedure: EGD (ESOPHAGOGASTRODUODENOSCOPY);  Surgeon: Jourdan Parks MD;  Location: [facility] ENDO;  Service: General;  Laterality: N/A;    GASTRIC BYPASS      HYSTERECTOMY  2012    JOINT REPLACEMENT      TKR    LEFT HEART CATHETERIZATION Left 2/5/2021    Procedure: Left heart cath;  Surgeon: Shane Pacheco MD;  Location: [facility] CATH;  Service: Cardiovascular;  Laterality: Left;    PELVIC LAPAROSCOPY Left     OOPH    RENAL BIOPSY N/A 10/5/2022    Procedure: BIOPSY, KIDNEY;  Surgeon: Velia Diagnostic Provider;  Location: [facility] to [facility] OR;  Service: General;  Laterality: N/A;    RIGHT HEART CATHETERIZATION Right 2/5/2021    Procedure: INSERTION, CATHETER, RIGHT HEART. via left radial and left brachial;  Surgeon: Shane Pacheco MD;  Location: [facility] to [facility] CATH;  Service: Cardiovascular;  Laterality: Right;    TOTAL ABDOMINAL HYSTERECTOMY W/ BILATERAL SALPINGOOPHORECTOMY         Family History   Problem Relation Age of Onset    Arthritis Mother     Breast cancer Mother     Heart disease Mother     Hypertension Mother     Cancer Father     Heart disease Father     Hypertension Father     Diabetes Daughter        Social History     Socioeconomic History    Marital status: Single   Tobacco Use    Smoking status: Never    Smokeless tobacco: Never   Substance and Sexual Activity    Alcohol use: Yes     Comment: occasional    Drug use: No    Sexual activity: Not Currently     Social Determinants of Health     Financial Resource Strain: Low Risk     Difficulty of Paying Living Expenses: Not hard at all   Food Insecurity: No Food Insecurity     Worried About Running Out of Food in the Last Year: Never true    Ran Out of Food in the Last Year: Never true   Transportation Needs: No Transportation Needs    Lack of Transportation (Medical): No    Lack of Transportation (Non-Medical): No   Physical Activity: Inactive    Days of Exercise per Week: 0 days    Minutes of Exercise per Session: 0 min   Stress: No Stress Concern Present    Feeling of Stress : Not at all   Social Connections: Moderately Integrated    Frequency of Communication with Friends and Family: More than three times a week    Frequency of Social Gatherings with Friends and Family: More than three times a week    Attends [employer] Services: More than 4 times per year    Active Member of Clubs or Organizations: Yes    Attends Club or Organization Meetings: More than 4 times per year    Marital Status: Never    Housing Stability: High Risk    Unable to Pay for Housing in the Last Year: Yes    Number of Places Lived in the Last Year: 1    Unstable Housing in the Last Year: No       No current facility-administered medications on file prior to encounter.     Current Outpatient Medications on File Prior to Encounter   Medication Sig Dispense Refill    apixaban (ELIQUIS) 5 mg Tab Take 1 tablet (5 mg total) by mouth 2 (two) times daily.      aspirin (ECOTRIN) 81 MG EC tablet Take 81 mg by mouth once daily.      ferrous sulfate (FEOSOL) 325 mg (65 mg iron) Tab tablet Take 325 mg by mouth daily with breakfast.      furosemide (LASIX) 20 MG tablet Take 20 mg by mouth once daily.      levETIRAcetam (KEPPRA) 250 MG Tab Take 500 mg by mouth 2 (two) times daily.      loperamide (IMODIUM) 2 mg capsule Take 1 capsule (2 mg total) by mouth 4 (four) times daily as needed for Diarrhea. 180 capsule 0    midodrine (PROAMATINE) 5 MG Tab Take 1 tablet (5 mg total) by mouth 3 (three) times daily with meals. 90 tablet 0    pantoprazole (PROTONIX) 40 MG tablet Take 40 mg by mouth once daily.      potassium chloride  SA (K-DUR,KLOR-CON) 20 MEQ tablet Take 1 tablet (20 mEq total) by mouth 2 (two) times daily. 60 tablet 0    timolol maleate 0.5% (TIMOPTIC) 0.5 % Drop Place 1 drop into both eyes 2 (two) times daily.      atorvastatin (LIPITOR) 40 MG tablet Take 1 tablet (40 mg total) by mouth every evening. (Patient not taking: Reported on 11/4/2022) 90 tablet 3    citric acid-potassium citrate (POLYCITRA) 1,100-334 mg/5 mL solution potassium citrate-citric acid 1,100 mg-334 mg/5 mL oral solution   TAKE 5 MLS (10 MEQ TOTAL) BY MOUTH ONCE. FOR 1 DOSE ONLY      diphenoxylate-atropine 2.5-0.025 mg (LOMOTIL) 2.5-0.025 mg per tablet Take 1 tablet by mouth 4 (four) times daily as needed for Diarrhea (unreleived with Imodium). (Patient not taking: Reported on 11/4/2022) 30 tablet 0    metoclopramide HCl (REGLAN) 10 MG tablet Take 1 tablet (10 mg total) by mouth every 6 (six) hours as needed (Nausea). (Patient not taking: Reported on 11/4/2022) 14 tablet 0    metoprolol tartrate (LOPRESSOR) 25 MG tablet Take 1 tablet (25 mg total) by mouth 2 (two) times daily. (Patient not taking: Reported on 11/4/2022) 60 tablet 11    sodium bicarbonate 650 MG tablet Take 1 tablet (650 mg total) by mouth 2 (two) times daily. for 5 days 10 tablet 0       Review of patient's allergies indicates:  No Known Allergies    Review of Systems   Constitutional:  Positive for malaise/fatigue and weight loss. Negative for chills and fever.   HENT:  Negative for congestion and sore throat.    Eyes:  Negative for blurred vision and double vision.   Respiratory:  Positive for shortness of breath. Negative for cough.    Cardiovascular:  Negative for chest pain and palpitations.   Gastrointestinal:         See HPI   Genitourinary:  Negative for frequency and urgency.   Musculoskeletal:  Negative for joint pain and myalgias.   Skin:  Negative for itching and rash.   Neurological:  Negative for sensory change and focal weakness.      Objective:     Vitals:    11/12/22 1125    BP: (!) 153/80   Pulse: 63   Resp: 18   Temp: 97.9 °F (36.6 °C)         Constitutional:  not in acute distress and well developed  HENT: Head: Normal, normocephalic, atraumatic.  Eyes: conjunctiva clear and sclera nonicteric  Cardiovascular: regular rate and rhythm and no murmur  Respiratory: normal chest expansion & respiratory effort   and no accessory muscle use  GI: soft, non-tender, without masses or organomegaly  Musculoskeletal: no muscular tenderness noted  Skin: normal color  Neurological: alert, oriented x3  Psychiatric: mood and affect are within normal limits, pt is a good historian; no memory problems were noted      Significant Labs:  Recent Labs   Lab 11/10/22  0408 11/11/22  0316 11/12/22  1253   HGB 8.0* 7.9* 8.8*       Lab Results   Component Value Date    WBC 4.86 11/12/2022    HGB 8.8 (L) 11/12/2022    HCT 26.4 (L) 11/12/2022    MCV 91 11/12/2022     11/12/2022       Lab Results   Component Value Date     11/11/2022    K 3.1 (L) 11/11/2022     (H) 11/11/2022    CO2 18 (L) 11/11/2022    BUN 6 (L) 11/11/2022    CREATININE 1.1 11/11/2022    CALCIUM 7.4 (L) 11/11/2022    ANIONGAP 7 (L) 11/11/2022    ESTGFRAFRICA 51 (A) 05/11/2022    EGFRNONAA 44 (A) 05/11/2022       Lab Results   Component Value Date    ALT 8 (L) 11/10/2022    AST 33 11/10/2022    ALKPHOS 82 11/10/2022    BILITOT 0.6 11/10/2022       Lab Results   Component Value Date    INR 1.1 09/30/2022    INR 2.4 (H) 05/19/2021    INR 1.1 03/04/2021       Significant Imaging:  Reviewed pertinent radiology findings.       Assessment/Plan:     Cherry Santiago is a 67 y.o. female with history of history of AF (on eliquis), CHF, arthritis, GERD, HLD, HTN, CAD, TIA and concern for recently diagnosed carcinoid syndrome in setting of chronic diarrhea. GI consulted for additional workup. Hypoalbuminemia raises concern for protein-losing enteropathy. Myeloma and amyloidosis have already been explored. Unclear if BM biopsy would be  of assistance. There was concern for neuroendocrine tumor. Chromogranin A elevated >2000. Extensive workup done thus far including upper and lower endosocpy (unrevealing), elevated chromogranin A, Ga Dotatate scan showing uptake in pancreas although EUS did not show mass to be biopsied and CT panc protocol showed no pancreatic lesion. Some lab workup still pending.    Problem List:  Chronic diarrhea, concern for neuroendocrine tumor      Recommendations:  - Obtain fecal elastase  - Can check serum tryptase for mastocytosis  - Will plan for EGD in coming days to obtain small bowel biopsies (not yet done), eval for malabsorptive vs autoimmune conditions. Can perform Congo Red staining for amyloid.  - Would stop PPI and hold for one week, then recheck chromogranin and gastrin levels. PPIs may be source of elevation in both.  - Will discuss with [facility] if pancreatic biopsy would be possible even without  discrete lesion to target (uptake was seen on dotatate PET)    Thank you for involving us in the care of Cherry Santiago. Please call with any additional questions, concerns or changes in the patient's clinical status. We will continue to follow.     Jet Paz MD  Gastroenterology Fellow PGY IV  [facility]

## 2022-11-13 PROCEDURE — 25000003 PHARM REV CODE 250: Performed by: HOSPITALIST

## 2022-11-13 PROCEDURE — 83520 IMMUNOASSAY QUANT NOS NONAB: CPT | Performed by: HOSPITALIST

## 2022-11-13 PROCEDURE — 36415 COLL VENOUS BLD VENIPUNCTURE: CPT | Performed by: HOSPITALIST

## 2022-11-13 PROCEDURE — 20600001 HC STEP DOWN PRIVATE ROOM

## 2022-11-13 PROCEDURE — 99233 PR SUBSEQUENT HOSPITAL CARE,LEVL III: ICD-10-PCS | Mod: ,,, | Performed by: HOSPITALIST

## 2022-11-13 PROCEDURE — 94761 N-INVAS EAR/PLS OXIMETRY MLT: CPT

## 2022-11-13 PROCEDURE — 99233 SBSQ HOSP IP/OBS HIGH 50: CPT | Mod: ,,, | Performed by: HOSPITALIST

## 2022-11-13 PROCEDURE — 82653 EL-1 FECAL QUANTITATIVE: CPT | Performed by: HOSPITALIST

## 2022-11-13 PROCEDURE — 25000003 PHARM REV CODE 250: Performed by: INTERNAL MEDICINE

## 2022-11-13 RX ORDER — CHOLESTYRAMINE 4 G/9G
1 POWDER, FOR SUSPENSION ORAL DAILY
Status: DISCONTINUED | OUTPATIENT
Start: 2022-11-13 | End: 2022-11-18

## 2022-11-13 RX ADMIN — POTASSIUM & SODIUM PHOSPHATES POWDER PACK 280-160-250 MG 1 PACKET: 280-160-250 PACK at 08:11

## 2022-11-13 RX ADMIN — POTASSIUM & SODIUM PHOSPHATES POWDER PACK 280-160-250 MG 1 PACKET: 280-160-250 PACK at 09:11

## 2022-11-13 RX ADMIN — FERROUS SULFATE TAB 325 MG (65 MG ELEMENTAL FE) 1 EACH: 325 (65 FE) TAB at 09:11

## 2022-11-13 RX ADMIN — LOPERAMIDE HYDROCHLORIDE 2 MG: 2 CAPSULE ORAL at 09:11

## 2022-11-13 RX ADMIN — PANTOPRAZOLE SODIUM 40 MG: 40 TABLET, DELAYED RELEASE ORAL at 06:11

## 2022-11-13 RX ADMIN — PANTOPRAZOLE SODIUM 40 MG: 40 TABLET, DELAYED RELEASE ORAL at 05:11

## 2022-11-13 RX ADMIN — APIXABAN 5 MG: 5 TABLET, FILM COATED ORAL at 08:11

## 2022-11-13 RX ADMIN — MICONAZOLE NITRATE 2 % TOPICAL POWDER: at 08:11

## 2022-11-13 RX ADMIN — ATORVASTATIN CALCIUM 40 MG: 20 TABLET, FILM COATED ORAL at 08:11

## 2022-11-13 RX ADMIN — LOPERAMIDE HYDROCHLORIDE 2 MG: 2 CAPSULE ORAL at 05:11

## 2022-11-13 RX ADMIN — POTASSIUM BICARBONATE 50 MEQ: 977.5 TABLET, EFFERVESCENT ORAL at 09:11

## 2022-11-13 RX ADMIN — LEVETIRACETAM 500 MG: 500 TABLET, FILM COATED ORAL at 08:11

## 2022-11-13 RX ADMIN — APIXABAN 5 MG: 5 TABLET, FILM COATED ORAL at 09:11

## 2022-11-13 RX ADMIN — CHOLESTYRAMINE 4 G: 4 POWDER, FOR SUSPENSION ORAL at 11:11

## 2022-11-13 RX ADMIN — METOPROLOL TARTRATE 25 MG: 25 TABLET, FILM COATED ORAL at 08:11

## 2022-11-13 RX ADMIN — ASPIRIN 81 MG: 81 TABLET, COATED ORAL at 09:11

## 2022-11-13 RX ADMIN — LOPERAMIDE HYDROCHLORIDE 2 MG: 2 CAPSULE ORAL at 08:11

## 2022-11-13 RX ADMIN — LEVETIRACETAM 500 MG: 500 TABLET, FILM COATED ORAL at 09:11

## 2022-11-13 RX ADMIN — SODIUM BICARBONATE 650 MG TABLET 1300 MG: at 08:11

## 2022-11-13 RX ADMIN — SODIUM BICARBONATE 650 MG TABLET 1300 MG: at 09:11

## 2022-11-13 RX ADMIN — CHOLECALCIFEROL TAB 25 MCG (1000 UNIT) 2000 UNITS: 25 TAB at 09:11

## 2022-11-13 NOTE — ASSESSMENT & PLAN NOTE
-DVT sonogram 8/22 and 10/22 negative for DVT  -11/7 DVT sonogram -Nonocclusive thrombus/DVT in the superior-mid right femoral vein.continue eliquis . S/p IVC filter   -Restarted eliquis following EUS  11/13- stable

## 2022-11-13 NOTE — PROGRESS NOTES
[facility] Note    Patient Name: Cherry Santiago  MRN: [id number]  Patient Class: IP- Inpatient   Admission Date: 11/6/2022  Length of Stay: 6 days  Attending Physician: Sonia Gramajo MD  Primary Care Provider: Amarilys Romero MD        Subjective:     Principal Problem:Diarrhea concurrent with and due to carcinoid syndrome        HPI:  Cherry Pozo is a 67-year-old past medical history of recently diagnosed carcinoid syndrome, AFib on Eliquis, CHF, arthritis, GERD, glaucoma, hyperlipidemia, hypertension, prior MI, TIA presenting with persistent diarrhea.      AAOx 3 . accompanied by the daughter at the bedside. Patient mentions having diarrhea intermittently since August 2022 more recently diarrhea has become worse over past few days -several episodes daily  associated nausea with intermittent vomiting. c/o lower cramping abdominal pain - 5/10.   symptoms concerned likely though to be secondary to carcinoid syndrome patient evaluated by Heme-Onc at [facility] with PET scan done 10/18 - Focal nodular foci of radiotracer uptake at the pancreatic tail and near the region of the pancreatic head above liver background activity could indicate somatostatin receptor avid lesions, but no corresponding mass is seen on CT portion of the study.  Recommend follow-up contrast enhanced pancreas protocol CT to further assess. 5HIAA levels on 10/21 WNL . CT AP W contrast done 11/4 shows No pancreatic lesion and Enterocolitis,. recent EGD -Non-bleeding gastric ulcers with no stigmata of  bleeding. Biopsied. Treated with a monopolar probe. colonoscopy with biopsy -No significant pathologic abnormality. No morphologic evidence of active inflammatory bowel disease, microscopic colitis, or neoplasia. Patient was  referred to [facility] for further workup and has scheduled appointment at Heme-Onc at [facility] this upcoming week. . c/o   dizziness,  and shortness of breath on minimal exertion    ER course -electrolyte derangement with hypokalemia to 2.4, hypomagsemia of 1.5 . replaced . UA + started on IV cefriaxone       Overview/Hospital Course:  After  admission - chromogranin A elevated at 2000s.  stool studies in process. DVT sonogram. Nonocclusive thrombus/DVT in the superior-mid right femoral vein. K replaced.  corrected calcium of 8.4.  PTH elevated 114 .started on sodium bicarbonate 1300mg BID .orthostatics + with drop in SBP to 80s on standing. RL bolus 1L and 75ml/h . C diff negative. UC with GRAM NEGATIVE PAIGE >100,000 cfu/ml ,Klebsiella Pneumonia.  H/O work up for neuroendocrine tumor vs VIPoma.  AES consulted for EUS and potential biopsy given that her PET scan showed uptake but no mass was seen on CT pancreas protocol. discontinued eliquis.  EUS - sleeve gastrectomy was found, characterized by healthy appearing mucosa. Widely patent duodenoenterostomy, characterized   by healthy appearing mucosa was found. few cystic lesions were seen in the pancreatic  head, genu of the pancreas and pancreatic body highly suspicious  for a branched intraductal papillary mucinous neoplasm.small for sampling. pancreatic head and pancreatic tail showed no mass. Awaiting further recs from oncology. Started octreotide. ID consulted for positive cryptosporidium antigen. nitazoxinide 500mg bid for 3 days. Repeat cryptosporidium Ag negative Hb at 6.7 Transfusion with 1 unit of PRBC with response.  Started Immodium.     11/11- UTI on levoquin, on immodium, octeotride, bicarb, suspicious tissue unable to be biopsied during EUS, colon bx neg, s/p transfusion. On apixaban for DVT. /61   Pulse 76  SpO2 95% on room air , eating 50%, good UO 1000cc, 6 BMs yesterday. Waiting on oncology recs. Discussed case w Dr. Larson.  Pt in shower doing ADLs, walking independently.    11/12- Oncology reports  Low suspicion for neuroendocrine tumor at this time. Lab for  VIPoma pending. Given this and patient with little improvement on octreotide, will discontinue octreotide. Will consult GI to help evaluate other causes for chronic diarrhea and elevated chromogranin, which may increase with irritable bowel disease, chronic hepatitis, liver failure, inflammatory diseases, neuroendocrine tumors, small cell lung tumors, and renal failure. Hx of gastric bypass.  Pt reported > 10 BMs, nurses reported 6. Eating 75% of meals. CT chest w contrast ordered.    11/13- 4 BMs overnight, 6 this am. Developed sniffles and nasal congestion. Appreciate GI input. BP 97/55    Pulse 76  , metamucil changed to questran.      Interval History: see above    Review of Systems   Constitutional:  Negative for activity change, chills, fatigue and fever.   HENT:  Negative for congestion, nosebleeds and trouble swallowing.    Respiratory:  Negative for apnea, cough, choking, chest tightness and shortness of breath.    Cardiovascular:  Negative for chest pain and leg swelling.   Gastrointestinal:  Positive for diarrhea. Negative for abdominal distention, abdominal pain, constipation, nausea and vomiting.   Genitourinary:  Negative for decreased urine volume, difficulty urinating, dysuria and frequency.   Musculoskeletal:  Negative for arthralgias, back pain, joint swelling, neck pain and neck stiffness.   Skin:  Negative for rash and wound.   Neurological:  Negative for dizziness, seizures, syncope, weakness, light-headedness, numbness and headaches.   Psychiatric/Behavioral:  Negative for agitation, behavioral problems, confusion, hallucinations, self-injury and sleep disturbance. The patient is not nervous/anxious.    Objective:     Vital Signs (Most Recent):  Temp: 97.5 °F (36.4 °C) (11/13/22 0725)  Pulse: 83 (11/13/22 0730)  Resp: 20 (11/13/22 0425)  BP: (!) 97/55 (11/13/22 0725)  SpO2: 99 % (11/13/22 0730) Vital Signs (24h Range):  Temp:  [97.4 °F (36.3 °C)-97.9 °F (36.6 °C)] 97.5 °F (36.4 °C)  Pulse:   [] 83  Resp:  [16-20] 20  SpO2:  [76 %-100 %] 99 %  BP: ()/(53-80) 97/55     Weight: 88.5 kg (195 lb)  Body mass index is 32.45 kg/m².    Intake/Output Summary (Last 24 hours) at 11/13/2022 0731  Last data filed at 11/13/2022 0024  Gross per 24 hour   Intake 2199.63 ml   Output --   Net 2199.63 ml        Physical Exam  Constitutional:       General: She is not in acute distress.     Appearance: Normal appearance. She is obese. She is ill-appearing.   HENT:      Head: Normocephalic and atraumatic.      Nose: Nose normal.      Mouth/Throat:      Mouth: Mucous membranes are moist.   Eyes:      General: No scleral icterus.     Extraocular Movements: Extraocular movements intact.      Pupils: Pupils are equal, round, and reactive to light.   Cardiovascular:      Rate and Rhythm: Normal rate and regular rhythm.      Pulses: Normal pulses.      Heart sounds: Normal heart sounds.   Pulmonary:      Effort: Pulmonary effort is normal.      Breath sounds: Normal breath sounds. No wheezing or rhonchi.   Chest:      Chest wall: No tenderness.   Abdominal:      General: Abdomen is flat. Bowel sounds are normal. There is no distension.      Palpations: Abdomen is soft. There is no mass.      Tenderness: There is no abdominal tenderness. There is no right CVA tenderness, left CVA tenderness, guarding or rebound.   Musculoskeletal:         General: No swelling, tenderness, deformity or signs of injury. Normal range of motion.      Cervical back: Normal range of motion and neck supple. No rigidity or tenderness.   Skin:     General: Skin is warm and dry.      Coloration: Skin is not jaundiced or pale.      Findings: No erythema or rash.   Neurological:      General: No focal deficit present.      Mental Status: She is alert and oriented to person, place, and time. Mental status is at baseline.      Cranial Nerves: No cranial nerve deficit.      Motor: No weakness.   Psychiatric:         Mood and Affect: Mood normal.          Behavior: Behavior normal.         Thought Content: Thought content normal.         Judgment: Judgment normal.       Significant Labs: All pertinent labs within the past 24 hours have been reviewed.  CBC:   Recent Labs   Lab 11/12/22  1253   WBC 4.86   HGB 8.8*   HCT 26.4*          CMP:   Recent Labs   Lab 11/12/22  1253      K 3.5   *   CO2 16*      BUN 6*   CREATININE 1.2   CALCIUM 7.5*   PROT 4.4*   ALBUMIN 1.4*   BILITOT 0.5   ALKPHOS 80   AST 45*   ALT 8*   ANIONGAP 9         Significant Imaging: I have reviewed all pertinent imaging results/findings within the past 24 hours.      Assessment/Plan:      * Carcinoid syndrome related chronic diarrhea   Diagnosis not confirmed with biopsy:   67-year-old past medical history of recently diagnosed carcinoid syndrome,  having diarrhea intermittently since August 2022 more recently diarrhea has become worse over past few days -several episodes daily  associated nausea with intermittent vomiting. symptoms concerned likely though to be secondary to carcinoid syndrome patient evaluated by Heme-Onc at [facility] with PET scan done 10/18 - Focal nodular foci of radiotracer uptake at the pancreatic tail and near the region of the pancreatic head above liver background activity could indicate somatostatin receptor avid lesions, but no corresponding mass is seen on CT portion of the study.  Recommend follow-up contrast enhanced pancreas protocol CT to further assess. 5HIAA levels on 10/21 WNL .     CT AP W contrast done 11/4 shows No pancreatic lesion and Enterocolitis,. recent EGD -Non-bleeding gastric ulcers with no stigmata of  bleeding. Biopsied. Treated with a monopolar probe.     Colonoscopy with biopsy -No significant pathologic abnormality. No morphologic evidence of active inflammatory bowel disease, microscopic colitis, or neoplasia. Patient was  referred to [facility] for further workup and has scheduled appointment  at Heme[facility]Onc at [facility] this upcoming week.    11/7 Chromogranin A elevated at 2000s.  stool studies in process. C diff negative. Hematology working her up for neuroendocrine tumor vs VIPoma.  AES consulted for EUS and potential biopsy given that her PET scan showed uptake but no mass was seen on CT pancreas protocol.discontinued eliquis.  plan for EUS on Wednesday.  started and discontinued octreotide since it failed to help  11/9 repeat cryptosporidium Ag negative  EUS today - sleeve gastrectomy was found, characterized by  healthy appearing mucosa.Widely patent duodenoenterostomy, characterized   by healthy appearing mucosa was found. few cystic lesions were seen in the pancreatic  head, genu of the pancreas and pancreatic body highly suspicious  for a branched intraductal papillary mucinous neoplasm.small for sampling. pancreatic head and pancreatic tail showed no mass.   11/10 - Oncology to see and review EUS; started immodium   11/11- on octeotride, immodium, 6 BMs yesterday, colon bx neg, repeat cryptospor neg. Consider biopsy per surgery? Waiting for oncology recs.  Add metamucil  (low dose) to absorb excess water.  11/13 - waiting on VIPoma labs. Discussed pt condition and plan w her DTR. Discuss diagnostic approach with GI.  CT chest pendinbg      Chronic diarrhea  Uncertain etiology  Concern for carcinoid syndrome with high chromogranin level, but no biopsy souce can be found.   CT a/p - Partial small bowel resection.  There are some thickened segments of large and small bowel, some with adjacent mesenteric edema.  5HIAA levels on 10/21 WNL .  CT AP W contrast done 11/4 shows No pancreatic lesion and Enterocolitis,.   recent EGD -Non-bleeding gastric ulcers with no stigmata of  bleeding. Biopsied. Treated with a monopolar probe.   colonoscopy with biopsy -No significant pathologic abnormality. No morphologic evidence of active inflammatory bowel disease, microscopic colitis, or  neoplasia  Colon biopsies neg 10/2022  Gastric biopsy 10/2022 - neg for h pylori  Kid bx 10/2022 - no amyloid, + diab nephropathy    Add CT chest- will need contrast for tumor eval. RL x 500 cc.   Consult GI- Is Surgery consult for pancreatic biopsy indicated? Any other eval for diarrhea?   Per GI:   Obtain fecal elastase = ordered  - Can check serum tryptase for mastocytosis - ordered  -  Could consider empiric cholestyramine for possible bile acid diarrhea as well- done ( stopped metamucil)    - If pending workup negative, consider hydrogen breath testing vs empiric rifaximin trial for SIBO although less likely.      UTI (urinary tract infection)   urinalysis positive . Culture, Urine pending  continue with anitbiotic IV ceftriaxone  11/8 UC with klebsiella; s/p x5 doses CTX - sensitive    11/11- on levoquin    Chronic deep vein thrombosis (DVT): right common femoral vein  -DVT sonogram 8/22 and 10/22 negative for DVT  -11/7 DVT sonogram -Nonocclusive thrombus/DVT in the superior-mid right femoral vein.continue eliquis . S/p IVC filter   -Restarted eliquis following EUS  11/13- stable    Orthostatic hypotension  11/7 orthostatics + with drop in SBP to 80s on standing. RL bolus 1L and 75ml/h .SBP in 70-80s this PM on IVF. NS bolus 500ml x1. critical care consulted   11/11- /61 . May need intermittent bolus IVF due to volume loss with diarrhea  11/12- good UO. Walking to BR. Eating 75%    Hypokalemia    - Patient with potassium   Recent Labs   Lab 11/10/22  0408 11/11/22  0316 11/12/22  1253   K 3.6 3.1* 3.5   . Replaced 11/11.  Monitor  11/13- klyte 50 x one        Essential hypertension  Chronic, controlled.  Latest blood pressure and vitals reviewed-   Temp:  [97.4 °F (36.3 °C)-97.9 °F (36.6 °C)]   Pulse:  []   Resp:  [16-20]   BP: ()/(53-80)   SpO2:  [76 %-100 %] .   Home meds for hypertension were reviewed and hold furosemide and metoprolol for now as with diarrhea. PRN meds if BP> 180/110 mm  HG  11/11- on metoprolol 25 bid      Stage 3a chronic kidney disease  seems to be at baseline. Creatine stable for now. BMP reviewed- noted Estimated Creatinine Clearance: 50 mL/min (based on SCr of 1.2 mg/dL). according to latest data. Monitor UOP and serial BMP and adjust therapy as needed. Renally dose meds.    Recent Labs   Lab 11/10/22  0408 11/11/22  0316 11/12/22  1253   BUN 7* 6* 6*   CREATININE 1.1 1.1 1.2          Chronic heart failure with preserved ejection fraction (HFpEF) NICM NYHA2   Patient admitted without  signs and symptoms of CHF exacerbation    Results for orders placed or performed during the hospital encounter of 11/06/22   Brain natriuretic peptide   Result Value Ref Range    BNP 82 0 - 99 pg/mL   . Telemetry monitoring. Strict input/ Output monitor, Daily weights.    denies chestpain.  Obtain serial troponins.  Cardiac Enzymes trend  Recent Labs   Lab 11/06/22  1251 11/06/22  1435   TROPONINI 0.016 0.019     No results found for this or any previous visit.  echo 10/7/22 left ventricle is normal in size. Global left ventricular   systolic function is normal. The left ventricular ejection fraction is   55%. Left ventricular diastolic function is abnormal (stage I impaired   relaxation). Noted left ventricular hypertrophy. Concentric left   ventricular hypertrophy is present. It is mild.     4. Mild (1+) tricuspid regurgitation.   5. The right ventricle is normal in size. Right ventricular systolic   function is normal.      11/11- on metoprolol, holding lasix due to excessive  diarrhea        Hypophosphatemia  Replaced  11/11 - Phos 2.4, neutraphos bid x 10 days      Hypomagnesemia  replaced      Hypocalcemia  11/6   corrected calcium of 8.4.  PTH elevated 114    11/13- lucy 7.5, alb 1,4      Dizziness  likely secondary to diarrhea. orthostasis. IV hydration      Anemia of chronic disease    Patient's with Normocytic anemia.. Hemoglobin stable. Etiology likely due to chronic disease .  Current  CBC reviewed-    Recent Labs   Lab 11/10/22  0408 11/11/22  0316 11/12/22  1253   HGB 8.0* 7.9* 8.8*    Monitor CBC and transfuse if H/H drops below 7/21.  11/9 concern for blood loss anemia.  Hb at 6.7 Transfusion with 1 unit of PRBC       Class 1 obesity due to excess calories with serious comorbidity and body mass index (BMI) of 32.0 to 32.9 in adult  Body mass index is 32.45 kg/m². Morbid obesity complicates all aspects of disease management from diagnostic modalities to treatment. Weight loss encouraged       Hypotension  See above      Seizures  continue with keppra BID      Cryptosporidium exposure  Repeat neg, colon biopsies neg.       Severe protein-calorie malnutrition   Nutrition consulted. Most recent weight and BMI monitored-   Alb 1.4  malabsorption suspected                        Measurements:  Wt Readings from Last 1 Encounters:   11/08/22 88.5 kg (195 lb)   Body mass index is 32.45 kg/m².    Recommendations:      Patient has been screened and assessed by RD. RD will follow patient.      VTE Risk Mitigation (From admission, onward)         Ordered     apixaban tablet 5 mg  2 times daily         11/09/22 1916     IP VTE HIGH RISK PATIENT  Once         11/06/22 1614     Place sequential compression device  Until discontinued         11/06/22 1614                Discharge Planning   MELYSSA: 11/17/2022     Code Status: Full Code   Is the patient medically ready for discharge?: No    Reason for patient still in hospital (select all that apply): Patient trending condition  Discharge Plan A: Home with family   Discharge Delays: None known at this time      Sonia Gramajo MD  Department of Hospital Medicine   [facility] - Oncology ([facility])

## 2022-11-13 NOTE — PLAN OF CARE
AAO x4. VSS. BM x4. Pt denies pain, n/v. Pt with nonskid footwear on with bed in lowest position and locked with bed rails up x2. Pt instructed to call prior to getting OOB; bed alarm on. Pt with call light within reach and verbalized understanding. Will continue to monitor pt.

## 2022-11-13 NOTE — ASSESSMENT & PLAN NOTE
seems to be at baseline. Creatine stable for now. BMP reviewed- noted Estimated Creatinine Clearance: 50 mL/min (based on SCr of 1.2 mg/dL). according to latest data. Monitor UOP and serial BMP and adjust therapy as needed. Renally dose meds.    Recent Labs   Lab 11/10/22  0408 11/11/22  0316 11/12/22  1253   BUN 7* 6* 6*   CREATININE 1.1 1.1 1.2

## 2022-11-13 NOTE — ASSESSMENT & PLAN NOTE
Uncertain etiology  Concern for carcinoid syndrome with high chromogranin level, but no biopsy souce can be found.   CT a/p - Partial small bowel resection.  There are some thickened segments of large and small bowel, some with adjacent mesenteric edema.  5HIAA levels on 10/21 WNL .  CT AP W contrast done 11/4 shows No pancreatic lesion and Enterocolitis,.   recent EGD -Non-bleeding gastric ulcers with no stigmata of  bleeding. Biopsied. Treated with a monopolar probe.   colonoscopy with biopsy -No significant pathologic abnormality. No morphologic evidence of active inflammatory bowel disease, microscopic colitis, or neoplasia  Colon biopsies neg 10/2022  Gastric biopsy 10/2022 - neg for h pylori  Kid bx 10/2022 - no amyloid, + diab nephropathy    Add CT chest- will need contrast for tumor eval. RL x 500 cc.   Consult GI- Is Surgery consult for pancreatic biopsy indicated? Any other eval for diarrhea?   Per GI:   Obtain fecal elastase = ordered  - Can check serum tryptase for mastocytosis - ordered  -  Could consider empiric cholestyramine for possible bile acid diarrhea as well- done ( stopped metamucil)    - If pending workup negative, consider hydrogen breath testing vs empiric rifaximin trial for SIBO although less likely.

## 2022-11-13 NOTE — ASSESSMENT & PLAN NOTE
- Patient with potassium   Recent Labs   Lab 11/10/22  0408 11/11/22  0316 11/12/22  1253   K 3.6 3.1* 3.5   . Replaced 11/11.  Monitor  11/13- mihaela 50 x one

## 2022-11-13 NOTE — PLAN OF CARE
Pt able to get to bedside commode with little assistance.  Had 4 bowel movements overnight.  More formed, no diarrhea on shift.  Feels that she is having less urgency and incontinence with bowl movements.  AAOx4.  VSS, afebrile.  Call light within reach, bed in lowest position, wheels locked.  Educated on fall precautions.

## 2022-11-13 NOTE — ASSESSMENT & PLAN NOTE
Patient's with Normocytic anemia.. Hemoglobin stable. Etiology likely due to chronic disease .  Current CBC reviewed-    Recent Labs   Lab 11/10/22  0408 11/11/22  0316 11/12/22  1253   HGB 8.0* 7.9* 8.8*    Monitor CBC and transfuse if H/H drops below 7/21.  11/9 concern for blood loss anemia.  Hb at 6.7 Transfusion with 1 unit of PRBC

## 2022-11-13 NOTE — PLAN OF CARE
AAO x4. VSS. BM x4. CT completed this afternoon. Pt with nonskid footwear on with bed in lowest position and locked with bed rails up x2. Pt instructed to call prior to getting OOB; bed alarm on. Pt with call light within reach and verbalized understanding. Will continue to monitor pt.

## 2022-11-13 NOTE — ASSESSMENT & PLAN NOTE
Nutrition consulted. Most recent weight and BMI monitored-   Alb 1.4  malabsorption suspected                        Measurements:  Wt Readings from Last 1 Encounters:   11/08/22 88.5 kg (195 lb)   Body mass index is 32.45 kg/m².    Recommendations:      Patient has been screened and assessed by RD. RD will follow patient.

## 2022-11-13 NOTE — ASSESSMENT & PLAN NOTE
Diagnosis not confirmed with biopsy:   67-year-old past medical history of recently diagnosed carcinoid syndrome,  having diarrhea intermittently since August 2022 more recently diarrhea has become worse over past few days -several episodes daily  associated nausea with intermittent vomiting. symptoms concerned likely though to be secondary to carcinoid syndrome patient evaluated by Heme-Onc at [facility] with PET scan done 10/18 - Focal nodular foci of radiotracer uptake at the pancreatic tail and near the region of the pancreatic head above liver background activity could indicate somatostatin receptor avid lesions, but no corresponding mass is seen on CT portion of the study.  Recommend follow-up contrast enhanced pancreas protocol CT to further assess. 5HIAA levels on 10/21 WNL .     CT AP W contrast done 11/4 shows No pancreatic lesion and Enterocolitis,. recent EGD -Non-bleeding gastric ulcers with no stigmata of  bleeding. Biopsied. Treated with a monopolar probe.     Colonoscopy with biopsy -No significant pathologic abnormality. No morphologic evidence of active inflammatory bowel disease, microscopic colitis, or neoplasia. Patient was  referred to [facility] for further workup and has scheduled appointment at [facility] at [facility] this upcoming week.    11/7 Chromogranin A elevated at 2000s.  stool studies in process. C diff negative. Hematology working her up for neuroendocrine tumor vs VIPoma.  AES consulted for EUS and potential biopsy given that her PET scan showed uptake but no mass was seen on CT pancreas protocol.discontinued eliquis.  plan for EUS on Wednesday.  started and discontinued octreotide since it failed to help  11/9 repeat cryptosporidium Ag negative  EUS today - sleeve gastrectomy was found, characterized by  healthy appearing mucosa.Widely patent duodenoenterostomy, characterized   by healthy appearing mucosa was found. few cystic lesions were seen in  the pancreatic  head, genu of the pancreas and pancreatic body highly suspicious  for a branched intraductal papillary mucinous neoplasm.small for sampling. pancreatic head and pancreatic tail showed no mass.   11/10 - Oncology to see and review EUS; started immodium   11/11- on octeotride, immodium, 6 BMs yesterday, colon bx neg, repeat cryptospor neg. Consider biopsy per surgery? Waiting for oncology recs.  Add metamucil  (low dose) to absorb excess water.  11/13 - waiting on VIPoma labs. Discussed pt condition and plan w her DTR. Discuss diagnostic approach with GI.  CT chest pendinbg

## 2022-11-13 NOTE — SUBJECTIVE & OBJECTIVE
Interval History: see above    Review of Systems   Constitutional:  Negative for activity change, chills, fatigue and fever.   HENT:  Negative for congestion, nosebleeds and trouble swallowing.    Respiratory:  Negative for apnea, cough, choking, chest tightness and shortness of breath.    Cardiovascular:  Negative for chest pain and leg swelling.   Gastrointestinal:  Positive for diarrhea. Negative for abdominal distention, abdominal pain, constipation, nausea and vomiting.   Genitourinary:  Negative for decreased urine volume, difficulty urinating, dysuria and frequency.   Musculoskeletal:  Negative for arthralgias, back pain, joint swelling, neck pain and neck stiffness.   Skin:  Negative for rash and wound.   Neurological:  Negative for dizziness, seizures, syncope, weakness, light-headedness, numbness and headaches.   Psychiatric/Behavioral:  Negative for agitation, behavioral problems, confusion, hallucinations, self-injury and sleep disturbance. The patient is not nervous/anxious.    Objective:     Vital Signs (Most Recent):  Temp: 97.5 °F (36.4 °C) (11/13/22 0725)  Pulse: 83 (11/13/22 0730)  Resp: 20 (11/13/22 0425)  BP: (!) 97/55 (11/13/22 0725)  SpO2: 99 % (11/13/22 0730) Vital Signs (24h Range):  Temp:  [97.4 °F (36.3 °C)-97.9 °F (36.6 °C)] 97.5 °F (36.4 °C)  Pulse:  [] 83  Resp:  [16-20] 20  SpO2:  [76 %-100 %] 99 %  BP: ()/(53-80) 97/55     Weight: 88.5 kg (195 lb)  Body mass index is 32.45 kg/m².    Intake/Output Summary (Last 24 hours) at 11/13/2022 0731  Last data filed at 11/13/2022 0024  Gross per 24 hour   Intake 2199.63 ml   Output --   Net 2199.63 ml        Physical Exam  Constitutional:       General: She is not in acute distress.     Appearance: Normal appearance. She is obese. She is ill-appearing.   HENT:      Head: Normocephalic and atraumatic.      Nose: Nose normal.      Mouth/Throat:      Mouth: Mucous membranes are moist.   Eyes:      General: No scleral icterus.      Extraocular Movements: Extraocular movements intact.      Pupils: Pupils are equal, round, and reactive to light.   Cardiovascular:      Rate and Rhythm: Normal rate and regular rhythm.      Pulses: Normal pulses.      Heart sounds: Normal heart sounds.   Pulmonary:      Effort: Pulmonary effort is normal.      Breath sounds: Normal breath sounds. No wheezing or rhonchi.   Chest:      Chest wall: No tenderness.   Abdominal:      General: Abdomen is flat. Bowel sounds are normal. There is no distension.      Palpations: Abdomen is soft. There is no mass.      Tenderness: There is no abdominal tenderness. There is no right CVA tenderness, left CVA tenderness, guarding or rebound.   Musculoskeletal:         General: No swelling, tenderness, deformity or signs of injury. Normal range of motion.      Cervical back: Normal range of motion and neck supple. No rigidity or tenderness.   Skin:     General: Skin is warm and dry.      Coloration: Skin is not jaundiced or pale.      Findings: No erythema or rash.   Neurological:      General: No focal deficit present.      Mental Status: She is alert and oriented to person, place, and time. Mental status is at baseline.      Cranial Nerves: No cranial nerve deficit.      Motor: No weakness.   Psychiatric:         Mood and Affect: Mood normal.         Behavior: Behavior normal.         Thought Content: Thought content normal.         Judgment: Judgment normal.       Significant Labs: All pertinent labs within the past 24 hours have been reviewed.  CBC:   Recent Labs   Lab 11/12/22  1253   WBC 4.86   HGB 8.8*   HCT 26.4*          CMP:   Recent Labs   Lab 11/12/22  1253      K 3.5   *   CO2 16*      BUN 6*   CREATININE 1.2   CALCIUM 7.5*   PROT 4.4*   ALBUMIN 1.4*   BILITOT 0.5   ALKPHOS 80   AST 45*   ALT 8*   ANIONGAP 9         Significant Imaging: I have reviewed all pertinent imaging results/findings within the past 24 hours.

## 2022-11-13 NOTE — ASSESSMENT & PLAN NOTE
Chronic, controlled.  Latest blood pressure and vitals reviewed-   Temp:  [97.4 °F (36.3 °C)-97.9 °F (36.6 °C)]   Pulse:  []   Resp:  [16-20]   BP: ()/(53-80)   SpO2:  [76 %-100 %] .   Home meds for hypertension were reviewed and hold furosemide and metoprolol for now as with diarrhea. PRN meds if BP> 180/110 mm HG  11/11- on metoprolol 25 bid

## 2022-11-13 NOTE — ASSESSMENT & PLAN NOTE
11/7 orthostatics + with drop in SBP to 80s on standing. RL bolus 1L and 75ml/h .SBP in 70-80s this PM on IVF. NS bolus 500ml x1. critical care consulted   11/11- /61 . May need intermittent bolus IVF due to volume loss with diarrhea  11/12- good UO. Walking to [place]. Eating 75%

## 2022-11-14 PROCEDURE — 87086 URINE CULTURE/COLONY COUNT: CPT | Performed by: HOSPITALIST

## 2022-11-14 PROCEDURE — 25000003 PHARM REV CODE 250: Performed by: INTERNAL MEDICINE

## 2022-11-14 PROCEDURE — 94761 N-INVAS EAR/PLS OXIMETRY MLT: CPT

## 2022-11-14 PROCEDURE — 25000003 PHARM REV CODE 250: Performed by: HOSPITALIST

## 2022-11-14 PROCEDURE — 99233 SBSQ HOSP IP/OBS HIGH 50: CPT | Mod: ,,, | Performed by: HOSPITALIST

## 2022-11-14 PROCEDURE — 20600001 HC STEP DOWN PRIVATE ROOM

## 2022-11-14 PROCEDURE — 81001 URINALYSIS AUTO W/SCOPE: CPT | Performed by: HOSPITALIST

## 2022-11-14 PROCEDURE — 99233 PR SUBSEQUENT HOSPITAL CARE,LEVL III: ICD-10-PCS | Mod: ,,, | Performed by: HOSPITALIST

## 2022-11-14 RX ORDER — HYDROCODONE BITARTRATE AND ACETAMINOPHEN 7.5; 325 MG/1; MG/1
1 TABLET ORAL EVERY 6 HOURS PRN
Status: DISCONTINUED | OUTPATIENT
Start: 2022-11-14 | End: 2022-11-14

## 2022-11-14 RX ORDER — HYDROCODONE BITARTRATE AND ACETAMINOPHEN 7.5; 325 MG/1; MG/1
1 TABLET ORAL EVERY 6 HOURS PRN
Status: DISCONTINUED | OUTPATIENT
Start: 2022-11-14 | End: 2022-11-25 | Stop reason: HOSPADM

## 2022-11-14 RX ADMIN — MICONAZOLE NITRATE 2 % TOPICAL POWDER: at 09:11

## 2022-11-14 RX ADMIN — POTASSIUM & SODIUM PHOSPHATES POWDER PACK 280-160-250 MG 1 PACKET: 280-160-250 PACK at 09:11

## 2022-11-14 RX ADMIN — FERROUS SULFATE TAB 325 MG (65 MG ELEMENTAL FE) 1 EACH: 325 (65 FE) TAB at 09:11

## 2022-11-14 RX ADMIN — ATORVASTATIN CALCIUM 40 MG: 20 TABLET, FILM COATED ORAL at 09:11

## 2022-11-14 RX ADMIN — PANTOPRAZOLE SODIUM 40 MG: 40 TABLET, DELAYED RELEASE ORAL at 05:11

## 2022-11-14 RX ADMIN — LOPERAMIDE HYDROCHLORIDE 2 MG: 2 CAPSULE ORAL at 09:11

## 2022-11-14 RX ADMIN — CHOLESTYRAMINE 4 G: 4 POWDER, FOR SUSPENSION ORAL at 12:11

## 2022-11-14 RX ADMIN — HYDROCODONE BITARTRATE AND ACETAMINOPHEN 1 TABLET: 7.5; 325 TABLET ORAL at 02:11

## 2022-11-14 RX ADMIN — APIXABAN 5 MG: 5 TABLET, FILM COATED ORAL at 09:11

## 2022-11-14 RX ADMIN — SODIUM BICARBONATE 650 MG TABLET 1300 MG: at 09:11

## 2022-11-14 RX ADMIN — CHOLECALCIFEROL TAB 25 MCG (1000 UNIT) 2000 UNITS: 25 TAB at 09:11

## 2022-11-14 RX ADMIN — ASPIRIN 81 MG: 81 TABLET, COATED ORAL at 09:11

## 2022-11-14 RX ADMIN — METOPROLOL TARTRATE 25 MG: 25 TABLET, FILM COATED ORAL at 09:11

## 2022-11-14 RX ADMIN — POTASSIUM BICARBONATE 50 MEQ: 978 TABLET, EFFERVESCENT ORAL at 09:11

## 2022-11-14 RX ADMIN — LOPERAMIDE HYDROCHLORIDE 2 MG: 2 CAPSULE ORAL at 02:11

## 2022-11-14 NOTE — ASSESSMENT & PLAN NOTE
Patient admitted without  signs and symptoms of CHF exacerbation    Results for orders placed or performed during the hospital encounter of 11/06/22   Brain natriuretic peptide   Result Value Ref Range    BNP 82 0 - 99 pg/mL   . Telemetry monitoring. Strict input/ Output monitor, Daily weights.    denies chestpain.  Obtain serial troponins.  Cardiac Enzymes trend  No results for input(s): CPK, CPKMB, MB, TROPONINI in the last 168 hours.  No results found for this or any previous visit.  echo 10/7/22 left ventricle is normal in size. Global left ventricular   systolic function is normal. The left ventricular ejection fraction is   55%. Left ventricular diastolic function is abnormal (stage I impaired   relaxation). Noted left ventricular hypertrophy. Concentric left   ventricular hypertrophy is present. It is mild.     4. Mild (1+) tricuspid regurgitation.   5. The right ventricle is normal in size. Right ventricular systolic   function is normal.      11/11- on metoprolol, holding lasix due to excessive  diarrhea

## 2022-11-14 NOTE — HPI
67F presents with intractable diarrhea since August 2022 associated with 40 lb weight loss.  Diarrhea is yellow and thin. Continues throughout the day. No temporal associations. Nothing makes it better or worse.  No blood in BM.  Not painful. Has occasional cramps in lower quadrants.  She has occasional nausea and vomiting.  Has never had diarrhea like this before.  No flushing, sweats.   No sick contacts.    She has been worked up outpatient so far.   She had C-scope with negative biopsy and EGD with mild gastritis.    Infectious workup has so far been negative. No c diff or cryptosporidium. ID has been consulted. Stool studies still pending.    Heme-onc consulted.  She had negative 5-HIAA. Chromogramin elevated but also on PPI, which can falsely elevate.  Pending VIPoma lab    She has had PET-DOTATATE which showed suspicious activity in tail and head of the pancreas without correlate on CT.  Dedicated panc CT also did not show a lesion.  She had EUS without correlate found. 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    She was started on octreotide without improvement and it was discontinued.  She has improved on loperamide.    No family history of IBD or other diarrheal disorders.    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 She does not have DM.    She has history of open brittany through midline in 1978, sleeve bypass in 2006, total hysterectomy in 2012, knee replacement in 2015 and back surgery in 2017.

## 2022-11-14 NOTE — PROGRESS NOTES
Armando Jenkins - Oncology ([facility])  [facility] Medicine  Progress Note    Patient Name: Cherry Santiago  MRN: [id number]  Patient Class: IP- Inpatient   Admission Date: 11/6/2022  Length of Stay: 7 days  Attending Physician: Sonia Gramajo MD  Primary Care Provider: Amarilys Romero MD        Subjective:     Principal Problem:Diarrhea concurrent with and due to carcinoid syndrome        HPI:  Cherry Pozo is a 67-year-old past medical history of recently diagnosed carcinoid syndrome, AFib on Eliquis, CHF, arthritis, GERD, glaucoma, hyperlipidemia, hypertension, prior MI, TIA presenting with persistent diarrhea.      AAOx 3 . accompanied by the daughter at the bedside. Patient mentions having diarrhea intermittently since August 2022 more recently diarrhea has become worse over past few days -several episodes daily  associated nausea with intermittent vomiting. c/o lower cramping abdominal pain - 5/10.   symptoms concerned likely though to be secondary to carcinoid syndrome patient evaluated by Heme-Onc at [facility] with PET scan done 10/18 - Focal nodular foci of radiotracer uptake at the pancreatic tail and near the region of the pancreatic head above liver background activity could indicate somatostatin receptor avid lesions, but no corresponding mass is seen on CT portion of the study.  Recommend follow-up contrast enhanced pancreas protocol CT to further assess. 5HIAA levels on 10/21 WNL . CT AP W contrast done 11/4 shows No pancreatic lesion and Enterocolitis,. recent EGD -Non-bleeding gastric ulcers with no stigmata of  bleeding. Biopsied. Treated with a monopolar probe. colonoscopy with biopsy -No significant pathologic abnormality. No morphologic evidence of active inflammatory bowel disease, microscopic colitis, or neoplasia. Patient was  referred to [facility] for further workup and has scheduled appointment at Heme-Onc at [facility] this upcoming week. . c/o   dizziness,  and shortness of breath on minimal exertion    ER course -electrolyte derangement with hypokalemia to 2.4, hypomagsemia of 1.5 . replaced . UA + started on IV cefriaxone       Overview/Hospital Course:  After  admission - chromogranin A elevated at 2000s.  stool studies in process. DVT sonogram. Nonocclusive thrombus/DVT in the superior-mid right femoral vein. K replaced.  corrected calcium of 8.4.  PTH elevated 114 .started on sodium bicarbonate 1300mg BID .orthostatics + with drop in SBP to 80s on standing. RL bolus 1L and 75ml/h . C diff negative. UC with GRAM NEGATIVE PAIGE >100,000 cfu/ml ,Klebsiella Pneumonia.  H/O work up for neuroendocrine tumor vs VIPoma.  AES consulted for EUS and potential biopsy given that her PET scan showed uptake but no mass was seen on CT pancreas protocol. discontinued eliquis.  EUS - sleeve gastrectomy was found, characterized by healthy appearing mucosa. Widely patent duodenoenterostomy, characterized   by healthy appearing mucosa was found. few cystic lesions were seen in the pancreatic  head, genu of the pancreas and pancreatic body highly suspicious  for a branched intraductal papillary mucinous neoplasm.small for sampling. pancreatic head and pancreatic tail showed no mass. Awaiting further recs from oncology. Started octreotide. ID consulted for positive cryptosporidium antigen. nitazoxinide 500mg bid for 3 days. Repeat cryptosporidium Ag negative Hb at 6.7 Transfusion with 1 unit of PRBC with response.  Started Immodium.     11/11- UTI on levoquin, on immodium, octeotride, bicarb, suspicious tissue unable to be biopsied during EUS, colon bx neg, s/p transfusion. On apixaban for DVT. /61   Pulse 76  SpO2 95% on room air , eating 50%, good UO 1000cc, 6 BMs yesterday. Waiting on oncology recs. Discussed case w Dr. Larson.  Pt in shower doing ADLs, walking independently.    11/12- Oncology reports  Low suspicion for neuroendocrine tumor at this time. Lab for  VIPoma pending. Given this and patient with little improvement on octreotide, will discontinue octreotide. Will consult GI to help evaluate other causes for chronic diarrhea and elevated chromogranin, which may increase with irritable bowel disease, chronic hepatitis, liver failure, inflammatory diseases, neuroendocrine tumors, small cell lung tumors, and renal failure. Hx of gastric bypass.  Pt reported > 10 BMs, nurses reported 6. Eating 75% of meals. CT chest w contrast ordered.  11/13- 4 BMs overnight, 6 this am. Developed sniffles and nasal congestion. Appreciate GI input. BP 97/55    Pulse 76  , metamucil changed to questran.    11/14- diarrrhea slowing on Questran, GI considering re-approach to bx per EUS and small bowel biopsies. Lab pending: VIPoma, somatostatin, truptase, fecal panc elastace.  CT chest- Small bilateral pleural effusions, trace pericardial fluid, anasarca.  2. Subcentimeter nodules bilaterally.  Oncologic considerations will determine, for imaging surveillance.  3. Significant hepatic steatosis.  Consider correlation with LFTs.  4. Status post sleeve gastrectomy.  Moderate-sized hiatal hernia.  BP 94/55   Pulse 67   Temp 97.9 °F (36.6 °C) (Oral)   Resp 16,  on room air.   - 3 BMs so far thia am. ( Improvement)      Interval History: see above    Review of Systems   Constitutional:  Negative for activity change, chills, fatigue and fever.   HENT:  Negative for congestion, nosebleeds and trouble swallowing.    Respiratory:  Negative for apnea, cough, choking, chest tightness and shortness of breath.    Cardiovascular:  Negative for chest pain and leg swelling.   Gastrointestinal:  Positive for diarrhea. Negative for abdominal distention, abdominal pain, constipation, nausea and vomiting.   Genitourinary:  Negative for decreased urine volume, difficulty urinating, dysuria and frequency.   Musculoskeletal:  Negative for arthralgias, back pain, joint swelling, neck pain and neck stiffness.    Skin:  Negative for rash and wound.   Neurological:  Negative for dizziness, seizures, syncope, weakness, light-headedness, numbness and headaches.   Psychiatric/Behavioral:  Negative for agitation, behavioral problems, confusion, hallucinations, self-injury and sleep disturbance. The patient is not nervous/anxious.    Objective:     Vital Signs (Most Recent):  Temp: 97.9 °F (36.6 °C) (11/14/22 0731)  Pulse: 67 (11/14/22 0733)  Resp: 16 (11/14/22 0731)  BP: (!) 94/55 (11/14/22 0731)  SpO2: 100 % (11/14/22 0733) Vital Signs (24h Range):  Temp:  [96.7 °F (35.9 °C)-98.3 °F (36.8 °C)] 97.9 °F (36.6 °C)  Pulse:  [56-74] 67  Resp:  [16-18] 16  SpO2:  [96 %-100 %] 100 %  BP: ()/(55-73) 94/55     Weight: 88.5 kg (195 lb)  Body mass index is 32.45 kg/m².    Intake/Output Summary (Last 24 hours) at 11/14/2022 0907  Last data filed at 11/14/2022 0314  Gross per 24 hour   Intake 1200 ml   Output --   Net 1200 ml        Physical Exam  Constitutional:       General: She is not in acute distress.     Appearance: Normal appearance. She is obese. She is ill-appearing.   HENT:      Head: Normocephalic and atraumatic.      Nose: Nose normal.      Mouth/Throat:      Mouth: Mucous membranes are moist.   Eyes:      General: No scleral icterus.     Extraocular Movements: Extraocular movements intact.      Pupils: Pupils are equal, round, and reactive to light.   Cardiovascular:      Rate and Rhythm: Normal rate and regular rhythm.      Pulses: Normal pulses.      Heart sounds: Normal heart sounds.   Pulmonary:      Effort: Pulmonary effort is normal.      Breath sounds: Normal breath sounds. No wheezing or rhonchi.   Chest:      Chest wall: No tenderness.   Abdominal:      General: Abdomen is flat. Bowel sounds are normal. There is no distension.      Palpations: Abdomen is soft. There is no mass.      Tenderness: There is no abdominal tenderness. There is no right CVA tenderness, left CVA tenderness, guarding or rebound.    Musculoskeletal:         General: No swelling, tenderness, deformity or signs of injury. Normal range of motion.      Cervical back: Normal range of motion and neck supple. No rigidity or tenderness.   Skin:     General: Skin is warm and dry.      Coloration: Skin is not jaundiced or pale.      Findings: No erythema or rash.   Neurological:      General: No focal deficit present.      Mental Status: She is alert and oriented to person, place, and time. Mental status is at baseline.      Cranial Nerves: No cranial nerve deficit.      Motor: No weakness.   Psychiatric:         Mood and Affect: Mood normal.         Behavior: Behavior normal.         Thought Content: Thought content normal.         Judgment: Judgment normal.       Significant Labs: All pertinent labs within the past 24 hours have been reviewed.  CBC:   Recent Labs   Lab 11/12/22  1253   WBC 4.86   HGB 8.8*   HCT 26.4*          CMP:   Recent Labs   Lab 11/12/22  1253      K 3.5   *   CO2 16*      BUN 6*   CREATININE 1.2   CALCIUM 7.5*   PROT 4.4*   ALBUMIN 1.4*   BILITOT 0.5   ALKPHOS 80   AST 45*   ALT 8*   ANIONGAP 9         Significant Imaging: I have reviewed all pertinent imaging results/findings within the past 24 hours.      Assessment/Plan:      * Carcinoid syndrome related chronic diarrhea   Diagnosis not confirmed with biopsy:   67-year-old past medical history of recently diagnosed carcinoid syndrome,  having diarrhea intermittently since August 2022 more recently diarrhea has become worse over past few days -several episodes daily  associated nausea with intermittent vomiting. symptoms concerned likely though to be secondary to carcinoid syndrome patient evaluated by Heme-Onc at [facility] with PET scan done 10/18 - Focal nodular foci of radiotracer uptake at the pancreatic tail and near the region of the pancreatic head above liver background activity could indicate somatostatin receptor avid  lesions, but no corresponding mass is seen on CT portion of the study.  Recommend follow-up contrast enhanced pancreas protocol CT to further assess. 5HIAA levels on 10/21 WNL .     CT AP W contrast done 11/4 shows No pancreatic lesion and Enterocolitis,. recent EGD -Non-bleeding gastric ulcers with no stigmata of  bleeding. Biopsied. Treated with a monopolar probe.     Colonoscopy with biopsy -No significant pathologic abnormality. No morphologic evidence of active inflammatory bowel disease, microscopic colitis, or neoplasia. Patient was  referred to [facility] for further workup and has scheduled appointment at Heme-Onc at [facility] this upcoming week.    11/7 Chromogranin A elevated at 2000s.  stool studies in process. C diff negative. Hematology working her up for neuroendocrine tumor vs VIPoma.  AES consulted for EUS and potential biopsy given that her PET scan showed uptake but no mass was seen on CT pancreas protocol.discontinued eliquis.  plan for EUS on Wednesday.  started and discontinued octreotide since it failed to help  11/9 repeat cryptosporidium Ag negative  EUS today - sleeve gastrectomy was found, characterized by  healthy appearing mucosa.Widely patent duodenoenterostomy, characterized   by healthy appearing mucosa was found. few cystic lesions were seen in the pancreatic  head, genu of the pancreas and pancreatic body highly suspicious  for a branched intraductal papillary mucinous neoplasm.small for sampling. pancreatic head and pancreatic tail showed no mass.   11/10 - Oncology to see and review EUS; started immodium   11/11- on octeotride, immodium, 6 BMs yesterday, colon bx neg, repeat cryptospor neg. Consider biopsy per surgery? Waiting for oncology recs.  Add metamucil  (low dose) to absorb excess water.  11/13 - waiting on VIPoma, somatostatin,  labs. Discussed pt condition and plan w her DTR. Discuss diagnostic approach with GI.  CT chest-  Some small sub-centimeter  nodules  11/4- improved w questran, GI considering another biopsy procedure (small bowel and pancreas per EUS). Holding pantoprazole and repeat gastrin and chromogranin level once off PPI for at least 2-4 weeks.  VIP& somatostatin level will also potentially be elevated with pantoprazole.      Chronic diarrhea  Uncertain etiology  Concern for carcinoid syndrome with high chromogranin level, but no biopsy souce can be found.   CT a/p - Partial small bowel resection.  There are some thickened segments of large and small bowel, some with adjacent mesenteric edema.  5HIAA levels on 10/21 WNL .  CT AP W contrast done 11/4 shows No pancreatic lesion and Enterocolitis,.   recent EGD -Non-bleeding gastric ulcers with no stigmata of  bleeding. Biopsied. Treated with a monopolar probe.   colonoscopy with biopsy -No significant pathologic abnormality. No morphologic evidence of active inflammatory bowel disease, microscopic colitis, or neoplasia  Colon biopsies neg 10/2022  Gastric biopsy 10/2022 - neg for h pylori  Kid bx 10/2022 - no amyloid, + diab nephropathy    Add CT chest- will need contrast for tumor eval. RL x 500 cc.   Consult GI- Is Surgery consult for pancreatic biopsy indicated? Any other eval for diarrhea?   Per GI:   Obtain fecal elastase = ordered  - Can check serum tryptase for mastocytosis - ordered  -  Could consider empiric cholestyramine for possible bile acid diarrhea as well- done ( stopped metamucil)    - If pending workup negative, consider hydrogen breath testing vs empiric rifaximin trial for SIBO although less likely.  11/14- continue questran and imodium,  lab tomorrow      Intractable back pain  Hs of OA spine, DJD, back surgery  Xray l spine 3/2021 - posterior fusion hardware from L2-L5 along with laminectomy change.  Grade 1 anterolistheses are suspected of L3 over L4 and L4 over L5.   11/14 - left flank  Completed levoquin  Check UA and reflex culture      UTI (urinary tract infection)    urinalysis positive . Culture, Urine pending  continue with anitbiotic IV ceftriaxone  11/8 UC with klebsiella; s/p x5 doses CTX - sensitive    11/14 - completed levoquin, has left flank/ back pain. Check UA, [place] given, warmpack.     Chronic deep vein thrombosis (DVT): right common femoral vein  -DVT sonogram 8/22 and 10/22 negative for DVT  -11/7 DVT sonogram -Nonocclusive thrombus/DVT in the superior-mid right femoral vein.continue eliquis . S/p IVC filter   -Restarted eliquis following EUS  11/13- stable    Orthostatic hypotension  11/7 orthostatics + with drop in SBP to 80s on standing. RL bolus 1L and 75ml/h .SBP in 70-80s this PM on IVF. NS bolus 500ml x1. critical care consulted   11/11- /61 . May need intermittent bolus IVF due to volume loss with diarrhea  11/12- good UO. Walking to BR. Eating 75%  -stable    Hypokalemia    - Patient with potassium   Recent Labs   Lab 11/10/22  0408 11/11/22  0316 11/12/22  1253   K 3.6 3.1* 3.5   . Replaced 11/11.  Monitor  Replace 11/13, 11/14        Essential hypertension  Chronic, controlled.  Latest blood pressure and vitals reviewed-   Temp:  [96.7 °F (35.9 °C)-98.3 °F (36.8 °C)]   Pulse:  [56-74]   Resp:  [16-18]   BP: ()/(55-73)   SpO2:  [96 %-100 %] .   Home meds for hypertension were reviewed and hold furosemide and metoprolol for now as with diarrhea. PRN meds if BP> 180/110 mm HG  11/11- on metoprolol 25 bid      Stage 3a chronic kidney disease  seems to be at baseline. Creatine stable for now. BMP reviewed- noted Estimated Creatinine Clearance: 50 mL/min (based on SCr of 1.2 mg/dL). according to latest data. Monitor UOP and serial BMP and adjust therapy as needed. Renally dose meds.    Recent Labs   Lab 11/10/22  0408 11/11/22  0316 11/12/22  1253   BUN 7* 6* 6*   CREATININE 1.1 1.1 1.2          Chronic heart failure with preserved ejection fraction (HFpEF) NIC NYHA2   Patient admitted without  signs and symptoms of CHF exacerbation    Results  for orders placed or performed during the hospital encounter of 11/06/22   Brain natriuretic peptide   Result Value Ref Range    BNP 82 0 - 99 pg/mL   . Telemetry monitoring. Strict input/ Output monitor, Daily weights.    denies chestpain.  Obtain serial troponins.  Cardiac Enzymes trend  No results for input(s): CPK, CPKMB, MB, TROPONINI in the last 168 hours.  No results found for this or any previous visit.  echo 10/7/22 left ventricle is normal in size. Global left ventricular   systolic function is normal. The left ventricular ejection fraction is   55%. Left ventricular diastolic function is abnormal (stage I impaired   relaxation). Noted left ventricular hypertrophy. Concentric left   ventricular hypertrophy is present. It is mild.     4. Mild (1+) tricuspid regurgitation.   5. The right ventricle is normal in size. Right ventricular systolic   function is normal.      11/11- on metoprolol, holding lasix due to excessive  diarrhea        Hypophosphatemia  Replaced  11/11 - Phos 2.4, neutraphos bid x 10 days      Hypomagnesemia  replaced      Hypocalcemia  11/6   corrected calcium of 8.4.  PTH elevated 114    11/13- lucy 7.5, alb 1. 4      Dizziness  likely secondary to diarrhea. orthostasis. IV hydration      Anemia of chronic disease    Patient's with Normocytic anemia.. Hemoglobin stable. Etiology likely due to chronic disease .  Current CBC reviewed-    Recent Labs   Lab 11/10/22  0408 11/11/22  0316 11/12/22  1253   HGB 8.0* 7.9* 8.8*    Monitor CBC and transfuse if H/H drops below 7/21.  11/9 concern for blood loss anemia.  Hb at 6.7 Transfusion with 1 unit of PRBC       Class 1 obesity due to excess calories with serious comorbidity and body mass index (BMI) of 32.0 to 32.9 in adult  Body mass index is 32.45 kg/m². Morbid obesity complicates all aspects of disease management from diagnostic modalities to treatment. Weight loss encouraged   - s/p Sleeve gastrectomy      Hypotension  See  above      Seizures  continue with keppra BID      Cryptosporidium exposure  Repeat neg, colon biopsies neg.       Severe protein-calorie malnutrition   Nutrition consulted. Most recent weight and BMI monitored-   Alb 1.4  malabsorption suspected                        Measurements:  Wt Readings from Last 1 Encounters:   11/08/22 88.5 kg (195 lb)   Body mass index is 32.45 kg/m².    Recommendations:      Patient has been screened and assessed by RD. RD will follow patient.      VTE Risk Mitigation (From admission, onward)         Ordered     apixaban tablet 5 mg  2 times daily         11/09/22 1916     IP VTE HIGH RISK PATIENT  Once         11/06/22 1614     Place sequential compression device  Until discontinued         11/06/22 1614                Discharge Planning   MELYSSA: 11/17/2022     Code Status: Full Code   Is the patient medically ready for discharge?: No    Reason for patient still in hospital (select all that apply): Patient trending condition  Discharge Plan A: Home with family   Discharge Delays: None known at this time      Sonia Gramajo MD  Department of Hospital Medicine   [facility] - Oncology ([facility])

## 2022-11-14 NOTE — ASSESSMENT & PLAN NOTE
Diagnosis not confirmed with biopsy:   67-year-old past medical history of recently diagnosed carcinoid syndrome,  having diarrhea intermittently since August 2022 more recently diarrhea has become worse over past few days -several episodes daily  associated nausea with intermittent vomiting. symptoms concerned likely though to be secondary to carcinoid syndrome patient evaluated by Heme-Onc at [facility] with PET scan done 10/18 - Focal nodular foci of radiotracer uptake at the pancreatic tail and near the region of the pancreatic head above liver background activity could indicate somatostatin receptor avid lesions, but no corresponding mass is seen on CT portion of the study.  Recommend follow-up contrast enhanced pancreas protocol CT to further assess. 5HIAA levels on 10/21 WNL .     CT AP W contrast done 11/4 shows No pancreatic lesion and Enterocolitis,. recent EGD -Non-bleeding gastric ulcers with no stigmata of  bleeding. Biopsied. Treated with a monopolar probe.     Colonoscopy with biopsy -No significant pathologic abnormality. No morphologic evidence of active inflammatory bowel disease, microscopic colitis, or neoplasia. Patient was  referred to [facility] for further workup and has scheduled appointment at [facility] at [facility] this upcoming week.    11/7 Chromogranin A elevated at 2000s.  stool studies in process. C diff negative. Hematology working her up for neuroendocrine tumor vs VIPoma.  AES consulted for EUS and potential biopsy given that her PET scan showed uptake but no mass was seen on CT pancreas protocol.discontinued eliquis.  plan for EUS on Wednesday.  started and discontinued octreotide since it failed to help  11/9 repeat cryptosporidium Ag negative  EUS today - sleeve gastrectomy was found, characterized by  healthy appearing mucosa.Widely patent duodenoenterostomy, characterized   by healthy appearing mucosa was found. few cystic lesions were seen in  the pancreatic  head, genu of the pancreas and pancreatic body highly suspicious  for a branched intraductal papillary mucinous neoplasm.small for sampling. pancreatic head and pancreatic tail showed no mass.   11/10 - Oncology to see and review EUS; started immodium   11/11- on octeotride, immodium, 6 BMs yesterday, colon bx neg, repeat cryptospor neg. Consider biopsy per surgery? Waiting for oncology recs.  Add metamucil  (low dose) to absorb excess water.  11/13 - waiting on VIPoma, somatostatin,  labs. Discussed pt condition and plan w her DTR. Discuss diagnostic approach with GI.  CT chest-  Some small sub-centimeter nodules  11/4- improved w questran, GI considering another biopsy procedure (small bowel and pancreas per EUS). Holding pantoprazole and repeat gastrin and chromogranin level once off PPI for at least 2-4 weeks.  VIP& somatostatin level will also potentially be elevated with pantoprazole.

## 2022-11-14 NOTE — SUBJECTIVE & OBJECTIVE
No current facility-administered medications on file prior to encounter.     Current Outpatient Medications on File Prior to Encounter   Medication Sig    apixaban (ELIQUIS) 5 mg Tab Take 1 tablet (5 mg total) by mouth 2 (two) times daily.    aspirin (ECOTRIN) 81 MG EC tablet Take 81 mg by mouth once daily.    ferrous sulfate (FEOSOL) 325 mg (65 mg iron) Tab tablet Take 325 mg by mouth daily with breakfast.    furosemide (LASIX) 20 MG tablet Take 20 mg by mouth once daily.    levETIRAcetam (KEPPRA) 250 MG Tab Take 500 mg by mouth 2 (two) times daily.    loperamide (IMODIUM) 2 mg capsule Take 1 capsule (2 mg total) by mouth 4 (four) times daily as needed for Diarrhea.    midodrine (PROAMATINE) 5 MG Tab Take 1 tablet (5 mg total) by mouth 3 (three) times daily with meals.    pantoprazole (PROTONIX) 40 MG tablet Take 40 mg by mouth once daily.    potassium chloride SA (K-DUR,KLOR-CON) 20 MEQ tablet Take 1 tablet (20 mEq total) by mouth 2 (two) times daily.    timolol maleate 0.5% (TIMOPTIC) 0.5 % Drop Place 1 drop into both eyes 2 (two) times daily.    atorvastatin (LIPITOR) 40 MG tablet Take 1 tablet (40 mg total) by mouth every evening. (Patient not taking: Reported on 11/4/2022)    citric acid-potassium citrate (POLYCITRA) 1,100-334 mg/5 mL solution potassium citrate-citric acid 1,100 mg-334 mg/5 mL oral solution   TAKE 5 MLS (10 MEQ TOTAL) BY MOUTH ONCE. FOR 1 DOSE ONLY    diphenoxylate-atropine 2.5-0.025 mg (LOMOTIL) 2.5-0.025 mg per tablet Take 1 tablet by mouth 4 (four) times daily as needed for Diarrhea (unreleived with Imodium). (Patient not taking: Reported on 11/4/2022)    metoclopramide HCl (REGLAN) 10 MG tablet Take 1 tablet (10 mg total) by mouth every 6 (six) hours as needed (Nausea). (Patient not taking: Reported on 11/4/2022)    metoprolol tartrate (LOPRESSOR) 25 MG tablet Take 1 tablet (25 mg total) by mouth 2 (two) times daily. (Patient not taking: Reported on 11/4/2022)    sodium bicarbonate 650 MG  tablet Take 1 tablet (650 mg total) by mouth 2 (two) times daily. for 5 days       Review of patient's allergies indicates:  No Known Allergies    Past Medical History:   Diagnosis Date    Anticoagulant long-term use     Arthritis     Atrial fibrillation     CHF (congestive heart failure)     Diabetes mellitus     Type2 resolved after weight loss surgery    DVT (deep venous thrombosis)     Encounter for blood transfusion     GERD (gastroesophageal reflux disease)     Glaucoma     Hypercholesterolemia     Hyperlipemia     Hypertension     Memory changes     Myocardial infarction     Renal failure     resolved    TIA (transient ischemic attack)      Past Surgical History:   Procedure Laterality Date    BACK SURGERY  06/21/2017    lumbar fusion 6/21/17 and surgery for hematoma to site on 6/26/17    CHOLECYSTECTOMY  1978    COLONOSCOPY N/A 10/3/2022    Procedure: COLONOSCOPY;  Surgeon: Jourdan Parks MD;  Location: [facility] ENDO;  Service: General;  Laterality: N/A;    COLONOSCOPY N/A 10/11/2022    Procedure: COLONOSCOPY;  Surgeon: Stephen Cooper MD;  Location: [facility] ENDO;  Service: Endoscopy;  Laterality: N/A;    ENDOSCOPIC ULTRASOUND OF UPPER GASTROINTESTINAL TRACT N/A 11/9/2022    Procedure: ULTRASOUND, UPPER GI TRACT, ENDOSCOPIC;  Surgeon: Jake Corona MD;  [facility]  Service: Endoscopy;  Laterality: N/A;    ESOPHAGOGASTRODUODENOSCOPY N/A 10/3/2022    Procedure: EGD (ESOPHAGOGASTRODUODENOSCOPY);  Surgeon: Jourdan Parks MD;  Location: [facility];  Service: General;  Laterality: N/A;    GASTRIC BYPASS      HYSTERECTOMY  2012    JOINT REPLACEMENT      TKR    LEFT HEART CATHETERIZATION Left 2/5/2021    Procedure: Left heart cath;  Surgeon: Shane Pacheco MD;  Location: [facility] CATH;  Service: Cardiovascular;  Laterality: Left;    PELVIC LAPAROSCOPY Left     OOPH    RENAL BIOPSY N/A 10/5/2022    Procedure: BIOPSY, KIDNEY;  Surgeon: Velia Diagnostic Provider;  Location: [facility] OR;  Service:  General;  Laterality: N/A;    RIGHT HEART CATHETERIZATION Right 2/5/2021    Procedure: INSERTION, CATHETER, RIGHT HEART. via left radial and left brachial;  Surgeon: Shane Pacheco MD;  Location: [facility] CATH;  Service: Cardiovascular;  Laterality: Right;    TOTAL ABDOMINAL HYSTERECTOMY W/ BILATERAL SALPINGOOPHORECTOMY       Family History       Problem Relation (Age of Onset)    Arthritis Mother    Breast cancer Mother    Cancer Father    Diabetes Daughter    Heart disease Mother, Father    Hypertension Mother, Father          Tobacco Use    Smoking status: Never    Smokeless tobacco: Never   Substance and Sexual Activity    Alcohol use: Yes     Comment: occasional    Drug use: No    Sexual activity: Not Currently     Review of Systems   Constitutional:  Positive for activity change, appetite change and unexpected weight change.   HENT: Negative.     Eyes: Negative.    Respiratory: Negative.     Cardiovascular: Negative.    Gastrointestinal:  Positive for abdominal pain, diarrhea, nausea and vomiting. Negative for abdominal distention, blood in stool and constipation.   Endocrine: Negative.    Genitourinary: Negative.    Musculoskeletal: Negative.    Skin:  Positive for color change. Negative for rash.   Allergic/Immunologic: Negative.    Neurological: Negative.    Hematological: Negative.    Psychiatric/Behavioral: Negative.     All other systems reviewed and are negative.  Objective:     Vital Signs (Most Recent):  Temp: 98.5 °F (36.9 °C) (11/14/22 1545)  Pulse: 61 (11/14/22 1545)  Resp: 17 (11/14/22 1545)  BP: 94/62 (11/14/22 1545)  SpO2: 99 % (11/14/22 1545) Vital Signs (24h Range):  Temp:  [96.7 °F (35.9 °C)-98.5 °F (36.9 °C)] 98.5 °F (36.9 °C)  Pulse:  [56-73] 61  Resp:  [16-18] 17  SpO2:  [96 %-100 %] 99 %  BP: ()/(55-73) 94/62     Weight: 88.5 kg (195 lb)  Body mass index is 32.45 kg/m².    Physical Exam  Vitals and nursing note reviewed.   Constitutional:       General: She is not in acute distress.      Appearance: She is obese. She is not ill-appearing.   Cardiovascular:      Rate and Rhythm: Normal rate.   Pulmonary:      Effort: Pulmonary effort is normal.   Abdominal:      Palpations: Abdomen is soft.      Comments: Non tender  Midline incisions are well healed  obese   Musculoskeletal:         General: Normal range of motion.   Skin:     General: Skin is warm and dry.      Capillary Refill: Capillary refill takes less than 2 seconds.   Neurological:      General: No focal deficit present.      Mental Status: She is alert and oriented to person, place, and time.   Psychiatric:         Mood and Affect: Mood normal.       Significant Labs:  I have reviewed all pertinent lab results within the past 24 hours.  CBC:   Recent Labs   Lab 11/12/22  1253   WBC 4.86   RBC 2.91*   HGB 8.8*   HCT 26.4*      MCV 91   MCH 30.2   MCHC 33.3     CMP:   Recent Labs   Lab 11/12/22  1253      CALCIUM 7.5*   ALBUMIN 1.4*   PROT 4.4*      K 3.5   CO2 16*   *   BUN 6*   CREATININE 1.2   ALKPHOS 80   ALT 8*   AST 45*   BILITOT 0.5     Microbiology Results (last 7 days)       Procedure Component Value Units Date/Time    Stool culture [[id number]] Collected: 11/06/22 2030    Order Status: Completed Specimen: Stool Updated: 11/10/22 1153     Stool Culture No Salmonella,Shigella,Vibrio,Campylobacter,Yersinia isolated.    E. coli 0157 antigen [[id number]] Collected: 11/06/22 2030    Order Status: Completed Specimen: Stool Updated: 11/08/22 0948     Shiga Toxin 1 E.coli Negative     Shiga Toxin 2 E.coli Negative    Urine culture [[id number]]  (Abnormal)  (Susceptibility) Collected: 11/06/22 1157    Order Status: Completed Specimen: Urine Updated: 11/08/22 0815     Urine Culture, Routine KLEBSIELLA PNEUMONIAE  >100,000 cfu/ml      Narrative:      Specimen Source->Urine            Significant Diagnostics:  I have reviewed all pertinent imaging results/findings within the past 24 hours.    Reviewed CT panc and  CT-dotatate and EUS/EGD findings and Cscope

## 2022-11-14 NOTE — ASSESSMENT & PLAN NOTE
Hs of OA spine, DJD, back surgery  Xray l spine 3/2021 - posterior fusion hardware from L2-L5 along with laminectomy change.  Grade 1 anterolistheses are suspected of L3 over L4 and L4 over L5.   11/14 - left flank  Completed levoquin  Check UA and reflex culture

## 2022-11-14 NOTE — ASSESSMENT & PLAN NOTE
67F with chronic diarrhea since August 2022 with 40 lbs weight loss.  Extensive workup so far is unrevealing though there is PET-DOTATATE positive lesions in head and tail of pancreas.  However, there is no mass correlate on CT panc or EUS.  Surg onc consulted for surgical exploration.  No role for surgery without target first identified.  AES plans to repeat EUS today  Will be presented in tumor board on Monday  Will follow along

## 2022-11-14 NOTE — ASSESSMENT & PLAN NOTE
urinalysis positive . Culture, Urine pending  continue with anitbiotic IV ceftriaxone  11/8 UC with klebsiella; s/p x5 doses CTX - sensitive    11/14 - completed levoquin, has left flank/ back pain. Check UA, [place] given, warmpack.

## 2022-11-14 NOTE — ASSESSMENT & PLAN NOTE
Chronic, controlled.  Latest blood pressure and vitals reviewed-   Temp:  [96.7 °F (35.9 °C)-98.3 °F (36.8 °C)]   Pulse:  [56-74]   Resp:  [16-18]   BP: ()/(55-73)   SpO2:  [96 %-100 %] .   Home meds for hypertension were reviewed and hold furosemide and metoprolol for now as with diarrhea. PRN meds if BP> 180/110 mm HG  11/11- on metoprolol 25 bid

## 2022-11-14 NOTE — ASSESSMENT & PLAN NOTE
Uncertain etiology  Concern for carcinoid syndrome with high chromogranin level, but no biopsy souce can be found.   CT a/p - Partial small bowel resection.  There are some thickened segments of large and small bowel, some with adjacent mesenteric edema.  5HIAA levels on 10/21 WNL .  CT AP W contrast done 11/4 shows No pancreatic lesion and Enterocolitis,.   recent EGD -Non-bleeding gastric ulcers with no stigmata of  bleeding. Biopsied. Treated with a monopolar probe.   colonoscopy with biopsy -No significant pathologic abnormality. No morphologic evidence of active inflammatory bowel disease, microscopic colitis, or neoplasia  Colon biopsies neg 10/2022  Gastric biopsy 10/2022 - neg for h pylori  Kid bx 10/2022 - no amyloid, + diab nephropathy    Add CT chest- will need contrast for tumor eval. RL x 500 cc.   Consult GI- Is Surgery consult for pancreatic biopsy indicated? Any other eval for diarrhea?   Per GI:   Obtain fecal elastase = ordered  - Can check serum tryptase for mastocytosis - ordered  -  Could consider empiric cholestyramine for possible bile acid diarrhea as well- done ( stopped metamucil)    - If pending workup negative, consider hydrogen breath testing vs empiric rifaximin trial for SIBO although less likely.  11/14- continue questran and imodium,  lab tomorrow

## 2022-11-14 NOTE — ASSESSMENT & PLAN NOTE
Body mass index is 32.45 kg/m². Morbid obesity complicates all aspects of disease management from diagnostic modalities to treatment. Weight loss encouraged   - s/p Sleeve gastrectomy

## 2022-11-14 NOTE — ASSESSMENT & PLAN NOTE
11/7 orthostatics + with drop in SBP to 80s on standing. RL bolus 1L and 75ml/h .SBP in 70-80s this PM on IVF. NS bolus 500ml x1. critical care consulted   11/11- /61 . May need intermittent bolus IVF due to volume loss with diarrhea  11/12- good UO. Walking to BR. Eating 75%  -stable

## 2022-11-14 NOTE — SUBJECTIVE & OBJECTIVE
Interval History: see above    Review of Systems   Constitutional:  Negative for activity change, chills, fatigue and fever.   HENT:  Negative for congestion, nosebleeds and trouble swallowing.    Respiratory:  Negative for apnea, cough, choking, chest tightness and shortness of breath.    Cardiovascular:  Negative for chest pain and leg swelling.   Gastrointestinal:  Positive for diarrhea. Negative for abdominal distention, abdominal pain, constipation, nausea and vomiting.   Genitourinary:  Negative for decreased urine volume, difficulty urinating, dysuria and frequency.   Musculoskeletal:  Negative for arthralgias, back pain, joint swelling, neck pain and neck stiffness.   Skin:  Negative for rash and wound.   Neurological:  Negative for dizziness, seizures, syncope, weakness, light-headedness, numbness and headaches.   Psychiatric/Behavioral:  Negative for agitation, behavioral problems, confusion, hallucinations, self-injury and sleep disturbance. The patient is not nervous/anxious.    Objective:     Vital Signs (Most Recent):  Temp: 97.9 °F (36.6 °C) (11/14/22 0731)  Pulse: 67 (11/14/22 0733)  Resp: 16 (11/14/22 0731)  BP: (!) 94/55 (11/14/22 0731)  SpO2: 100 % (11/14/22 0733) Vital Signs (24h Range):  Temp:  [96.7 °F (35.9 °C)-98.3 °F (36.8 °C)] 97.9 °F (36.6 °C)  Pulse:  [56-74] 67  Resp:  [16-18] 16  SpO2:  [96 %-100 %] 100 %  BP: ()/(55-73) 94/55     Weight: 88.5 kg (195 lb)  Body mass index is 32.45 kg/m².    Intake/Output Summary (Last 24 hours) at 11/14/2022 0907  Last data filed at 11/14/2022 0314  Gross per 24 hour   Intake 1200 ml   Output --   Net 1200 ml        Physical Exam  Constitutional:       General: She is not in acute distress.     Appearance: Normal appearance. She is obese. She is ill-appearing.   HENT:      Head: Normocephalic and atraumatic.      Nose: Nose normal.      Mouth/Throat:      Mouth: Mucous membranes are moist.   Eyes:      General: No scleral icterus.     Extraocular  Movements: Extraocular movements intact.      Pupils: Pupils are equal, round, and reactive to light.   Cardiovascular:      Rate and Rhythm: Normal rate and regular rhythm.      Pulses: Normal pulses.      Heart sounds: Normal heart sounds.   Pulmonary:      Effort: Pulmonary effort is normal.      Breath sounds: Normal breath sounds. No wheezing or rhonchi.   Chest:      Chest wall: No tenderness.   Abdominal:      General: Abdomen is flat. Bowel sounds are normal. There is no distension.      Palpations: Abdomen is soft. There is no mass.      Tenderness: There is no abdominal tenderness. There is no right CVA tenderness, left CVA tenderness, guarding or rebound.   Musculoskeletal:         General: No swelling, tenderness, deformity or signs of injury. Normal range of motion.      Cervical back: Normal range of motion and neck supple. No rigidity or tenderness.   Skin:     General: Skin is warm and dry.      Coloration: Skin is not jaundiced or pale.      Findings: No erythema or rash.   Neurological:      General: No focal deficit present.      Mental Status: She is alert and oriented to person, place, and time. Mental status is at baseline.      Cranial Nerves: No cranial nerve deficit.      Motor: No weakness.   Psychiatric:         Mood and Affect: Mood normal.         Behavior: Behavior normal.         Thought Content: Thought content normal.         Judgment: Judgment normal.       Significant Labs: All pertinent labs within the past 24 hours have been reviewed.  CBC:   Recent Labs   Lab 11/12/22  1253   WBC 4.86   HGB 8.8*   HCT 26.4*          CMP:   Recent Labs   Lab 11/12/22  1253      K 3.5   *   CO2 16*      BUN 6*   CREATININE 1.2   CALCIUM 7.5*   PROT 4.4*   ALBUMIN 1.4*   BILITOT 0.5   ALKPHOS 80   AST 45*   ALT 8*   ANIONGAP 9         Significant Imaging: I have reviewed all pertinent imaging results/findings within the past 24 hours.

## 2022-11-14 NOTE — ASSESSMENT & PLAN NOTE
- Patient with potassium   Recent Labs   Lab 11/10/22  0408 11/11/22  0316 11/12/22  1253   K 3.6 3.1* 3.5   . Replaced 11/11.  Monitor  Replace 11/13, 11/14

## 2022-11-15 PROBLEM — N30.00 ACUTE CYSTITIS WITHOUT HEMATURIA: Status: ACTIVE | Noted: 2022-11-15

## 2022-11-15 LAB
ALBUMIN SERPL BCP-MCNC: 1.3 G/DL (ref 3.5–5.2)
ALP SERPL-CCNC: 75 U/L (ref 55–135)
ALT SERPL W/O P-5'-P-CCNC: 6 U/L (ref 10–44)
ANION GAP SERPL CALC-SCNC: 7 MMOL/L (ref 8–16)
AST SERPL-CCNC: 36 U/L (ref 10–40)
BACTERIA #/AREA URNS AUTO: ABNORMAL /HPF
BASOPHILS # BLD AUTO: 0.02 K/UL (ref 0–0.2)
BASOPHILS NFR BLD: 0.4 % (ref 0–1.9)
BILIRUB SERPL-MCNC: 0.6 MG/DL (ref 0.1–1)
BILIRUB UR QL STRIP: NEGATIVE
BUN SERPL-MCNC: 7 MG/DL (ref 8–23)
CALCIUM SERPL-MCNC: 7.1 MG/DL (ref 8.7–10.5)
CHLORIDE SERPL-SCNC: 118 MMOL/L (ref 95–110)
CLARITY UR REFRACT.AUTO: ABNORMAL
CO2 SERPL-SCNC: 22 MMOL/L (ref 23–29)
COLOR UR AUTO: YELLOW
CREAT SERPL-MCNC: 1.2 MG/DL (ref 0.5–1.4)
DIFFERENTIAL METHOD: ABNORMAL
EOSINOPHIL # BLD AUTO: 0 K/UL (ref 0–0.5)
EOSINOPHIL NFR BLD: 0.4 % (ref 0–8)
ERYTHROCYTE [DISTWIDTH] IN BLOOD BY AUTOMATED COUNT: 20.6 % (ref 11.5–14.5)
EST. GFR  (NO RACE VARIABLE): 49.6 ML/MIN/1.73 M^2
GLUCOSE SERPL-MCNC: 87 MG/DL (ref 70–110)
GLUCOSE UR QL STRIP: NEGATIVE
HCT VFR BLD AUTO: 24.6 % (ref 37–48.5)
HGB BLD-MCNC: 8.1 G/DL (ref 12–16)
HGB UR QL STRIP: ABNORMAL
HYALINE CASTS UR QL AUTO: 28 /LPF
IMM GRANULOCYTES # BLD AUTO: 0.02 K/UL (ref 0–0.04)
IMM GRANULOCYTES NFR BLD AUTO: 0.4 % (ref 0–0.5)
KETONES UR QL STRIP: NEGATIVE
LEUKOCYTE ESTERASE UR QL STRIP: ABNORMAL
LYMPHOCYTES # BLD AUTO: 1.2 K/UL (ref 1–4.8)
LYMPHOCYTES NFR BLD: 24.8 % (ref 18–48)
MCH RBC QN AUTO: 29.8 PG (ref 27–31)
MCHC RBC AUTO-ENTMCNC: 32.9 G/DL (ref 32–36)
MCV RBC AUTO: 90 FL (ref 82–98)
MICROSCOPIC COMMENT: ABNORMAL
MONOCYTES # BLD AUTO: 0.7 K/UL (ref 0.3–1)
MONOCYTES NFR BLD: 13.7 % (ref 4–15)
NEUTROPHILS # BLD AUTO: 2.9 K/UL (ref 1.8–7.7)
NEUTROPHILS NFR BLD: 60.3 % (ref 38–73)
NITRITE UR QL STRIP: NEGATIVE
NRBC BLD-RTO: 0 /100 WBC
PH UR STRIP: 6 [PH] (ref 5–8)
PLATELET # BLD AUTO: 175 K/UL (ref 150–450)
PMV BLD AUTO: 10.2 FL (ref 9.2–12.9)
POTASSIUM SERPL-SCNC: 2.8 MMOL/L (ref 3.5–5.1)
PROT SERPL-MCNC: 4 G/DL (ref 6–8.4)
PROT UR QL STRIP: ABNORMAL
RBC # BLD AUTO: 2.72 M/UL (ref 4–5.4)
RBC #/AREA URNS AUTO: 21 /HPF (ref 0–4)
SODIUM SERPL-SCNC: 147 MMOL/L (ref 136–145)
SP GR UR STRIP: 1.02 (ref 1–1.03)
SQUAMOUS #/AREA URNS AUTO: 5 /HPF
TRYPTASE LEVEL: 8.4 NG/ML
URN SPEC COLLECT METH UR: ABNORMAL
WBC # BLD AUTO: 4.83 K/UL (ref 3.9–12.7)
WBC #/AREA URNS AUTO: >100 /HPF (ref 0–5)
WBC CLUMPS UR QL AUTO: ABNORMAL

## 2022-11-15 PROCEDURE — 25000003 PHARM REV CODE 250: Performed by: HOSPITALIST

## 2022-11-15 PROCEDURE — 25000003 PHARM REV CODE 250: Performed by: INTERNAL MEDICINE

## 2022-11-15 PROCEDURE — 63600175 PHARM REV CODE 636 W HCPCS: Performed by: HOSPITALIST

## 2022-11-15 PROCEDURE — 20600001 HC STEP DOWN PRIVATE ROOM

## 2022-11-15 PROCEDURE — 99233 SBSQ HOSP IP/OBS HIGH 50: CPT | Mod: ,,, | Performed by: HOSPITALIST

## 2022-11-15 PROCEDURE — 85025 COMPLETE CBC W/AUTO DIFF WBC: CPT | Performed by: HOSPITALIST

## 2022-11-15 PROCEDURE — 36415 COLL VENOUS BLD VENIPUNCTURE: CPT | Performed by: HOSPITALIST

## 2022-11-15 PROCEDURE — 99233 PR SUBSEQUENT HOSPITAL CARE,LEVL III: ICD-10-PCS | Mod: ,,, | Performed by: HOSPITALIST

## 2022-11-15 PROCEDURE — 80053 COMPREHEN METABOLIC PANEL: CPT | Performed by: HOSPITALIST

## 2022-11-15 RX ORDER — ONDANSETRON 2 MG/ML
4 INJECTION INTRAMUSCULAR; INTRAVENOUS EVERY 6 HOURS PRN
Status: DISCONTINUED | OUTPATIENT
Start: 2022-11-15 | End: 2022-11-25 | Stop reason: HOSPADM

## 2022-11-15 RX ADMIN — CHOLESTYRAMINE 4 G: 4 POWDER, FOR SUSPENSION ORAL at 03:11

## 2022-11-15 RX ADMIN — ONDANSETRON 4 MG: 2 INJECTION INTRAMUSCULAR; INTRAVENOUS at 01:11

## 2022-11-15 RX ADMIN — SODIUM CHLORIDE, POTASSIUM CHLORIDE, SODIUM LACTATE AND CALCIUM CHLORIDE 500 ML: 600; 310; 30; 20 INJECTION, SOLUTION INTRAVENOUS at 09:11

## 2022-11-15 RX ADMIN — LOPERAMIDE HYDROCHLORIDE 2 MG: 2 CAPSULE ORAL at 08:11

## 2022-11-15 RX ADMIN — LOPERAMIDE HYDROCHLORIDE 2 MG: 2 CAPSULE ORAL at 03:11

## 2022-11-15 RX ADMIN — ASPIRIN 81 MG: 81 TABLET, COATED ORAL at 03:11

## 2022-11-15 RX ADMIN — FERROUS SULFATE TAB 325 MG (65 MG ELEMENTAL FE) 1 EACH: 325 (65 FE) TAB at 03:11

## 2022-11-15 RX ADMIN — POTASSIUM & SODIUM PHOSPHATES POWDER PACK 280-160-250 MG 1 PACKET: 280-160-250 PACK at 03:11

## 2022-11-15 RX ADMIN — CEFTRIAXONE 1 G: 1 INJECTION, SOLUTION INTRAVENOUS at 09:11

## 2022-11-15 RX ADMIN — SODIUM BICARBONATE 650 MG TABLET 1300 MG: at 03:11

## 2022-11-15 RX ADMIN — PROMETHAZINE HYDROCHLORIDE 12.5 MG: 25 INJECTION INTRAMUSCULAR; INTRAVENOUS at 10:11

## 2022-11-15 RX ADMIN — SODIUM BICARBONATE 650 MG TABLET 1300 MG: at 08:11

## 2022-11-15 RX ADMIN — ATORVASTATIN CALCIUM 40 MG: 20 TABLET, FILM COATED ORAL at 08:11

## 2022-11-15 RX ADMIN — POTASSIUM & SODIUM PHOSPHATES POWDER PACK 280-160-250 MG 1 PACKET: 280-160-250 PACK at 09:11

## 2022-11-15 RX ADMIN — MICONAZOLE NITRATE 2 % TOPICAL POWDER: at 09:11

## 2022-11-15 RX ADMIN — CHOLECALCIFEROL TAB 25 MCG (1000 UNIT) 2000 UNITS: 25 TAB at 03:11

## 2022-11-15 NOTE — ASSESSMENT & PLAN NOTE
Chronic, controlled.  Latest blood pressure and vitals reviewed-   Temp:  [98.2 °F (36.8 °C)-98.5 °F (36.9 °C)]   Pulse:  [57-87]   Resp:  [16-18]   BP: ()/(44-70)   SpO2:  [95 %-100 %] .   Home meds for hypertension were reviewed and hold furosemide and metoprolol for now as with diarrhea. PRN meds if BP> 180/110 mm HG  11/11- on metoprolol 25 bid

## 2022-11-15 NOTE — ASSESSMENT & PLAN NOTE
Diagnosis not confirmed with biopsy:   67-year-old past medical history of recently diagnosed carcinoid syndrome,  having diarrhea intermittently since August 2022 more recently diarrhea has become worse over past few days -several episodes daily  associated nausea with intermittent vomiting. symptoms concerned likely though to be secondary to carcinoid syndrome patient evaluated by Hem[facility]Onc at [facility] with PET scan done 10/18 - Focal nodular foci of radiotracer uptake at the pancreatic tail and near the region of the pancreatic head above liver background activity could indicate somatostatin receptor avid lesions, but no corresponding mass is seen on CT portion of the study.  Recommend follow-up contrast enhanced pancreas protocol CT to further assess. 5HIAA levels on 10/21 WNL .     CT AP W contrast done 11/4 shows No pancreatic lesion and Enterocolitis,. recent EGD -Non-bleeding gastric ulcers with no stigmata of  bleeding. Biopsied. Treated with a monopolar probe.     Colonoscopy with biopsy -No significant pathologic abnormality. No morphologic evidence of active inflammatory bowel disease, microscopic colitis, or neoplasia. Patient was  referred to [facility] for further workup and has scheduled appointment at [facility] at [facility] this upcoming week.    11/7 Chromogranin A elevated at 2000s.  stool studies in process. C diff negative. Hematology working her up for neuroendocrine tumor vs VIPoma.  AES consulted for EUS and potential biopsy given that her PET scan showed uptake but no mass was seen on CT pancreas protocol.discontinued eliquis.  plan for EUS on Wednesday.  started and discontinued octreotide since it failed to help  11/9 repeat cryptosporidium Ag negative  EUS today - sleeve gastrectomy was found, characterized by  healthy appearing mucosa.Widely patent duodenoenterostomy, characterized   by healthy appearing mucosa was found. few cystic lesions were seen in  the pancreatic  head, genu of the pancreas and pancreatic body highly suspicious  for a branched intraductal papillary mucinous neoplasm.small for sampling. pancreatic head and pancreatic tail showed no mass.   11/10 - Oncology to see and review EUS; started immodium   11/11- on octeotride, immodium, 6 BMs yesterday, colon bx neg, repeat cryptospor neg. Consider biopsy per surgery? Waiting for oncology recs.  Add metamucil  (low dose) to absorb excess water.  11/13 - waiting on VIPoma, somatostatin,  labs. Discussed pt condition and plan w her DTR. Discuss diagnostic approach with GI.  CT chest-  Some small sub-centimeter nodules  11/4- improved w questran, GI considering another biopsy procedure (small bowel and pancreas per EUS). Holding pantoprazole and repeat gastrin and chromogranin level once off PPI for at least 2-4 weeks.  VIP& somatostatin level will also potentially be elevated with pantoprazole.    11/15- EGD and small bowel biopsies to be done, lab today pending. Will need to hold eliquis 11/16 before biopsies. NPO after midnight.

## 2022-11-15 NOTE — PLAN OF CARE
Problem: Adult Inpatient Plan of Care  Goal: Plan of Care Review  Outcome: Ongoing, Not Progressing  Goal: Patient-Specific Goal (Individualized)  Outcome: Ongoing, Not Progressing  Goal: Absence of Hospital-Acquired Illness or Injury  Outcome: Ongoing, Not Progressing  Goal: Optimal Comfort and Wellbeing  Outcome: Ongoing, Not Progressing  Goal: Readiness for Transition of Care  Outcome: Ongoing, Not Progressing     Problem: Infection  Goal: Absence of Infection Signs and Symptoms  Outcome: Ongoing, Not Progressing     Problem: Skin Injury Risk Increased  Goal: Skin Health and Integrity  Outcome: Ongoing, Not Progressing     Problem: Fall Injury Risk  Goal: Absence of Fall and Fall-Related Injury  Outcome: Ongoing, Not Progressing

## 2022-11-15 NOTE — ASSESSMENT & PLAN NOTE
Nutrition consulted. Most recent weight and BMI monitored-   Alb 1.4  malabsorption suspected  Eating 25-75% on meals  Considering TPN for nutritional support.                         Measurements:  Wt Readings from Last 1 Encounters:   11/08/22 88.5 kg (195 lb)   Body mass index is 32.45 kg/m².    Recommendations:      Patient has been screened and assessed by RD. RD will follow patient.

## 2022-11-15 NOTE — PLAN OF CARE
Plan of care reviewed with patient. Day 8 of admission. Only complaint today was pain. Notified primary team and they ordered Norco 7.5. Full relief obtained. Diarrhea improving, electrolytes replaced, SurgOnc consult placed for possible ExLap, and Endo consult for possible EGD. VSS, q1h patient rounds, bed in low position and wheels locked, rails up x2, call light and personal belongings within reach.    Bebeto Ayala   11/14/2022   6:24 PM

## 2022-11-15 NOTE — TREATMENT PLAN
GI Treatment Plan    Cherry Santiago is a 67 y.o. female admitted to hospital 11/6/2022 (Hospital Day: 10) due to Diarrhea concurrent with and due to carcinoid syndrome.     Interval History  Some improvement with cholestyramine    Objective  Temp:  [98.2 °F (36.8 °C)-98.6 °F (37 °C)] 98.6 °F (37 °C) (11/15 0748)  Pulse:  [57-87] 80 (11/15 0846)  BP: ()/(40-70) 65/40 (11/15 0748)  Resp:  [16-18] 16 (11/15 0748)  SpO2:  [95 %-100 %] 98 % (11/15 0748)      Laboratory    Recent Labs   Lab 11/11/22  0316 11/12/22  1253 11/15/22  0751   HGB 7.9* 8.8* 8.1*         Assessment and Plan      Cherry Santiago is a 67 y.o. female with history of history of AF (on eliquis), CHF, arthritis, GERD, HLD, HTN, CAD, TIA and concern for recently diagnosed carcinoid syndrome in setting of chronic diarrhea. GI consulted for additional workup. Hypoalbuminemia raises concern for protein-losing enteropathy. Myeloma and amyloidosis have already been explored. Unclear if BM biopsy would be of assistance. There was concern for neuroendocrine tumor. Chromogranin A elevated >2000. Extensive workup done thus far including upper and lower endosocpy (unrevealing), elevated chromogranin A, Ga Dotatate scan showing uptake in pancreas although EUS did not show mass to be biopsied and CT panc protocol showed no pancreatic lesion. Some lab workup still pending.     Problem List:  Chronic diarrhea, concern for neuroendocrine tumor        Recommendations:  - Fecal elastase, serum tryptase, serum VIP and somatostatin pending  - EGD for small bowel biopsies on 11/17 (holding eliquis x48 hr)  - Hold PPI for one week (last dose 11/14) then recheck chromogranin and gastrin levels. PPIs may be source of elevation in both.  - No role for repeat EUS and biopsy without identifiable lesion per AES  - Should discuss in tumor board on Monday 11/21    Thank you for involving us in the care of Cherry Santiago. Please call with any additional questions,  concerns or changes in the patient's clinical status.    Jet Paz MD  Gastroenterology Fellow, PGY IV

## 2022-11-15 NOTE — ASSESSMENT & PLAN NOTE
+ UA on 11/14. Rocephin started. Was recently on levoquin.  This was associated w left flank pain.   Still w diarrhea  culture pending

## 2022-11-15 NOTE — SUBJECTIVE & OBJECTIVE
Interval History: see above    Review of Systems   Constitutional:  Negative for activity change, chills, fatigue and fever.   HENT:  Negative for congestion, nosebleeds and trouble swallowing.    Respiratory:  Negative for apnea, cough, choking, chest tightness and shortness of breath.    Cardiovascular:  Negative for chest pain and leg swelling.   Gastrointestinal:  Positive for diarrhea. Negative for abdominal distention, abdominal pain, constipation, nausea and vomiting.   Genitourinary:  Negative for decreased urine volume, difficulty urinating, dysuria and frequency.   Musculoskeletal:  Negative for arthralgias, back pain, joint swelling, neck pain and neck stiffness.   Skin:  Negative for rash and wound.   Neurological:  Negative for dizziness, seizures, syncope, weakness, light-headedness, numbness and headaches.   Psychiatric/Behavioral:  Negative for agitation, behavioral problems, confusion, hallucinations, self-injury and sleep disturbance. The patient is not nervous/anxious.    Objective:     Vital Signs (Most Recent):  Temp: 98.2 °F (36.8 °C) (11/15/22 0447)  Pulse: 70 (11/15/22 0447)  Resp: 16 (11/15/22 0447)  BP: 112/70 (11/15/22 0447)  SpO2: 99 % (11/15/22 0551) Vital Signs (24h Range):  Temp:  [97.9 °F (36.6 °C)-98.5 °F (36.9 °C)] 98.2 °F (36.8 °C)  Pulse:  [57-87] 70  Resp:  [16-18] 16  SpO2:  [95 %-100 %] 99 %  BP: ()/(44-70) 112/70     Weight: 88.5 kg (195 lb)  Body mass index is 32.45 kg/m².    Intake/Output Summary (Last 24 hours) at 11/15/2022 0730  Last data filed at 11/15/2022 0201  Gross per 24 hour   Intake 240 ml   Output --   Net 240 ml        Physical Exam  Constitutional:       General: She is not in acute distress.     Appearance: Normal appearance. She is obese. She is ill-appearing.   HENT:      Head: Normocephalic and atraumatic.      Nose: Nose normal.      Mouth/Throat:      Mouth: Mucous membranes are moist.   Eyes:      General: No scleral icterus.     Extraocular  Movements: Extraocular movements intact.      Pupils: Pupils are equal, round, and reactive to light.   Cardiovascular:      Rate and Rhythm: Normal rate and regular rhythm.      Pulses: Normal pulses.      Heart sounds: Normal heart sounds.   Pulmonary:      Effort: Pulmonary effort is normal.      Breath sounds: Normal breath sounds. No wheezing or rhonchi.   Chest:      Chest wall: No tenderness.   Abdominal:      General: Abdomen is flat. Bowel sounds are normal. There is no distension.      Palpations: Abdomen is soft. There is no mass.      Tenderness: There is no abdominal tenderness. There is no right CVA tenderness, left CVA tenderness, guarding or rebound.   Musculoskeletal:         General: No swelling, tenderness, deformity or signs of injury. Normal range of motion.      Cervical back: Normal range of motion and neck supple. No rigidity or tenderness.   Skin:     General: Skin is warm and dry.      Coloration: Skin is not jaundiced or pale.      Findings: No erythema or rash.   Neurological:      General: No focal deficit present.      Mental Status: She is alert and oriented to person, place, and time. Mental status is at baseline.      Cranial Nerves: No cranial nerve deficit.      Motor: No weakness.   Psychiatric:         Mood and Affect: Mood normal.         Behavior: Behavior normal.         Thought Content: Thought content normal.         Judgment: Judgment normal.       Significant Labs: All pertinent labs within the past 24 hours have been reviewed.  CBC:   No results for input(s): WBC, HGB, HCT, PLT in the last 48 hours.    CMP:   No results for input(s): NA, K, CL, CO2, GLU, BUN, CREATININE, CALCIUM, PROT, ALBUMIN, BILITOT, ALKPHOS, AST, ALT, ANIONGAP, EGFRNONAA in the last 48 hours.    Invalid input(s): ESTGFAFRICA      Significant Imaging: I have reviewed all pertinent imaging results/findings within the past 24 hours.

## 2022-11-15 NOTE — ASSESSMENT & PLAN NOTE
Hs of OA spine, DJD, back surgery  Xray l spine 3/2021 - posterior fusion hardware from L2-L5 along with laminectomy change.  Grade 1 anterolistheses are suspected of L3 over L4 and L4 over L5.   11/14 - left flank  Completed levoquin  See UTI: Checked UA + on 11/14 , reflex culture pending. Rocephin started. She was recently on levoquin, Might be resistant

## 2022-11-15 NOTE — PLAN OF CARE
Pt AAOx4, up to bedside commode with assistance.  One episode of hypotension, asymptomatic, resolved without intervention.  One BM during shift.  Afebrile.  Call light within reach, bed in lowest position, wheels locked.

## 2022-11-15 NOTE — CONSULTS
[facility] - Oncology ([facility])  General Surgery  Consult Note    Patient Name: Cherry Santiago  MRN: [id number]  Code Status: Full Code  Admission Date: 11/6/2022  Hospital Length of Stay: 8 days  Attending Physician: Sonia Gramajo MD  Primary Care Provider: Amarilys Romero MD    Patient information was obtained from patient and past medical records.     Inpatient consult to Surgical Oncology  Consult performed by: SHU Sam MD  Consult ordered by: Sonia Gramajo MD  Reason for consult: diarrhea        Subjective:     Principal Problem: Diarrhea concurrent with and due to carcinoid syndrome    History of Present Illness: 67F presents with intractable diarrhea since August 2022 associated with 40 lb weight loss.  Diarrhea is yellow and thin. Continues throughout the day. No temporal associations. Nothing makes it better or worse.  No blood in BM.  Not painful. Has occasional cramps in lower quadrants.  She has occasional nausea and vomiting.  Has never had diarrhea like this before.  No flushing, sweats.   No sick contacts.    She has been worked up outpatient so far.   She had C-scope with negative biopsy and EGD with mild gastritis.    Infectious workup has so far been negative. No c diff or cryptosporidium. ID has been consulted. Stool studies still pending.    Heme-onc consulted.  She had negative 5-HIAA. Chromogramin elevated but also on PPI, which can falsely elevate.  Pending VIPoma lab    She has had PET-DOTATATE which showed suspicious activity in tail and head of the pancreas without correlate on CT.  Dedicated panc CT also did not show a lesion.  She had EUS without correlate found. 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    She was started on octreotide without improvement and it was discontinued.  She has improved on loperamide.    No family history of IBD or other diarrheal disorders.    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 She does not have DM.    She has history of open brittany through midline in 1978, sleeve bypass in 2006, total hysterectomy in 2012, knee replacement in 2015 and back surgery in 2017.        No current facility-administered medications on file prior to encounter.     Current Outpatient Medications on File Prior to Encounter   Medication Sig    apixaban (ELIQUIS) 5 mg Tab Take 1 tablet (5 mg total) by mouth 2 (two) times daily.    aspirin (ECOTRIN) 81 MG EC tablet Take 81 mg by mouth once daily.    ferrous sulfate (FEOSOL) 325 mg (65 mg iron) Tab tablet Take 325 mg by mouth daily with breakfast.    furosemide (LASIX) 20 MG tablet Take 20 mg by mouth once daily.    levETIRAcetam (KEPPRA) 250 MG Tab Take 500 mg by mouth 2 (two) times daily.    loperamide (IMODIUM) 2 mg capsule Take 1 capsule (2 mg total) by mouth 4 (four) times daily as needed for Diarrhea.    midodrine (PROAMATINE) 5 MG Tab Take 1 tablet (5 mg total) by mouth 3 (three) times daily with meals.    pantoprazole (PROTONIX) 40 MG tablet Take 40 mg by mouth once daily.    potassium chloride SA (K-DUR,KLOR-CON) 20 MEQ tablet Take 1 tablet (20 mEq total) by mouth 2 (two) times daily.    timolol maleate 0.5% (TIMOPTIC) 0.5 % Drop Place 1 drop into both eyes 2 (two) times daily.    atorvastatin (LIPITOR) 40 MG tablet Take 1 tablet (40 mg total) by mouth every evening. (Patient not taking: Reported on 11/4/2022)    citric acid-potassium citrate (POLYCITRA) 1,100-334 mg/5 mL solution potassium citrate-citric acid 1,100 mg-334 mg/5 mL oral solution   TAKE 5 MLS (10 MEQ TOTAL) BY MOUTH ONCE. FOR 1 DOSE ONLY    diphenoxylate-atropine 2.5-0.025 mg (LOMOTIL) 2.5-0.025 mg per tablet Take 1 tablet by mouth 4 (four) times daily as needed for Diarrhea (unreleived with Imodium). (Patient not taking: Reported on 11/4/2022)    metoclopramide HCl (REGLAN) 10 MG tablet Take 1 tablet (10 mg total) by mouth every 6 (six) hours as needed (Nausea). (Patient  not taking: Reported on 11/4/2022)    metoprolol tartrate (LOPRESSOR) 25 MG tablet Take 1 tablet (25 mg total) by mouth 2 (two) times daily. (Patient not taking: Reported on 11/4/2022)    sodium bicarbonate 650 MG tablet Take 1 tablet (650 mg total) by mouth 2 (two) times daily. for 5 days       Review of patient's allergies indicates:  No Known Allergies    Past Medical History:   Diagnosis Date    Anticoagulant long-term use     Arthritis     Atrial fibrillation     CHF (congestive heart failure)     Diabetes mellitus     Type2 resolved after weight loss surgery    DVT (deep venous thrombosis)     Encounter for blood transfusion     GERD (gastroesophageal reflux disease)     Glaucoma     Hypercholesterolemia     Hyperlipemia     Hypertension     Memory changes     Myocardial infarction     Renal failure     resolved    TIA (transient ischemic attack)      Past Surgical History:   Procedure Laterality Date    BACK SURGERY  06/21/2017    lumbar fusion 6/21/17 and surgery for hematoma to site on 6/26/17    CHOLECYSTECTOMY  1978    COLONOSCOPY N/A 10/3/2022    Procedure: COLONOSCOPY;  Surgeon: Jourdan Parks MD;  Location: [facility];  Service: General;  Laterality: N/A;    COLONOSCOPY N/A 10/11/2022    Procedure: COLONOSCOPY;  Surgeon: Stephen Cooper MD;  Location: [facility];  Service: Endoscopy;  Laterality: N/A;    ENDOSCOPIC ULTRASOUND OF UPPER GASTROINTESTINAL TRACT N/A 11/9/2022    Procedure: ULTRASOUND, UPPER GI TRACT, ENDOSCOPIC;  Surgeon: Jake Corona MD;  Location: [facility] ([address] [place] [address]);  Service: Endoscopy;  Laterality: N/A;    ESOPHAGOGASTRODUODENOSCOPY N/A 10/3/2022    Procedure: EGD (ESOPHAGOGASTRODUODENOSCOPY);  Surgeon: Jourdan Parks MD;  Location: [facility];  Service: General;  Laterality: N/A;    GASTRIC BYPASS      HYSTERECTOMY  2012    JOINT REPLACEMENT      TKR    LEFT HEART CATHETERIZATION Left 2/5/2021    Procedure: Left heart cath;  Surgeon: Shane PIERCE  MD Junior;  Location: [facility] CATH;  Service: Cardiovascular;  Laterality: Left;    PELVIC LAPAROSCOPY Left     OOPH    RENAL BIOPSY N/A 10/5/2022    Procedure: BIOPSY, KIDNEY;  Surgeon: Velia Diagnostic Provider;  Location: [facility] OR;  Service: General;  Laterality: N/A;    RIGHT HEART CATHETERIZATION Right 2/5/2021    Procedure: INSERTION, CATHETER, RIGHT HEART. via left radial and left brachial;  Surgeon: Shane Pacheco MD;  Location: [facility] CATH;  Service: Cardiovascular;  Laterality: Right;    TOTAL ABDOMINAL HYSTERECTOMY W/ BILATERAL SALPINGOOPHORECTOMY       Family History       Problem Relation (Age of Onset)    Arthritis Mother    Breast cancer Mother    Cancer Father    Diabetes Daughter    Heart disease Mother, Father    Hypertension Mother, Father          Tobacco Use    Smoking status: Never    Smokeless tobacco: Never   Substance and Sexual Activity    Alcohol use: Yes     Comment: occasional    Drug use: No    Sexual activity: Not Currently     Review of Systems   Constitutional:  Positive for activity change, appetite change and unexpected weight change.   HENT: Negative.     Eyes: Negative.    Respiratory: Negative.     Cardiovascular: Negative.    Gastrointestinal:  Positive for abdominal pain, diarrhea, nausea and vomiting. Negative for abdominal distention, blood in stool and constipation.   Endocrine: Negative.    Genitourinary: Negative.    Musculoskeletal: Negative.    Skin:  Positive for color change. Negative for rash.   Allergic/Immunologic: Negative.    Neurological: Negative.    Hematological: Negative.    Psychiatric/Behavioral: Negative.     All other systems reviewed and are negative.  Objective:     Vital Signs (Most Recent):  Temp: 98.5 °F (36.9 °C) (11/14/22 1545)  Pulse: 61 (11/14/22 1545)  Resp: 17 (11/14/22 1545)  BP: 94/62 (11/14/22 1545)  SpO2: 99 % (11/14/22 1545) Vital Signs (24h Range):  Temp:  [96.7 °F (35.9 °C)-98.5 °F (36.9 °C)] 98.5 °F (36.9 °C)  Pulse:  [56-73]  61  Resp:  [16-18] 17  SpO2:  [96 %-100 %] 99 %  BP: ()/(55-73) 94/62     Weight: 88.5 kg (195 lb)  Body mass index is 32.45 kg/m².    Physical Exam  Vitals and nursing note reviewed.   Constitutional:       General: She is not in acute distress.     Appearance: She is obese. She is not ill-appearing.   Cardiovascular:      Rate and Rhythm: Normal rate.   Pulmonary:      Effort: Pulmonary effort is normal.   Abdominal:      Palpations: Abdomen is soft.      Comments: Non tender  Midline incisions are well healed  obese   Musculoskeletal:         General: Normal range of motion.   Skin:     General: Skin is warm and dry.      Capillary Refill: Capillary refill takes less than 2 seconds.   Neurological:      General: No focal deficit present.      Mental Status: She is alert and oriented to person, place, and time.   Psychiatric:         Mood and Affect: Mood normal.       Significant Labs:  I have reviewed all pertinent lab results within the past 24 hours.  CBC:   Recent Labs   Lab 11/12/22  1253   WBC 4.86   RBC 2.91*   HGB 8.8*   HCT 26.4*      MCV 91   MCH 30.2   MCHC 33.3     CMP:   Recent Labs   Lab 11/12/22  1253      CALCIUM 7.5*   ALBUMIN 1.4*   PROT 4.4*      K 3.5   CO2 16*   *   BUN 6*   CREATININE 1.2   ALKPHOS 80   ALT 8*   AST 45*   BILITOT 0.5     Microbiology Results (last 7 days)       Procedure Component Value Units Date/Time    Stool culture [[id number]] Collected: 11/06/22 2030    Order Status: Completed Specimen: Stool Updated: 11/10/22 1153     Stool Culture No Salmonella,Shigella,Vibrio,Campylobacter,Yersinia isolated.    E. coli 0157 antigen [[id number]] Collected: 11/06/22 2030    Order Status: Completed Specimen: Stool Updated: 11/08/22 0948     Shiga Toxin 1 E.coli Negative     Shiga Toxin 2 E.coli Negative    Urine culture [[id number]]  (Abnormal)  (Susceptibility) Collected: 11/06/22 1157    Order Status: Completed Specimen: Urine Updated: 11/08/22 0815      Urine Culture, Routine KLEBSIELLA PNEUMONIAE  >100,000 cfu/ml      Narrative:      Specimen Source->Urine            Significant Diagnostics:  I have reviewed all pertinent imaging results/findings within the past 24 hours.    Reviewed CT panc and CT-dotatate and EUS/EGD findings and Cscope      Assessment/Plan:     Chronic diarrhea  67F with chronic diarrhea since August 2022 with 40 lbs weight loss.  Extensive workup so far is unrevealing though there is PET-DOTATATE positive lesions in head and tail of pancreas.  However, there is no mass correlate on CT panc or EUS.  Surg onc consulted for surgical exploration.  No role for surgery without target first identified.  AES plans to repeat EUS today  Will be presented in tumor board on Monday  Will follow along       VTE Risk Mitigation (From admission, onward)         Ordered     apixaban tablet 5 mg  2 times daily         11/09/22 1916     IP VTE HIGH RISK PATIENT  Once         11/06/22 1614     Place sequential compression device  Until discontinued         11/06/22 1614                Thank you for your consult. I will follow-up with patient. Please contact us if you have any additional questions.    YUDI Sam MD  General Surgery  [facility] - Oncology ([facility])

## 2022-11-15 NOTE — PROGRESS NOTES
Armando Jenkins - Oncology ([facility])  [facility] Medicine  Progress Note    Patient Name: Cherry Santiago  MRN: [id number]  Patient Class: IP- Inpatient   Admission Date: 11/6/2022  Length of Stay: 8 days  Attending Physician: Sonia Gramajo MD  Primary Care Provider: Amarilys Romero MD        Subjective:     Principal Problem:Diarrhea concurrent with and due to carcinoid syndrome        HPI:  Cherry Pozo is a 67-year-old past medical history of recently diagnosed carcinoid syndrome, AFib on Eliquis, CHF, arthritis, GERD, glaucoma, hyperlipidemia, hypertension, prior MI, TIA presenting with persistent diarrhea.      AAOx 3 . accompanied by the daughter at the bedside. Patient mentions having diarrhea intermittently since August 2022 more recently diarrhea has become worse over past few days -several episodes daily  associated nausea with intermittent vomiting. c/o lower cramping abdominal pain - 5/10.   symptoms concerned likely though to be secondary to carcinoid syndrome patient evaluated by Heme-Onc at [facility] with PET scan done 10/18 - Focal nodular foci of radiotracer uptake at the pancreatic tail and near the region of the pancreatic head above liver background activity could indicate somatostatin receptor avid lesions, but no corresponding mass is seen on CT portion of the study.  Recommend follow-up contrast enhanced pancreas protocol CT to further assess. 5HIAA levels on 10/21 WNL . CT AP W contrast done 11/4 shows No pancreatic lesion and Enterocolitis,. recent EGD -Non-bleeding gastric ulcers with no stigmata of  bleeding. Biopsied. Treated with a monopolar probe. colonoscopy with biopsy -No significant pathologic abnormality. No morphologic evidence of active inflammatory bowel disease, microscopic colitis, or neoplasia. Patient was  referred to [facility] for further workup and has scheduled appointment at Heme-Onc at [facility] this upcoming week. . c/o   dizziness,  and shortness of breath on minimal exertion    ER course -electrolyte derangement with hypokalemia to 2.4, hypomagsemia of 1.5 . replaced . UA + started on IV cefriaxone       Overview/Hospital Course:  After  admission - chromogranin A elevated at 2000s.  stool studies in process. DVT sonogram. Nonocclusive thrombus/DVT in the superior-mid right femoral vein. K replaced.  corrected calcium of 8.4.  PTH elevated 114 .started on sodium bicarbonate 1300mg BID .orthostatics + with drop in SBP to 80s on standing. RL bolus 1L and 75ml/h . C diff negative. UC with GRAM NEGATIVE PAIGE >100,000 cfu/ml ,Klebsiella Pneumonia.  H/O work up for neuroendocrine tumor vs VIPoma.  AES consulted for EUS and potential biopsy given that her PET scan showed uptake but no mass was seen on CT pancreas protocol. discontinued eliquis.  EUS - sleeve gastrectomy was found, characterized by healthy appearing mucosa. Widely patent duodenoenterostomy, characterized   by healthy appearing mucosa was found. few cystic lesions were seen in the pancreatic  head, genu of the pancreas and pancreatic body highly suspicious  for a branched intraductal papillary mucinous neoplasm.small for sampling. pancreatic head and pancreatic tail showed no mass. Awaiting further recs from oncology. Started octreotide. ID consulted for positive cryptosporidium antigen. nitazoxinide 500mg bid for 3 days. Repeat cryptosporidium Ag negative Hb at 6.7 Transfusion with 1 unit of PRBC with response.  Started Immodium.     11/11- UTI on levoquin, on immodium, octeotride, bicarb, suspicious tissue unable to be biopsied during EUS, colon bx neg, s/p transfusion. On apixaban for DVT. /61   Pulse 76  SpO2 95% on room air , eating 50%, good UO 1000cc, 6 BMs yesterday. Waiting on oncology recs. Discussed case w Dr. Larson.  Pt in shower doing ADLs, walking independently.    11/12- Oncology reports  Low suspicion for neuroendocrine tumor at this time. Lab for  VIPoma pending. Given this and patient with little improvement on octreotide, will discontinue octreotide. Will consult GI to help evaluate other causes for chronic diarrhea and elevated chromogranin, which may increase with irritable bowel disease, chronic hepatitis, liver failure, inflammatory diseases, neuroendocrine tumors, small cell lung tumors, and renal failure. Hx of gastric bypass.  Pt reported > 10 BMs, nurses reported 6. Eating 75% of meals. CT chest w contrast ordered.  11/13- 4 BMs overnight, 6 this am. Developed sniffles and nasal congestion. Appreciate GI input. BP 97/55    Pulse 76  , metamucil changed to questran.    11/14- diarrrhea slowing on Questran, GI considering re-approach to bx per EUS and small bowel biopsies. Lab pending: VIPoma, somatostatin, tryptase, fecal panc elastace.  CT chest- Small bilateral pleural effusions, trace pericardial fluid, anasarca.  2. Subcentimeter nodules bilaterally.  Oncologic considerations will determine, for imaging surveillance.  3. Significant hepatic steatosis.  Consider correlation with LFTs.  4. Status post sleeve gastrectomy.  Moderate-sized hiatal hernia.  BP 94/55   Pulse 67   Temp 97.9 °F (36.6 °C) (Oral)   Resp 16,  on room air.   - 3 BMs so far thia am. ( Improvement)  - GI and surgery to discuss this case at tumor board on Monday about ex-lap. Sonsulted surg oncology.    11/15- GI planning on EGD and small bowel biopsies, # stools diminishing with Questran and immodium. . Pt will be presented at tumor board next Monday.  /70 VSS. Walking to BR. Chronic back pain . Above lab still pending.      Interval History: see above    Review of Systems   Constitutional:  Negative for activity change, chills, fatigue and fever.   HENT:  Negative for congestion, nosebleeds and trouble swallowing.    Respiratory:  Negative for apnea, cough, choking, chest tightness and shortness of breath.    Cardiovascular:  Negative for chest pain and leg swelling.    Gastrointestinal:  Positive for diarrhea. Negative for abdominal distention, abdominal pain, constipation, nausea and vomiting.   Genitourinary:  Negative for decreased urine volume, difficulty urinating, dysuria and frequency.   Musculoskeletal:  Negative for arthralgias, back pain, joint swelling, neck pain and neck stiffness.   Skin:  Negative for rash and wound.   Neurological:  Negative for dizziness, seizures, syncope, weakness, light-headedness, numbness and headaches.   Psychiatric/Behavioral:  Negative for agitation, behavioral problems, confusion, hallucinations, self-injury and sleep disturbance. The patient is not nervous/anxious.    Objective:     Vital Signs (Most Recent):  Temp: 98.2 °F (36.8 °C) (11/15/22 0447)  Pulse: 70 (11/15/22 0447)  Resp: 16 (11/15/22 0447)  BP: 112/70 (11/15/22 0447)  SpO2: 99 % (11/15/22 0551) Vital Signs (24h Range):  Temp:  [97.9 °F (36.6 °C)-98.5 °F (36.9 °C)] 98.2 °F (36.8 °C)  Pulse:  [57-87] 70  Resp:  [16-18] 16  SpO2:  [95 %-100 %] 99 %  BP: ()/(44-70) 112/70     Weight: 88.5 kg (195 lb)  Body mass index is 32.45 kg/m².    Intake/Output Summary (Last 24 hours) at 11/15/2022 0730  Last data filed at 11/15/2022 0201  Gross per 24 hour   Intake 240 ml   Output --   Net 240 ml        Physical Exam  Constitutional:       General: She is not in acute distress.     Appearance: Normal appearance. She is obese. She is ill-appearing.   HENT:      Head: Normocephalic and atraumatic.      Nose: Nose normal.      Mouth/Throat:      Mouth: Mucous membranes are moist.   Eyes:      General: No scleral icterus.     Extraocular Movements: Extraocular movements intact.      Pupils: Pupils are equal, round, and reactive to light.   Cardiovascular:      Rate and Rhythm: Normal rate and regular rhythm.      Pulses: Normal pulses.      Heart sounds: Normal heart sounds.   Pulmonary:      Effort: Pulmonary effort is normal.      Breath sounds: Normal breath sounds. No wheezing or  rhonchi.   Chest:      Chest wall: No tenderness.   Abdominal:      General: Abdomen is flat. Bowel sounds are normal. There is no distension.      Palpations: Abdomen is soft. There is no mass.      Tenderness: There is no abdominal tenderness. There is no right CVA tenderness, left CVA tenderness, guarding or rebound.   Musculoskeletal:         General: No swelling, tenderness, deformity or signs of injury. Normal range of motion.      Cervical back: Normal range of motion and neck supple. No rigidity or tenderness.   Skin:     General: Skin is warm and dry.      Coloration: Skin is not jaundiced or pale.      Findings: No erythema or rash.   Neurological:      General: No focal deficit present.      Mental Status: She is alert and oriented to person, place, and time. Mental status is at baseline.      Cranial Nerves: No cranial nerve deficit.      Motor: No weakness.   Psychiatric:         Mood and Affect: Mood normal.         Behavior: Behavior normal.         Thought Content: Thought content normal.         Judgment: Judgment normal.       Significant Labs: All pertinent labs within the past 24 hours have been reviewed.  CBC:   No results for input(s): WBC, HGB, HCT, PLT in the last 48 hours.    CMP:   No results for input(s): NA, K, CL, CO2, GLU, BUN, CREATININE, CALCIUM, PROT, ALBUMIN, BILITOT, ALKPHOS, AST, ALT, ANIONGAP, EGFRNONAA in the last 48 hours.    Invalid input(s): ESTGFAFRICA      Significant Imaging: I have reviewed all pertinent imaging results/findings within the past 24 hours.      Assessment/Plan:      * Carcinoid syndrome related chronic diarrhea   Diagnosis not confirmed with biopsy:   67-year-old past medical history of recently diagnosed carcinoid syndrome,  having diarrhea intermittently since August 2022 more recently diarrhea has become worse over past few days -several episodes daily  associated nausea with intermittent vomiting. symptoms concerned likely though to be secondary to  carcinoid syndrome patient evaluated by Heme-Onc at [facility] with PET scan done 10/18 - Focal nodular foci of radiotracer uptake at the pancreatic tail and near the region of the pancreatic head above liver background activity could indicate somatostatin receptor avid lesions, but no corresponding mass is seen on CT portion of the study.  Recommend follow-up contrast enhanced pancreas protocol CT to further assess. 5HIAA levels on 10/21 WNL .     CT AP W contrast done 11/4 shows No pancreatic lesion and Enterocolitis,. recent EGD -Non-bleeding gastric ulcers with no stigmata of  bleeding. Biopsied. Treated with a monopolar probe.     Colonoscopy with biopsy -No significant pathologic abnormality. No morphologic evidence of active inflammatory bowel disease, microscopic colitis, or neoplasia. Patient was  referred to [facility] for further workup and has scheduled appointment at Heme-Onc at [facility] this upcoming week.    11/7 Chromogranin A elevated at 2000s.  stool studies in process. C diff negative. Hematology working her up for neuroendocrine tumor vs VIPoma.  AES consulted for EUS and potential biopsy given that her PET scan showed uptake but no mass was seen on CT pancreas protocol.discontinued eliquis.  plan for EUS on Wednesday.  started and discontinued octreotide since it failed to help  11/9 repeat cryptosporidium Ag negative  EUS today - sleeve gastrectomy was found, characterized by  healthy appearing mucosa.Widely patent duodenoenterostomy, characterized   by healthy appearing mucosa was found. few cystic lesions were seen in the pancreatic  head, genu of the pancreas and pancreatic body highly suspicious  for a branched intraductal papillary mucinous neoplasm.small for sampling. pancreatic head and pancreatic tail showed no mass.   11/10 - Oncology to see and review EUS; started immodium   11/11- on octeotride, immodium, 6 BMs yesterday, colon bx neg, repeat  cryptospor neg. Consider biopsy per surgery? Waiting for oncology recs.  Add metamucil  (low dose) to absorb excess water.  11/13 - waiting on VIPoma, somatostatin,  labs. Discussed pt condition and plan w her DTR. Discuss diagnostic approach with GI.  CT chest-  Some small sub-centimeter nodules  11/4- improved w questran, GI considering another biopsy procedure (small bowel and pancreas per EUS). Holding pantoprazole and repeat gastrin and chromogranin level once off PPI for at least 2-4 weeks.  VIP& somatostatin level will also potentially be elevated with pantoprazole.    11/15- EGD and small bowel biopsies to be done, lab today pending. Will need to hold eliquis 11/16 before biopsies. NPO after midnight.       Chronic diarrhea  Uncertain etiology  Concern for carcinoid syndrome with high chromogranin level, but no biopsy souce can be found.   CT a/p - Partial small bowel resection.  There are some thickened segments of large and small bowel, some with adjacent mesenteric edema.  5HIAA levels on 10/21 WNL .  CT AP W contrast done 11/4 shows No pancreatic lesion and Enterocolitis,.   recent EGD -Non-bleeding gastric ulcers with no stigmata of  bleeding. Biopsied. Treated with a monopolar probe.   colonoscopy with biopsy -No significant pathologic abnormality. No morphologic evidence of active inflammatory bowel disease, microscopic colitis, or neoplasia  Colon biopsies neg 10/2022  Gastric biopsy 10/2022 - neg for h pylori  Kid bx 10/2022 - no amyloid, + diab nephropathy    Add CT chest- will need contrast for tumor eval. RL x 500 cc.   Consult GI- Is Surgery consult for pancreatic biopsy indicated? Any other eval for diarrhea?   Per GI:   Obtain fecal elastase = ordered  - Can check serum tryptase for mastocytosis - ordered  -  Could consider empiric cholestyramine for possible bile acid diarrhea as well- done ( stopped metamucil)    - If pending workup negative, consider hydrogen breath testing vs empiric  rifaximin trial for SIBO although less likely.  11/14- continue questran and imodium,  lab tomorrow      Acute cystitis without hematuria  + UA on 11/14. Rocephin started. Was recently on levoquin.  This was associated w left flank pain.   Still w diarrhea  culture pending      Intractable back pain  Hs of OA spine, DJD, back surgery  Xray l spine 3/2021 - posterior fusion hardware from L2-L5 along with laminectomy change.  Grade 1 anterolistheses are suspected of L3 over L4 and L4 over L5.   11/14 - left flank  Completed levoquin  See UTI: Checked UA + on 11/14 , reflex culture pending. Rocephin started. She was recently on levoquin, Might be resistant       UTI (urinary tract infection)   urinalysis positive . Culture, Urine pending  continue with anitbiotic IV ceftriaxone  11/8 UC with klebsiella; s/p x5 doses CTX - sensitive    11/14 - completed levoquin, has left flank/ back pain. Check UA, [place] given, warmpack.     Chronic deep vein thrombosis (DVT): right common femoral vein  -DVT sonogram 8/22 and 10/22 negative for DVT  -11/7 DVT sonogram -Nonocclusive thrombus/DVT in the superior-mid right femoral vein.continue eliquis . S/p IVC filter   -Restarted eliquis following EUS  11/13- stable    Orthostatic hypotension  11/7 orthostatics + with drop in SBP to 80s on standing. RL bolus 1L and 75ml/h .SBP in 70-80s this PM on IVF. NS bolus 500ml x1. critical care consulted   11/11- /61 . May need intermittent bolus IVF due to volume loss with diarrhea  11/12- good UO. Walking to BR. Eating 75%  -stable    Hypokalemia    - Patient with potassium   Recent Labs   Lab 11/10/22  0408 11/11/22  0316 11/12/22  1253   K 3.6 3.1* 3.5   . Replaced 11/11.  Monitor  Replace 11/13, 11/14        Essential hypertension  Chronic, controlled.  Latest blood pressure and vitals reviewed-   Temp:  [98.2 °F (36.8 °C)-98.5 °F (36.9 °C)]   Pulse:  [57-87]   Resp:  [16-18]   BP: ()/(44-70)   SpO2:  [95 %-100 %] .   Home meds  for hypertension were reviewed and hold furosemide and metoprolol for now as with diarrhea. PRN meds if BP> 180/110 mm HG  11/11- on metoprolol 25 bid      Stage 3a chronic kidney disease  seems to be at baseline. Creatine stable for now. BMP reviewed- noted Estimated Creatinine Clearance: 50 mL/min (based on SCr of 1.2 mg/dL). according to latest data. Monitor UOP and serial BMP and adjust therapy as needed. Renally dose meds.    Recent Labs   Lab 11/10/22  0408 11/11/22  0316 11/12/22  1253   BUN 7* 6* 6*   CREATININE 1.1 1.1 1.2          Chronic heart failure with preserved ejection fraction (HFpEF) NICM NYHA2   Patient admitted without  signs and symptoms of CHF exacerbation    Results for orders placed or performed during the hospital encounter of 11/06/22   Brain natriuretic peptide   Result Value Ref Range    BNP 82 0 - 99 pg/mL   . Telemetry monitoring. Strict input/ Output monitor, Daily weights.    denies chestpain.  Obtain serial troponins.  Cardiac Enzymes trend  No results for input(s): CPK, CPKMB, MB, TROPONINI in the last 168 hours.  No results found for this or any previous visit.  echo 10/7/22 left ventricle is normal in size. Global left ventricular   systolic function is normal. The left ventricular ejection fraction is   55%. Left ventricular diastolic function is abnormal (stage I impaired   relaxation). Noted left ventricular hypertrophy. Concentric left   ventricular hypertrophy is present. It is mild.     4. Mild (1+) tricuspid regurgitation.   5. The right ventricle is normal in size. Right ventricular systolic   function is normal.      11/11- on metoprolol, holding lasix due to excessive  diarrhea        Hypophosphatemia  Replaced  11/11 - Phos 2.4, neutraphos bid x 10 days      Hypomagnesemia  replaced      Hypocalcemia  11/6   corrected calcium of 8.4.  PTH elevated 114    11/13- lucy 7.5, alb 1. 4      Dizziness  likely secondary to diarrhea. orthostasis. IV hydration      Anemia of  chronic disease    Patient's with Normocytic anemia.. Hemoglobin stable. Etiology likely due to chronic disease .  Current CBC reviewed-    Recent Labs   Lab 11/10/22  0408 11/11/22  0316 11/12/22  1253   HGB 8.0* 7.9* 8.8*    Monitor CBC and transfuse if H/H drops below 7/21.  11/9 concern for blood loss anemia.  Hb at 6.7 Transfusion with 1 unit of PRBC       Class 1 obesity due to excess calories with serious comorbidity and body mass index (BMI) of 32.0 to 32.9 in adult  Body mass index is 32.45 kg/m². Morbid obesity complicates all aspects of disease management from diagnostic modalities to treatment. Weight loss encouraged   - s/p Sleeve gastrectomy      Hypotension  See above      Seizures  continue with keppra BID      Cryptosporidium exposure  Repeat neg, colon biopsies neg.       Severe protein-calorie malnutrition   Nutrition consulted. Most recent weight and BMI monitored-   Alb 1.4  malabsorption suspected  Eating 25-75% on meals  Considering TPN for nutritional support.                         Measurements:  Wt Readings from Last 1 Encounters:   11/08/22 88.5 kg (195 lb)   Body mass index is 32.45 kg/m².    Recommendations:      Patient has been screened and assessed by RD. RD will follow patient.      VTE Risk Mitigation (From admission, onward)         Ordered     IP VTE HIGH RISK PATIENT  Once         11/06/22 1614     Place sequential compression device  Until discontinued         11/06/22 1614                Discharge Planning   MELYSSA: 11/17/2022     Code Status: Full Code   Is the patient medically ready for discharge?: No    Reason for patient still in hospital (select all that apply): Patient trending condition  Discharge Plan A: Home with family   Discharge Delays: None known at this time      Sonia Gramajo MD  Department of Hospital Medicine   [facility] - Oncology ([facility])

## 2022-11-15 NOTE — ASSESSMENT & PLAN NOTE
urinalysis positive . Culture, Urine pending  continue with anitbiotic IV ceftriaxone  11/8 UC with klebsiella; s/p x5 doses CTX - sensitive    11/14 - completed levoquin, has left flank/ back pain. Check UA, [place] given, warmpack.

## 2022-11-16 PROBLEM — R68.89 MULTIPLE COMPLAINTS: Status: ACTIVE | Noted: 2022-11-16

## 2022-11-16 PROBLEM — R11.2 NAUSEA VOMITING AND DIARRHEA: Status: ACTIVE | Noted: 2022-11-16

## 2022-11-16 PROBLEM — R19.7 NAUSEA VOMITING AND DIARRHEA: Status: ACTIVE | Noted: 2022-11-16

## 2022-11-16 PROBLEM — R06.02 SOB (SHORTNESS OF BREATH): Status: ACTIVE | Noted: 2022-11-16

## 2022-11-16 LAB
ANION GAP SERPL CALC-SCNC: 10 MMOL/L (ref 8–16)
BACTERIA UR CULT: NORMAL
BUN SERPL-MCNC: 7 MG/DL (ref 8–23)
CALCIUM SERPL-MCNC: 7.1 MG/DL (ref 8.7–10.5)
CHLORIDE SERPL-SCNC: 116 MMOL/L (ref 95–110)
CO2 SERPL-SCNC: 17 MMOL/L (ref 23–29)
CREAT SERPL-MCNC: 1.2 MG/DL (ref 0.5–1.4)
EST. GFR  (NO RACE VARIABLE): 49.6 ML/MIN/1.73 M^2
GLUCOSE SERPL-MCNC: 102 MG/DL (ref 70–110)
MAGNESIUM SERPL-MCNC: 1.6 MG/DL (ref 1.6–2.6)
PHOSPHATE SERPL-MCNC: 2.7 MG/DL (ref 2.7–4.5)
POTASSIUM SERPL-SCNC: 4 MMOL/L (ref 3.5–5.1)
SODIUM SERPL-SCNC: 143 MMOL/L (ref 136–145)

## 2022-11-16 PROCEDURE — 20600001 HC STEP DOWN PRIVATE ROOM

## 2022-11-16 PROCEDURE — 36573 INSJ PICC RS&I 5 YR+: CPT

## 2022-11-16 PROCEDURE — B4185 PARENTERAL SOL 10 GM LIPIDS: HCPCS | Performed by: HOSPITALIST

## 2022-11-16 PROCEDURE — 80048 BASIC METABOLIC PNL TOTAL CA: CPT | Performed by: HOSPITALIST

## 2022-11-16 PROCEDURE — A4217 STERILE WATER/SALINE, 500 ML: HCPCS | Performed by: HOSPITALIST

## 2022-11-16 PROCEDURE — 25000003 PHARM REV CODE 250: Performed by: HOSPITALIST

## 2022-11-16 PROCEDURE — 63600175 PHARM REV CODE 636 W HCPCS: Performed by: HOSPITALIST

## 2022-11-16 PROCEDURE — C1751 CATH, INF, PER/CENT/MIDLINE: HCPCS

## 2022-11-16 PROCEDURE — 99233 PR SUBSEQUENT HOSPITAL CARE,LEVL III: ICD-10-PCS | Mod: ,,, | Performed by: HOSPITALIST

## 2022-11-16 PROCEDURE — 84100 ASSAY OF PHOSPHORUS: CPT | Performed by: HOSPITALIST

## 2022-11-16 PROCEDURE — 83735 ASSAY OF MAGNESIUM: CPT | Performed by: HOSPITALIST

## 2022-11-16 PROCEDURE — 25000003 PHARM REV CODE 250: Performed by: INTERNAL MEDICINE

## 2022-11-16 PROCEDURE — 76937 US GUIDE VASCULAR ACCESS: CPT

## 2022-11-16 PROCEDURE — 36415 COLL VENOUS BLD VENIPUNCTURE: CPT | Performed by: HOSPITALIST

## 2022-11-16 PROCEDURE — 99233 SBSQ HOSP IP/OBS HIGH 50: CPT | Mod: ,,, | Performed by: HOSPITALIST

## 2022-11-16 RX ORDER — SODIUM CHLORIDE 0.9 % (FLUSH) 0.9 %
10 SYRINGE (ML) INJECTION EVERY 6 HOURS
Status: DISCONTINUED | OUTPATIENT
Start: 2022-11-16 | End: 2022-11-25 | Stop reason: HOSPADM

## 2022-11-16 RX ORDER — SODIUM CHLORIDE 0.9 % (FLUSH) 0.9 %
10 SYRINGE (ML) INJECTION
Status: DISCONTINUED | OUTPATIENT
Start: 2022-11-16 | End: 2022-11-25 | Stop reason: HOSPADM

## 2022-11-16 RX ADMIN — MICONAZOLE NITRATE 2 % TOPICAL POWDER: at 08:11

## 2022-11-16 RX ADMIN — LOPERAMIDE HYDROCHLORIDE 2 MG: 2 CAPSULE ORAL at 09:11

## 2022-11-16 RX ADMIN — FERROUS SULFATE TAB 325 MG (65 MG ELEMENTAL FE) 1 EACH: 325 (65 FE) TAB at 11:11

## 2022-11-16 RX ADMIN — SODIUM BICARBONATE 650 MG TABLET 1300 MG: at 09:11

## 2022-11-16 RX ADMIN — POTASSIUM & SODIUM PHOSPHATES POWDER PACK 280-160-250 MG 1 PACKET: 280-160-250 PACK at 11:11

## 2022-11-16 RX ADMIN — CHOLESTYRAMINE 4 G: 4 POWDER, FOR SUSPENSION ORAL at 11:11

## 2022-11-16 RX ADMIN — ASPIRIN 81 MG: 81 TABLET, COATED ORAL at 11:11

## 2022-11-16 RX ADMIN — LOPERAMIDE HYDROCHLORIDE 2 MG: 2 CAPSULE ORAL at 03:11

## 2022-11-16 RX ADMIN — POTASSIUM BICARBONATE 50 MEQ: 978 TABLET, EFFERVESCENT ORAL at 11:11

## 2022-11-16 RX ADMIN — LOPERAMIDE HYDROCHLORIDE 2 MG: 2 CAPSULE ORAL at 11:11

## 2022-11-16 RX ADMIN — POTASSIUM BICARBONATE 50 MEQ: 978 TABLET, EFFERVESCENT ORAL at 05:11

## 2022-11-16 RX ADMIN — ASCORBIC ACID, VITAMIN A PALMITATE, CHOLECALCIFEROL, THIAMINE HYDROCHLORIDE, RIBOFLAVIN-5 PHOSPHATE SODIUM, PYRIDOXINE HYDROCHLORIDE, NIACINAMIDE, DEXPANTHENOL, ALPHA-TOCOPHEROL ACETATE, VITAMIN K1, FOLIC ACID, BIOTIN, CYANOCOBALAMIN: 200; 3300; 200; 6; 3.6; 6; 40; 15; 10; 150; 600; 60; 5 INJECTION, SOLUTION INTRAVENOUS at 09:11

## 2022-11-16 RX ADMIN — MICONAZOLE NITRATE 2 % TOPICAL POWDER: at 09:11

## 2022-11-16 RX ADMIN — I.V. FAT EMULSION 250 ML: 20 EMULSION INTRAVENOUS at 09:11

## 2022-11-16 RX ADMIN — POTASSIUM & SODIUM PHOSPHATES POWDER PACK 280-160-250 MG 1 PACKET: 280-160-250 PACK at 09:11

## 2022-11-16 RX ADMIN — CHOLECALCIFEROL TAB 25 MCG (1000 UNIT) 2000 UNITS: 25 TAB at 11:11

## 2022-11-16 RX ADMIN — METOPROLOL TARTRATE 25 MG: 25 TABLET, FILM COATED ORAL at 09:11

## 2022-11-16 RX ADMIN — CEFTRIAXONE 1 G: 1 INJECTION, SOLUTION INTRAVENOUS at 08:11

## 2022-11-16 RX ADMIN — SODIUM BICARBONATE 650 MG TABLET 1300 MG: at 11:11

## 2022-11-16 RX ADMIN — ATORVASTATIN CALCIUM 40 MG: 20 TABLET, FILM COATED ORAL at 09:11

## 2022-11-16 NOTE — SUBJECTIVE & OBJECTIVE
Interval History: see above    Review of Systems   Constitutional:  Negative for activity change, chills, fatigue and fever.   HENT:  Negative for congestion, nosebleeds and trouble swallowing.    Respiratory:  Negative for apnea, cough, choking, chest tightness and shortness of breath.    Cardiovascular:  Negative for chest pain and leg swelling.   Gastrointestinal:  Positive for diarrhea. Negative for abdominal distention, abdominal pain, constipation, nausea and vomiting.   Genitourinary:  Negative for decreased urine volume, difficulty urinating, dysuria and frequency.   Musculoskeletal:  Negative for arthralgias, back pain, joint swelling, neck pain and neck stiffness.   Skin:  Negative for rash and wound.   Neurological:  Negative for dizziness, seizures, syncope, weakness, light-headedness, numbness and headaches.   Psychiatric/Behavioral:  Negative for agitation, behavioral problems, confusion, hallucinations, self-injury and sleep disturbance. The patient is not nervous/anxious.    Objective:     Vital Signs (Most Recent):  Temp: 98.6 °F (37 °C) (11/16/22 0712)  Pulse: 82 (11/16/22 0712)  Resp: 17 (11/16/22 0712)  BP: (!) 92/55 (11/16/22 0712)  SpO2: (!) 94 % (11/16/22 0712) Vital Signs (24h Range):  Temp:  [98.2 °F (36.8 °C)-98.7 °F (37.1 °C)] 98.6 °F (37 °C)  Pulse:  [64-85] 82  Resp:  [16-18] 17  SpO2:  [91 %-100 %] 94 %  BP: ()/(41-74) 92/55     Weight: 88.5 kg (195 lb)  Body mass index is 32.45 kg/m².    Intake/Output Summary (Last 24 hours) at 11/16/2022 0903  Last data filed at 11/15/2022 1715  Gross per 24 hour   Intake 25 ml   Output --   Net 25 ml        Physical Exam  Constitutional:       General: She is not in acute distress.     Appearance: Normal appearance. She is obese. She is ill-appearing.   HENT:      Head: Normocephalic and atraumatic.      Nose: Nose normal.      Mouth/Throat:      Mouth: Mucous membranes are moist.   Eyes:      General: No scleral icterus.     Extraocular  Movements: Extraocular movements intact.      Pupils: Pupils are equal, round, and reactive to light.   Cardiovascular:      Rate and Rhythm: Normal rate and regular rhythm.      Pulses: Normal pulses.      Heart sounds: Normal heart sounds.   Pulmonary:      Effort: Pulmonary effort is normal.      Breath sounds: Normal breath sounds. No wheezing or rhonchi.   Chest:      Chest wall: No tenderness.   Abdominal:      General: Abdomen is flat. Bowel sounds are normal. There is no distension.      Palpations: Abdomen is soft. There is no mass.      Tenderness: There is no abdominal tenderness. There is no right CVA tenderness, left CVA tenderness, guarding or rebound.   Musculoskeletal:         General: No swelling, tenderness, deformity or signs of injury. Normal range of motion.      Cervical back: Normal range of motion and neck supple. No rigidity or tenderness.   Skin:     General: Skin is warm and dry.      Coloration: Skin is not jaundiced or pale.      Findings: No erythema or rash.   Neurological:      General: No focal deficit present.      Mental Status: She is alert and oriented to person, place, and time. Mental status is at baseline.      Cranial Nerves: No cranial nerve deficit.      Motor: No weakness.   Psychiatric:         Mood and Affect: Mood normal.         Behavior: Behavior normal.         Thought Content: Thought content normal.         Judgment: Judgment normal.       Significant Labs: All pertinent labs within the past 24 hours have been reviewed.  CBC:   Recent Labs   Lab 11/15/22  0751   WBC 4.83   HGB 8.1*   HCT 24.6*          CMP:   Recent Labs   Lab 11/15/22  0751   *   K 2.8*   *   CO2 22*   GLU 87   BUN 7*   CREATININE 1.2   CALCIUM 7.1*   PROT 4.0*   ALBUMIN 1.3*   BILITOT 0.6   ALKPHOS 75   AST 36   ALT 6*   ANIONGAP 7*         Significant Imaging: I have reviewed all pertinent imaging results/findings within the past 24 hours.

## 2022-11-16 NOTE — ASSESSMENT & PLAN NOTE
Uncertain etiology  Concern for carcinoid syndrome with high chromogranin level, but no biopsy souce can be found.   CT a/p - Partial small bowel resection.  There are some thickened segments of large and small bowel, some with adjacent mesenteric edema.  5HIAA levels on 10/21 WNL .  CT AP W contrast done 11/4 shows No pancreatic lesion and Enterocolitis,.   recent EGD -Non-bleeding gastric ulcers with no stigmata of  bleeding. Biopsied. Treated with a monopolar probe.   colonoscopy with biopsy -No significant pathologic abnormality. No morphologic evidence of active inflammatory bowel disease, microscopic colitis, or neoplasia  Colon biopsies neg 10/2022  Gastric biopsy 10/2022 - neg for h pylori  Kid bx 10/2022 - no amyloid, + diab nephropathy    Add CT chest- will need contrast for tumor eval. RL x 500 cc.   Consult GI- Is Surgery consult for pancreatic biopsy indicated? Any other eval for diarrhea?   Per GI:   Obtain fecal elastase = ordered  - Can check serum tryptase for mastocytosis - ordered  -  Could consider empiric cholestyramine for possible bile acid diarrhea as well- done ( stopped metamucil)    - If pending workup negative, consider hydrogen breath testing vs empiric rifaximin trial for SIBO although less likely.  11/17- continue questran and imodium,  lab tomorrow, monitor stools and oral intake

## 2022-11-16 NOTE — ASSESSMENT & PLAN NOTE
Nutrition consulted. Most recent weight and BMI monitored-   Alb 1.4 -> 1.3  malabsorption suspected. Small bowel biopsies 11/17  Eating 25-75% of meals  Starting TPN for nutritional support on 11/16. Continue at home one month at least. Will need f/u labs weekly, f/u oncology, pcp, surg oncology, GI.                         Measurements:  Wt Readings from Last 1 Encounters:   11/08/22 88.5 kg (195 lb)   Body mass index is 32.45 kg/m².    Recommendations:      Patient has been screened and assessed by RD. RD will follow patient.

## 2022-11-16 NOTE — ASSESSMENT & PLAN NOTE
Diagnosis not confirmed with biopsy:   67-year-old past medical history of recently diagnosed carcinoid syndrome,  having diarrhea intermittently since August 2022 more recently diarrhea has become worse over past few days -several episodes daily  associated nausea with intermittent vomiting. symptoms concerned likely though to be secondary to carcinoid syndrome patient evaluated by [facility]Onc at [facility] with PET scan done 10/18 - Focal nodular foci of radiotracer uptake at the pancreatic tail and near the region of the pancreatic head above liver background activity could indicate somatostatin receptor avid lesions, but no corresponding mass is seen on CT portion of the study.  Recommend follow-up contrast enhanced pancreas protocol CT to further assess. 5HIAA levels on 10/21 WNL .     CT AP W contrast done 11/4 shows No pancreatic lesion and Enterocolitis,. recent EGD -Non-bleeding gastric ulcers with no stigmata of  bleeding. Biopsied. Treated with a monopolar probe.     Colonoscopy with biopsy -No significant pathologic abnormality. No morphologic evidence of active inflammatory bowel disease, microscopic colitis, or neoplasia. Patient was  referred to [facility] for further workup and has scheduled appointment at [facility] this upcoming week.    11/7 Chromogranin A elevated at 2000s.  stool studies in process. C diff negative. Hematology working her up for neuroendocrine tumor vs VIPoma.  AES consulted for EUS and potential biopsy given that her PET scan showed uptake but no mass was seen on CT pancreas protocol.discontinued eliquis.  plan for EUS on Wednesday.  started and discontinued octreotide since it failed to help  11/9 repeat cryptosporidium Ag negative  EUS today - sleeve gastrectomy was found, characterized by  healthy appearing mucosa.Widely patent duodenoenterostomy, characterized   by healthy appearing mucosa was found. few cystic lesions were seen in  the pancreatic  head, genu of the pancreas and pancreatic body highly suspicious  for a branched intraductal papillary mucinous neoplasm.small for sampling. pancreatic head and pancreatic tail showed no mass.   11/10 - Oncology to see and review EUS; started immodium   11/11- on octeotride, immodium, 6 BMs yesterday, colon bx neg, repeat cryptospor neg. Consider biopsy per surgery? Waiting for oncology recs.  Add metamucil  (low dose) to absorb excess water.  11/13 - waiting on VIPoma, somatostatin,  labs. Discussed pt condition and plan w her DTR. Discuss diagnostic approach with GI.  CT chest-  Some small sub-centimeter nodules  11/4- improved w questran, GI considering another biopsy procedure (small bowel and pancreas per EUS). Holding pantoprazole and repeat gastrin and chromogranin level once off PPI for at least 2-4 weeks.  VIP& somatostatin level will also potentially be elevated with pantoprazole.    11/16- EGD and small bowel biopsies to be done 11/17, lab today pending. Will need to resume eliquis 11/17 after biopsies. NPO after midnight.

## 2022-11-16 NOTE — ASSESSMENT & PLAN NOTE
11/7 orthostatics + with drop in SBP to 80s on standing. RL bolus 1L and 75ml/h .SBP in 70-80s this PM on IVF. NS bolus 500ml x1. critical care consulted   11/11- /61 . May need intermittent bolus IVF due to volume loss with diarrhea  11/12- good UO. Walking to [place]. Eating 75%  11/16- eating 25 %, has been NPO for possible procedure  -stable

## 2022-11-16 NOTE — PROGRESS NOTES
[facility] - Oncology ([facility])  [facility] Medicine  Progress Note    Patient Name: Cherry Santiago  MRN: [id number]  Patient Class: IP- Inpatient   Admission Date: 11/6/2022  Length of Stay: 9 days  Attending Physician: Sonia Gramajo MD  Primary Care Provider: Amarilys Romero MD        Subjective:     Principal Problem:Diarrhea concurrent with and due to carcinoid syndrome        HPI:  Cherry Pozo is a 67-year-old past medical history of recently diagnosed carcinoid syndrome, AFib on Eliquis, CHF, arthritis, GERD, glaucoma, hyperlipidemia, hypertension, prior MI, TIA presenting with persistent diarrhea.      AAOx 3 . accompanied by the daughter at the bedside. Patient mentions having diarrhea intermittently since August 2022 more recently diarrhea has become worse over past few days -several episodes daily  associated nausea with intermittent vomiting. c/o lower cramping abdominal pain - 5/10.   symptoms concerned likely though to be secondary to carcinoid syndrome patient evaluated by Heme-Onc at [facility] with PET scan done 10/18 - Focal nodular foci of radiotracer uptake at the pancreatic tail and near the region of the pancreatic head above liver background activity could indicate somatostatin receptor avid lesions, but no corresponding mass is seen on CT portion of the study.  Recommend follow-up contrast enhanced pancreas protocol CT to further assess. 5HIAA levels on 10/21 WNL . CT AP W contrast done 11/4 shows No pancreatic lesion and Enterocolitis,. recent EGD -Non-bleeding gastric ulcers with no stigmata of  bleeding. Biopsied. Treated with a monopolar probe. colonoscopy with biopsy -No significant pathologic abnormality. No morphologic evidence of active inflammatory bowel disease, microscopic colitis, or neoplasia. Patient was  referred to [facility] for further workup and has scheduled appointment at Heme-Onc at [facility] this upcoming week. . c/o   dizziness,  and shortness of breath on minimal exertion    ER course -electrolyte derangement with hypokalemia to 2.4, hypomagsemia of 1.5 . replaced . UA + started on IV cefriaxone       Overview/Hospital Course:  After  admission - chromogranin A elevated at 2000s.  stool studies in process. DVT sonogram. Nonocclusive thrombus/DVT in the superior-mid right femoral vein. K replaced.  corrected calcium of 8.4.  PTH elevated 114 .started on sodium bicarbonate 1300mg BID .orthostatics + with drop in SBP to 80s on standing. RL bolus 1L and 75ml/h . C diff negative. UC with GRAM NEGATIVE PAIGE >100,000 cfu/ml ,Klebsiella Pneumonia.  H/O work up for neuroendocrine tumor vs VIPoma.  AES consulted for EUS and potential biopsy given that her PET scan showed uptake but no mass was seen on CT pancreas protocol. discontinued eliquis.  EUS - sleeve gastrectomy was found, characterized by healthy appearing mucosa. Widely patent duodenoenterostomy, characterized   by healthy appearing mucosa was found. few cystic lesions were seen in the pancreatic  head, genu of the pancreas and pancreatic body highly suspicious  for a branched intraductal papillary mucinous neoplasm.small for sampling. pancreatic head and pancreatic tail showed no mass. Awaiting further recs from oncology. Started octreotide. ID consulted for positive cryptosporidium antigen. nitazoxinide 500mg bid for 3 days. Repeat cryptosporidium Ag negative Hb at 6.7 Transfusion with 1 unit of PRBC with response.  Started Immodium.     11/11- UTI on levoquin, on immodium, octeotride, bicarb, suspicious tissue unable to be biopsied during EUS, colon bx neg, s/p transfusion. On apixaban for DVT. /61   Pulse 76  SpO2 95% on room air , eating 50%, good UO 1000cc, 6 BMs yesterday. Waiting on oncology recs. Discussed case w Dr. Larson.  Pt in shower doing ADLs, walking independently.    11/12- Oncology reports  Low suspicion for neuroendocrine tumor at this time. Lab for  VIPoma pending. Given this and patient with little improvement on octreotide, will discontinue octreotide. Will consult GI to help evaluate other causes for chronic diarrhea and elevated chromogranin, which may increase with irritable bowel disease, chronic hepatitis, liver failure, inflammatory diseases, neuroendocrine tumors, small cell lung tumors, and renal failure. Hx of gastric bypass.  Pt reported > 10 BMs, nurses reported 6. Eating 75% of meals. CT chest w contrast ordered.  11/13- 4 BMs overnight, 6 this am. Developed sniffles and nasal congestion. Appreciate GI input. BP 97/55    Pulse 76  , metamucil changed to questran.  11/14- diarrrhea slowing on Questran, GI considering re-approach to bx per EUS and small bowel biopsies. Lab pending: VIPoma, somatostatin, tryptase, fecal panc elastace.  CT chest- Small bilateral pleural effusions, trace pericardial fluid, anasarca.  2. Subcentimeter nodules bilaterally.  Oncologic considerations will determine, for imaging surveillance.  3. Significant hepatic steatosis.  Consider correlation with LFTs.  4. Status post sleeve gastrectomy.  Moderate-sized hiatal hernia.  BP 94/55   Pulse 67   Temp 97.9 °F (36.6 °C) (Oral)   Resp 16,  on room air.   - 3 BMs so far thia am. ( Improvement)  - GI and surgery to discuss this case at tumor board on Monday about ex-lap. Sonsulted surg oncology.    11/15- GI planning on EGD and small bowel biopsies on 11/17, # stools diminishing with Questran and immodium. . Pt will be presented at tumor board next Monday.  /70 VSS. Walking to BR. Chronic back pain . Above lab still pending.    11/16- ortal intake and #BMs diminishing.  K 2.9- k lyte, re-ordered lab 1 pm. And in am.  Reviewed her care and condition w her DTR today.  Pt needs to eat  with counting # of BMs while on questran. EGD tomorrow. Start TPN for nutritional support and continue at home at least one month. May need ex lap and biopsy of pancreas.  Needs f/u CT  chest nodules. Tumor board review on Monday. F/u oncology GI and surg oncology.       Interval History: see above    Review of Systems   Constitutional:  Negative for activity change, chills, fatigue and fever.   HENT:  Negative for congestion, nosebleeds and trouble swallowing.    Respiratory:  Negative for apnea, cough, choking, chest tightness and shortness of breath.    Cardiovascular:  Negative for chest pain and leg swelling.   Gastrointestinal:  Positive for diarrhea. Negative for abdominal distention, abdominal pain, constipation, nausea and vomiting.   Genitourinary:  Negative for decreased urine volume, difficulty urinating, dysuria and frequency.   Musculoskeletal:  Negative for arthralgias, back pain, joint swelling, neck pain and neck stiffness.   Skin:  Negative for rash and wound.   Neurological:  Negative for dizziness, seizures, syncope, weakness, light-headedness, numbness and headaches.   Psychiatric/Behavioral:  Negative for agitation, behavioral problems, confusion, hallucinations, self-injury and sleep disturbance. The patient is not nervous/anxious.    Objective:     Vital Signs (Most Recent):  Temp: 98.6 °F (37 °C) (11/16/22 0712)  Pulse: 82 (11/16/22 0712)  Resp: 17 (11/16/22 0712)  BP: (!) 92/55 (11/16/22 0712)  SpO2: (!) 94 % (11/16/22 0712) Vital Signs (24h Range):  Temp:  [98.2 °F (36.8 °C)-98.7 °F (37.1 °C)] 98.6 °F (37 °C)  Pulse:  [64-85] 82  Resp:  [16-18] 17  SpO2:  [91 %-100 %] 94 %  BP: ()/(41-74) 92/55     Weight: 88.5 kg (195 lb)  Body mass index is 32.45 kg/m².    Intake/Output Summary (Last 24 hours) at 11/16/2022 0903  Last data filed at 11/15/2022 1715  Gross per 24 hour   Intake 25 ml   Output --   Net 25 ml        Physical Exam  Constitutional:       General: She is not in acute distress.     Appearance: Normal appearance. She is obese. She is ill-appearing.   HENT:      Head: Normocephalic and atraumatic.      Nose: Nose normal.      Mouth/Throat:      Mouth: Mucous  membranes are moist.   Eyes:      General: No scleral icterus.     Extraocular Movements: Extraocular movements intact.      Pupils: Pupils are equal, round, and reactive to light.   Cardiovascular:      Rate and Rhythm: Normal rate and regular rhythm.      Pulses: Normal pulses.      Heart sounds: Normal heart sounds.   Pulmonary:      Effort: Pulmonary effort is normal.      Breath sounds: Normal breath sounds. No wheezing or rhonchi.   Chest:      Chest wall: No tenderness.   Abdominal:      General: Abdomen is flat. Bowel sounds are normal. There is no distension.      Palpations: Abdomen is soft. There is no mass.      Tenderness: There is no abdominal tenderness. There is no right CVA tenderness, left CVA tenderness, guarding or rebound.   Musculoskeletal:         General: No swelling, tenderness, deformity or signs of injury. Normal range of motion.      Cervical back: Normal range of motion and neck supple. No rigidity or tenderness.   Skin:     General: Skin is warm and dry.      Coloration: Skin is not jaundiced or pale.      Findings: No erythema or rash.   Neurological:      General: No focal deficit present.      Mental Status: She is alert and oriented to person, place, and time. Mental status is at baseline.      Cranial Nerves: No cranial nerve deficit.      Motor: No weakness.   Psychiatric:         Mood and Affect: Mood normal.         Behavior: Behavior normal.         Thought Content: Thought content normal.         Judgment: Judgment normal.       Significant Labs: All pertinent labs within the past 24 hours have been reviewed.  CBC:   Recent Labs   Lab 11/15/22  0751   WBC 4.83   HGB 8.1*   HCT 24.6*          CMP:   Recent Labs   Lab 11/15/22  0751   *   K 2.8*   *   CO2 22*   GLU 87   BUN 7*   CREATININE 1.2   CALCIUM 7.1*   PROT 4.0*   ALBUMIN 1.3*   BILITOT 0.6   ALKPHOS 75   AST 36   ALT 6*   ANIONGAP 7*         Significant Imaging: I have reviewed all pertinent imaging  results/findings within the past 24 hours.      Assessment/Plan:      * Carcinoid syndrome related chronic diarrhea   Diagnosis not confirmed with biopsy:   67-year-old past medical history of recently diagnosed carcinoid syndrome,  having diarrhea intermittently since August 2022 more recently diarrhea has become worse over past few days -several episodes daily  associated nausea with intermittent vomiting. symptoms concerned likely though to be secondary to carcinoid syndrome patient evaluated by Heme-Onc at [facility] with PET scan done 10/18 - Focal nodular foci of radiotracer uptake at the pancreatic tail and near the region of the pancreatic head above liver background activity could indicate somatostatin receptor avid lesions, but no corresponding mass is seen on CT portion of the study.  Recommend follow-up contrast enhanced pancreas protocol CT to further assess. 5HIAA levels on 10/21 WNL .     CT AP W contrast done 11/4 shows No pancreatic lesion and Enterocolitis,. recent EGD -Non-bleeding gastric ulcers with no stigmata of  bleeding. Biopsied. Treated with a monopolar probe.     Colonoscopy with biopsy -No significant pathologic abnormality. No morphologic evidence of active inflammatory bowel disease, microscopic colitis, or neoplasia. Patient was  referred to [facility] for further workup and has scheduled appointment at Heme-Onc at [facility] this upcoming week.    11/7 Chromogranin A elevated at 2000s.  stool studies in process. C diff negative. Hematology working her up for neuroendocrine tumor vs VIPoma.  AES consulted for EUS and potential biopsy given that her PET scan showed uptake but no mass was seen on CT pancreas protocol.discontinued eliquis.  plan for EUS on Wednesday.  started and discontinued octreotide since it failed to help  11/9 repeat cryptosporidium Ag negative  EUS today - sleeve gastrectomy was found, characterized by  healthy appearing  mucosa.Widely patent duodenoenterostomy, characterized   by healthy appearing mucosa was found. few cystic lesions were seen in the pancreatic  head, genu of the pancreas and pancreatic body highly suspicious  for a branched intraductal papillary mucinous neoplasm.small for sampling. pancreatic head and pancreatic tail showed no mass.   11/10 - Oncology to see and review EUS; started immodium   11/11- on octeotride, immodium, 6 BMs yesterday, colon bx neg, repeat cryptospor neg. Consider biopsy per surgery? Waiting for oncology recs.  Add metamucil  (low dose) to absorb excess water.  11/13 - waiting on VIPoma, somatostatin,  labs. Discussed pt condition and plan w her DTR. Discuss diagnostic approach with GI.  CT chest-  Some small sub-centimeter nodules  11/4- improved w questran, GI considering another biopsy procedure (small bowel and pancreas per EUS). Holding pantoprazole and repeat gastrin and chromogranin level once off PPI for at least 2-4 weeks.  VIP& somatostatin level will also potentially be elevated with pantoprazole.    11/16- EGD and small bowel biopsies to be done 11/17, lab today pending. Will need to resume eliquis 11/17 after biopsies. NPO after midnight.       Chronic diarrhea  Uncertain etiology  Concern for carcinoid syndrome with high chromogranin level, but no biopsy souce can be found.   CT a/p - Partial small bowel resection.  There are some thickened segments of large and small bowel, some with adjacent mesenteric edema.  5HIAA levels on 10/21 WNL .  CT AP W contrast done 11/4 shows No pancreatic lesion and Enterocolitis,.   recent EGD -Non-bleeding gastric ulcers with no stigmata of  bleeding. Biopsied. Treated with a monopolar probe.   colonoscopy with biopsy -No significant pathologic abnormality. No morphologic evidence of active inflammatory bowel disease, microscopic colitis, or neoplasia  Colon biopsies neg 10/2022  Gastric biopsy 10/2022 - neg for h pylori  Kid bx 10/2022 - no  amyloid, + diab nephropathy    Add CT chest- will need contrast for tumor eval. RL x 500 cc.   Consult GI- Is Surgery consult for pancreatic biopsy indicated? Any other eval for diarrhea?   Per GI:   Obtain fecal elastase = ordered  - Can check serum tryptase for mastocytosis - ordered  -  Could consider empiric cholestyramine for possible bile acid diarrhea as well- done ( stopped metamucil)    - If pending workup negative, consider hydrogen breath testing vs empiric rifaximin trial for SIBO although less likely.  11/17- continue questran and imodium,  lab tomorrow, monitor stools and oral intake      Acute cystitis without hematuria  + UA on 11/14. Rocephin started. Was recently on levoquin.  This was associated w left flank pain.   Still w diarrhea  11/16- culture - no growth . Will dc rocephin ( received 3 days)      Intractable back pain  Hs of OA spine, DJD, back surgery  Xray l spine 3/2021 - posterior fusion hardware from L2-L5 along with laminectomy change.  Grade 1 anterolistheses are suspected of L3 over L4 and L4 over L5.   11/14 - left flank  Completed levoquin  See UTI: Checked UA + on 11/14 , reflex culture no growth. Rocephin- completed 3 days. She was recently on levoquin, Might be resistant   11/16- resolved      UTI (urinary tract infection)   urinalysis positive . Culture, Urine pending  continue with anitbiotic IV ceftriaxone  11/8 UC with klebsiella; s/p x5 doses CTX - sensitive    11/14 - completed levoquin, has left flank/ back pain. Check UA, [place] given, warmpack.   11/16- see above    Chronic deep vein thrombosis (DVT): right common femoral vein  -DVT sonogram 8/22 and 10/22 negative for DVT  -11/7 DVT sonogram -Nonocclusive thrombus/DVT in the superior-mid right femoral vein.continue eliquis . S/p IVC filter   -Restarted eliquis following EUS  11/16- stable, Resume eliquis after small bowel biopsies    Orthostatic hypotension  11/7 orthostatics + with drop in SBP to 80s on standing. RL  bolus 1L and 75ml/h .SBP in 70-80s this PM on IVF. NS bolus 500ml x1. critical care consulted   11/11- /61 . May need intermittent bolus IVF due to volume loss with diarrhea  11/12- good UO. Walking to BR. Eating 75%  11/16- eating 25 %, has been NPO for possible procedure  -stable    Hypokalemia    - Patient with potassium   Recent Labs   Lab 11/11/22  0316 11/12/22  1253 11/15/22  0751   K 3.1* 3.5 2.8*   . Replaced 11/11.  Monitor  Replace 11/13, 11/14, 11/16        Essential hypertension  Chronic, controlled.  Latest blood pressure and vitals reviewed-   Temp:  [98.2 °F (36.8 °C)-98.7 °F (37.1 °C)]   Pulse:  [64-85]   Resp:  [16-18]   BP: ()/(41-74)   SpO2:  [91 %-100 %] .   Home meds for hypertension were reviewed and hold furosemide and metoprolol for now as with diarrhea. PRN meds if BP> 180/110 mm HG  11/11- on metoprolol 25 bid      Stage 3a chronic kidney disease  seems to be at baseline. Creatine stable for now. BMP reviewed- noted Estimated Creatinine Clearance: 50 mL/min (based on SCr of 1.2 mg/dL). according to latest data. Monitor UOP and serial BMP and adjust therapy as needed. Renally dose meds.    Recent Labs   Lab 11/11/22  0316 11/12/22  1253 11/15/22  0751   BUN 6* 6* 7*   CREATININE 1.1 1.2 1.2          Chronic heart failure with preserved ejection fraction (HFpEF) [facility] NYHA2   Patient admitted without  signs and symptoms of CHF exacerbation    Results for orders placed or performed during the hospital encounter of 11/06/22   Brain natriuretic peptide   Result Value Ref Range    BNP 82 0 - 99 pg/mL   . Telemetry monitoring. Strict input/ Output monitor, Daily weights.    denies chestpain.  Obtain serial troponins.  Cardiac Enzymes trend  No results for input(s): CPK, CPKMB, MB, TROPONINI in the last 168 hours.  No results found for this or any previous visit.  echo 10/7/22 left ventricle is normal in size. Global left ventricular   systolic function is normal. The left ventricular  ejection fraction is   55%. Left ventricular diastolic function is abnormal (stage I impaired   relaxation). Noted left ventricular hypertrophy. Concentric left   ventricular hypertrophy is present. It is mild.     4. Mild (1+) tricuspid regurgitation.   5. The right ventricle is normal in size. Right ventricular systolic   function is normal.      11/11- on metoprolol, holding lasix due to excessive diarrhea  11/16- stable        Hypophosphatemia  Replaced  11/11 - Phos 2.4, neutraphos bid x 10 days  11/16- rechecking phos      Hypomagnesemia  Replaced  11/16- rechecking today, alb 1.3      Hypocalcemia  11/6   corrected calcium of 8.4.  PTH elevated 114    11/13- lucy 7.5, alb 1. 4      Dizziness  likely secondary to diarrhea. orthostasis. IV hydration  11/15- RL x 500 cc.   11/16- BP  92/55, Pulse 82     Anemia of chronic disease    Patient's with Normocytic anemia.. Hemoglobin stable. Etiology likely due to chronic disease .  Current CBC reviewed-    Recent Labs   Lab 11/11/22  0316 11/12/22  1253 11/15/22  0751   HGB 7.9* 8.8* 8.1*    Monitor CBC and transfuse if H/H drops below 7/21.  11/9 concern for blood loss anemia.  Hb at 6.7 Transfusion with 1 unit of PRBC       Class 1 obesity due to excess calories with serious comorbidity and body mass index (BMI) of 32.0 to 32.9 in adult  Body mass index is 32.45 kg/m². Morbid obesity complicates all aspects of disease management from diagnostic modalities to treatment. Weight loss encouraged   - s/p Sleeve gastrectomy      Hypotension  See above      Seizures  continue with keppra BID      Cryptosporidium exposure  Repeat neg, colon biopsies neg.       Severe protein-calorie malnutrition   Nutrition consulted. Most recent weight and BMI monitored-   Alb 1.4 -> 1.3  malabsorption suspected. Small bowel biopsies 11/17  Eating 25-75% of meals  Starting TPN for nutritional support on 11/16. Continue at home one month at least. Will need f/u labs weekly, f/u oncology,  pcp, surg oncology, GI.                         Measurements:  Wt Readings from Last 1 Encounters:   11/08/22 88.5 kg (195 lb)   Body mass index is 32.45 kg/m².    Recommendations:      Patient has been screened and assessed by RD. RD will follow patient.      VTE Risk Mitigation (From admission, onward)         Ordered     IP VTE HIGH RISK PATIENT  Once         11/06/22 1614     Place sequential compression device  Until discontinued         11/06/22 1614                Discharge Planning   MELYSSA: 11/21/2022     Code Status: Full Code   Is the patient medically ready for discharge?: No    Reason for patient still in hospital (select all that apply): Patient trending condition  Discharge Plan A: Home with family   Discharge Delays: None known at this time      Sonia Gramajo MD  Department of Hospital Medicine   [address] - Oncology ([facility])

## 2022-11-16 NOTE — PROCEDURES
"Cherry Santiago is a 67 y.o. female patient.    Temp: 97.7 °F (36.5 °C) (11/16/22 1530)  Pulse: 81 (11/16/22 1530)  Resp: 18 (11/16/22 1530)  BP: (!) 93/55 (11/16/22 1530)  SpO2: 100 % (11/16/22 1530)  Weight: 88.5 kg (195 lb) (11/08/22 1035)  Height: 5' 5" (165.1 cm) (11/08/22 1035)    PICC  Date/Time: 11/16/2022 5:10 PM  Performed by: Queenie Ennis RN  Assisting provider: Marco Wellington LPN  Consent Done: Yes  Time out: Immediately prior to procedure a time out was called to verify the correct patient, procedure, equipment, support staff and site/side marked as required  Indications: med administration and vascular access  Anesthesia: local infiltration  Local anesthetic: lidocaine 1% without epinephrine  Anesthetic Total (mL): 2  Preparation: skin prepped with ChloraPrep  Skin prep agent dried: skin prep agent completely dried prior to procedure  Sterile barriers: all five maximum sterile barriers used - cap, mask, sterile gown, sterile gloves, and large sterile sheet  Hand hygiene: hand hygiene performed prior to central venous catheter insertion  Location details: right basilic  Catheter type: double lumen  Catheter size: 5 Fr  Catheter Length: 36cm    Ultrasound guidance: yes  Vessel Caliber: medium and patent, compressibility normal  Vascular Doppler: not done  Needle advanced into vessel with real time Ultrasound guidance.  Guidewire confirmed in vessel.  Image recorded and saved.  Number of attempts: 1  Post-procedure: blood return through all ports, chlorhexidine patch and sterile dressing applied  Technical procedures used: 3CG  Specimens: No  Implants: No  Assessment: placement verified by x-ray  Complications: none        Name MARCO WELLINGTON LPN  11/16/2022    "

## 2022-11-16 NOTE — ASSESSMENT & PLAN NOTE
Chronic, controlled.  Latest blood pressure and vitals reviewed-   Temp:  [98.2 °F (36.8 °C)-98.7 °F (37.1 °C)]   Pulse:  [64-85]   Resp:  [16-18]   BP: ()/(41-74)   SpO2:  [91 %-100 %] .   Home meds for hypertension were reviewed and hold furosemide and metoprolol for now as with diarrhea. PRN meds if BP> 180/110 mm HG  11/11- on metoprolol 25 bid

## 2022-11-16 NOTE — ASSESSMENT & PLAN NOTE
Patient admitted without  signs and symptoms of CHF exacerbation    Results for orders placed or performed during the hospital encounter of 11/06/22   Brain natriuretic peptide   Result Value Ref Range    BNP 82 0 - 99 pg/mL   . Telemetry monitoring. Strict input/ Output monitor, Daily weights.    denies chestpain.  Obtain serial troponins.  Cardiac Enzymes trend  No results for input(s): CPK, CPKMB, MB, TROPONINI in the last 168 hours.  No results found for this or any previous visit.  echo 10/7/22 left ventricle is normal in size. Global left ventricular   systolic function is normal. The left ventricular ejection fraction is   55%. Left ventricular diastolic function is abnormal (stage I impaired   relaxation). Noted left ventricular hypertrophy. Concentric left   ventricular hypertrophy is present. It is mild.     4. Mild (1+) tricuspid regurgitation.   5. The right ventricle is normal in size. Right ventricular systolic   function is normal.      11/11- on metoprolol, holding lasix due to excessive diarrhea  11/16- stable

## 2022-11-16 NOTE — ASSESSMENT & PLAN NOTE
- Patient with potassium   Recent Labs   Lab 11/11/22  0316 11/12/22  1253 11/15/22  0751   K 3.1* 3.5 2.8*   . Replaced 11/11.  Monitor  Replace 11/13, 11/14, 11/16

## 2022-11-16 NOTE — ASSESSMENT & PLAN NOTE
+ UA on 11/14. Rocephin started. Was recently on levoquin.  This was associated w left flank pain.   Still w diarrhea  11/16- culture - no growth . Will dc rocephin ( received 3 days)

## 2022-11-16 NOTE — CONSULTS
D/L PICC placed in R basilic vein, 36cm in length with 0cm exposed. Arm circumference 44cm. Lot#[id number]

## 2022-11-16 NOTE — ASSESSMENT & PLAN NOTE
likely secondary to diarrhea. orthostasis. IV hydration  11/15- RL x 500 cc.   11/16- BP  92/55, Pulse 82

## 2022-11-16 NOTE — ASSESSMENT & PLAN NOTE
seems to be at baseline. Creatine stable for now. BMP reviewed- noted Estimated Creatinine Clearance: 50 mL/min (based on SCr of 1.2 mg/dL). according to latest data. Monitor UOP and serial BMP and adjust therapy as needed. Renally dose meds.    Recent Labs   Lab 11/11/22  0316 11/12/22  1253 11/15/22  0751   BUN 6* 6* 7*   CREATININE 1.1 1.2 1.2

## 2022-11-16 NOTE — ASSESSMENT & PLAN NOTE
Patient's with Normocytic anemia.. Hemoglobin stable. Etiology likely due to chronic disease .  Current CBC reviewed-    Recent Labs   Lab 11/11/22  0316 11/12/22  1253 11/15/22  0751   HGB 7.9* 8.8* 8.1*    Monitor CBC and transfuse if H/H drops below 7/21.  11/9 concern for blood loss anemia.  Hb at 6.7 Transfusion with 1 unit of PRBC

## 2022-11-16 NOTE — ASSESSMENT & PLAN NOTE
-DVT sonogram 8/22 and 10/22 negative for DVT  -11/7 DVT sonogram -Nonocclusive thrombus/DVT in the superior-mid right femoral vein.continue eliquis . S/p IVC filter   -Restarted eliquis following EUS  11/16- stable, Resume eliquis after small bowel biopsies

## 2022-11-16 NOTE — ASSESSMENT & PLAN NOTE
Hs of OA spine, DJD, back surgery  Xray l spine 3/2021 - posterior fusion hardware from L2-L5 along with laminectomy change.  Grade 1 anterolistheses are suspected of L3 over L4 and L4 over L5.   11/14 - left flank  Completed levoquin  See UTI: Checked UA + on 11/14 , reflex culture no growth. Rocephin- completed 3 days. She was recently on levoquin, Might be resistant   11/16- resolved

## 2022-11-17 ENCOUNTER — ANESTHESIA EVENT (OUTPATIENT)
Dept: ENDOSCOPY | Facility: HOSPITAL | Age: 67
DRG: 391 | End: 2022-11-17
Payer: MEDICARE

## 2022-11-17 ENCOUNTER — ANESTHESIA (OUTPATIENT)
Dept: ENDOSCOPY | Facility: HOSPITAL | Age: 67
DRG: 391 | End: 2022-11-17
Payer: MEDICARE

## 2022-11-17 LAB
ALBUMIN SERPL BCP-MCNC: 1.3 G/DL (ref 3.5–5.2)
ALP SERPL-CCNC: 75 U/L (ref 55–135)
ALT SERPL W/O P-5'-P-CCNC: 7 U/L (ref 10–44)
ANION GAP SERPL CALC-SCNC: 9 MMOL/L (ref 8–16)
AST SERPL-CCNC: 42 U/L (ref 10–40)
BASOPHILS # BLD AUTO: 0.05 K/UL (ref 0–0.2)
BASOPHILS NFR BLD: 1.1 % (ref 0–1.9)
BILIRUB SERPL-MCNC: 0.5 MG/DL (ref 0.1–1)
BUN SERPL-MCNC: 7 MG/DL (ref 8–23)
CALCIUM SERPL-MCNC: 7.1 MG/DL (ref 8.7–10.5)
CHLORIDE SERPL-SCNC: 115 MMOL/L (ref 95–110)
CO2 SERPL-SCNC: 20 MMOL/L (ref 23–29)
CREAT SERPL-MCNC: 1.1 MG/DL (ref 0.5–1.4)
DIFFERENTIAL METHOD: ABNORMAL
ELASTASE 1, FECAL: 55 MCG/G
EOSINOPHIL # BLD AUTO: 0 K/UL (ref 0–0.5)
EOSINOPHIL NFR BLD: 0.9 % (ref 0–8)
ERYTHROCYTE [DISTWIDTH] IN BLOOD BY AUTOMATED COUNT: 20.5 % (ref 11.5–14.5)
EST. GFR  (NO RACE VARIABLE): 55.1 ML/MIN/1.73 M^2
GLUCOSE SERPL-MCNC: 99 MG/DL (ref 70–110)
HCT VFR BLD AUTO: 24.9 % (ref 37–48.5)
HGB BLD-MCNC: 8.1 G/DL (ref 12–16)
IMM GRANULOCYTES # BLD AUTO: 0.02 K/UL (ref 0–0.04)
IMM GRANULOCYTES NFR BLD AUTO: 0.4 % (ref 0–0.5)
LYMPHOCYTES # BLD AUTO: 1.7 K/UL (ref 1–4.8)
LYMPHOCYTES NFR BLD: 35.3 % (ref 18–48)
MCH RBC QN AUTO: 29.7 PG (ref 27–31)
MCHC RBC AUTO-ENTMCNC: 32.5 G/DL (ref 32–36)
MCV RBC AUTO: 91 FL (ref 82–98)
MONOCYTES # BLD AUTO: 0.9 K/UL (ref 0.3–1)
MONOCYTES NFR BLD: 18.7 % (ref 4–15)
NEUTROPHILS # BLD AUTO: 2.1 K/UL (ref 1.8–7.7)
NEUTROPHILS NFR BLD: 43.6 % (ref 38–73)
NRBC BLD-RTO: 0 /100 WBC
PLATELET # BLD AUTO: 160 K/UL (ref 150–450)
PMV BLD AUTO: 9.5 FL (ref 9.2–12.9)
POCT GLUCOSE: 117 MG/DL (ref 70–110)
POTASSIUM SERPL-SCNC: 3.2 MMOL/L (ref 3.5–5.1)
PROT SERPL-MCNC: 4 G/DL (ref 6–8.4)
RBC # BLD AUTO: 2.73 M/UL (ref 4–5.4)
SODIUM SERPL-SCNC: 144 MMOL/L (ref 136–145)
WBC # BLD AUTO: 4.7 K/UL (ref 3.9–12.7)

## 2022-11-17 PROCEDURE — 99233 SBSQ HOSP IP/OBS HIGH 50: CPT | Mod: ,,, | Performed by: INTERNAL MEDICINE

## 2022-11-17 PROCEDURE — 88342 CHG IMMUNOCYTOCHEMISTRY: ICD-10-PCS | Mod: 26,,, | Performed by: PATHOLOGY

## 2022-11-17 PROCEDURE — 85025 COMPLETE CBC W/AUTO DIFF WBC: CPT | Performed by: HOSPITALIST

## 2022-11-17 PROCEDURE — 43239 EGD BIOPSY SINGLE/MULTIPLE: CPT | Performed by: INTERNAL MEDICINE

## 2022-11-17 PROCEDURE — 37000009 HC ANESTHESIA EA ADD 15 MINS: Performed by: INTERNAL MEDICINE

## 2022-11-17 PROCEDURE — 25000003 PHARM REV CODE 250: Performed by: NURSE ANESTHETIST, CERTIFIED REGISTERED

## 2022-11-17 PROCEDURE — 25000003 PHARM REV CODE 250: Performed by: INTERNAL MEDICINE

## 2022-11-17 PROCEDURE — 88342 IMHCHEM/IMCYTCHM 1ST ANTB: CPT | Performed by: PATHOLOGY

## 2022-11-17 PROCEDURE — 25000003 PHARM REV CODE 250: Performed by: HOSPITALIST

## 2022-11-17 PROCEDURE — 99233 PR SUBSEQUENT HOSPITAL CARE,LEVL III: ICD-10-PCS | Mod: ,,, | Performed by: INTERNAL MEDICINE

## 2022-11-17 PROCEDURE — 43239 PR EGD, FLEX, W/BIOPSY, SGL/MULTI: ICD-10-PCS | Mod: ,,, | Performed by: INTERNAL MEDICINE

## 2022-11-17 PROCEDURE — 20600001 HC STEP DOWN PRIVATE ROOM

## 2022-11-17 PROCEDURE — 37000008 HC ANESTHESIA 1ST 15 MINUTES: Performed by: INTERNAL MEDICINE

## 2022-11-17 PROCEDURE — D9220A PRA ANESTHESIA: ICD-10-PCS | Mod: ANES,,, | Performed by: ANESTHESIOLOGY

## 2022-11-17 PROCEDURE — 80053 COMPREHEN METABOLIC PANEL: CPT | Performed by: HOSPITALIST

## 2022-11-17 PROCEDURE — 43239 EGD BIOPSY SINGLE/MULTIPLE: CPT | Mod: ,,, | Performed by: INTERNAL MEDICINE

## 2022-11-17 PROCEDURE — D9220A PRA ANESTHESIA: Mod: CRNA,,, | Performed by: NURSE ANESTHETIST, CERTIFIED REGISTERED

## 2022-11-17 PROCEDURE — 27201012 HC FORCEPS, HOT/COLD, DISP: Performed by: INTERNAL MEDICINE

## 2022-11-17 PROCEDURE — A4216 STERILE WATER/SALINE, 10 ML: HCPCS | Performed by: HOSPITALIST

## 2022-11-17 PROCEDURE — D9220A PRA ANESTHESIA: ICD-10-PCS | Mod: CRNA,,, | Performed by: NURSE ANESTHETIST, CERTIFIED REGISTERED

## 2022-11-17 PROCEDURE — 88305 TISSUE EXAM BY PATHOLOGIST: CPT | Mod: 26,,, | Performed by: PATHOLOGY

## 2022-11-17 PROCEDURE — 88305 TISSUE EXAM BY PATHOLOGIST: ICD-10-PCS | Mod: 26,,, | Performed by: PATHOLOGY

## 2022-11-17 PROCEDURE — D9220A PRA ANESTHESIA: Mod: ANES,,, | Performed by: ANESTHESIOLOGY

## 2022-11-17 PROCEDURE — 36415 COLL VENOUS BLD VENIPUNCTURE: CPT | Performed by: HOSPITALIST

## 2022-11-17 PROCEDURE — 82962 GLUCOSE BLOOD TEST: CPT | Performed by: INTERNAL MEDICINE

## 2022-11-17 PROCEDURE — 88305 TISSUE EXAM BY PATHOLOGIST: CPT | Mod: 59 | Performed by: PATHOLOGY

## 2022-11-17 PROCEDURE — 88342 IMHCHEM/IMCYTCHM 1ST ANTB: CPT | Mod: 26,,, | Performed by: PATHOLOGY

## 2022-11-17 PROCEDURE — 63600175 PHARM REV CODE 636 W HCPCS: Performed by: NURSE ANESTHETIST, CERTIFIED REGISTERED

## 2022-11-17 RX ORDER — ONDANSETRON 2 MG/ML
4 INJECTION INTRAMUSCULAR; INTRAVENOUS DAILY PRN
Status: DISCONTINUED | OUTPATIENT
Start: 2022-11-17 | End: 2022-11-17 | Stop reason: HOSPADM

## 2022-11-17 RX ORDER — FENTANYL CITRATE 50 UG/ML
25 INJECTION, SOLUTION INTRAMUSCULAR; INTRAVENOUS EVERY 5 MIN PRN
Status: DISCONTINUED | OUTPATIENT
Start: 2022-11-17 | End: 2022-11-17 | Stop reason: HOSPADM

## 2022-11-17 RX ORDER — PROPOFOL 10 MG/ML
VIAL (ML) INTRAVENOUS
Status: DISCONTINUED | OUTPATIENT
Start: 2022-11-17 | End: 2022-11-17

## 2022-11-17 RX ORDER — FENTANYL CITRATE 50 UG/ML
INJECTION, SOLUTION INTRAMUSCULAR; INTRAVENOUS
Status: DISCONTINUED | OUTPATIENT
Start: 2022-11-17 | End: 2022-11-17

## 2022-11-17 RX ORDER — PHENYLEPHRINE HYDROCHLORIDE 10 MG/ML
INJECTION INTRAVENOUS
Status: DISCONTINUED | OUTPATIENT
Start: 2022-11-17 | End: 2022-11-17

## 2022-11-17 RX ORDER — LIDOCAINE HYDROCHLORIDE 20 MG/ML
INJECTION, SOLUTION EPIDURAL; INFILTRATION; INTRACAUDAL; PERINEURAL
Status: DISCONTINUED | OUTPATIENT
Start: 2022-11-17 | End: 2022-11-17

## 2022-11-17 RX ORDER — PROPOFOL 10 MG/ML
VIAL (ML) INTRAVENOUS CONTINUOUS PRN
Status: DISCONTINUED | OUTPATIENT
Start: 2022-11-17 | End: 2022-11-17

## 2022-11-17 RX ADMIN — LIDOCAINE HYDROCHLORIDE 100 MG: 20 INJECTION, SOLUTION EPIDURAL; INFILTRATION; INTRACAUDAL; PERINEURAL at 10:11

## 2022-11-17 RX ADMIN — SODIUM BICARBONATE 650 MG TABLET 1300 MG: at 11:11

## 2022-11-17 RX ADMIN — CHOLECALCIFEROL TAB 25 MCG (1000 UNIT) 2000 UNITS: 25 TAB at 11:11

## 2022-11-17 RX ADMIN — CHOLESTYRAMINE 4 G: 4 POWDER, FOR SUSPENSION ORAL at 11:11

## 2022-11-17 RX ADMIN — PROPOFOL 60 MG: 10 INJECTION, EMULSION INTRAVENOUS at 10:11

## 2022-11-17 RX ADMIN — FENTANYL CITRATE 50 MCG: 50 INJECTION INTRAMUSCULAR; INTRAVENOUS at 10:11

## 2022-11-17 RX ADMIN — LOPERAMIDE HYDROCHLORIDE 2 MG: 2 CAPSULE ORAL at 03:11

## 2022-11-17 RX ADMIN — METOPROLOL TARTRATE 25 MG: 25 TABLET, FILM COATED ORAL at 09:11

## 2022-11-17 RX ADMIN — MICONAZOLE NITRATE 2 % TOPICAL POWDER: at 09:11

## 2022-11-17 RX ADMIN — LOPERAMIDE HYDROCHLORIDE 2 MG: 2 CAPSULE ORAL at 11:11

## 2022-11-17 RX ADMIN — ATORVASTATIN CALCIUM 40 MG: 20 TABLET, FILM COATED ORAL at 09:11

## 2022-11-17 RX ADMIN — Medication 10 ML: at 05:11

## 2022-11-17 RX ADMIN — Medication 100 MCG/KG/MIN: at 10:11

## 2022-11-17 RX ADMIN — ASPIRIN 81 MG: 81 TABLET, COATED ORAL at 11:11

## 2022-11-17 RX ADMIN — SODIUM BICARBONATE 650 MG TABLET 1300 MG: at 09:11

## 2022-11-17 RX ADMIN — POTASSIUM & SODIUM PHOSPHATES POWDER PACK 280-160-250 MG 1 PACKET: 280-160-250 PACK at 09:11

## 2022-11-17 RX ADMIN — GLYCOPYRROLATE 0.2 MG: 0.2 INJECTION INTRAMUSCULAR; INTRAVENOUS at 10:11

## 2022-11-17 RX ADMIN — METOPROLOL TARTRATE 25 MG: 25 TABLET, FILM COATED ORAL at 11:11

## 2022-11-17 RX ADMIN — HYDROCODONE BITARTRATE AND ACETAMINOPHEN 1 TABLET: 7.5; 325 TABLET ORAL at 07:11

## 2022-11-17 RX ADMIN — PHENYLEPHRINE HYDROCHLORIDE 100 MCG: 10 INJECTION INTRAVENOUS at 10:11

## 2022-11-17 RX ADMIN — LOPERAMIDE HYDROCHLORIDE 2 MG: 2 CAPSULE ORAL at 09:11

## 2022-11-17 RX ADMIN — FERROUS SULFATE TAB 325 MG (65 MG ELEMENTAL FE) 1 EACH: 325 (65 FE) TAB at 11:11

## 2022-11-17 RX ADMIN — POTASSIUM & SODIUM PHOSPHATES POWDER PACK 280-160-250 MG 1 PACKET: 280-160-250 PACK at 11:11

## 2022-11-17 RX ADMIN — SODIUM CHLORIDE: 0.9 INJECTION, SOLUTION INTRAVENOUS at 10:11

## 2022-11-17 RX ADMIN — Medication 10 ML: at 03:11

## 2022-11-17 RX ADMIN — PHENYLEPHRINE HYDROCHLORIDE 200 MCG: 10 INJECTION INTRAVENOUS at 10:11

## 2022-11-17 NOTE — TRANSFER OF CARE
"Anesthesia Transfer of Care Note    Patient: Cherry Santiago    Procedure(s) Performed: Procedure(s) (LRB):  EGD (ESOPHAGOGASTRODUODENOSCOPY) (N/A)    Patient location: PACU    Anesthesia Type: general    Transport from OR: Transported from OR on room air with adequate spontaneous ventilation    Post pain: adequate analgesia    Post assessment: no apparent anesthetic complications and tolerated procedure well    Post vital signs: stable    Level of consciousness: awake    Nausea/Vomiting: no nausea/vomiting    Complications: none    Transfer of care protocol was followed      Last vitals:   Visit Vitals  /79 (Patient Position: Lying)   Pulse 74   Temp 36.7 °C (98 °F) (Temporal)   Resp 16   Ht 5' 5" (1.651 m)   Wt 88.5 kg (195 lb)   LMP  (LMP Unknown)   SpO2 100%   Breastfeeding No   BMI 32.45 kg/m²     "

## 2022-11-17 NOTE — ANESTHESIA PREPROCEDURE EVALUATION
11/17/2022  Cherry Santiago is a 67 y.o., female.  Pre-operative evaluation for Procedure(s) (LRB):  EGD (ESOPHAGOGASTRODUODENOSCOPY) (N/A)    Cherry Santiago is a 67 y.o. female     Patient Active Problem List   Diagnosis    Acute deep vein thrombosis (DVT) of femoral vein of right lower extremity    Class 1 obesity due to excess calories with serious comorbidity and body mass index (BMI) of 32.0 to 32.9 in adult    SIRS (systemic inflammatory response syndrome)    Essential hypertension    Anemia of chronic disease    Hypokalemia    Bilateral groin pain    Lipoma of buttock    Iron deficiency anemia    Edema    Dizziness    Early onset Alzheimer's dementia without behavioral disturbance    Aneurysm, cerebral, nonruptured    Acute bilateral non-occlusive distal DVT    Chronic deep vein thrombosis (DVT): right common femoral vein    Chronic heart failure with preserved ejection fraction (HFpEF) NICM NYHA2     TIA (transient ischemic attack)    Normocytic anemia    Hypocalcemia    Transaminasemia    NAFLD (nonalcoholic fatty liver disease)    Uncomplicated opioid dependence    Syncope    Anginal equivalent    Syncope and collapse    Intractable back pain    Severe protein-calorie malnutrition     Hypopotassemia    Carcinoid syndrome related chronic diarrhea     Stage 3a chronic kidney disease    Inadequate dietary energy intake    Hypomagnesemia    Polypharmacy    Metabolic acidemia    Vitamin D deficiency    PAD (peripheral artery disease)    History of DVT (deep vein thrombosis)    Chronic asymptomatic hypotension    2.8 cm complex cyst of vaginal wall     UTI (urinary tract infection)    Seizures    Orthostatic hypotension    Hypotension    Cryptosporidium exposure    Hypophosphatemia    Chronic diarrhea    Acute cystitis without hematuria    Nausea  vomiting and diarrhea    Multiple complaints    SOB (shortness of breath)       Review of patient's allergies indicates:  No Known Allergies    No current facility-administered medications on file prior to encounter.     Current Outpatient Medications on File Prior to Encounter   Medication Sig Dispense Refill    apixaban (ELIQUIS) 5 mg Tab Take 1 tablet (5 mg total) by mouth 2 (two) times daily.      aspirin (ECOTRIN) 81 MG EC tablet Take 81 mg by mouth once daily.      ferrous sulfate (FEOSOL) 325 mg (65 mg iron) Tab tablet Take 325 mg by mouth daily with breakfast.      furosemide (LASIX) 20 MG tablet Take 20 mg by mouth once daily.      levETIRAcetam (KEPPRA) 250 MG Tab Take 500 mg by mouth 2 (two) times daily.      loperamide (IMODIUM) 2 mg capsule Take 1 capsule (2 mg total) by mouth 4 (four) times daily as needed for Diarrhea. 180 capsule 0    midodrine (PROAMATINE) 5 MG Tab Take 1 tablet (5 mg total) by mouth 3 (three) times daily with meals. 90 tablet 0    pantoprazole (PROTONIX) 40 MG tablet Take 40 mg by mouth once daily.      potassium chloride SA (K-DUR,KLOR-CON) 20 MEQ tablet Take 1 tablet (20 mEq total) by mouth 2 (two) times daily. 60 tablet 0    timolol maleate 0.5% (TIMOPTIC) 0.5 % Drop Place 1 drop into both eyes 2 (two) times daily.      atorvastatin (LIPITOR) 40 MG tablet Take 1 tablet (40 mg total) by mouth every evening. (Patient not taking: Reported on 11/4/2022) 90 tablet 3    citric acid-potassium citrate (POLYCITRA) 1,100-334 mg/5 mL solution potassium citrate-citric acid 1,100 mg-334 mg/5 mL oral solution   TAKE 5 MLS (10 MEQ TOTAL) BY MOUTH ONCE. FOR 1 DOSE ONLY      diphenoxylate-atropine 2.5-0.025 mg (LOMOTIL) 2.5-0.025 mg per tablet Take 1 tablet by mouth 4 (four) times daily as needed for Diarrhea (unreleived with Imodium). (Patient not taking: Reported on 11/4/2022) 30 tablet 0    metoclopramide HCl (REGLAN) 10 MG tablet Take 1 tablet (10 mg total) by mouth every 6  (six) hours as needed (Nausea). (Patient not taking: Reported on 11/4/2022) 14 tablet 0    metoprolol tartrate (LOPRESSOR) 25 MG tablet Take 1 tablet (25 mg total) by mouth 2 (two) times daily. (Patient not taking: Reported on 11/4/2022) 60 tablet 11    sodium bicarbonate 650 MG tablet Take 1 tablet (650 mg total) by mouth 2 (two) times daily. for 5 days 10 tablet 0       Past Surgical History:   Procedure Laterality Date    BACK SURGERY  06/21/2017    lumbar fusion 6/21/17 and surgery for hematoma to site on 6/26/17    CHOLECYSTECTOMY  1978    COLONOSCOPY N/A 10/3/2022    Procedure: COLONOSCOPY;  Surgeon: Jourdan Parks MD;  Location: [facility];  Service: General;  Laterality: N/A;    COLONOSCOPY N/A 10/11/2022    Procedure: COLONOSCOPY;  Surgeon: Stephen Cooper MD;  Location: [facility];  Service: Endoscopy;  Laterality: N/A;    ENDOSCOPIC ULTRASOUND OF UPPER GASTROINTESTINAL TRACT N/A 11/9/2022    Procedure: ULTRASOUND, UPPER GI TRACT, ENDOSCOPIC;  Surgeon: Jake Corona MD;  Location: [facility] (2ND FLR);  Service: Endoscopy;  Laterality: N/A;    ESOPHAGOGASTRODUODENOSCOPY N/A 10/3/2022    Procedure: EGD (ESOPHAGOGASTRODUODENOSCOPY);  Surgeon: Jourdan Parks MD;  Location: [facility];  Service: General;  Laterality: N/A;    ESOPHAGOGASTRODUODENOSCOPY N/A 11/15/2022    Procedure: EGD (ESOPHAGOGASTRODUODENOSCOPY);  Surgeon: Roque Soliman MD;  Location: [facility] (2ND FLR);  Service: Endoscopy;  Laterality: N/A;    GASTRIC BYPASS      HYSTERECTOMY  2012    JOINT REPLACEMENT      TKR    LEFT HEART CATHETERIZATION Left 2/5/2021    Procedure: Left heart cath;  Surgeon: Shane Pacheco MD;  Location: [facility] CATH;  Service: Cardiovascular;  Laterality: Left;    PELVIC LAPAROSCOPY Left     OOP[facility]    RENAL BIOPSY N/A 10/5/2022    Procedure: BIOPSY, KIDNEY;  Surgeon: Velia Diagnostic Provider;  Location: [facility] OR;  Service: General;  Laterality: N/A;    RIGHT HEART CATHETERIZATION Right 2/5/2021     Procedure: INSERTION, CATHETER, RIGHT HEART. via left radial and left brachial;  Surgeon: Shane Pacheco MD;  [facility];  Service: Cardiovascular;  Laterality: Right;    TOTAL ABDOMINAL HYSTERECTOMY W/ BILATERAL SALPINGOOPHORECTOMY         Social History     Socioeconomic History    Marital status: Single   Tobacco Use    Smoking status: Never    Smokeless tobacco: Never   Substance and Sexual Activity    Alcohol use: Yes     Comment: occasional    Drug use: No    Sexual activity: Not Currently     Social Determinants of Health     Financial Resource Strain: Low Risk     Difficulty of Paying Living Expenses: Not hard at all   Food Insecurity: No Food Insecurity    Worried About Running Out of Food in the Last Year: Never true    Ran Out of Food in the Last Year: Never true   Transportation Needs: No Transportation Needs    Lack of Transportation (Medical): No    Lack of Transportation (Non-Medical): No   Physical Activity: Inactive    Days of Exercise per Week: 0 days    Minutes of Exercise per Session: 0 min   Stress: No Stress Concern Present    Feeling of Stress : Not at all   Social Connections: Moderately Integrated    Frequency of Communication with Friends and Family: More than three times a week    Frequency of Social Gatherings with Friends and Family: More than three times a week    Attends Jew Services: More than 4 times per year    Active Member of Clubs or Organizations: Yes    Attends Club or Organization Meetings: More than 4 times per year    Marital Status: Never    Housing Stability: High Risk    Unable to Pay for Housing in the Last Year: Yes    Number of Places Lived in the Last Year: 1    Unstable Housing in the Last Year: No         CBC:   Recent Labs     11/15/22  0751 11/17/22  0048   WBC 4.83 4.70   RBC 2.72* 2.73*   HGB 8.1* 8.1*   HCT 24.6* 24.9*    160   MCV 90 91   MCH 29.8 29.7   MCHC 32.9 32.5       CMP:   Recent Labs      11/15/22  0751 11/16/22  1310 11/17/22  0048   * 143 144   K 2.8* 4.0 3.2*   * 116* 115*   CO2 22* 17* 20*   BUN 7* 7* 7*   CREATININE 1.2 1.2 1.1   GLU 87 102 99   MG  --  1.6  --    PHOS  --  2.7  --    CALCIUM 7.1* 7.1* 7.1*   ALBUMIN 1.3*  --  1.3*   PROT 4.0*  --  4.0*   ALKPHOS 75  --  75   ALT 6*  --  7*   AST 36  --  42*   BILITOT 0.6  --  0.5         Pre-op Assessment    I have reviewed the Patient Summary Reports.     I have reviewed the Nursing Notes.    I have reviewed the Medications.     Review of Systems  Anesthesia Hx:  No problems with previous Anesthesia  History of prior surgery of interest to airway management or planning: Denies Family Hx of Anesthesia complications.   Denies Personal Hx of Anesthesia complications.   Social:  Non-Smoker    Hematology/Oncology:  Hematology Normal   Oncology Normal     EENT/Dental:EENT/Dental Normal   Cardiovascular:   Hypertension Past MI  Angina CHF    Pulmonary:  Pulmonary Normal    Renal/:   Chronic Renal Disease    Hepatic/GI:  Hepatic/GI Normal    Musculoskeletal:  Musculoskeletal Normal    Neurological:   TIA, Seizures    Endocrine:   Diabetes    Psych:  Psychiatric Normal           Physical Exam  General: Well nourished and Cooperative    Airway:  Mallampati: II   Mouth Opening: Normal  TM Distance: Normal  Tongue: Normal  Neck ROM: Normal ROM    Dental:  Intact    Chest/Lungs:  Clear to auscultation, Normal Respiratory Rate    Heart:  Rate: Normal  Rhythm: Regular Rhythm  Sounds: Normal        Anesthesia Plan  Type of Anesthesia, risks & benefits discussed:    Anesthesia Type: Gen ETT, Gen Natural Airway  Intra-op Monitoring Plan: Standard ASA Monitors  Post Op Pain Control Plan: multimodal analgesia  Induction:  IV  Airway Plan: , Post-Induction  Informed Consent: Informed consent signed with the Patient representative and all parties understand the risks and agree with anesthesia plan.  All questions answered.   ASA Score: 3  Day of  Surgery Review of History & Physical: H&P Update referred to the surgeon/provider.    Ready For Surgery From Anesthesia Perspective.     .

## 2022-11-17 NOTE — PROVATION PATIENT INSTRUCTIONS
Discharge Summary/Instructions after an Endoscopic Procedure  Patient Name: Cherry Santiago  Patient MRN: [id number]  Patient YOB: 1955  Thursday, November 17, 2022  Roque Soliman MD  Dear patient,  As a result of recent federal legislation (The Federal Cures Act), you may   receive lab or pathology results from your procedure in your [facility]   account before your physician is able to contact you. Your physician or   their representative will relay the results to you with their   recommendations at their soonest availability.  Thank you,  RESTRICTIONS:  During your procedure today, you received medications for sedation.  These   medications may affect your judgment, balance and coordination.  Therefore,   for 24 hours, you have the following restrictions:   - DO NOT drive a car, operate machinery, make legal/financial decisions,   sign important papers or drink alcohol.    ACTIVITY:  Today: no heavy lifting, straining or running due to procedural   sedation/anesthesia.  The following day: return to full activity including work.  DIET:  Eat and drink normally unless instructed otherwise.     TREATMENT FOR COMMON SIDE EFFECTS:  - Mild abdominal pain, nausea, belching, bloating or excessive gas:  rest,   eat lightly and use a heating pad.  - Sore Throat: treat with throat lozenges and/or gargle with warm salt   water.  - Because air was used during the procedure, expelling large amounts of air   from your rectum or belching is normal.  - If a bowel prep was taken, you may not have a bowel movement for 1-3 days.    This is normal.  SYMPTOMS TO WATCH FOR AND REPORT TO YOUR PHYSICIAN:  1. Abdominal pain or bloating, other than gas cramps.  2. Chest pain.  3. Back pain.  4. Signs of infection such as: chills or fever occurring within 24 hours   after the procedure.  5. Rectal bleeding, which would show as bright red, maroon, or black stools.   (A tablespoon of blood from the rectum is not serious, especially  if   hemorrhoids are present.)  6. Vomiting.  7. Weakness or dizziness.  GO DIRECTLY TO THE NEAREST EMERGENCY ROOM IF YOU HAVE ANY OF THE FOLLOWING:      Difficulty breathing              Chills and/or fever over 101 F   Persistent vomiting and/or vomiting blood   Severe abdominal pain   Severe chest pain   Black, tarry stools   Bleeding- more than one tablespoon   Any other symptom or condition that you feel may need urgent attention  Your doctor recommends these additional instructions:  If any biopsies were taken, your doctors clinic will contact you in 1 to 2   weeks with any results.  - [facility].   - Advance diet as tolerated.   - Await pathology results.   For questions, problems or results please call your physician - Roque Soliman MD at Work:  [phone].  [facility], EMERGENCY ROOM PHONE NUMBER: [phone]  IF A COMPLICATION OR EMERGENCY SITUATION ARISES AND YOU ARE UNABLE TO REACH   YOUR PHYSICIAN - GO DIRECTLY TO THE EMERGENCY ROOM.  Roque Soliman MD  11/17/2022 10:37:21 AM  This report has been verified and signed electronically.  Dear patient,  As a result of recent federal legislation (The Federal Cures Act), you may   receive lab or pathology results from your procedure in your [facility]   account before your physician is able to contact you. Your physician or   their representative will relay the results to you with their   recommendations at their soonest availability.  Thank you,  PROVATION

## 2022-11-17 NOTE — INTERVAL H&P NOTE
The patient has been examined and the H&P has been reviewed:    I concur with the findings and no changes have occurred since H&P was written.    Procedure risks, benefits and alternative options discussed and understood by patient/family.      EGD today    Active Hospital Problems    Diagnosis  POA    *Carcinoid syndrome related chronic diarrhea  [K52.9, E34.0]  Yes    Nausea vomiting and diarrhea [R11.2, R19.7]  Yes    Multiple complaints [R68.89]  Yes    SOB (shortness of breath) [R06.02]  Yes    Acute cystitis without hematuria [N30.00]  No    Chronic diarrhea [K52.9]  Yes    Hypophosphatemia [E83.39]  Yes    Cryptosporidium exposure [Z20.89]  Yes    Orthostatic hypotension [I95.1]  Yes    Hypotension [I95.9]  Yes    UTI (urinary tract infection) [N39.0]  Yes    Seizures [R56.9]  Yes    Hypomagnesemia [E83.42]  Yes    Stage 3a chronic kidney disease [N18.31]  Yes    Severe protein-calorie malnutrition  [E43]  Yes    Intractable back pain [M54.9]  Yes    Chronic heart failure with preserved ejection fraction (HFpEF) NICM NYHA2  [I50.32]  Yes    Chronic deep vein thrombosis (DVT): right common femoral vein [I82.509]  Yes    Hypocalcemia [E83.51]  Yes    Dizziness [R42]  Yes    Hypokalemia [E87.6]  Yes    Anemia of chronic disease [D63.8]  Yes    Class 1 obesity due to excess calories with serious comorbidity and body mass index (BMI) of 32.0 to 32.9 in adult [E66.09, Z68.32]  Not Applicable    Essential hypertension [I10]  Yes     Chronic      Resolved Hospital Problems    Diagnosis Date Resolved POA    Type 2 diabetes mellitus, without long-term current use of insulin [E11.9] 11/06/2022 Yes     Chronic

## 2022-11-17 NOTE — ANESTHESIA POSTPROCEDURE EVALUATION
Anesthesia Post Evaluation    Patient: Cherry Santiago    Procedure(s) Performed: Procedure(s) (LRB):  EGD (ESOPHAGOGASTRODUODENOSCOPY) (N/A)    Final Anesthesia Type: general      Patient location during evaluation: PACU  Patient participation: Yes- Able to Participate  Level of consciousness: awake and alert  Post-procedure vital signs: reviewed and stable  Pain management: adequate  Airway patency: patent    PONV status at discharge: No PONV  Anesthetic complications: no      Cardiovascular status: blood pressure returned to baseline and hemodynamically stable  Respiratory status: unassisted and spontaneous ventilation  Hydration status: euvolemic  Follow-up not needed.          Vitals Value Taken Time   /71 11/17/22 1100   Temp 36.6 °C (97.9 °F) 11/17/22 1100   Pulse 77 11/17/22 1102   Resp 14 11/17/22 1102   SpO2 97 % 11/17/22 1102   Vitals shown include unvalidated device data.      Event Time   Out of Recovery 11:05:26         Pain/Olena Score: Pain Rating Prior to Med Admin: 5 (11/17/2022  7:57 AM)  Olena Score: 10 (11/17/2022 10:45 AM)

## 2022-11-17 NOTE — NURSING TRANSFER
Nursing Transfer Note      11/17/2022     Reason patient is being transferred: returning to room post recovery    Transfer to 857    Transfer via stretcher    Transfer with telemetry monitoring    Transported by [facility] Watson    Medicines sent: TPN infusing per pump    Any special needs or follow-up needed: N/A    Chart send with patient: Yes    Notified: Padmini Gómez    Patient reassessed at: 11/17/22 @ 1100    Upon arrival to floor: Transfer of care to floor Marianna werner

## 2022-11-17 NOTE — PROGRESS NOTES
"[facility] - Oncology ([facility])  Adult Nutrition  Consult Note    SUMMARY     Recommendations    1) Increase TPN: 157 g D + 44 g AA -Provides 709 kcals, 44g protein (GIR: 1.23)   -Meets 50% of calorie and protein needs    2) Continue regular diet w/ boost plus  - monitor renal labs      3) RD following    Goals: Meet % EEN/EPN by next RD f/u  Nutrition Goal Status: new  Communication of RD Recs: other (comment) (POC)    Assessment and Plan  Nutrition Problem  Altered nutrition related lab values    Related to (etiology):   CKD    Signs and Symptoms (as evidenced by):   H/H:8.1/24.9, potassium:3.2, GFR:55.1, lucy:7.1, ast:42, alt:7    Interventions/Recommendations (treatment strategy):  Collaboration with other providers    Nutrition Diagnosis Status:   New     Reason for Assessment    Reason For Assessment: new TPN  Diagnosis: other (see comments) (Carcinoid syndrome related chronic diarrhea)  Relevant Medical History: HTN, seizures, CKD-3a, HFpef, Anemia  Interdisciplinary Rounds: did not attend  General Information Comments: Unable to speak to pt due to not in room. Diet advanced from NPO. TPN regimen started. Pt continues w/ diarrhea. Noted with fluctuating wt the past month. RD to follow and monitor.  Nutrition Discharge Planning: adeqaute intake    Nutrition Risk Screen    Nutrition Risk Screen: no indicators present    Nutrition/Diet History    Spiritual, Cultural Beliefs, Christianity Practices, Values that Affect Care: no  Food Allergies: NKFA    Anthropometrics    Temp: 97.9 °F (36.6 °C)  Height Method: Stated  Height: 5' 5" (165.1 cm)  Height (inches): 65 in  Weight Method: Bed Scale  Weight: 88.5 kg (195 lb)  Weight (lb): 195 lb  Ideal Body Weight (IBW), Female: 125 lb  % Ideal Body Weight, Female (lb): 156 %  BMI (Calculated): 32.4  BMI Grade: 30 - 34.9- obesity - grade I       Lab/Procedures/Meds    Pertinent Labs Reviewed: reviewed  Pertinent Labs Comments: H/H:8.1/24.9, potassium:3.2, GFR:55.1, " lucy:7.1, ast:42, alt:7  Pertinent Medications Reviewed: reviewed  Pertinent Medications Comments: atorvastatin, ferrous suldate, lopressor, potassium, sodium, phosphates, Na bicarb, vitamin D, NaCl      Estimated/Assessed Needs    Weight Used For Calorie Calculations: 88.5 kg (195 lb 1.7 oz)  Energy Calorie Requirements (kcal): 1420  Energy Need Method: [place]-St Jeor  Protein Requirements: 88-97 (1-1.1 g/kg)  Weight Used For Protein Calculations: 88.5 kg (195 lb 1.7 oz)        RDA Method (mL): 1420         Nutrition Prescription Ordered    Current Diet Order: regular  Oral Nutrition Supplement: Boost plus    Evaluation of Received Nutrient/Fluid Intake    Parenteral Calories (kcal): 448  Parenteral Protein (gm): 27  % Kcal Needs: 31  % Protein Needs: 30  I/O: +100  Energy Calories Required: not meeting needs  Protein Required: not meeting needs  Comments: LBM 11/17- loose; liquid  % Intake of Estimated Energy Needs: 25 - 50 %  % Meal Intake: 0 - 25 %    Nutrition Risk    Level of Risk/Frequency of Follow-up: low       Monitor and Evaluation    Food and Nutrient Intake: energy intake, food and beverage intake, parenteral nutrition intake  Food and Nutrient Adminstration: diet order, enteral and parenteral nutrition administration  Knowledge/Beliefs/Attitudes: food and nutrition knowledge/skill, beliefs and attitudes  Physical Activity and Function: nutrition-related ADLs and IADLs  Anthropometric Measurements: height/length, weight, body mass index, weight change  Biochemical Data, Medical Tests and Procedures: electrolyte and renal panel, gastrointestinal profile, glucose/endocrine profile, inflammatory profile, lipid profile  Nutrition-Focused Physical Findings: overall appearance       Nutrition Follow-Up    RD Follow-up?: Yes    Hollie Montoya, Registration Eligible, Provisional LDN

## 2022-11-17 NOTE — PLAN OF CARE
AAO x4. VSS. BM x2. EGD completed this morning. Pt c/o lower back pain. Administered hydrocodone-acetaminophen x1. Pt with nonskid footwear on with bed in lowest position and locked with bed rails up x2. Pt instructed to call prior to getting OOB; bed alarm refused. Pt with call light within reach and verbalized understanding. Will continue to monitor pt.

## 2022-11-17 NOTE — PLAN OF CARE
POC reviewed with patient. Patient lipids and TPN started overnight, tolerating well. Vitals stable overnight.Bed in low lock position with call light in reach, instructed patient to use call night when needing assistance. Will continue to monitor.

## 2022-11-17 NOTE — PLAN OF CARE
Recommendations    1) Increase TPN: 157 g D + 44 g AA -Provides 709 kcals, 44g protein (GIR: 1.23)   -Meets 50% of calorie and protein needs    2) Continue regular diet w/ boost plus  - monitor renal labs      3) RD following    Goals: Meet % EEN/EPN by next RD f/u  Nutrition Goal Status: new  Communication of RD Recs: other (comment) (POC)

## 2022-11-18 PROBLEM — R19.7 DIARRHEA DUE TO MALABSORPTION: Status: ACTIVE | Noted: 2022-10-07

## 2022-11-18 PROBLEM — R82.90 ABNORMAL URINALYSIS: Status: ACTIVE | Noted: 2022-11-18

## 2022-11-18 PROBLEM — K90.9 DIARRHEA DUE TO MALABSORPTION: Status: ACTIVE | Noted: 2022-10-07

## 2022-11-18 PROBLEM — E87.8 REFEEDING SYNDROME: Status: ACTIVE | Noted: 2022-11-18

## 2022-11-18 LAB
ALBUMIN SERPL BCP-MCNC: 1.3 G/DL (ref 3.5–5.2)
ANION GAP SERPL CALC-SCNC: 5 MMOL/L (ref 8–16)
BASOPHILS # BLD AUTO: 0.03 K/UL (ref 0–0.2)
BASOPHILS NFR BLD: 0.6 % (ref 0–1.9)
BUN SERPL-MCNC: 6 MG/DL (ref 8–23)
CALCIUM SERPL-MCNC: 7.1 MG/DL (ref 8.7–10.5)
CHLORIDE SERPL-SCNC: 116 MMOL/L (ref 95–110)
CO2 SERPL-SCNC: 24 MMOL/L (ref 23–29)
CREAT SERPL-MCNC: 1 MG/DL (ref 0.5–1.4)
CRP SERPL-MCNC: 4.3 MG/L (ref 0–8.2)
DIFFERENTIAL METHOD: ABNORMAL
EOSINOPHIL # BLD AUTO: 0.1 K/UL (ref 0–0.5)
EOSINOPHIL NFR BLD: 1 % (ref 0–8)
ERYTHROCYTE [DISTWIDTH] IN BLOOD BY AUTOMATED COUNT: 20.2 % (ref 11.5–14.5)
EST. GFR  (NO RACE VARIABLE): >60 ML/MIN/1.73 M^2
GLUCOSE SERPL-MCNC: 134 MG/DL (ref 70–110)
HCT VFR BLD AUTO: 26.8 % (ref 37–48.5)
HGB BLD-MCNC: 8.5 G/DL (ref 12–16)
IMM GRANULOCYTES # BLD AUTO: 0.02 K/UL (ref 0–0.04)
IMM GRANULOCYTES NFR BLD AUTO: 0.4 % (ref 0–0.5)
LYMPHOCYTES # BLD AUTO: 1.3 K/UL (ref 1–4.8)
LYMPHOCYTES NFR BLD: 24.8 % (ref 18–48)
MAGNESIUM SERPL-MCNC: 1.4 MG/DL (ref 1.6–2.6)
MCH RBC QN AUTO: 30 PG (ref 27–31)
MCHC RBC AUTO-ENTMCNC: 31.7 G/DL (ref 32–36)
MCV RBC AUTO: 95 FL (ref 82–98)
MONOCYTES # BLD AUTO: 0.5 K/UL (ref 0.3–1)
MONOCYTES NFR BLD: 9.4 % (ref 4–15)
NEUTROPHILS # BLD AUTO: 3.2 K/UL (ref 1.8–7.7)
NEUTROPHILS NFR BLD: 63.8 % (ref 38–73)
NRBC BLD-RTO: 0 /100 WBC
PHOSPHATE SERPL-MCNC: 2.2 MG/DL (ref 2.7–4.5)
PLATELET # BLD AUTO: 163 K/UL (ref 150–450)
PMV BLD AUTO: 9.7 FL (ref 9.2–12.9)
POTASSIUM SERPL-SCNC: 3.4 MMOL/L (ref 3.5–5.1)
PREALB SERPL-MCNC: 7 MG/DL (ref 20–43)
RBC # BLD AUTO: 2.83 M/UL (ref 4–5.4)
SODIUM SERPL-SCNC: 145 MMOL/L (ref 136–145)
SOMATOSTAT PLAS-MCNC: 27 PG/ML
VASOACTIVE INTESTINAL POLYPEPTIDE PLASMA: <50 PG/ML
WBC # BLD AUTO: 5.08 K/UL (ref 3.9–12.7)

## 2022-11-18 PROCEDURE — 99233 SBSQ HOSP IP/OBS HIGH 50: CPT | Mod: ,,, | Performed by: INTERNAL MEDICINE

## 2022-11-18 PROCEDURE — 25000003 PHARM REV CODE 250: Performed by: INTERNAL MEDICINE

## 2022-11-18 PROCEDURE — 97161 PT EVAL LOW COMPLEX 20 MIN: CPT

## 2022-11-18 PROCEDURE — 80069 RENAL FUNCTION PANEL: CPT | Performed by: INTERNAL MEDICINE

## 2022-11-18 PROCEDURE — 20600001 HC STEP DOWN PRIVATE ROOM

## 2022-11-18 PROCEDURE — 25000003 PHARM REV CODE 250: Performed by: HOSPITALIST

## 2022-11-18 PROCEDURE — 63600175 PHARM REV CODE 636 W HCPCS: Performed by: STUDENT IN AN ORGANIZED HEALTH CARE EDUCATION/TRAINING PROGRAM

## 2022-11-18 PROCEDURE — 99233 PR SUBSEQUENT HOSPITAL CARE,LEVL III: ICD-10-PCS | Mod: ,,, | Performed by: INTERNAL MEDICINE

## 2022-11-18 PROCEDURE — 84134 ASSAY OF PREALBUMIN: CPT | Performed by: INTERNAL MEDICINE

## 2022-11-18 PROCEDURE — 97165 OT EVAL LOW COMPLEX 30 MIN: CPT

## 2022-11-18 PROCEDURE — 85025 COMPLETE CBC W/AUTO DIFF WBC: CPT | Performed by: INTERNAL MEDICINE

## 2022-11-18 PROCEDURE — 97116 GAIT TRAINING THERAPY: CPT

## 2022-11-18 PROCEDURE — A4217 STERILE WATER/SALINE, 500 ML: HCPCS | Performed by: INTERNAL MEDICINE

## 2022-11-18 PROCEDURE — 86140 C-REACTIVE PROTEIN: CPT | Performed by: INTERNAL MEDICINE

## 2022-11-18 PROCEDURE — 83735 ASSAY OF MAGNESIUM: CPT | Performed by: INTERNAL MEDICINE

## 2022-11-18 PROCEDURE — A4216 STERILE WATER/SALINE, 10 ML: HCPCS | Performed by: HOSPITALIST

## 2022-11-18 PROCEDURE — 97535 SELF CARE MNGMENT TRAINING: CPT

## 2022-11-18 PROCEDURE — 63600175 PHARM REV CODE 636 W HCPCS: Performed by: INTERNAL MEDICINE

## 2022-11-18 RX ORDER — SODIUM CHLORIDE 9 MG/ML
INJECTION, SOLUTION INTRAVENOUS CONTINUOUS
Status: DISCONTINUED | OUTPATIENT
Start: 2022-11-18 | End: 2022-11-19

## 2022-11-18 RX ORDER — MAGNESIUM SULFATE HEPTAHYDRATE 40 MG/ML
2 INJECTION, SOLUTION INTRAVENOUS
Status: DISPENSED | OUTPATIENT
Start: 2022-11-18 | End: 2022-11-19

## 2022-11-18 RX ORDER — DIPHENOXYLATE HCL/ATROPINE 2.5-.025/5
10 LIQUID (ML) ORAL 4 TIMES DAILY
Status: DISCONTINUED | OUTPATIENT
Start: 2022-11-18 | End: 2022-11-25 | Stop reason: HOSPADM

## 2022-11-18 RX ORDER — LIDOCAINE 50 MG/G
1 PATCH TOPICAL DAILY
Status: DISCONTINUED | OUTPATIENT
Start: 2022-11-19 | End: 2022-11-25 | Stop reason: HOSPADM

## 2022-11-18 RX ORDER — CHOLESTYRAMINE 4 G/9G
1 POWDER, FOR SUSPENSION ORAL
Status: DISCONTINUED | OUTPATIENT
Start: 2022-11-18 | End: 2022-11-22

## 2022-11-18 RX ORDER — DIPHENOXYLATE HYDROCHLORIDE AND ATROPINE SULFATE 2.5; .025 MG/1; MG/1
1 TABLET ORAL 4 TIMES DAILY PRN
Status: CANCELLED | OUTPATIENT
Start: 2022-11-18

## 2022-11-18 RX ORDER — ACETAMINOPHEN 500 MG
1000 TABLET ORAL ONCE
Status: COMPLETED | OUTPATIENT
Start: 2022-11-19 | End: 2022-11-18

## 2022-11-18 RX ADMIN — Medication 10 ML: at 05:11

## 2022-11-18 RX ADMIN — MICONAZOLE NITRATE 2 % TOPICAL POWDER: at 09:11

## 2022-11-18 RX ADMIN — CHOLESTYRAMINE 4 G: 4 POWDER, FOR SUSPENSION ORAL at 05:11

## 2022-11-18 RX ADMIN — MAGNESIUM SULFATE HEPTAHYDRATE: 500 INJECTION, SOLUTION INTRAMUSCULAR; INTRAVENOUS at 10:11

## 2022-11-18 RX ADMIN — CHOLESTYRAMINE 4 G: 4 POWDER, FOR SUSPENSION ORAL at 10:11

## 2022-11-18 RX ADMIN — MAGNESIUM SULFATE 2 G: 2 INJECTION INTRAVENOUS at 10:11

## 2022-11-18 RX ADMIN — CHOLECALCIFEROL TAB 25 MCG (1000 UNIT) 2000 UNITS: 25 TAB at 09:11

## 2022-11-18 RX ADMIN — APIXABAN 5 MG: 5 TABLET, FILM COATED ORAL at 09:11

## 2022-11-18 RX ADMIN — Medication 10 ML: at 01:11

## 2022-11-18 RX ADMIN — LOPERAMIDE HYDROCHLORIDE 2 MG: 2 CAPSULE ORAL at 09:11

## 2022-11-18 RX ADMIN — SODIUM BICARBONATE 650 MG TABLET 1300 MG: at 09:11

## 2022-11-18 RX ADMIN — ATORVASTATIN CALCIUM 40 MG: 20 TABLET, FILM COATED ORAL at 09:11

## 2022-11-18 RX ADMIN — ASPIRIN 81 MG: 81 TABLET, COATED ORAL at 09:11

## 2022-11-18 RX ADMIN — DIPHENOXYLATE HYDROCHLORIDE AND ATROPINE SULFATE 10 ML: 2.5; .025 SOLUTION ORAL at 01:11

## 2022-11-18 RX ADMIN — POTASSIUM & SODIUM PHOSPHATES POWDER PACK 280-160-250 MG 1 PACKET: 280-160-250 PACK at 09:11

## 2022-11-18 RX ADMIN — DIPHENOXYLATE HYDROCHLORIDE AND ATROPINE SULFATE 10 ML: 2.5; .025 SOLUTION ORAL at 05:11

## 2022-11-18 RX ADMIN — LOPERAMIDE HYDROCHLORIDE 2 MG: 2 CAPSULE ORAL at 05:11

## 2022-11-18 RX ADMIN — ACETAMINOPHEN 1000 MG: 500 TABLET ORAL at 11:11

## 2022-11-18 RX ADMIN — Medication 10 ML: at 12:11

## 2022-11-18 RX ADMIN — SODIUM CHLORIDE: 0.9 INJECTION, SOLUTION INTRAVENOUS at 11:11

## 2022-11-18 RX ADMIN — FERROUS SULFATE TAB 325 MG (65 MG ELEMENTAL FE) 1 EACH: 325 (65 FE) TAB at 09:11

## 2022-11-18 RX ADMIN — SODIUM CHLORIDE, SODIUM LACTATE, POTASSIUM CHLORIDE, AND CALCIUM CHLORIDE 1000 ML: .6; .31; .03; .02 INJECTION, SOLUTION INTRAVENOUS at 09:11

## 2022-11-18 RX ADMIN — DIPHENOXYLATE HYDROCHLORIDE AND ATROPINE SULFATE 10 ML: 2.5; .025 SOLUTION ORAL at 09:11

## 2022-11-18 RX ADMIN — POTASSIUM PHOSPHATE, MONOBASIC AND POTASSIUM PHOSPHATE, DIBASIC 30 MMOL: 224; 236 INJECTION, SOLUTION, CONCENTRATE INTRAVENOUS at 10:11

## 2022-11-18 NOTE — PLAN OF CARE
Problem: Occupational Therapy  Goal: Occupational Therapy Goal  Description: Goals to be met by: 12/2/2022     Patient will increase functional independence with ADLs by performing:    UE Dressing with Modified [place].  LE Dressing with Modified [place].  Grooming while standing at sink with Modified [place].  Toileting from toilet with Modified [place] for hygiene and clothing management.   Toilet transfer to toilet with Minimal Assistance.    Outcome: Ongoing, Progressing

## 2022-11-18 NOTE — PT/OT/SLP EVAL
Physical Therapy Evaluation    Patient Name:  Cherry Santiago   MRN:  [id number]    Recommendations:     Discharge Recommendations:  home health PT   Discharge Equipment Recommendations: none   Barriers to discharge: None    Assessment:     Cherry Santiago is a 67 y.o. female admitted with a medical diagnosis of Diarrhea concurrent with and due to carcinoid syndrome.  She presents with the following impairments/functional limitations:  weakness, impaired endurance, impaired self care skills, impaired functional mobility, gait instability, impaired balance Pt. cooperative and tolerated treatment well. Pt. progressing with mobility.    Rehab Prognosis: Good; patient would benefit from acute skilled PT services to address these deficits and reach maximum level of function.    Recent Surgery: Procedure(s) (LRB):  EGD (ESOPHAGOGASTRODUODENOSCOPY) (N/A) 1 Day Post-Op    Plan:     During this hospitalization, patient to be seen 3 x/week to address the identified rehab impairments via gait training, therapeutic activities, therapeutic exercises and progress toward the following goals:    Plan of Care Expires:  12/18/22    Subjective     Chief Complaint: back discomfort  Patient/Family Comments/goals: pt. Agreeable to PT  Pain/Comfort:  Pain Rating 1: 8/10  Location - Orientation 1: generalized  Location 1: back  Pain Addressed 1: Reposition, Distraction, Cessation of Activity  Pain Rating Post-Intervention 1: 8/10    Patients cultural, spiritual, Caodaism conflicts given the current situation: no    Living Environment:  Pt. Lives with daughter and grandson in [facility] with ramp access  Prior to admission, patients level of function was amb. with RW.  Equipment used at home: bedside commode, shower chair, walker, rolling, wheelchair, hospital bed.  Upon discharge, patient will have assistance from daughter.    Objective:     Communicated with nursing prior to session.  Patient found supine with peripheral IV  upon PT entry to  room.    General Precautions: Standard, fall   Orthopedic Precautions:N/A   Braces: N/A  Respiratory Status: Room air    Exams:  RLE ROM: WFL  RLE Strength: WFL  LLE ROM: WFL  LLE Strength: WFL    Functional Mobility:  Bed Mobility:     Rolling Right: stand by assistance  Scooting: stand by assistance  Supine to Sit: stand by assistance  Transfers:     Sit to Stand:  contact guard assistance and minimum assistance with rolling walker  Gait: 150' and 10' with RW and CGA. Pt. amb. with decreased step length/abdelrahman. Pt. had seated rest break between gait trials.  Balance: fair      AM-PAC 6 CLICK MOBILITY  Total Score:18       Treatment & Education:  Pt. required Assist to stand from low surface of toilet in bathroom. Discussed therapy needs, goals, and POC.    Patient left supine with all lines intact and call button in reach.    GOALS:   Multidisciplinary Problems       Physical Therapy Goals          Problem: Physical Therapy    Goal Priority Disciplines Outcome Goal Variances Interventions   Physical Therapy Goal     PT, PT/OT Ongoing, Progressing     Description: Goals to be met by: 2022     Patient will increase functional independence with mobility by performin. Supine to sit with Set-up [place]  2. Sit to supine with Set-up [place]  3. Sit to stand transfer with Supervision  4. Bed to chair transfer with Supervision using Rolling Walker  5. Gait  x 250 feet with Supervision using Rolling Walker.   6. Lower extremity exercise program x15 reps per handout, with supervision                         History:     Past Medical History:   Diagnosis Date    Anticoagulant long-term use     Arthritis     Atrial fibrillation     CHF (congestive heart failure)     Diabetes mellitus     Type2 resolved after weight loss surgery    DVT (deep venous thrombosis)     Encounter for blood transfusion     GERD (gastroesophageal reflux disease)     Glaucoma     Hypercholesterolemia     Hyperlipemia      Hypertension     Memory changes     Myocardial infarction     Renal failure     resolved    TIA (transient ischemic attack)        Past Surgical History:   Procedure Laterality Date    BACK SURGERY  06/21/2017    lumbar fusion 6/21/17 and surgery for hematoma to site on 6/26/17    CHOLECYSTECTOMY  1978    COLONOSCOPY N/A 10/3/2022    Procedure: COLONOSCOPY;  Surgeon: Jourdan Parks MD;  Location: [facility] ENDO;  Service: General;  Laterality: N/A;    COLONOSCOPY N/A 10/11/2022    Procedure: COLONOSCOPY;  Surgeon: Stephen Cooper MD;  Location: [facility] ENDO;  Service: Endoscopy;  Laterality: N/A;    ENDOSCOPIC ULTRASOUND OF UPPER GASTROINTESTINAL TRACT N/A 11/9/2022    Procedure: ULTRASOUND, UPPER GI TRACT, ENDOSCOPIC;  Surgeon: Jake Corona MD;  Location: [facility] ENDO (2ND FLR);  Service: Endoscopy;  Laterality: N/A;    ESOPHAGOGASTRODUODENOSCOPY N/A 10/3/2022    Procedure: EGD (ESOPHAGOGASTRODUODENOSCOPY);  Surgeon: Jourdan Parks MD;  Location: [facility] ENDO;  Service: General;  Laterality: N/A;    ESOPHAGOGASTRODUODENOSCOPY N/A 11/15/2022    Procedure: EGD (ESOPHAGOGASTRODUODENOSCOPY);  Surgeon: Roque Soliman MD;  Location: [facility] ENDO (2ND FLR);  Service: Endoscopy;  Laterality: N/A;    ESOPHAGOGASTRODUODENOSCOPY N/A 11/17/2022    Procedure: EGD (ESOPHAGOGASTRODUODENOSCOPY);  Surgeon: Roque Soliman MD;  Location: [facility] ENDO (2ND FLR);  Service: Endoscopy;  Laterality: N/A;    GASTRIC BYPASS      HYSTERECTOMY  2012    JOINT REPLACEMENT      TKR    LEFT HEART CATHETERIZATION Left 2/5/2021    Procedure: Left heart cath;  Surgeon: Shane Pacheco MD;  Location: [facility] CATH;  Service: Cardiovascular;  Laterality: Left;    PELVIC LAPAROSCOPY Left     OOPH    RENAL BIOPSY N/A 10/5/2022    Procedure: BIOPSY, KIDNEY;  Surgeon: Velia Diagnostic Provider;  Location: [facility] OR;  Service: General;  Laterality: N/A;    RIGHT HEART CATHETERIZATION Right 2/5/2021    Procedure: INSERTION, CATHETER, RIGHT HEART. via left radial  and left brachial;  Surgeon: Shane Pacheco MD;  Location: [facility];  Service: Cardiovascular;  Laterality: Right;    TOTAL ABDOMINAL HYSTERECTOMY W/ BILATERAL SALPINGOOPHORECTOMY         Time Tracking:     PT Received On: 11/18/22  PT Start Time: 1349     PT Stop Time: 1411  PT Total Time (min): 22 min     Billable Minutes: Evaluation 12 and Gait Training 10      11/18/2022

## 2022-11-18 NOTE — PLAN OF CARE
No acute events this shift. Patient AAOx4. Afebrile and VSS. No complaints of pain or nausea. TPN infusion completed this shift. Bed in lowest position. Side rails up x2. All possessions and call light within reach. Non-skid socks worn when out of bed. Instructed to call for assistance and voiced understanding. All needs of patient currently met. Will continue to monitor with frequent rounding.

## 2022-11-18 NOTE — ASSESSMENT & PLAN NOTE
Refeeding syndrome - resolved  Albumin 1.3, Prealbumin 7, and CRP 4.3. Lost about 37 lbs in 6 months. Malabsorption suspected. Small bowel biopsies 11/17 pending.   Eating 25-75% of meals  Started on TPN prior to improvement of diarrhea  Per GI, TPN not needed if able to take adequate nutrition orally  Lipids stopped to encourage hunger. Continue TPN for protein replacement.   RD consulted for calorie count. If she is able to eat >60% of meals, no need for TPN    Measurements:  Wt Readings from Last 1 Encounters:   11/17/22 88.5 kg (195 lb)   Body mass index is 32.45 kg/m².

## 2022-11-18 NOTE — PLAN OF CARE
Problem: Physical Therapy  Goal: Physical Therapy Goal  Description: Goals to be met by: 2022     Patient will increase functional independence with mobility by performin. Supine to sit with Set-up [place]  2. Sit to supine with Set-up [place]  3. Sit to stand transfer with Supervision  4. Bed to chair transfer with Supervision using Rolling Walker  5. Gait  x 250 feet with Supervision using Rolling Walker.   6. Lower extremity exercise program x15 reps per handout, with supervision    Outcome: Ongoing, Progressing

## 2022-11-18 NOTE — ASSESSMENT & PLAN NOTE
unlikely neuroendocrine tumor  Responding to anti-diarrheals loperamide, cholestyramine and lomotil. Responded very well with lomotil. Stopped cholestyramine. Will trial off loperamide  Extensive work up remarkable for low pancreatic elastase. Per GI, no need to start pancreatic enzymes as her diarrhea has improved

## 2022-11-18 NOTE — TREATMENT PLAN
GI Treatment Plan    Cherry Santiago is a 67 y.o. female admitted to hospital 11/6/2022 (Hospital Day: 13) due to Diarrhea concurrent with and due to carcinoid syndrome.     Interval History  Reports stools are becoming more formed  Only 3 Bms today  Minimal PO intake, reports no appetite  Denies abd pain, bloating, n/v    Objective  Temp:  [97.8 °F (36.6 °C)-99 °F (37.2 °C)] 99 °F (37.2 °C) (11/18 1138)  Pulse:  [65-84] 84 (11/18 1138)  BP: ()/(49-66) 94/60 (11/18 1138)  Resp:  [15-18] 18 (11/18 1138)  SpO2:  [92 %-99 %] 98 % (11/18 1138)    General: Alert, Oriented x3, no distress  Abdomen: Non-distended. Normal tympany. Soft. Non-tender. No peritoneal signs.    Laboratory    Recent Labs   Lab 11/15/22  0751 11/17/22  0048 11/18/22  1102   HGB 8.1* 8.1* 8.5*         Assessment and Plan    Cherry Santiago is a 67 y.o. female with history of history of AF (on eliquis), CHF, arthritis, GERD, HLD, HTN, CAD, TIA and concern for recently diagnosed carcinoid syndrome in setting of chronic diarrhea. GI consulted for additional workup. Hypoalbuminemia raises concern for protein-losing enteropathy. Myeloma and amyloidosis have already been explored. Unclear if BM biopsy would be of assistance. There was concern for neuroendocrine tumor. Chromogranin A elevated >2000. Extensive workup done thus far including upper and lower endosocpy (unrevealing), elevated chromogranin A, Ga Dotatate scan showing uptake in pancreas although EUS did not show mass to be biopsied and CT panc protocol showed no pancreatic lesion. VIP and somatostatin levels pending. Holding PPI to recheck gastrin and chromogranin levels. Some symptomatic improvement with cholestyramine. Planning to discuss in tumor board.     Problem List:  Chronic diarrhea, concern for neuroendocrine tumor        Recommendations:  - Serum tryptase, serum VIP and somatostatin pending  - Small bowel biopsies pending  - Continue immodium  - Can add lomotil 10mL q6h  -  Hold PPI for one week (last dose 11/14) then recheck chromogranin and gastrin levels. PPIs may be source of elevation in both.  - No role for repeat EUS and biopsy without identifiable lesion per AES  - Will be discussed in tumor board Monday 11/21    Thank you for involving us in the care of Cherry Santiago. Please call with any additional questions, concerns or changes in the patient's clinical status.    Jet Paz MD  Gastroenterology Fellow, PGY IV

## 2022-11-18 NOTE — PROGRESS NOTES
[facility] Medicine  Progress note    Team: [facility] [place] [facility]OSP MED Z Jennifer Saini MD  Admit Date: 11/6/2022    Principal Problem:  Diarrhea concurrent with and due to carcinoid syndrome    Interval hx:  Diarrhea still loose again    ROS   Respiratory: neg for cough neg for shortness of breath  Cardiovascular: neg for chest pain neg for palpitations  Gastrointestinal: neg for nausea neg for vomiting, neg for abdominal pain neg for diarrhea neg for constipation   Behavioral/Psych: neg for depression neg for anxiety    PEx  Temp:  [97.8 °F (36.6 °C)-98.8 °F (37.1 °C)]   Pulse:  [65-86]   Resp:  [16-20]   BP: ()/(57-79)   SpO2:  [93 %-100 %]     Intake/Output Summary (Last 24 hours) at 11/17/2022 2339  Last data filed at 11/17/2022 1736  Gross per 24 hour   Intake 580 ml   Output --   Net 580 ml     General Appearance: no acute distress, WD, not malnourished appearing  Heart: regular rate and rhythm, no heave  Respiratory: Normal respiratory effort, symmetric excursion, bilateral vesicular breath sounds   Abdomen: Soft, non-tender; bowel sounds active  Skin: intact, no rash, no ulcers  Neurologic:  No focal numbness or weakness  Mental status: Alert, oriented x 4, affect appropriate     Recent Labs   Lab 11/12/22  1253 11/15/22  0751 11/17/22  0048   WBC 4.86 4.83 4.70   HGB 8.8* 8.1* 8.1*   HCT 26.4* 24.6* 24.9*    175 160     Recent Labs   Lab 11/15/22  0751 11/16/22  1310 11/17/22  0048   * 143 144   K 2.8* 4.0 3.2*   * 116* 115*   CO2 22* 17* 20*   BUN 7* 7* 7*   CREATININE 1.2 1.2 1.1   GLU 87 102 99   CALCIUM 7.1* 7.1* 7.1*   MG  --  1.6  --    PHOS  --  2.7  --      Recent Labs   Lab 11/12/22  1253 11/15/22  0751 11/17/22  0048   ALKPHOS 80 75 75   ALT 8* 6* 7*   AST 45* 36 42*   ALBUMIN 1.4* 1.3* 1.3*   PROT 4.4* 4.0* 4.0*   BILITOT 0.5 0.6 0.5        Recent Labs   Lab 11/17/22  1041   POCTGLUCOSE 117*       Scheduled Meds:   alteplase  2 mg Other Once    aspirin  81 mg Oral Daily    atorvastatin   40 mg Oral QHS    cholestyramine  1 packet Oral Daily    ferrous sulfate  1 tablet Oral Daily    loperamide  2 mg Oral TID    metoprolol tartrate  25 mg Oral BID    miconazole NITRATE 2 %   Topical (Top) BID    potassium, sodium phosphates  1 packet Oral BID    sodium bicarbonate  1,300 mg Oral BID    sodium chloride 0.9%  10 mL Intravenous Q6H    vitamin D  2,000 Units Oral Daily     Continuous Infusions:  As Needed:  sodium chloride, dextrose 10%, dextrose 10%, glucagon (human recombinant), glucose, glucose, HYDROcodone-acetaminophen, naloxone, ondansetron, promethazine (PHENERGAN) IVPB, Flushing PICC Protocol **AND** sodium chloride 0.9% **AND** sodium chloride 0.9%    Assessment and Plan  / Problems managed today    * Carcinoid syndrome related chronic diarrhea   Diagnosis not confirmed with biopsy:   67-year-old past medical history of recently diagnosed carcinoid syndrome,  having diarrhea intermittently since August 2022 more recently diarrhea has become worse over past few days -several episodes daily  associated nausea with intermittent vomiting. symptoms concerned likely though to be secondary to carcinoid syndrome patient evaluated by Heme-Onc at [facility] with PET scan done 10/18 - Focal nodular foci of radiotracer uptake at the pancreatic tail and near the region of the pancreatic head above liver background activity could indicate somatostatin receptor avid lesions, but no corresponding mass is seen on CT portion of the study.  Recommend follow-up contrast enhanced pancreas protocol CT to further assess. 5HIAA levels on 10/21 WNL .     CT AP W contrast done 11/4 shows No pancreatic lesion and Enterocolitis,. recent EGD -Non-bleeding gastric ulcers with no stigmata of  bleeding. Biopsied. Treated with a monopolar probe.     Colonoscopy with biopsy -No significant pathologic abnormality. No morphologic evidence of active inflammatory bowel disease, microscopic colitis, or neoplasia.  Patient was  referred to [facility] for further workup and has scheduled appointment at Heme-Onc at [facility] this upcoming week.    11/7 Chromogranin A elevated at 2000s.  stool studies in process. C diff negative. Hematology working her up for neuroendocrine tumor vs VIPoma.  AES consulted for EUS and potential biopsy given that her PET scan showed uptake but no mass was seen on CT pancreas protocol.discontinued eliquis.  plan for EUS on Wednesday.  started and discontinued octreotide since it failed to help  11/9 repeat cryptosporidium Ag negative  EUS today - sleeve gastrectomy was found, characterized by  healthy appearing mucosa.Widely patent duodenoenterostomy, characterized   by healthy appearing mucosa was found. few cystic lesions were seen in the pancreatic  head, genu of the pancreas and pancreatic body highly suspicious  for a branched intraductal papillary mucinous neoplasm.small for sampling. pancreatic head and pancreatic tail showed no mass.   11/10 - Oncology to see and review EUS; started immodium   11/11- on octeotride, immodium, 6 BMs yesterday, colon bx neg, repeat cryptospor neg. Consider biopsy per surgery? Waiting for oncology recs.  Add metamucil  (low dose) to absorb excess water.  11/13 - waiting on VIPoma, somatostatin,  labs. Discussed pt condition and plan w her DTR. Discuss diagnostic approach with GI.  CT chest-  Some small sub-centimeter nodules  11/4- improved w questran, GI considering another biopsy procedure (small bowel and pancreas per EUS). Holding pantoprazole and repeat gastrin and chromogranin level once off PPI for at least 2-4 weeks.  VIP& somatostatin level will also potentially be elevated with pantoprazole.  EGD and small bowel biopsies done 11/17. Increase questran to TID.     Acute cystitis without hematuria  + UA on 11/14. Rocephin started. Was recently on levoquin.  This was associated w left flank pain.   Still w diarrhea  11/16- culture - no  growth . Will dc rocephin ( received 3 days)      Chronic diarrhea  Uncertain etiology  Concern for carcinoid syndrome with high chromogranin level, but no biopsy souce can be found.   CT a/p - Partial small bowel resection.  There are some thickened segments of large and small bowel, some with adjacent mesenteric edema.  5HIAA levels on 10/21 WNL .  CT AP W contrast done 11/4 shows No pancreatic lesion and Enterocolitis,.   recent EGD -Non-bleeding gastric ulcers with no stigmata of  bleeding. Biopsied. Treated with a monopolar probe.   colonoscopy with biopsy -No significant pathologic abnormality. No morphologic evidence of active inflammatory bowel disease, microscopic colitis, or neoplasia  Colon biopsies neg 10/2022  Gastric biopsy 10/2022 - neg for h pylori  Kid bx 10/2022 - no amyloid, + diab nephropathy    Add CT chest- will need contrast for tumor eval. RL x 500 cc.   Consult GI- Is Surgery consult for pancreatic biopsy indicated? Any other eval for diarrhea?   Per GI:   Obtain fecal elastase = ordered  - Can check serum tryptase for mastocytosis - ordered  -  Could consider empiric cholestyramine for possible bile acid diarrhea as well- done ( stopped metamucil)    - If pending workup negative, consider hydrogen breath testing vs empiric rifaximin trial for SIBO although less likely.  11/17- continue questran and imodium,  lab tomorrow, monitor stools and oral intake      Hypophosphatemia  Requires replacing  NaPhos 40 mmol  Consider refeeding    Cryptosporidium exposure  Repeat neg, colon biopsies neg.       Hypotension  See above      Orthostatic hypotension  11/7 orthostatics + with drop in SBP to 80s on standing. RL bolus 1L and 75ml/h .SBP in 70-80s this PM on IVF. NS bolus 500ml x1. critical care consulted   11/11- /61 . May need intermittent bolus IVF due to volume loss with diarrhea  11/12- good UO. Walking to BR. Eating 75%  11/16- eating 25 %, has been NPO for possible  procedure  -stable    Seizures  continue with keppra BID      UTI (urinary tract infection)   urinalysis positive . Culture, Urine pending  continue with anitbiotic IV ceftriaxone  11/8 UC with klebsiella; s/p x5 doses CTX - sensitive    11/14 - completed levoquin, has left flank/ back pain. Check UA, [employer] given, warmpack.   11/16- see above    Hypomagnesemia  Replaced  11/16- rechecking today, alb 1.3      Stage 3a chronic kidney disease  seems to be at baseline. Creatine stable for now. BMP reviewed- noted Estimated Creatinine Clearance: 50 mL/min (based on SCr of 1.2 mg/dL). according to latest data. Monitor UOP and serial BMP and adjust therapy as needed. Renally dose meds.    Recent Labs   Lab 11/11/22  0316 11/12/22  1253 11/15/22  0751   BUN 6* 6* 7*   CREATININE 1.1 1.2 1.2          Severe protein-calorie malnutrition   Nutrition consulted. Most recent weight and BMI monitored-   Alb 1.4 -> 1.3  malabsorption suspected. Small bowel biopsies 11/17  Eating 25-75% of meals  Consider TPN. Continue at home one month at least. Will need f/u labs weekly, f/u oncology, pcp, surg oncology, GI.     Measurements:  Wt Readings from Last 1 Encounters:   11/17/22 88.5 kg (195 lb)   Body mass index is 32.45 kg/m².    Recommendations: Recommendation/Intervention: 1) Increase TPN: 157 g D + 44 g AA -Provides 709 kcals, 44g protein (GIR: 1.23) (50% of calorie and protein needs)   2) Continue regular diet w/ boost plus  - monitor renal labs  3) RD following  Goals: Meet % EEN/EPN by next RD f/u    Patient has been screened and assessed by RD. RD will follow patient.      Intractable back pain  Hs of OA spine, DJD, back surgery  Xray l spine 3/2021 - posterior fusion hardware from L2-L5 along with laminectomy change.  Grade 1 anterolistheses are suspected of L3 over L4 and L4 over L5.   11/14 - left flank  Completed levoquin  See UTI: Checked UA + on 11/14 , reflex culture no growth. Rocephin- completed 3 days. She was  recently on levoquin, Might be resistant   11/16- resolved      Hypocalcemia  11/6   corrected calcium of 8.4.  PTH elevated 114    11/13- lucy 7.5, alb 1. 4      Chronic heart failure with preserved ejection fraction (HFpEF) NICM NYHA2   Patient admitted without  signs and symptoms of CHF exacerbation    Results for orders placed or performed during the hospital encounter of 11/06/22   Brain natriuretic peptide   Result Value Ref Range    BNP 82 0 - 99 pg/mL   . Telemetry monitoring. Strict input/ Output monitor, Daily weights.    denies chestpain.  Obtain serial troponins.  Cardiac Enzymes trend  No results for input(s): CPK, CPKMB, MB, TROPONINI in the last 168 hours.  No results found for this or any previous visit.  echo 10/7/22 left ventricle is normal in size. Global left ventricular   systolic function is normal. The left ventricular ejection fraction is   55%. Left ventricular diastolic function is abnormal (stage I impaired   relaxation). Noted left ventricular hypertrophy. Concentric left   ventricular hypertrophy is present. It is mild.     4. Mild (1+) tricuspid regurgitation.   5. The right ventricle is normal in size. Right ventricular systolic   function is normal.      11/11- on metoprolol, holding lasix due to excessive diarrhea  11/16- stable        Chronic deep vein thrombosis (DVT): right common femoral vein  -DVT sonogram 8/22 and 10/22 negative for DVT  -11/7 DVT sonogram -Nonocclusive thrombus/DVT in the superior-mid right femoral vein.continue eliquis . S/p IVC filter   -Restarted eliquis following EUS  11/16- stable, Resume eliquis after small bowel biopsies    Dizziness  likely secondary to diarrhea. orthostasis. IV hydration  11/15- RL x 500 cc.   11/16- BP  92/55, Pulse 82     Hypokalemia    - Patient with potassium   Recent Labs   Lab 11/11/22  0316 11/12/22  1253 11/15/22  0751   K 3.1* 3.5 2.8*   . Replaced 11/11.  Monitor  Replace 11/13, 11/14, 11/16        Anemia of chronic  disease    Patient's with Normocytic anemia.. Hemoglobin stable. Etiology likely due to chronic disease .  Current CBC reviewed-    Recent Labs   Lab 11/11/22  0316 11/12/22  1253 11/15/22  0751   HGB 7.9* 8.8* 8.1*    Monitor CBC and transfuse if H/H drops below 7/21.  11/9 concern for blood loss anemia.  Hb at 6.7 Transfusion with 1 unit of PRBC       Essential hypertension  Chronic, controlled.  Latest blood pressure and vitals reviewed-   Temp:  [98.2 °F (36.8 °C)-98.7 °F (37.1 °C)]   Pulse:  [64-85]   Resp:  [16-18]   BP: ()/(41-74)   SpO2:  [91 %-100 %] .   Home meds for hypertension were reviewed and hold furosemide and metoprolol for now as with diarrhea. PRN meds if BP> 180/110 mm HG  11/11- on metoprolol 25 bid      Class 1 obesity due to excess calories with serious comorbidity and body mass index (BMI) of 32.0 to 32.9 in adult  Body mass index is 32.45 kg/m². Morbid obesity complicates all aspects of disease management from diagnostic modalities to treatment. Weight loss encouraged   - s/p Sleeve gastrectomy        Discharge Planning   MELYSSA: 11/21/2022     Code Status: Full Code   Is the patient medically ready for discharge?: No    Reason for patient still in hospital (select all that apply): Treatment, Consult recommendations, and PT / OT recommendations  Discharge Plan A: Home with family   Discharge Delays: None known at this time    Diet:  regular diet with boost  GI PPx: not needed  DVT PPx:  apixaban  Airways: room air  Wounds: none    Goals of Care:  Return to prior functional status       Time (minutes) spent in care of the patient (Greater than 1/2 spent in direct face-to-face contact and care coordination on unit)  35 min    Jennifer Saini MD

## 2022-11-18 NOTE — PT/OT/SLP EVAL
Occupational Therapy  Co Evaluation w PT  CoEval performed to optimize pt participation and assessment of full functional capacity.      Name: Cherry Santiago  MRN: [id number]  Admitting Diagnosis:  Diarrhea concurrent with and due to carcinoid syndrome  Recent Surgery: Procedure(s) (LRB):  EGD (ESOPHAGOGASTRODUODENOSCOPY) (N/A) 1 Day Post-Op    Recommendations:     Discharge Recommendations: home health OT  Discharge Equipment Recommendations:  none  Barriers to discharge:       Assessment:     Cherry Santiago is a 67 y.o. female with a medical diagnosis of Diarrhea concurrent with and due to carcinoid syndrome.  She presents with good tolerance to session on this date completing toileting from toilet level and func amb in [facility]. Pt required increased assistance for toilet transfer however reports being Mod I for BSC transfer. Pt w SOB during func amb. Performance deficits affecting function: weakness, impaired endurance, impaired self care skills, impaired functional mobility, gait instability, impaired balance.    Pt motivated to return home however not at baseline for ADL and functional mobility performance in addition to concerns of fall risk and would benefit from continued OT services at this time.    Rehab Prognosis: Good; patient would benefit from acute skilled OT services to address these deficits and reach maximum level of function.       Plan:     Patient to be seen 3 x/week to address the above listed problems via self-care/home management, therapeutic activities, therapeutic exercises  Plan of Care Expires: 12/18/22  Plan of Care Reviewed with: patient    Subjective     Chief Complaint: back pain  Patient/Family Comments/goals: I cant get up from the toilet    Occupational Profile:  Living Environment: lives w daughter and grandson in [facility], Ts combo  Previous level of function: Min A for ADL specifically bathing and socks   Roles and Routines: drives  Equipment Used at Home:  bedside commode, shower  chair, walker, rolling, wheelchair, hospital bed  Assistance upon Discharge: daughter able to assist until she returns to work    Pain/Comfort:  Pain Rating 1: 8/10  Location - Orientation 1: generalized  Location 1: back  Pain Addressed 1: Reposition, Distraction    Patients cultural, spiritual, Shinto conflicts given the current situation: no    Objective:     Communicated with: RN prior to session.  Patient found supine with peripheral IV upon OT entry to room.    General Precautions: Standard, fall   Orthopedic Precautions:N/A   Braces: N/A  Respiratory Status: Room air    Occupational Performance:    Bed Mobility:    Patient completed Supine to Sit with stand by assistance  SBA EOB balance    Functional Mobility/Transfers:  Patient completed Sit <> Stand Transfer with contact guard assistance  with  rolling walker   Mod A to complete sit>stand from toilet w HHA and grab bar  Functional Mobility: pt completed functional ambulation to restroom and in hallway to simulate household and community mobility requiring CGA and RW    Activities of Daily Living:  Toileting: stand by assistance completed pericare and clothing hygiene from toilet level     Cognitive/Visual Perceptual:  Cognitive/Psychosocial Skills:     -       Oriented to: Person, Place, Time, and Situation   -       Follows Commands/attention:Follows multistep  commands  -       Communication: clear/fluent  -       Memory: No Deficits noted  -       Safety awareness/insight to disability: intact   -       Mood/Affect/Coping skills/emotional control: Cooperative and Pleasant    Physical Exam:  Upper Extremity Range of Motion:     -       Right Upper Extremity: WFL  -       Left Upper Extremity: WFL  Upper Extremity Strength:    -       Right Upper Extremity: WFL  -       Left Upper Extremity: WFL   Strength: -       Right Upper Extremity: WFL  -       Left Upper Extremity: WFL    AMPAC 6 Click ADL:  AMPAC Total Score: 20    Treatment &  Education:  Pt educated on scope of practice and importance of daily functional mobility.   Pt educated on safety precautions during transfers  Pt updated on POC and discharge recc  White board updated to reflect pt status and visual reminder included on board instructing her to call for nurse as needed.      Patient left sitting edge of bed with all lines intact, call button in reach, and RN notified    GOALS:   Multidisciplinary Problems       Occupational Therapy Goals          Problem: Occupational Therapy    Goal Priority Disciplines Outcome Interventions   Occupational Therapy Goal     OT, PT/OT Ongoing, Progressing    Description: Goals to be met by: 12/2/2022     Patient will increase functional independence with ADLs by performing:    UE Dressing with Modified Eddy.  LE Dressing with Modified Eddy.  Grooming while standing at sink with Modified Eddy.  Toileting from toilet with Modified Eddy for hygiene and clothing management.   Toilet transfer to toilet with Minimal Assistance.                         History:     Past Medical History:   Diagnosis Date    Anticoagulant long-term use     Arthritis     Atrial fibrillation     CHF (congestive heart failure)     Diabetes mellitus     Type2 resolved after weight loss surgery    DVT (deep venous thrombosis)     Encounter for blood transfusion     GERD (gastroesophageal reflux disease)     Glaucoma     Hypercholesterolemia     Hyperlipemia     Hypertension     Memory changes     Myocardial infarction     Renal failure     resolved    TIA (transient ischemic attack)          Past Surgical History:   Procedure Laterality Date    BACK SURGERY  06/21/2017    lumbar fusion 6/21/17 and surgery for hematoma to site on 6/26/17    CHOLECYSTECTOMY  1978    COLONOSCOPY N/A 10/3/2022    Procedure: COLONOSCOPY;  Surgeon: Jourdan Parks MD;  Location: [facility];  Service: General;  Laterality: N/A;    COLONOSCOPY N/A 10/11/2022    Procedure:  COLONOSCOPY;  Surgeon: Stephen Cooper MD;  Location: [facility];  Service: Endoscopy;  Laterality: N/A;    ENDOSCOPIC ULTRASOUND OF UPPER GASTROINTESTINAL TRACT N/A 11/9/2022    Procedure: ULTRASOUND, UPPER GI TRACT, ENDOSCOPIC;  Surgeon: Jake Corona MD;  Location: [facility] (2ND FLR);  Service: Endoscopy;  Laterality: N/A;    ESOPHAGOGASTRODUODENOSCOPY N/A 10/3/2022    Procedure: EGD (ESOPHAGOGASTRODUODENOSCOPY);  Surgeon: Jourdan Parks MD;  Location: [facility];  Service: General;  Laterality: N/A;    ESOPHAGOGASTRODUODENOSCOPY N/A 11/15/2022    Procedure: EGD (ESOPHAGOGASTRODUODENOSCOPY);  Surgeon: Roque Soliman MD;  Location: [facility] (2ND FLR);  Service: Endoscopy;  Laterality: N/A;    ESOPHAGOGASTRODUODENOSCOPY N/A 11/17/2022    Procedure: EGD (ESOPHAGOGASTRODUODENOSCOPY);  Surgeon: Roque Soliman MD;  Location: [facility] (2ND FLR);  Service: Endoscopy;  Laterality: N/A;    GASTRIC BYPASS      HYSTERECTOMY  2012    JOINT REPLACEMENT      TKR    LEFT HEART CATHETERIZATION Left 2/5/2021    Procedure: Left heart cath;  Surgeon: Shane Pacheco MD;  Location: [facility] CATH;  Service: Cardiovascular;  Laterality: Left;    PELVIC LAPAROSCOPY Left     OOPH    RENAL BIOPSY N/A 10/5/2022    Procedure: BIOPSY, KIDNEY;  Surgeon: [facility];  Location: [facility] OR;  Service: General;  Laterality: N/A;    RIGHT HEART CATHETERIZATION Right 2/5/2021    Procedure: INSERTION, CATHETER, RIGHT HEART. via left radial and left brachial;  Surgeon: Shane Pacheco MD;  Location: [facility] CATH;  Service: Cardiovascular;  Laterality: Right;    TOTAL ABDOMINAL HYSTERECTOMY W/ BILATERAL SALPINGOOPHORECTOMY         Time Tracking:     OT Date of Treatment: 11/18/22  OT Start Time: 1348  OT Stop Time: 1410  OT Total Time (min): 22 min    Billable Minutes:Evaluation 10  Self Care/Home Management 12    11/18/2022

## 2022-11-18 NOTE — PLAN OF CARE
[facility] (Hospital)  Discharge Reassessment    Primary Care Provider: Amarilys Romero MD    Expected Discharge Date: 11/21/2022    Patient not medically ready to discharge per MD.    Reassessment (most recent)       Discharge Reassessment - 11/18/22 1453          Discharge Reassessment    Assessment Type Discharge Planning Reassessment     Did the patient's condition or plan change since previous assessment? No     Discharge Plan discussed with: Patient;Adult children     Discharge Plan A Home with family     Discharge Plan B Home Health     DME Needed Upon Discharge  other (see comments)   TBD    Discharge Barriers Identified None        Post-Acute Status    Discharge Delays None known at this time                   Joanne De Jesus RN     [phone]

## 2022-11-19 PROBLEM — J96.01 ACUTE HYPOXEMIC RESPIRATORY FAILURE: Status: ACTIVE | Noted: 2022-11-19

## 2022-11-19 PROBLEM — I95.9 HYPOTENSION: Status: ACTIVE | Noted: 2022-11-19

## 2022-11-19 LAB
ALBUMIN SERPL BCP-MCNC: 1.1 G/DL (ref 3.5–5.2)
ALBUMIN SERPL BCP-MCNC: 1.3 G/DL (ref 3.5–5.2)
ALLENS TEST: ABNORMAL
ALP SERPL-CCNC: 75 U/L (ref 55–135)
ALT SERPL W/O P-5'-P-CCNC: 9 U/L (ref 10–44)
ANION GAP SERPL CALC-SCNC: 5 MMOL/L (ref 8–16)
ANION GAP SERPL CALC-SCNC: 8 MMOL/L (ref 8–16)
AST SERPL-CCNC: 58 U/L (ref 10–40)
BASOPHILS # BLD AUTO: 0.02 K/UL (ref 0–0.2)
BASOPHILS # BLD AUTO: 0.03 K/UL (ref 0–0.2)
BASOPHILS NFR BLD: 0.4 % (ref 0–1.9)
BASOPHILS NFR BLD: 0.5 % (ref 0–1.9)
BILIRUB SERPL-MCNC: 0.4 MG/DL (ref 0.1–1)
BNP SERPL-MCNC: 236 PG/ML (ref 0–99)
BUN SERPL-MCNC: 6 MG/DL (ref 8–23)
BUN SERPL-MCNC: 7 MG/DL (ref 8–23)
CALCIUM SERPL-MCNC: 6.9 MG/DL (ref 8.7–10.5)
CALCIUM SERPL-MCNC: 7 MG/DL (ref 8.7–10.5)
CHLORIDE SERPL-SCNC: 114 MMOL/L (ref 95–110)
CHLORIDE SERPL-SCNC: 115 MMOL/L (ref 95–110)
CO2 SERPL-SCNC: 24 MMOL/L (ref 23–29)
CO2 SERPL-SCNC: 24 MMOL/L (ref 23–29)
CORTIS SERPL-MCNC: 9 UG/DL (ref 4.3–22.4)
CREAT SERPL-MCNC: 1.1 MG/DL (ref 0.5–1.4)
CREAT SERPL-MCNC: 1.1 MG/DL (ref 0.5–1.4)
DELSYS: ABNORMAL
DIFFERENTIAL METHOD: ABNORMAL
DIFFERENTIAL METHOD: ABNORMAL
EOSINOPHIL # BLD AUTO: 0.1 K/UL (ref 0–0.5)
EOSINOPHIL # BLD AUTO: 0.1 K/UL (ref 0–0.5)
EOSINOPHIL NFR BLD: 1 % (ref 0–8)
EOSINOPHIL NFR BLD: 1.6 % (ref 0–8)
ERYTHROCYTE [DISTWIDTH] IN BLOOD BY AUTOMATED COUNT: 20.1 % (ref 11.5–14.5)
ERYTHROCYTE [DISTWIDTH] IN BLOOD BY AUTOMATED COUNT: 20.1 % (ref 11.5–14.5)
ERYTHROCYTE [SEDIMENTATION RATE] IN BLOOD BY WESTERGREN METHOD: 17 MM/H
EST. GFR  (NO RACE VARIABLE): 55.1 ML/MIN/1.73 M^2
EST. GFR  (NO RACE VARIABLE): 55.1 ML/MIN/1.73 M^2
FIO2: 21
GLUCOSE SERPL-MCNC: 126 MG/DL (ref 70–110)
GLUCOSE SERPL-MCNC: 156 MG/DL (ref 70–110)
HCT VFR BLD AUTO: 22.4 % (ref 37–48.5)
HCT VFR BLD AUTO: 24.2 % (ref 37–48.5)
HGB BLD-MCNC: 7.1 G/DL (ref 12–16)
HGB BLD-MCNC: 7.6 G/DL (ref 12–16)
IMM GRANULOCYTES # BLD AUTO: 0.01 K/UL (ref 0–0.04)
IMM GRANULOCYTES # BLD AUTO: 0.01 K/UL (ref 0–0.04)
IMM GRANULOCYTES NFR BLD AUTO: 0.2 % (ref 0–0.5)
IMM GRANULOCYTES NFR BLD AUTO: 0.2 % (ref 0–0.5)
LACTATE SERPL-SCNC: 2.9 MMOL/L (ref 0.5–2.2)
LYMPHOCYTES # BLD AUTO: 1.6 K/UL (ref 1–4.8)
LYMPHOCYTES # BLD AUTO: 1.6 K/UL (ref 1–4.8)
LYMPHOCYTES NFR BLD: 27.3 % (ref 18–48)
LYMPHOCYTES NFR BLD: 30.2 % (ref 18–48)
MAGNESIUM SERPL-MCNC: 2 MG/DL (ref 1.6–2.6)
MCH RBC QN AUTO: 30.3 PG (ref 27–31)
MCH RBC QN AUTO: 30.5 PG (ref 27–31)
MCHC RBC AUTO-ENTMCNC: 31.4 G/DL (ref 32–36)
MCHC RBC AUTO-ENTMCNC: 31.7 G/DL (ref 32–36)
MCV RBC AUTO: 96 FL (ref 82–98)
MCV RBC AUTO: 96 FL (ref 82–98)
MODE: ABNORMAL
MONOCYTES # BLD AUTO: 0.8 K/UL (ref 0.3–1)
MONOCYTES # BLD AUTO: 0.8 K/UL (ref 0.3–1)
MONOCYTES NFR BLD: 13.3 % (ref 4–15)
MONOCYTES NFR BLD: 16.1 % (ref 4–15)
NEUTROPHILS # BLD AUTO: 2.7 K/UL (ref 1.8–7.7)
NEUTROPHILS # BLD AUTO: 3.4 K/UL (ref 1.8–7.7)
NEUTROPHILS NFR BLD: 51.5 % (ref 38–73)
NEUTROPHILS NFR BLD: 57.7 % (ref 38–73)
NRBC BLD-RTO: 0 /100 WBC
NRBC BLD-RTO: 0 /100 WBC
PHOSPHATE SERPL-MCNC: 3.4 MG/DL (ref 2.7–4.5)
PLATELET # BLD AUTO: 130 K/UL (ref 150–450)
PLATELET # BLD AUTO: 143 K/UL (ref 150–450)
PMV BLD AUTO: 10.6 FL (ref 9.2–12.9)
PMV BLD AUTO: 10.9 FL (ref 9.2–12.9)
PO2 BLDA: 40 MMHG (ref 40–60)
POC SATURATED O2: 68 % (ref 95–100)
POCT GLUCOSE: 126 MG/DL (ref 70–110)
POCT GLUCOSE: 155 MG/DL (ref 70–110)
POTASSIUM SERPL-SCNC: 3.5 MMOL/L (ref 3.5–5.1)
POTASSIUM SERPL-SCNC: 3.6 MMOL/L (ref 3.5–5.1)
PROT SERPL-MCNC: 4.1 G/DL (ref 6–8.4)
RBC # BLD AUTO: 2.33 M/UL (ref 4–5.4)
RBC # BLD AUTO: 2.51 M/UL (ref 4–5.4)
SAMPLE: ABNORMAL
SITE: ABNORMAL
SODIUM SERPL-SCNC: 143 MMOL/L (ref 136–145)
SODIUM SERPL-SCNC: 147 MMOL/L (ref 136–145)
SP02: 100
T4 FREE SERPL-MCNC: 0.87 NG/DL (ref 0.71–1.51)
TSH SERPL DL<=0.005 MIU/L-ACNC: 4.47 UIU/ML (ref 0.4–4)
WBC # BLD AUTO: 5.16 K/UL (ref 3.9–12.7)
WBC # BLD AUTO: 5.96 K/UL (ref 3.9–12.7)

## 2022-11-19 PROCEDURE — B4185 PARENTERAL SOL 10 GM LIPIDS: HCPCS | Performed by: INTERNAL MEDICINE

## 2022-11-19 PROCEDURE — 25000003 PHARM REV CODE 250: Performed by: STUDENT IN AN ORGANIZED HEALTH CARE EDUCATION/TRAINING PROGRAM

## 2022-11-19 PROCEDURE — 25000003 PHARM REV CODE 250: Performed by: HOSPITALIST

## 2022-11-19 PROCEDURE — 25000003 PHARM REV CODE 250: Performed by: INTERNAL MEDICINE

## 2022-11-19 PROCEDURE — 87040 BLOOD CULTURE FOR BACTERIA: CPT | Mod: 59 | Performed by: STUDENT IN AN ORGANIZED HEALTH CARE EDUCATION/TRAINING PROGRAM

## 2022-11-19 PROCEDURE — 99291 PR CRITICAL CARE, E/M 30-74 MINUTES: ICD-10-PCS | Mod: ,,, | Performed by: NURSE PRACTITIONER

## 2022-11-19 PROCEDURE — 63600175 PHARM REV CODE 636 W HCPCS: Mod: JG | Performed by: STUDENT IN AN ORGANIZED HEALTH CARE EDUCATION/TRAINING PROGRAM

## 2022-11-19 PROCEDURE — A4216 STERILE WATER/SALINE, 10 ML: HCPCS | Performed by: HOSPITALIST

## 2022-11-19 PROCEDURE — 63600175 PHARM REV CODE 636 W HCPCS: Performed by: STUDENT IN AN ORGANIZED HEALTH CARE EDUCATION/TRAINING PROGRAM

## 2022-11-19 PROCEDURE — A4217 STERILE WATER/SALINE, 500 ML: HCPCS | Performed by: STUDENT IN AN ORGANIZED HEALTH CARE EDUCATION/TRAINING PROGRAM

## 2022-11-19 PROCEDURE — 27000221 HC OXYGEN, UP TO 24 HOURS

## 2022-11-19 PROCEDURE — 84439 ASSAY OF FREE THYROXINE: CPT | Performed by: STUDENT IN AN ORGANIZED HEALTH CARE EDUCATION/TRAINING PROGRAM

## 2022-11-19 PROCEDURE — 99291 CRITICAL CARE FIRST HOUR: CPT | Mod: ,,, | Performed by: NURSE PRACTITIONER

## 2022-11-19 PROCEDURE — 99291 PR CRITICAL CARE, E/M 30-74 MINUTES: ICD-10-PCS | Mod: ,,, | Performed by: INTERNAL MEDICINE

## 2022-11-19 PROCEDURE — 36415 COLL VENOUS BLD VENIPUNCTURE: CPT | Performed by: INTERNAL MEDICINE

## 2022-11-19 PROCEDURE — 83605 ASSAY OF LACTIC ACID: CPT | Performed by: INTERNAL MEDICINE

## 2022-11-19 PROCEDURE — 80053 COMPREHEN METABOLIC PANEL: CPT | Performed by: INTERNAL MEDICINE

## 2022-11-19 PROCEDURE — 94761 N-INVAS EAR/PLS OXIMETRY MLT: CPT

## 2022-11-19 PROCEDURE — 82533 TOTAL CORTISOL: CPT | Performed by: STUDENT IN AN ORGANIZED HEALTH CARE EDUCATION/TRAINING PROGRAM

## 2022-11-19 PROCEDURE — 84443 ASSAY THYROID STIM HORMONE: CPT | Performed by: STUDENT IN AN ORGANIZED HEALTH CARE EDUCATION/TRAINING PROGRAM

## 2022-11-19 PROCEDURE — 85025 COMPLETE CBC W/AUTO DIFF WBC: CPT | Mod: 91 | Performed by: INTERNAL MEDICINE

## 2022-11-19 PROCEDURE — 83735 ASSAY OF MAGNESIUM: CPT | Performed by: INTERNAL MEDICINE

## 2022-11-19 PROCEDURE — 63600175 PHARM REV CODE 636 W HCPCS: Performed by: HOSPITALIST

## 2022-11-19 PROCEDURE — 99291 CRITICAL CARE FIRST HOUR: CPT | Mod: ,,, | Performed by: INTERNAL MEDICINE

## 2022-11-19 PROCEDURE — 99900035 HC TECH TIME PER 15 MIN (STAT)

## 2022-11-19 PROCEDURE — P9047 ALBUMIN (HUMAN), 25%, 50ML: HCPCS | Mod: JG | Performed by: STUDENT IN AN ORGANIZED HEALTH CARE EDUCATION/TRAINING PROGRAM

## 2022-11-19 PROCEDURE — 85025 COMPLETE CBC W/AUTO DIFF WBC: CPT | Performed by: INTERNAL MEDICINE

## 2022-11-19 PROCEDURE — 80069 RENAL FUNCTION PANEL: CPT | Performed by: INTERNAL MEDICINE

## 2022-11-19 PROCEDURE — 83880 ASSAY OF NATRIURETIC PEPTIDE: CPT | Performed by: INTERNAL MEDICINE

## 2022-11-19 PROCEDURE — 20000000 HC ICU ROOM

## 2022-11-19 PROCEDURE — 63600175 PHARM REV CODE 636 W HCPCS: Performed by: INTERNAL MEDICINE

## 2022-11-19 RX ORDER — ALBUMIN HUMAN 250 G/1000ML
25 SOLUTION INTRAVENOUS 2 TIMES DAILY
Status: DISCONTINUED | OUTPATIENT
Start: 2022-11-19 | End: 2022-11-19

## 2022-11-19 RX ORDER — ALBUMIN HUMAN 250 G/1000ML
25 SOLUTION INTRAVENOUS 2 TIMES DAILY
Status: COMPLETED | OUTPATIENT
Start: 2022-11-19 | End: 2022-11-20

## 2022-11-19 RX ORDER — MUPIROCIN 20 MG/G
OINTMENT TOPICAL 2 TIMES DAILY
Status: COMPLETED | OUTPATIENT
Start: 2022-11-19 | End: 2022-11-23

## 2022-11-19 RX ORDER — NOREPINEPHRINE BITARTRATE/D5W 4MG/250ML
0-3 PLASTIC BAG, INJECTION (ML) INTRAVENOUS CONTINUOUS
Status: DISCONTINUED | OUTPATIENT
Start: 2022-11-19 | End: 2022-11-20

## 2022-11-19 RX ORDER — FUROSEMIDE 10 MG/ML
20 INJECTION INTRAMUSCULAR; INTRAVENOUS ONCE
Status: COMPLETED | OUTPATIENT
Start: 2022-11-19 | End: 2022-11-19

## 2022-11-19 RX ORDER — INSULIN ASPART 100 [IU]/ML
0-5 INJECTION, SOLUTION INTRAVENOUS; SUBCUTANEOUS EVERY 4 HOURS PRN
Status: DISCONTINUED | OUTPATIENT
Start: 2022-11-19 | End: 2022-11-20

## 2022-11-19 RX ORDER — DEXTROSE MONOHYDRATE 100 MG/ML
INJECTION, SOLUTION INTRAVENOUS CONTINUOUS PRN
Status: DISCONTINUED | OUTPATIENT
Start: 2022-11-19 | End: 2022-11-25 | Stop reason: HOSPADM

## 2022-11-19 RX ADMIN — ASPIRIN 81 MG: 81 TABLET, COATED ORAL at 08:11

## 2022-11-19 RX ADMIN — DIPHENOXYLATE HYDROCHLORIDE AND ATROPINE SULFATE 10 ML: 2.5; .025 SOLUTION ORAL at 04:11

## 2022-11-19 RX ADMIN — CHOLESTYRAMINE 4 G: 4 POWDER, FOR SUSPENSION ORAL at 03:11

## 2022-11-19 RX ADMIN — Medication 10 ML: at 12:11

## 2022-11-19 RX ADMIN — FUROSEMIDE 20 MG: 10 INJECTION, SOLUTION INTRAVENOUS at 09:11

## 2022-11-19 RX ADMIN — LIDOCAINE 1 PATCH: 50 PATCH CUTANEOUS at 08:11

## 2022-11-19 RX ADMIN — MICONAZOLE NITRATE 2 % TOPICAL POWDER: at 08:11

## 2022-11-19 RX ADMIN — CHOLECALCIFEROL TAB 25 MCG (1000 UNIT) 2000 UNITS: 25 TAB at 08:11

## 2022-11-19 RX ADMIN — ATORVASTATIN CALCIUM 40 MG: 20 TABLET, FILM COATED ORAL at 08:11

## 2022-11-19 RX ADMIN — DIPHENOXYLATE HYDROCHLORIDE AND ATROPINE SULFATE 10 ML: 2.5; .025 SOLUTION ORAL at 08:11

## 2022-11-19 RX ADMIN — I.V. FAT EMULSION 250 ML: 20 EMULSION INTRAVENOUS at 10:11

## 2022-11-19 RX ADMIN — LOPERAMIDE HYDROCHLORIDE 2 MG: 2 CAPSULE ORAL at 03:11

## 2022-11-19 RX ADMIN — ONDANSETRON 4 MG: 2 INJECTION INTRAMUSCULAR; INTRAVENOUS at 09:11

## 2022-11-19 RX ADMIN — MAGNESIUM SULFATE HEPTAHYDRATE: 500 INJECTION, SOLUTION INTRAMUSCULAR; INTRAVENOUS at 10:11

## 2022-11-19 RX ADMIN — APIXABAN 5 MG: 5 TABLET, FILM COATED ORAL at 08:11

## 2022-11-19 RX ADMIN — MUPIROCIN: 20 OINTMENT TOPICAL at 08:11

## 2022-11-19 RX ADMIN — MAGNESIUM SULFATE 2 G: 2 INJECTION INTRAVENOUS at 12:11

## 2022-11-19 RX ADMIN — Medication 10 ML: at 06:11

## 2022-11-19 RX ADMIN — ALBUMIN HUMAN 25 G: 0.25 SOLUTION INTRAVENOUS at 08:11

## 2022-11-19 RX ADMIN — CHOLESTYRAMINE 4 G: 4 POWDER, FOR SUSPENSION ORAL at 12:11

## 2022-11-19 RX ADMIN — MICONAZOLE NITRATE 2 % TOPICAL POWDER: at 12:11

## 2022-11-19 RX ADMIN — SODIUM BICARBONATE 650 MG TABLET 1300 MG: at 08:11

## 2022-11-19 RX ADMIN — FERROUS SULFATE TAB 325 MG (65 MG ELEMENTAL FE) 1 EACH: 325 (65 FE) TAB at 08:11

## 2022-11-19 RX ADMIN — DIPHENOXYLATE HYDROCHLORIDE AND ATROPINE SULFATE 10 ML: 2.5; .025 SOLUTION ORAL at 12:11

## 2022-11-19 RX ADMIN — LOPERAMIDE HYDROCHLORIDE 2 MG: 2 CAPSULE ORAL at 08:11

## 2022-11-19 RX ADMIN — MUPIROCIN: 20 OINTMENT TOPICAL at 09:11

## 2022-11-19 RX ADMIN — CHOLESTYRAMINE 4 G: 4 POWDER, FOR SUSPENSION ORAL at 06:11

## 2022-11-19 NOTE — ASSESSMENT & PLAN NOTE
Elevated iron levels and ferritin. Required one unit PRBC for Hgb 6.7. Likely due to CKD    - trend Hgb  - transfuse if <7 Hgb

## 2022-11-19 NOTE — H&P
[facility] - Cardiac Medical ICU  Critical Care Medicine  History & Physical    Patient Name: Cherry Santiago  MRN: [id number]  Admission Date: 11/6/2022  Hospital Length of Stay: 12 days  Code Status: Full Code  Attending Physician: Cody Palacios MD   Primary Care Provider: Amarilys Romero MD   Principal Problem: Diarrhea due to malabsorption    Subjective:     HPI:  67yoF w PMHx recently diagnosed carcinoid syndrome, + cryptosporidium Ag (10/'22), AFib on Eliquis, CHF, arthritis, GERD, glaucoma, hyperlipidemia, hypertension, prior MI, TIA presented with persistent diarrhea. Per ED records:      August 2022 more recently diarrhea has become worse over past few days -several episodes daily  associated nausea with intermittent vomiting. c/o lower cramping abdominal pain - 5/10.   symptoms concerned likely though to be secondary to carcinoid syndrome patient evaluated by Heme-Onc at [facility] with PET scan done 10/18 - Focal nodular foci of radiotracer uptake at the pancreatic tail and near the region of the pancreatic head above liver background activity could indicate somatostatin receptor avid lesions, but no corresponding mass is seen on CT portion of the study.  Recommend follow-up contrast enhanced pancreas protocol CT to further assess. 5HIAA levels on 10/21 WNL . CT AP W contrast done 11/4 shows No pancreatic lesion and Enterocolitis,. recent EGD -Non-bleeding gastric ulcers with no stigmata of  bleeding. Biopsied. Treated with a monopolar probe. colonoscopy with biopsy -No significant pathologic abnormality. No morphologic evidence of active inflammatory bowel disease, microscopic colitis, or neoplasia. Patient was  referred to [facility] for further workup and has scheduled appointment at Heme-Onc at [facility] this upcoming week. . c/o  dizziness,  and shortness of breath on minimal exertion     ER course -electrolyte derangement with hypokalemia to 2.4, hypomagsemia of  1.5 . replaced . UA + started on IV cefriaxone     Critical care consulted on the night of 11/19 for hypotension despite fluid resuscitation. She has had a prolonged hospital course which she has been evaluated by GI, surgical oncology, and oncology. She is to be presented to tumor board 11/21. AES consulted for EUS and potential biopsy given that her PET scan showed uptake but no mass was seen on CT pancreas protocolCourse complicated by nonocclusive DVT - on A/C. Additionally she had positive cryptosporidium antigens. Persistent diarrhea evaluated by GI. Has had upper and lower endoscopic interventions. Fluid volume excess in the setting of severe malnutrition and malabsorption. She was treated for a Klebsiella UTI.     At the time of evaluation, BP 77/54 while lying in bed. She reports intermittent lightheadedness. She has 2+ pitting edema bilaterally, and rales on exam. Her diuresis had not been resumed. Today alone she was given IVF bolus as well as continuous IVF in addition to her TPN/Fat Emulsion. She will transfer to the ICU for vasopressor support.       Hospital/ICU Course:  No notes on file     Past Medical History:   Diagnosis Date    Anticoagulant long-term use     Arthritis     Atrial fibrillation     CHF (congestive heart failure)     Diabetes mellitus     Type2 resolved after weight loss surgery    DVT (deep venous thrombosis)     Encounter for blood transfusion     GERD (gastroesophageal reflux disease)     Glaucoma     Hypercholesterolemia     Hyperlipemia     Hypertension     Memory changes     Myocardial infarction     Renal failure     resolved    TIA (transient ischemic attack)        Past Surgical History:   Procedure Laterality Date    BACK SURGERY  06/21/2017    lumbar fusion 6/21/17 and surgery for hematoma to site on 6/26/17    CHOLECYSTECTOMY  1978    COLONOSCOPY N/A 10/3/2022    Procedure: COLONOSCOPY;  Surgeon: Jourdan Parks MD;  Location: [facility] ENDO;  Service:  General;  Laterality: N/A;    COLONOSCOPY N/A 10/11/2022    Procedure: COLONOSCOPY;  Surgeon: Stephen Cooper MD;  Location: [facility] ENDO;  Service: Endoscopy;  Laterality: N/A;    ENDOSCOPIC ULTRASOUND OF UPPER GASTROINTESTINAL TRACT N/A 11/9/2022    Procedure: ULTRASOUND, UPPER GI TRACT, ENDOSCOPIC;  Surgeon: Jake Corona MD;  Location: [facility] (2ND FLR);  Service: Endoscopy;  Laterality: N/A;    ESOPHAGOGASTRODUODENOSCOPY N/A 10/3/2022    Procedure: EGD (ESOPHAGOGASTRODUODENOSCOPY);  Surgeon: Jourdan Parks MD;  Location: [facility] ENDO;  Service: General;  Laterality: N/A;    ESOPHAGOGASTRODUODENOSCOPY N/A 11/15/2022    Procedure: EGD (ESOPHAGOGASTRODUODENOSCOPY);  Surgeon: Roque Soliman MD;  Location: [facility] (2ND FLR);  Service: Endoscopy;  Laterality: N/A;    ESOPHAGOGASTRODUODENOSCOPY N/A 11/17/2022    Procedure: EGD (ESOPHAGOGASTRODUODENOSCOPY);  Surgeon: Roque Soliman MD;  Location: [facility] (2ND FLR);  Service: Endoscopy;  Laterality: N/A;    GASTRIC BYPASS      HYSTERECTOMY  2012    JOINT REPLACEMENT      TKR    LEFT HEART CATHETERIZATION Left 2/5/2021    Procedure: Left heart cath;  Surgeon: Shane Pacheco MD;  Location: [facility] CATH;  Service: Cardiovascular;  Laterality: Left;    PELVIC LAPAROSCOPY Left     OOP[facility]    RENAL BIOPSY N/A 10/5/2022    Procedure: BIOPSY, KIDNEY;  Surgeon: [facility] Provider;  Location: [facility] OR;  Service: General;  Laterality: N/A;    RIGHT HEART CATHETERIZATION Right 2/5/2021    Procedure: INSERTION, CATHETER, RIGHT HEART. via left radial and left brachial;  Surgeon: Shane Pacheco MD;  Location: [facility] CATH;  Service: Cardiovascular;  Laterality: Right;    TOTAL ABDOMINAL HYSTERECTOMY W/ BILATERAL SALPINGOOPHORECTOMY         Review of patient's allergies indicates:  No Known Allergies    Family History       Problem Relation (Age of Onset)    Arthritis Mother    Breast cancer Mother    Cancer Father    Diabetes Daughter    Heart disease  Mother, Father    Hypertension Mother, Father          Tobacco Use    Smoking status: Never    Smokeless tobacco: Never   Substance and Sexual Activity    Alcohol use: Yes     Comment: occasional    Drug use: No    Sexual activity: Not Currently      Review of Systems   Constitutional:  Positive for fatigue. Negative for activity change, chills and fever.   HENT:  Negative for congestion, nosebleeds and trouble swallowing.    Eyes:  Negative for pain and visual disturbance.   Respiratory:  Positive for shortness of breath. Negative for apnea, cough, choking, chest tightness and wheezing.    Cardiovascular:  Negative for chest pain and leg swelling.   Gastrointestinal:  Positive for diarrhea. Negative for abdominal distention, abdominal pain, constipation, nausea and vomiting.   Genitourinary:  Negative for decreased urine volume, difficulty urinating, dysuria, frequency and hematuria.   Musculoskeletal:  Negative for arthralgias, back pain, joint swelling, neck pain and neck stiffness.   Skin:  Negative for rash and wound.   Neurological:  Positive for dizziness, weakness and light-headedness. Negative for seizures, syncope, numbness and headaches.   Psychiatric/Behavioral:  Negative for agitation, behavioral problems, confusion, hallucinations, self-injury and sleep disturbance. The patient is not nervous/anxious.      Objective:     Vital Signs (Most Recent):  Temp: 98.6 °F (37 °C) (11/19/22 0129)  Pulse: 90 (11/19/22 0209)  Resp: (!) 22 (11/19/22 0134)  BP: (!) 77/49 (11/19/22 0215)  SpO2: 98 % (11/19/22 0209)   Vital Signs (24h Range):  Temp:  [98.6 °F (37 °C)-99.5 °F (37.5 °C)] 98.6 °F (37 °C)  Pulse:  [68-90] 90  Resp:  [16-22] 22  SpO2:  [91 %-99 %] 98 %  BP: ()/(46-60) 77/49   Weight: 88.5 kg (195 lb)  Body mass index is 32.45 kg/m².      Intake/Output Summary (Last 24 hours) at 11/19/2022 0244  Last data filed at 11/19/2022 0130  Gross per 24 hour   Intake 2849.79 ml   Output --   Net 2849.79 ml        Physical Exam  Vitals and nursing note reviewed.   Constitutional:       General: She is not in acute distress.     Appearance: Normal appearance. She is obese. She is ill-appearing.   HENT:      Head: Normocephalic and atraumatic.      Nose: Nose normal.      Mouth/Throat:      Mouth: Mucous membranes are moist.   Eyes:      General: No scleral icterus.     Extraocular Movements: Extraocular movements intact.      Pupils: Pupils are equal, round, and reactive to light.   Neck:      Vascular: JVD present.   Cardiovascular:      Rate and Rhythm: Normal rate and regular rhythm.      Pulses: Normal pulses.      Heart sounds: Normal heart sounds. No murmur heard.  Pulmonary:      Effort: Pulmonary effort is normal.      Breath sounds: Rales present. No wheezing or rhonchi.   Chest:      Chest wall: No tenderness.   Abdominal:      General: Abdomen is flat. Bowel sounds are normal. There is no distension.      Palpations: Abdomen is soft. There is no mass.      Tenderness: There is no abdominal tenderness. There is no right CVA tenderness, left CVA tenderness, guarding or rebound.   Musculoskeletal:         General: No swelling, tenderness, deformity or signs of injury. Normal range of motion.      Cervical back: Normal range of motion and neck supple. No rigidity or tenderness.      Right lower le+ Pitting Edema present.      Left lower le+ Pitting Edema present.   Skin:     General: Skin is warm and dry.      Coloration: Skin is not jaundiced or pale.      Findings: No erythema or rash.   Neurological:      General: No focal deficit present.      Mental Status: She is alert and oriented to person, place, and time. Mental status is at baseline.      Cranial Nerves: No cranial nerve deficit.      Motor: No weakness.   Psychiatric:         Mood and Affect: Mood normal.         Behavior: Behavior normal.         Thought Content: Thought content normal.         Judgment: Judgment normal.       Vents:      Lines/Drains/Airways       Peripherally Inserted Central Catheter Line  Duration             PICC Double Lumen 11/16/22 1713 right basilic 2 days                  Significant Labs:    CBC/Anemia Profile:  Recent Labs   Lab 11/18/22  1102   WBC 5.08   HGB 8.5*   HCT 26.8*      MCV 95   RDW 20.2*        Chemistries:  Recent Labs   Lab 11/18/22  1102      K 3.4*   *   CO2 24   BUN 6*   CREATININE 1.0   CALCIUM 7.1*   ALBUMIN 1.3*   MG 1.4*   PHOS 2.2*       All pertinent labs within the past 24 hours have been reviewed.    Significant Imaging: I have reviewed all pertinent imaging results/findings within the past 24 hours.    Assessment/Plan:     Neuro  Seizures  Patient on keppra at home. Chart review unclear why patient on keppra.     - continue home keppra    Pulmonary  Acute hypoxemic respiratory failure  Patient with Hypoxic Respiratory failure which is Acute.  she is not on home oxygen. Supplemental oxygen was provided and noted-  .   Signs/symptoms of respiratory failure include- respiratory distress. Contributing diagnoses includes - CHF Labs and images were reviewed. Patient Has not had a recent ABG. Will treat underlying causes and adjust management of respiratory failure as follows- 2/2 volume overload    - wean O2 as tolerated  - see hypotension    Cardiac/Vascular  Hypotension  Patient hypotensive on the floor with MAPs as low as 57. Patient appeared volume overloaded with pitting edema and elevated JVD. CXR showed worsening pulmonary edema. Repeat BP with MAPs of 76    - obtain TTE  - patient on home midodrine, will hold off to prevent carcinoid syndrome  - hold off on excess fluids  - f/u BNP and troponin  - if elevated, will plan to diurese    Chronic heart failure with preserved ejection fraction (HFpEF) NIC NYHA2   Echo from last month showed:  EF of 55% with GIDD. Patient on lasix 20 daily at home which was held on admission due to hypotension.     Essential  hypertension  Medications on hold due to hypotension    Renal/  Hypophosphatemia  Phos 2.2    - replete prn    Hypomagnesemia  Mag 1.4.     - replete prn    Hypokalemia  k 3.4    - replete prn    Hematology  Chronic deep vein thrombosis (DVT): right common femoral vein  US on 11/7 showed nonocclusive thrombus/DVT in the superior-mid right femoral vein. S/p IVC filter.     - Continue eliquis    Oncology  Anemia of chronic disease  Elevated iron levels and ferritin. Required one unit PRBC for Hgb 6.7. Likely due to CKD    - trend Hgb  - transfuse if <7 Hgb    Endocrine  Severe protein-calorie malnutrition   Nutrition consulted. Most recent weight and BMI monitored-                         Measurements:  Wt Readings from Last 1 Encounters:   11/17/22 88.5 kg (195 lb)   Body mass index is 32.45 kg/m².    Recommendations: Recommendation/Intervention: 1) Increase TPN: 157 g D + 44 g AA -Provides 709 kcals, 44g protein (GIR: 1.23) (50% of calorie and protein needs)   2) Continue regular diet w/ boost plus  - monitor renal labs  3) RD following  Goals: Meet % EEN/EPN by next RD f/u    Patient has been screened and assessed by RD. RD will follow patient.      Class 1 obesity due to excess calories with serious comorbidity and body mass index (BMI) of 32.0 to 32.9 in adult  Body mass index is 32.45 kg/m². Morbid obesity complicates all aspects of disease management from diagnostic modalities to treatment. Weight loss encouraged   - s/p Sleeve gastrectomy      GI  * Diarrhea due to malabsorption  67 yoF s/p gastric sleeve who presented with diarrhea with concerns of carcinoid evaluated by Heme[facility]Onc at [facility] with PET scan done 10/18 showed focal nodular foci of radiotracer uptake at the pancreatic tail and near the region of the pancreatic head above liver background activity could indicate somatostatin receptor avid lesions. 5HIAA levels WNL. CT AP W contrast performed on 11/4 showed no pancreatic  lesion and Enterocolitis. EGD and Colonoscopy with biopsy showed no abnormality. Chromogranin A elevated at 2000s. Stool studies were negative. Heme/oncworking her up for neuroendocrine tumor vs VIPoma.  AES consulted for EUS and potential biopsy given that her PET scan showed uptake but no mass was seen on CT pancreas protocol.discontinued eliquis. EUS showed few cystic lesions were seen in the pancreatic  head, genu of the pancreas and pancreatic body highly suspicious  for a branched intraductal papillary mucinous neoplasm.small for sampling. pancreatic head and pancreatic tail showed no mass. GI considering another biopsy procedure (small bowel and pancreas per EUS). Holding pantoprazole and repeat gastrin and chromogranin level once off PPI for at least 2-4 weeks.    - continue imodium  - GI consulted, appreciate recommendations  - f/u Serum tryptase, serum VIP and somatostatin pending  - Hold PPI for one week (last dose 11/14) then recheck chromogranin and gastrin levels. PPIs may be source of elevation in both.  - planned to be discussed in tumor board Monday 11/21    Orthopedic  Intractable back pain  Hs of OA spine, DJD, back surgery. Xray l spine 3/2021 - posterior fusion hardware from L2-L5 along with laminectomy change. Grade 1 anterolistheses are suspected of L3 over L4 and L4 over L5.     - continue multimodal pain management        Critical Care Daily Checklist:    A: Awake: RASS Goal/Actual Goal:    Actual: Chávez Agitation Sedation Scale (RASS): Alert and calm   B: Spontaneous Breathing Trial Performed?     C: SAT & SBT Coordinated?  N/A                      D: Delirium: CAM-ICU Overall CAM-ICU: Negative   E: Early Mobility Performed? No   F: Feeding Goal: Goals: Meet % EEN/EPN by next RD f/u  Status: Nutrition Goal Status: new   Current Diet Order   Procedures    Diet renal [facility]; Cardiac (Low Na/Chol); Fluid - 1500mL     Order Specific Question:   Indicate patient location for  additional diet options:     Answer:   [facility]     Order Specific Question:   Additional Diet Options:     Answer:   Cardiac (Low Na/Chol)     Order Specific Question:   Fluid restriction:     Answer:   Fluid - 1500mL      AS: Analgesia/Sedation PRN    T: Thromboembolic Prophylaxis Apixiban    H: HOB > 300 Yes   U: Stress Ulcer Prophylaxis (if needed) N/A   G: Glucose Control Following. < 180 goal    B: Bowel Function Stool Occurrence: 1   I: Indwelling Catheter (Lines & Hollis) Necessity PICC   D: De-escalation of Antimicrobials/Pharmacotherapies Follow up new cultures. Low threshold for abx.     Plan for the day/ETD Admit to ICU for MAP > 65    Code Status:  Family/Goals of Care: Full Code       Critical Care Time: 50 minutes    Plan of care discussed with Dr. Clark. Attestation to follow.     Critical secondary to Patient has a condition that poses threat to life and bodily function: shock requiring vasopressor support      Critical care was time spent personally by me on the following activities: development of treatment plan with patient or surrogate and bedside caregivers, discussions with consultants, evaluation of patient's response to treatment, examination of patient, ordering and performing treatments and interventions, ordering and review of laboratory studies, ordering and review of radiographic studies, pulse oximetry, re-evaluation of patient's condition. This critical care time did not overlap with that of any other provider or involve time for any procedures.     Ignacio Osman NP  Critical Care Medicine  [facility] - Cardiac Medical ICU

## 2022-11-19 NOTE — CARE UPDATE
RAPID RESPONSE NURSE PROACTIVE ROUNDING NOTE       Time of Visit:     Admit Date: 2022  LOS: 11  Code Status: Full Code   Date of Visit: 2022  : 1955  Age: 67 y.o.  Sex: female  Race: Black or   Bed: [facility]85 A:   MRN: [id number]  Was the patient discharged from an ICU this admission? No   Was the patient discharged from a PACU within last 24 hours? No   Did the patient receive conscious sedation/general anesthesia in last 24 hours? No  Was the patient in the ED within the past 24 hours? No  Was the patient on NIPPV within the past 24 hours? No   Attending Physician: Jennifer Saini MD  Primary Service: [facility]   Time spent at the bedside: < 15 min    SITUATION    Notified by  noted during chart checks .  Reason for alert: hypotension  Called to evaluate the patient for Circulatory    BACKGROUND     Why is the patient in the hospital?: Diarrhea due to malabsorption    Patient has a past medical history of Anticoagulant long-term use, Arthritis, Atrial fibrillation, CHF (congestive heart failure), Diabetes mellitus, DVT (deep venous thrombosis), Encounter for blood transfusion, GERD (gastroesophageal reflux disease), Glaucoma, Hypercholesterolemia, Hyperlipemia, Hypertension, Memory changes, Myocardial infarction, Renal failure, and TIA (transient ischemic attack).    Last Vitals:  Temp: 99.4 °F (37.4 °C) (2245)  Pulse: 89 (2245)  Resp: 22 (2245)  BP: 96/54 (2245)  SpO2: 92 % (2245)    24 Hours Vitals Range:  Temp:  [98.4 °F (36.9 °C)-99.5 °F (37.5 °C)]   Pulse:  [76-89]   Resp:  [15-22]   BP: ()/(48-60)   SpO2:  [92 %-98 %]     Labs:  Recent Labs     22  0048 22  1102   WBC 4.70 5.08   HGB 8.1* 8.5*   HCT 24.9* 26.8*    163       Recent Labs     22  1310 22  0048 22  1102    144 145   K 4.0 3.2* 3.4*   * 115* 116*   CO2 17* 20* 24   CREATININE 1.2 1.1 1.0    99 134*   PHOS 2.7  --  2.2*    MG 1.6  --  1.4*        No results for input(s): PH, PCO2, PO2, HCO3, POCSATURATED, BE in the last 72 hours.     ASSESSMENT    Physical Exam  Constitutional:       Appearance: She is ill-appearing.   Cardiovascular:      Rate and Rhythm: Normal rate.   Pulmonary:      Breath sounds: Decreased air movement present.   Abdominal:      Tenderness: There is abdominal tenderness in the right lower quadrant and left lower quadrant.      Comments: Diffuse lower quadrant abdominal pain   Skin:     General: Skin is warm and dry.      Coloration: Skin is pale.   Neurological:      Mental Status: She is alert and oriented to person, place, and time. Mental status is at baseline.     HR 89  BP 96/54 (67)   RR 22, SpO2 92% on room air  Temp 99.4    Patient denies feeling symptomatic with previously low BP. Currently receiving 1L LR bolus, with ~150cc left to infuse  Also on IV TPN, K phos, Mag  Continuous NS at 100cc/hr to start once bolus is completed    Upon review of I&Os, patient has had no output charted for the last several days. Per bedside RN and review of notes, patient has been having frequent diarrhea as well as urinating, but unsure of exact amount.     INTERVENTIONS    The patient was seen for Cardiac problem. Staff concerns included hypotension. The following interventions were performed: continuous cardiac monitoring.    RN to repeat VS after bolus is completed    RECOMMENDATIONS    Monitor VS closely  Strict I&O  Monitor respiratory status  Place patient on continuous telemetry   Utilize caution with fluid resuscitation, unsure of patient's actual volume status and does have heart failure with a mildly reduced EF of 55%    PROVIDER ESCALATION    Yes/No  no    Orders received and case discussed with NA.    Disposition: Remain in room 857.    FOLLOW-UP    bedside RNFabiola, charge MITZY Woody  updated on plan of care. Instructed to call the Rapid Response Nurse, Tiffanie Rodriguez RN at [phone] for additional questions or  concerns.

## 2022-11-19 NOTE — ASSESSMENT & PLAN NOTE
DVT sonogram 8/22 and 10/22 negative for DVT. On 11/7 DVT sonogram -Nonocclusive thrombus/DVT in the superior-mid right femoral vein. S/p IVC filter. Eliquis held intermittently due to procedures. Continue eliquis

## 2022-11-19 NOTE — ASSESSMENT & PLAN NOTE
Echo from last month showed:  EF of 55% with GIDD. Patient on lasix 20 daily at home which was held on admission due to hypotension.

## 2022-11-19 NOTE — ASSESSMENT & PLAN NOTE
Patient admitted without  signs and symptoms of CHF exacerbation    Results for orders placed or performed during the hospital encounter of 11/06/22   Brain natriuretic peptide   Result Value Ref Range    BNP 82 0 - 99 pg/mL   No results found for this or any previous visit.  echo 10/7/22 left ventricle is normal in size. Global left ventricular systolic function is normal. The left ventricular ejection fraction is 55%. Left ventricular diastolic function is abnormal (stage I impaired relaxation). Noted left ventricular hypertrophy. Concentric left ventricular hypertrophy is present. It is mild. Mild (1+) tricuspid regurgitation. The right ventricle is normal in size. Right ventricular systolic function is normal.    Furosemide IV intermittently given due to symptomatic swelling BLE as tolerated

## 2022-11-19 NOTE — CODE/ RAPID DOCUMENTATION
RAPID RESPONSE NURSE NOTE        Admit Date: 2022  LOS: 12  Code Status: Full Code   Date of Consult: 2022  : 1955  Age: 67 y.o.  Weight:   Wt Readings from Last 1 Encounters:   22 88.5 kg (195 lb)     Sex: female  Race: Black or    Bed: [address] [place] [address] A:   MRN: [id number]  Time Rapid Response Team page Received: 0118  Time Rapid Response Team at Bedside: 0120  Time Rapid Response Team left Bedside: 0300  Was the patient discharged from an ICU this admission? No   Was the patient discharged from a PACU within last 24 hours? No   Did the patient receive conscious sedation/general anesthesia in last 24 hours? No  Was the patient in the ED within the past 24 hours? No  Was the patient on NIPPV within the past 24 hours? No   Did this progress into an ARC or CPA: no  Attending Physician: Jennifer Saini MD  Primary Service: [facility]       SITUATION    Notified by bedside RN via phone call.  Reason for alert: hypotension  Called to evaluate the patient for Circulatory    BACKGROUND     Why is the patient in the hospital?: Diarrhea due to malabsorption    Patient has a past medical history of Anticoagulant long-term use, Arthritis, Atrial fibrillation, CHF (congestive heart failure), Diabetes mellitus, DVT (deep venous thrombosis), Encounter for blood transfusion, GERD (gastroesophageal reflux disease), Glaucoma, Hypercholesterolemia, Hyperlipemia, Hypertension, Memory changes, Myocardial infarction, Renal failure, and TIA (transient ischemic attack).    Last Vitals:  Temp: 98.6 °F (37 °C) (129)  Pulse: 81 (134)  Resp: 22 (134)  BP: 78/49 (134)  SpO2: 95 % (134)    24 Hours Vitals Range:  Temp:  [98.6 °F (37 °C)-99.5 °F (37.5 °C)]   Pulse:  [68-89]   Resp:  [16-22]   BP: ()/(47-60)   SpO2:  [91 %-98 %]     Labs:  Recent Labs     22  0048 22  1102   WBC 4.70 5.08   HGB 8.1* 8.5*   HCT 24.9* 26.8*    163       Recent Labs      11/16/22  1310 11/17/22  0048 11/18/22  1102    144 145   K 4.0 3.2* 3.4*   * 115* 116*   CO2 17* 20* 24   CREATININE 1.2 1.1 1.0    99 134*   PHOS 2.7  --  2.2*   MG 1.6  --  1.4*        No results for input(s): PH, PCO2, PO2, HCO3, POCSATURATED, BE in the last 72 hours.     ASSESSMENT    Physical Exam  Constitutional:       Appearance: She is ill-appearing.   Cardiovascular:      Rate and Rhythm: Normal rate and regular rhythm.   Pulmonary:      Breath sounds: Decreased air movement present. Rales present.      Comments: Dyspnea with exertion  Abdominal:      Tenderness: There is abdominal tenderness.   Genitourinary:     Comments: No urine output yet this shift, ~40cc in bladder with bladder scanner  Musculoskeletal:      Right lower leg: 3+ Edema present.      Left lower leg: 3+ Edema present.   Skin:     General: Skin is warm and dry.      Coloration: Skin is pale.   Neurological:      Mental Status: She is alert and oriented to person, place, and time. Mental status is at baseline.     HR 89  BP 82/47 (58)  RR 22, SpO2 98% on 2L NC  Temp 98.6    Per bedside RN, while patient was moving in bed she got lightheaded/dizzy and short of breath  Found to be hypoxic ~88% on room air, placed on 2L NC. BP down again. Rapid called at that time    INTERVENTIONS    The patient was seen for Cardiac problem. Staff concerns included hypotension. The following interventions were performed: BMP, CBC, portable chest x-ray, critical care consult, and lactic acid, BNP.    Labs already ordered and sent  CXR done at 0109    Dr Obregon with hospital medicine to bedside, decision made to consult critical care    SHU Rosas NP with critical care to bedside. Decision made to transfer patient to ICU for higher level of care, possible pressor support    RECOMMENDATIONS    We recommend: transfer to ICU for higher level of care    PROVIDER ESCALATION    Orders received and case discussed with Gilberto Lizama  Ling with [facility], Dr Obregon with  .    Primary team arrival time: 0140    Disposition: Tx in ICU bed 6065.  Patient transported to MICU to continuous cardiac, SpO2 monitoring on 2L NC with RRN x2, oncology charge RN. Handoff given to MICU RN.  TPN and IV electrolyte replacements transferred with patient  Patient's daughter, Padmini, updated per patient request via telephone, informed of patient's room number in ICU and ICU visiting hours    FOLLOW UP    bedside RNFabiola, charge RN Bong  updated on plan of care. Instructed to call the Rapid Response Nurse, Tiffanie Rodriguez, RN at [phone] for additional questions or concerns.

## 2022-11-19 NOTE — ASSESSMENT & PLAN NOTE
Due to dehydration and malnutrition from diarrhea  IVF hydration for support  May improve with improved nutritional status  Was in ICU night of 11/19 due to hypotension with failure to respond to fluids - arrived to ICU and BP slightly improved to where she did not require pressures. She was given albumin infusions and stepped to floor the following day.   BP remains low normal or slightly hypotension

## 2022-11-19 NOTE — ASSESSMENT & PLAN NOTE
Hs of OA spine, DJD, back surgery  Xray l spine 3/2021 - posterior fusion hardware from L2-L5 along with laminectomy change.  Grade 1 anterolistheses are suspected of L3 over L4 and L4 over L5.   Resolved with pain management and treatment of UTI

## 2022-11-19 NOTE — ASSESSMENT & PLAN NOTE
Nutrition consulted. Most recent weight and BMI monitored-                         Measurements:  Wt Readings from Last 1 Encounters:   11/17/22 88.5 kg (195 lb)   Body mass index is 32.45 kg/m².    Recommendations: Recommendation/Intervention: 1) Increase TPN: 157 g D + 44 g AA -Provides 709 kcals, 44g protein (GIR: 1.23) (50% of calorie and protein needs)   2) Continue regular diet w/ boost plus  - monitor renal labs  3) RD following  Goals: Meet % EEN/EPN by next RD f/u    Patient has been screened and assessed by RD. RD will follow patient.

## 2022-11-19 NOTE — ASSESSMENT & PLAN NOTE
Patient hypotensive on the floor with MAPs as low as 57. Patient appeared volume overloaded with pitting edema and elevated JVD. CXR showed worsening pulmonary edema. Repeat BP with MAPs of 76    - obtain TTE  - patient on home midodrine, will hold off to prevent carcinoid syndrome  - hold off on excess fluids  - f/u BNP and troponin  - if elevated, will plan to diurese

## 2022-11-19 NOTE — SUBJECTIVE & OBJECTIVE
Past Medical History:   Diagnosis Date    Anticoagulant long-term use     Arthritis     Atrial fibrillation     CHF (congestive heart failure)     Diabetes mellitus     Type2 resolved after weight loss surgery    DVT (deep venous thrombosis)     Encounter for blood transfusion     GERD (gastroesophageal reflux disease)     Glaucoma     Hypercholesterolemia     Hyperlipemia     Hypertension     Memory changes     Myocardial infarction     Renal failure     resolved    TIA (transient ischemic attack)        Past Surgical History:   Procedure Laterality Date    BACK SURGERY  06/21/2017    lumbar fusion 6/21/17 and surgery for hematoma to site on 6/26/17    CHOLECYSTECTOMY  1978    COLONOSCOPY N/A 10/3/2022    Procedure: COLONOSCOPY;  Surgeon: Jourdan Parks MD;  Location: [facility];  Service: General;  Laterality: N/A;    COLONOSCOPY N/A 10/11/2022    Procedure: COLONOSCOPY;  Surgeon: Stephen Cooper MD;  Location: [facility];  Service: Endoscopy;  Laterality: N/A;    ENDOSCOPIC ULTRASOUND OF UPPER GASTROINTESTINAL TRACT N/A 11/9/2022    Procedure: ULTRASOUND, UPPER GI TRACT, ENDOSCOPIC;  Surgeon: Jake Corona MD;  Location: [facility] (2ND FLR);  Service: Endoscopy;  Laterality: N/A;    ESOPHAGOGASTRODUODENOSCOPY N/A 10/3/2022    Procedure: EGD (ESOPHAGOGASTRODUODENOSCOPY);  Surgeon: Jourdan Parks MD;  Location: [facility];  Service: General;  Laterality: N/A;    ESOPHAGOGASTRODUODENOSCOPY N/A 11/15/2022    Procedure: EGD (ESOPHAGOGASTRODUODENOSCOPY);  Surgeon: Roque Soliman MD;  Location: [facility] (2ND FLR);  Service: Endoscopy;  Laterality: N/A;    ESOPHAGOGASTRODUODENOSCOPY N/A 11/17/2022    Procedure: EGD (ESOPHAGOGASTRODUODENOSCOPY);  Surgeon: Roque Soliman MD;  Location: [facility] ENDO (2ND FLR);  Service: Endoscopy;  Laterality: N/A;    GASTRIC BYPASS      HYSTERECTOMY  2012    JOINT REPLACEMENT      TKR    LEFT HEART CATHETERIZATION Left 2/5/2021    Procedure: Left heart cath;  Surgeon: Shane PIERCE  MD Junior;  Location: [facility] CATH;  Service: Cardiovascular;  Laterality: Left;    PELVIC LAPAROSCOPY Left     OOPH    RENAL BIOPSY N/A 10/5/2022    Procedure: BIOPSY, KIDNEY;  Surgeon: Velia Diagnostic Provider;  Location: [facility] OR;  Service: General;  Laterality: N/A;    RIGHT HEART CATHETERIZATION Right 2/5/2021    Procedure: INSERTION, CATHETER, RIGHT HEART. via left radial and left brachial;  Surgeon: Shane Pacheco MD;  Location: [facility] CATH;  Service: Cardiovascular;  Laterality: Right;    TOTAL ABDOMINAL HYSTERECTOMY W/ BILATERAL SALPINGOOPHORECTOMY         Review of patient's allergies indicates:  No Known Allergies    Family History       Problem Relation (Age of Onset)    Arthritis Mother    Breast cancer Mother    Cancer Father    Diabetes Daughter    Heart disease Mother, Father    Hypertension Mother, Father          Tobacco Use    Smoking status: Never    Smokeless tobacco: Never   Substance and Sexual Activity    Alcohol use: Yes     Comment: occasional    Drug use: No    Sexual activity: Not Currently      Review of Systems   Constitutional:  Positive for fatigue. Negative for activity change, chills and fever.   HENT:  Negative for congestion, nosebleeds and trouble swallowing.    Eyes:  Negative for pain and visual disturbance.   Respiratory:  Positive for shortness of breath. Negative for apnea, cough, choking, chest tightness and wheezing.    Cardiovascular:  Negative for chest pain and leg swelling.   Gastrointestinal:  Positive for diarrhea. Negative for abdominal distention, abdominal pain, constipation, nausea and vomiting.   Genitourinary:  Negative for decreased urine volume, difficulty urinating, dysuria, frequency and hematuria.   Musculoskeletal:  Negative for arthralgias, back pain, joint swelling, neck pain and neck stiffness.   Skin:  Negative for rash and wound.   Neurological:  Positive for dizziness, weakness and light-headedness. Negative for seizures, syncope, numbness and  headaches.   Psychiatric/Behavioral:  Negative for agitation, behavioral problems, confusion, hallucinations, self-injury and sleep disturbance. The patient is not nervous/anxious.      Objective:     Vital Signs (Most Recent):  Temp: 98.6 °F (37 °C) (11/19/22 0129)  Pulse: 90 (11/19/22 0209)  Resp: (!) 22 (11/19/22 0134)  BP: (!) 77/49 (11/19/22 0215)  SpO2: 98 % (11/19/22 0209)   Vital Signs (24h Range):  Temp:  [98.6 °F (37 °C)-99.5 °F (37.5 °C)] 98.6 °F (37 °C)  Pulse:  [68-90] 90  Resp:  [16-22] 22  SpO2:  [91 %-99 %] 98 %  BP: ()/(46-60) 77/49   Weight: 88.5 kg (195 lb)  Body mass index is 32.45 kg/m².      Intake/Output Summary (Last 24 hours) at 11/19/2022 0244  Last data filed at 11/19/2022 0130  Gross per 24 hour   Intake 2849.79 ml   Output --   Net 2849.79 ml       Physical Exam  Vitals and nursing note reviewed.   Constitutional:       General: She is not in acute distress.     Appearance: Normal appearance. She is obese. She is ill-appearing.   HENT:      Head: Normocephalic and atraumatic.      Nose: Nose normal.      Mouth/Throat:      Mouth: Mucous membranes are moist.   Eyes:      General: No scleral icterus.     Extraocular Movements: Extraocular movements intact.      Pupils: Pupils are equal, round, and reactive to light.   Neck:      Vascular: JVD present.   Cardiovascular:      Rate and Rhythm: Normal rate and regular rhythm.      Pulses: Normal pulses.      Heart sounds: Normal heart sounds. No murmur heard.  Pulmonary:      Effort: Pulmonary effort is normal.      Breath sounds: Rales present. No wheezing or rhonchi.   Chest:      Chest wall: No tenderness.   Abdominal:      General: Abdomen is flat. Bowel sounds are normal. There is no distension.      Palpations: Abdomen is soft. There is no mass.      Tenderness: There is no abdominal tenderness. There is no right CVA tenderness, left CVA tenderness, guarding or rebound.   Musculoskeletal:         General: No swelling, tenderness,  deformity or signs of injury. Normal range of motion.      Cervical back: Normal range of motion and neck supple. No rigidity or tenderness.      Right lower le+ Pitting Edema present.      Left lower le+ Pitting Edema present.   Skin:     General: Skin is warm and dry.      Coloration: Skin is not jaundiced or pale.      Findings: No erythema or rash.   Neurological:      General: No focal deficit present.      Mental Status: She is alert and oriented to person, place, and time. Mental status is at baseline.      Cranial Nerves: No cranial nerve deficit.      Motor: No weakness.   Psychiatric:         Mood and Affect: Mood normal.         Behavior: Behavior normal.         Thought Content: Thought content normal.         Judgment: Judgment normal.       Vents:     Lines/Drains/Airways       Peripherally Inserted Central Catheter Line  Duration             PICC Double Lumen 22 1713 right basilic 2 days                  Significant Labs:    CBC/Anemia Profile:  Recent Labs   Lab 22  1102   WBC 5.08   HGB 8.5*   HCT 26.8*      MCV 95   RDW 20.2*        Chemistries:  Recent Labs   Lab 22  1102      K 3.4*   *   CO2 24   BUN 6*   CREATININE 1.0   CALCIUM 7.1*   ALBUMIN 1.3*   MG 1.4*   PHOS 2.2*       All pertinent labs within the past 24 hours have been reviewed.    Significant Imaging: I have reviewed all pertinent imaging results/findings within the past 24 hours.

## 2022-11-19 NOTE — ASSESSMENT & PLAN NOTE
Hs of OA spine, DJD, back surgery. Xray l spine 3/2021 - posterior fusion hardware from L2-L5 along with laminectomy change. Grade 1 anterolistheses are suspected of L3 over L4 and L4 over L5.     - continue multimodal pain management

## 2022-11-19 NOTE — PLAN OF CARE
AAO x4. VSS. BM x2. Pt with nonskid footwear on with bed in lowest position and locked with bed rails up x2. Pt instructed to call prior to getting OOB; bed alarm refused. Pt with call light within reach and verbalized understanding. Will continue to monitor pt.

## 2022-11-19 NOTE — PLAN OF CARE
Patient AAOx4. Bradycardic and declining O2 saturation. MD informed. LR bolus ordered. B/P elevated slightly but did not sustain. Rapid response team round on patient due to bradycardia and low O2 saturation. Patient became symptomatic. MD and ICU team notified. Patient assessed at bedside. Decision made to transfer patient to ICU. Report called and patient transported to ICU.

## 2022-11-19 NOTE — CONSULTS
Consult received. Patient to be admitted to ICU. Full H&P to follow.     Simeon Curtis MD  Critical Care Medicine

## 2022-11-19 NOTE — PLAN OF CARE
Significant Event  Hospital Medicine    CC:  Diarrhea due to malabsorption  Date:  11/19/2022  Admit Date:  11/6/2022  Hospital Length of Stay:  12    Code Status:  Full Code     SUBJECTIVE:     Significant Events:  Called urgently to the bedside by nurse to evaluate patient for hypotension; patient had been having soft pressures previously but responded to fluids, at this point her XR appeared much more fluid overloaded, and now need 2L O2. Patient was alert and oriented during but MAPs consistently in the low 60s.     OBJECTIVE:     Physical Exam:  Last Vitals:   Vitals:    11/19/22 0335   BP:    Pulse: 72   Resp: 19   Temp:      GA:  laying in bed, no acute distress  HEENT:  PERRL.  No scleral icterus or JVD.   Pulmonary:  Clear to auscultation A/P/L.  No wheezing, crackles, or rhonchi.  Cardiac:  RRR, S1 & S2 w/o rubs/murmurs/gallops.   Abdominal:  Bowel sounds present x 4. No appreciable hepatosplenomegaly.  Neuro:  --GCS: 15  --Mental Status:  Alert, awake and oriented  --CN II-XII grossly intact.  Corneal reflex, gag, cough intact.  --Extremity strength and sensation intact x 4.     ASSESSMENT AND PLAN/INTERVENTIONS:     1) Hypotension/acute resp failure  Plan/Interventions:  - fluids initially helped however did not last very long   - labs ordered, holding continuous fluids, BNP elevated and lactate elevated as well  - unsure if low pressure from fluid overload however unable to give lasix at this time with low MAPs vs  infection vs diarrhea vs lack of midodrine  - consult ICU at this time for further care    34 mins of Uninterrupted Critical Care/Counseling time (not including procedures) was provided in order to assess and manage the high probability of imminent or life-threatening deterioration of cardio-respiratory status requiring vasodilator support and pending intubation

## 2022-11-19 NOTE — ASSESSMENT & PLAN NOTE
Patient with Hypoxic Respiratory failure which is Acute.  she is not on home oxygen. Supplemental oxygen was provided and noted-  .   Signs/symptoms of respiratory failure include- respiratory distress. Contributing diagnoses includes - CHF Labs and images were reviewed. Patient Has not had a recent ABG. Will treat underlying causes and adjust management of respiratory failure as follows- 2/2 volume overload    - wean O2 as tolerated  - see hypotension

## 2022-11-19 NOTE — ASSESSMENT & PLAN NOTE
seems to be at baseline. Creatine stable for now. BMP reviewed- noted Estimated Creatinine Clearance: 60 mL/min (based on SCr of 1 mg/dL). according to latest data. Monitor UOP and serial BMP and adjust therapy as needed. Renally dose meds.  Kid bx 10/2022 - no amyloid, + diab nephropathy

## 2022-11-19 NOTE — ASSESSMENT & PLAN NOTE
Elevated iron levels and ferritin  blood loss not apparent.  Likely due to malnutrition  Required intermittent PRBCs transfusions

## 2022-11-19 NOTE — ASSESSMENT & PLAN NOTE
Flank pain  11/8 UC with klebsiella; s/p x5 doses CTX - sensitive and transitioned to levoquin for total of 7 days.

## 2022-11-19 NOTE — CARE UPDATE
RAPID RESPONSE NURSE FOLLOW-UP NOTE       Followed up with patient for proactive rounding.  Patient BP back down, 89/50 (63). Patient asymptomatic.  Labs, CXR ordered  Reviewed plan of care with bedside RNFabiola .   Team will continue to follow.  Please call Rapid Response RN, Tiffanie Rodriguez RN with any questions or concerns at [phone].

## 2022-11-19 NOTE — ASSESSMENT & PLAN NOTE
US on 11/7 showed nonocclusive thrombus/DVT in the superior-mid right femoral vein. S/p IVC filter.     - Continue eliquis

## 2022-11-19 NOTE — PROGRESS NOTES
[facility] Medicine  Progress note    [place]: [facility] Jennifer Saini MD  Admit Date: 11/6/2022    Principal Problem:  Diarrhea concurrent with and due to carcinoid syndrome    Interval hx:  Diarrhea improved this morning    ROS   Respiratory: neg for cough neg for shortness of breath  Cardiovascular: neg for chest pain neg for palpitations  Gastrointestinal: neg for nausea neg for vomiting, neg for abdominal pain neg for diarrhea neg for constipation   Behavioral/Psych: neg for depression neg for anxiety    PEx  Temp:  [98.4 °F (36.9 °C)-99.5 °F (37.5 °C)]   Pulse:  [67-88]   Resp:  [15-18]   BP: ()/(48-60)   SpO2:  [92 %-98 %]     Intake/Output Summary (Last 24 hours) at 11/18/2022 2012  Last data filed at 11/18/2022 1056  Gross per 24 hour   Intake 600 ml   Output --   Net 600 ml       General Appearance: no acute distress, WD, not malnourished appearing  Heart: regular rate and rhythm, no heave  Respiratory: Normal respiratory effort, symmetric excursion, bilateral vesicular breath sounds   Abdomen: Soft, non-tender; bowel sounds active  Skin: intact, no rash, no ulcers  Neurologic:  No focal numbness or weakness  Mental status: Alert, oriented x 4, affect appropriate     Recent Labs   Lab 11/15/22  0751 11/17/22  0048 11/18/22  1102   WBC 4.83 4.70 5.08   HGB 8.1* 8.1* 8.5*   HCT 24.6* 24.9* 26.8*    160 163       Recent Labs   Lab 11/16/22  1310 11/17/22  0048 11/18/22  1102    144 145   K 4.0 3.2* 3.4*   * 115* 116*   CO2 17* 20* 24   BUN 7* 7* 6*   CREATININE 1.2 1.1 1.0    99 134*   CALCIUM 7.1* 7.1* 7.1*   MG 1.6  --  1.4*   PHOS 2.7  --  2.2*       Recent Labs   Lab 11/12/22  1253 11/15/22  0751 11/17/22  0048 11/18/22  1102   ALKPHOS 80 75 75  --    ALT 8* 6* 7*  --    AST 45* 36 42*  --    ALBUMIN 1.4* 1.3* 1.3* 1.3*   PROT 4.4* 4.0* 4.0*  --    BILITOT 0.5 0.6 0.5  --           Recent Labs   Lab 11/17/22  1041   POCTGLUCOSE 117*         Scheduled Meds:   alteplase  2 mg  Other Once    apixaban  5 mg Oral BID    aspirin  81 mg Oral Daily    atorvastatin  40 mg Oral QHS    cholestyramine  1 packet Oral TID AC    diphenoxylate-atropine 2.5-0.025 mg/5 ml  10 mL Oral QID    [START ON 11/19/2022] fat emulsion 20%  250 mL Intravenous Daily    ferrous sulfate  1 tablet Oral Daily    lactated ringers  1,000 mL Intravenous Once    loperamide  2 mg Oral TID    magnesium sulfate IVPB  2 g Intravenous Q2H    metoprolol tartrate  25 mg Oral BID    miconazole NITRATE 2 %   Topical (Top) BID    potassium phosphate IVPB  30 mmol Intravenous Once    sodium bicarbonate  1,300 mg Oral BID    sodium chloride 0.9%  10 mL Intravenous Q6H    vitamin D  2,000 Units Oral Daily     Continuous Infusions:   Standard Custom Day One ADULT TPN for patient with NO electrolyte abnormality and NO renal dysfunction (CENTRAL)       As Needed:  sodium chloride, dextrose 10%, dextrose 10%, glucagon (human recombinant), glucose, glucose, HYDROcodone-acetaminophen, naloxone, ondansetron, promethazine (PHENERGAN) IVPB, Flushing PICC Protocol **AND** sodium chloride 0.9% **AND** sodium chloride 0.9%    Assessment and Plan  / Problems managed today    * Diarrhea due to malabsorption  unlikely carcinoid  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 9/30 CT AP showed partial small bowel resection. There are some thickened segments of large and small bowel, some with adjacent mesenteric edema. Symptoms concerned likely though to be secondary to carcinoid syndrome patient evaluated by Heme-Onc at [facility] with PET scan done 10/18 - Focal nodular foci of radiotracer uptake at the pancreatic tail and near the region of the pancreatic head above liver background activity could indicate  somatostatin receptor avid lesions, but no corresponding mass is seen on CT portion of the study.  Recommend follow-up contrast enhanced pancreas protocol CT to further assess. 5HIAA levels on 10/21 WNL. CT AP W contrast done 11/4 shows No pancreatic lesion and Enterocolitis,. recent EGD -Non-bleeding gastric ulcers with no stigmata of  bleeding. Biopsy neg for H pylori. Treated with a monopolar probe. Colonoscopy with biopsy -No significant pathologic abnormality. No morphologic evidence of active inflammatory bowel disease, microscopic colitis, or neoplasia. Patient was  referred to [facility] for further workup and has scheduled appointment at Heme-Onc at [facility] this upcoming week. Chromogranin A elevated at 2000s. C diff negative. Fecal fat level normal. Fecal elastase low. AES consulted for EUS and potential biopsy given that her PET scan showed uptake but no mass was seen on CT pancreas protocol. Started and discontinued octreotide since it failed to help. Repeat cryptosporidium Ag negative. 11/9 EUS - sleeve gastrectomy was found, characterized by healthy appearing mucosa. Widely patent duodenoenterostomy, characterized  by healthy appearing mucosa was found. few cystic lesions were seen in the pancreatic head, genu of the pancreas and pancreatic body highly suspicious for a branched intraductal papillary mucinous neoplasm. Quite small for sampling. Pancreatic head and pancreatic tail showed no mass. Oncology reviewed results on 11/12 and feel patient with low suspicion for neuroendocrine tumor at this point. CT chest with contrast 11/13 significant for subcentimeter nodules bilaterally. Surgery oncology consulted - testing thus far not convincing for need for surgical exploration. Will present to tumor board 11/21. Chomogranin A level elevated due to PPI. Somatostatin level normal. Tryptase negative. Insulin and proinsulin levels normal. VIPoma level pending, but can be elevated from PPI.  Will need repeat gastrin and chromogranin level once off PPI for at least 2-4 weeks. EGD and small bowel biopsies done 11/17. Increase questran to TID. Continue loperamide 2 mg TID. Add lomotil 1 cap QID    Low fecal elastase - will discuss significance with GI. Consider starting pancreatic enzymes. Ferrtin slightly elevated 272 and high iron saturation 83, elevated gastrin (need repeat due to PPI), normal IgG and negative Tissue Transglutaminase Iga.     Hypophosphatemia  Requires replacing  NaPhos 40 mmol  Consider refeeding    Hypomagnesemia  Replace by IV and through TPN    Severe protein-calorie malnutrition   Refeeding syndrome  Albumin 1.3, Prealbumin 7, and CRP 4.3. Lost about 37 lbs in 6 months. Malabsorption suspected. Small bowel biopsies 11/17 pending.   Eating 25-75% of meals  Starting TPN. May need several days of titration in anticipation of refeeding syndrome.   Will need f/u labs weekly, f/u oncology, pcp, surg oncology, GI.     Measurements:  Wt Readings from Last 1 Encounters:   11/17/22 88.5 kg (195 lb)   Body mass index is 32.45 kg/m².    Recommendations: Recommendation/Intervention: 1) Increase TPN: 157 g D + 44 g AA -Provides 709 kcals, 44g protein (GIR: 1.23) (50% of calorie and protein needs)   2) Continue regular diet w/ boost plus  - monitor renal labs  3) RD following  Goals: Meet % EEN/EPN by next RD f/u    Patient has been screened and assessed by RD. RD will follow patient.    Hypocalcemia  mild  PTH elevated 114    Started on TPN    Hypokalemia  Replace by IV and through TPN    Orthostatic hypotension  Due to dehydration from diarrhea  IVF hydration for support  May improve with improved nutritional status    Dizziness  likely secondary to diarrhea. orthostasis. IV hydration    Cryptosporidium exposure  Repeat Ag neg, colon biopsies neg.     Seizures  continue with keppra BID    UTI (urinary tract infection)  Flank pain  11/8 UC with klebsiella; s/p x5 doses CTX - sensitive and  transitioned to levoquin for total of 7 days.     Stage 3a chronic kidney disease  seems to be at baseline. Creatine stable for now. BMP reviewed- noted Estimated Creatinine Clearance: 60 mL/min (based on SCr of 1 mg/dL). according to latest data. Monitor UOP and serial BMP and adjust therapy as needed. Renally dose meds.  Kid bx 10/2022 - no amyloid, + diab nephropathy    Intractable back pain  Hs of OA spine, DJD, back surgery  Xray l spine 3/2021 - posterior fusion hardware from L2-L5 along with laminectomy change.  Grade 1 anterolistheses are suspected of L3 over L4 and L4 over L5.   Resolved with pain management and treatment of UTI    Chronic heart failure with preserved ejection fraction (HFpEF) NICM NYHA2   Patient admitted without  signs and symptoms of CHF exacerbation    Results for orders placed or performed during the hospital encounter of 11/06/22   Brain natriuretic peptide   Result Value Ref Range    BNP 82 0 - 99 pg/mL   No results found for this or any previous visit.  echo 10/7/22 left ventricle is normal in size. Global left ventricular systolic function is normal. The left ventricular ejection fraction is 55%. Left ventricular diastolic function is abnormal (stage I impaired relaxation). Noted left ventricular hypertrophy. Concentric left ventricular hypertrophy is present. It is mild. Mild (1+) tricuspid regurgitation. The right ventricle is normal in size. Right ventricular systolic function is normal.    Furosemide on hold due to hypovolemia    Chronic deep vein thrombosis (DVT): right common femoral vein  DVT sonogram 8/22 and 10/22 negative for DVT. On 11/7 DVT sonogram -Nonocclusive thrombus/DVT in the superior-mid right femoral vein. S/p IVC filter. Eliquis held intermittently due to procedures. Continue eliquis    Anemia of chronic disease  Elevated iron levels and ferritin  Required one unit PRBC for Hgb 6.7    Essential hypertension  Medications on hold due to hypotension      Class 1  obesity due to excess calories with serious comorbidity and body mass index (BMI) of 32.0 to 32.9 in adult  Patient now with sarcopenia due to diarrhea  - s/p Sleeve gastrectomy      Discharge Planning   MELYSSA: 11/21/2022     Code Status: Full Code   Is the patient medically ready for discharge?: No    Reason for patient still in hospital (select all that apply): Treatment, Consult recommendations, and PT / OT recommendations  Discharge Plan A: Home with family   Discharge Delays: None known at this time    Diet:  regular diet with boost  GI PPx: not needed  DVT PPx:  apixaban  Airways: room air  Wounds: none    Goals of Care:  Return to prior functional status       Time (minutes) spent in care of the patient (Greater than 1/2 spent in direct face-to-face contact and care coordination on unit)  35 min    Jennifer Saini MD

## 2022-11-19 NOTE — ASSESSMENT & PLAN NOTE
67 yoF s/p gastric sleeve who presented with diarrhea with concerns of carcinoid evaluated by Heme-Onc at [facility] with PET scan done 10/18 showed focal nodular foci of radiotracer uptake at the pancreatic tail and near the region of the pancreatic head above liver background activity could indicate somatostatin receptor avid lesions. 5HIAA levels WNL. CT AP W contrast performed on 11/4 showed no pancreatic lesion and Enterocolitis. EGD and Colonoscopy with biopsy showed no abnormality. Chromogranin A elevated at 2000s. Stool studies were negative. Heme/oncworking her up for neuroendocrine tumor vs VIPoma.  AES consulted for EUS and potential biopsy given that her PET scan showed uptake but no mass was seen on CT pancreas protocol.discontinued eliquis. EUS showed few cystic lesions were seen in the pancreatic  head, genu of the pancreas and pancreatic body highly suspicious  for a branched intraductal papillary mucinous neoplasm.small for sampling. pancreatic head and pancreatic tail showed no mass. GI considering another biopsy procedure (small bowel and pancreas per EUS). Holding pantoprazole and repeat gastrin and chromogranin level once off PPI for at least 2-4 weeks.    - continue imodium  - GI consulted, appreciate recommendations  - f/u Serum tryptase, serum VIP and somatostatin pending  - Hold PPI for one week (last dose 11/14) then recheck chromogranin and gastrin levels. PPIs may be source of elevation in both.  - planned to be discussed in tumor board Monday 11/21

## 2022-11-20 LAB
ABO + RH BLD: NORMAL
ALBUMIN SERPL BCP-MCNC: 1.5 G/DL (ref 3.5–5.2)
ANION GAP SERPL CALC-SCNC: 6 MMOL/L (ref 8–16)
BASOPHILS # BLD AUTO: 0.02 K/UL (ref 0–0.2)
BASOPHILS NFR BLD: 0.4 % (ref 0–1.9)
BLD GP AB SCN CELLS X3 SERPL QL: NORMAL
BUN SERPL-MCNC: 6 MG/DL (ref 8–23)
CA-I BLDV-SCNC: 1.17 MMOL/L (ref 1.06–1.42)
CALCIUM SERPL-MCNC: 7.3 MG/DL (ref 8.7–10.5)
CHLORIDE SERPL-SCNC: 110 MMOL/L (ref 95–110)
CO2 SERPL-SCNC: 24 MMOL/L (ref 23–29)
CREAT SERPL-MCNC: 1.1 MG/DL (ref 0.5–1.4)
DIFFERENTIAL METHOD: ABNORMAL
EOSINOPHIL # BLD AUTO: 0.1 K/UL (ref 0–0.5)
EOSINOPHIL NFR BLD: 2.4 % (ref 0–8)
ERYTHROCYTE [DISTWIDTH] IN BLOOD BY AUTOMATED COUNT: 20 % (ref 11.5–14.5)
EST. GFR  (NO RACE VARIABLE): 55.1 ML/MIN/1.73 M^2
GLUCOSE SERPL-MCNC: 403 MG/DL (ref 70–110)
HCT VFR BLD AUTO: 22.3 % (ref 37–48.5)
HGB BLD-MCNC: 6.9 G/DL (ref 12–16)
IMM GRANULOCYTES # BLD AUTO: 0.01 K/UL (ref 0–0.04)
IMM GRANULOCYTES NFR BLD AUTO: 0.2 % (ref 0–0.5)
LYMPHOCYTES # BLD AUTO: 1.6 K/UL (ref 1–4.8)
LYMPHOCYTES NFR BLD: 30.7 % (ref 18–48)
MAGNESIUM SERPL-MCNC: 2.3 MG/DL (ref 1.6–2.6)
MCH RBC QN AUTO: 30 PG (ref 27–31)
MCHC RBC AUTO-ENTMCNC: 30.9 G/DL (ref 32–36)
MCV RBC AUTO: 97 FL (ref 82–98)
MONOCYTES # BLD AUTO: 0.8 K/UL (ref 0.3–1)
MONOCYTES NFR BLD: 14.6 % (ref 4–15)
NEUTROPHILS # BLD AUTO: 2.8 K/UL (ref 1.8–7.7)
NEUTROPHILS NFR BLD: 51.7 % (ref 38–73)
NRBC BLD-RTO: 0 /100 WBC
PHOSPHATE SERPL-MCNC: 3.7 MG/DL (ref 2.7–4.5)
PLATELET # BLD AUTO: 125 K/UL (ref 150–450)
PMV BLD AUTO: 10.5 FL (ref 9.2–12.9)
POCT GLUCOSE: 119 MG/DL (ref 70–110)
POCT GLUCOSE: 124 MG/DL (ref 70–110)
POCT GLUCOSE: 129 MG/DL (ref 70–110)
POTASSIUM SERPL-SCNC: 4.9 MMOL/L (ref 3.5–5.1)
RBC # BLD AUTO: 2.3 M/UL (ref 4–5.4)
SODIUM SERPL-SCNC: 140 MMOL/L (ref 136–145)
TRIGL SERPL-MCNC: 38 MG/DL (ref 30–150)
WBC # BLD AUTO: 5.35 K/UL (ref 3.9–12.7)

## 2022-11-20 PROCEDURE — 85025 COMPLETE CBC W/AUTO DIFF WBC: CPT | Performed by: INTERNAL MEDICINE

## 2022-11-20 PROCEDURE — 86920 COMPATIBILITY TEST SPIN: CPT | Performed by: INTERNAL MEDICINE

## 2022-11-20 PROCEDURE — B4185 PARENTERAL SOL 10 GM LIPIDS: HCPCS | Performed by: INTERNAL MEDICINE

## 2022-11-20 PROCEDURE — 63600175 PHARM REV CODE 636 W HCPCS: Mod: JG | Performed by: STUDENT IN AN ORGANIZED HEALTH CARE EDUCATION/TRAINING PROGRAM

## 2022-11-20 PROCEDURE — 80069 RENAL FUNCTION PANEL: CPT | Performed by: INTERNAL MEDICINE

## 2022-11-20 PROCEDURE — A4216 STERILE WATER/SALINE, 10 ML: HCPCS | Performed by: HOSPITALIST

## 2022-11-20 PROCEDURE — 27000221 HC OXYGEN, UP TO 24 HOURS

## 2022-11-20 PROCEDURE — 25000003 PHARM REV CODE 250: Performed by: INTERNAL MEDICINE

## 2022-11-20 PROCEDURE — 25000003 PHARM REV CODE 250: Performed by: HOSPITALIST

## 2022-11-20 PROCEDURE — 25000003 PHARM REV CODE 250: Performed by: STUDENT IN AN ORGANIZED HEALTH CARE EDUCATION/TRAINING PROGRAM

## 2022-11-20 PROCEDURE — 12000002 HC ACUTE/MED SURGE SEMI-PRIVATE ROOM

## 2022-11-20 PROCEDURE — 86850 RBC ANTIBODY SCREEN: CPT

## 2022-11-20 PROCEDURE — 63600175 PHARM REV CODE 636 W HCPCS: Performed by: INTERNAL MEDICINE

## 2022-11-20 PROCEDURE — P9047 ALBUMIN (HUMAN), 25%, 50ML: HCPCS | Mod: JG | Performed by: STUDENT IN AN ORGANIZED HEALTH CARE EDUCATION/TRAINING PROGRAM

## 2022-11-20 PROCEDURE — 84478 ASSAY OF TRIGLYCERIDES: CPT | Performed by: INTERNAL MEDICINE

## 2022-11-20 PROCEDURE — 94761 N-INVAS EAR/PLS OXIMETRY MLT: CPT

## 2022-11-20 PROCEDURE — 83735 ASSAY OF MAGNESIUM: CPT | Performed by: INTERNAL MEDICINE

## 2022-11-20 PROCEDURE — A4217 STERILE WATER/SALINE, 500 ML: HCPCS | Performed by: INTERNAL MEDICINE

## 2022-11-20 PROCEDURE — 82330 ASSAY OF CALCIUM: CPT | Performed by: INTERNAL MEDICINE

## 2022-11-20 PROCEDURE — 99900035 HC TECH TIME PER 15 MIN (STAT)

## 2022-11-20 RX ORDER — LIDOCAINE HYDROCHLORIDE 20 MG/ML
15 SOLUTION OROPHARYNGEAL ONCE
Status: COMPLETED | OUTPATIENT
Start: 2022-11-20 | End: 2022-11-20

## 2022-11-20 RX ORDER — PANTOPRAZOLE SODIUM 40 MG/1
40 TABLET, DELAYED RELEASE ORAL DAILY
Status: DISCONTINUED | OUTPATIENT
Start: 2022-11-20 | End: 2022-11-20

## 2022-11-20 RX ORDER — INSULIN ASPART 100 [IU]/ML
1-10 INJECTION, SOLUTION INTRAVENOUS; SUBCUTANEOUS EVERY 4 HOURS PRN
Status: DISCONTINUED | OUTPATIENT
Start: 2022-11-20 | End: 2022-11-25 | Stop reason: HOSPADM

## 2022-11-20 RX ORDER — ALBUMIN HUMAN 250 G/1000ML
25 SOLUTION INTRAVENOUS 2 TIMES DAILY
Status: COMPLETED | OUTPATIENT
Start: 2022-11-20 | End: 2022-11-21

## 2022-11-20 RX ORDER — MAG HYDROX/ALUMINUM HYD/SIMETH 200-200-20
30 SUSPENSION, ORAL (FINAL DOSE FORM) ORAL EVERY 6 HOURS PRN
Status: DISCONTINUED | OUTPATIENT
Start: 2022-11-20 | End: 2022-11-25 | Stop reason: HOSPADM

## 2022-11-20 RX ORDER — INSULIN ASPART 100 [IU]/ML
0-5 INJECTION, SOLUTION INTRAVENOUS; SUBCUTANEOUS 4 TIMES DAILY
Status: DISCONTINUED | OUTPATIENT
Start: 2022-11-20 | End: 2022-11-20

## 2022-11-20 RX ORDER — ALBUMIN HUMAN 250 G/1000ML
12.5 SOLUTION INTRAVENOUS ONCE
Status: DISCONTINUED | OUTPATIENT
Start: 2022-11-20 | End: 2022-11-20

## 2022-11-20 RX ORDER — FUROSEMIDE 10 MG/ML
20 INJECTION INTRAMUSCULAR; INTRAVENOUS DAILY
Status: COMPLETED | OUTPATIENT
Start: 2022-11-20 | End: 2022-11-21

## 2022-11-20 RX ADMIN — DIPHENOXYLATE HYDROCHLORIDE AND ATROPINE SULFATE 10 ML: 2.5; .025 SOLUTION ORAL at 02:11

## 2022-11-20 RX ADMIN — Medication 10 ML: at 11:11

## 2022-11-20 RX ADMIN — LOPERAMIDE HYDROCHLORIDE 2 MG: 2 CAPSULE ORAL at 02:11

## 2022-11-20 RX ADMIN — CHOLECALCIFEROL TAB 25 MCG (1000 UNIT) 2000 UNITS: 25 TAB at 08:11

## 2022-11-20 RX ADMIN — CHOLESTYRAMINE 4 G: 4 POWDER, FOR SUSPENSION ORAL at 04:11

## 2022-11-20 RX ADMIN — LOPERAMIDE HYDROCHLORIDE 2 MG: 2 CAPSULE ORAL at 08:11

## 2022-11-20 RX ADMIN — MICONAZOLE NITRATE 2 % TOPICAL POWDER: at 08:11

## 2022-11-20 RX ADMIN — ALBUMIN (HUMAN) 25 G: 12.5 SOLUTION INTRAVENOUS at 08:11

## 2022-11-20 RX ADMIN — SODIUM BICARBONATE 650 MG TABLET 1300 MG: at 08:11

## 2022-11-20 RX ADMIN — MUPIROCIN: 20 OINTMENT TOPICAL at 08:11

## 2022-11-20 RX ADMIN — APIXABAN 5 MG: 5 TABLET, FILM COATED ORAL at 08:11

## 2022-11-20 RX ADMIN — CHOLESTYRAMINE 4 G: 4 POWDER, FOR SUSPENSION ORAL at 11:11

## 2022-11-20 RX ADMIN — ATORVASTATIN CALCIUM 40 MG: 20 TABLET, FILM COATED ORAL at 08:11

## 2022-11-20 RX ADMIN — DIPHENOXYLATE HYDROCHLORIDE AND ATROPINE SULFATE 10 ML: 2.5; .025 SOLUTION ORAL at 09:11

## 2022-11-20 RX ADMIN — MAGNESIUM SULFATE HEPTAHYDRATE: 500 INJECTION, SOLUTION INTRAMUSCULAR; INTRAVENOUS at 11:11

## 2022-11-20 RX ADMIN — ALBUMIN HUMAN 25 G: 0.25 SOLUTION INTRAVENOUS at 08:11

## 2022-11-20 RX ADMIN — FUROSEMIDE 20 MG: 10 INJECTION, SOLUTION INTRAVENOUS at 12:11

## 2022-11-20 RX ADMIN — LIDOCAINE HYDROCHLORIDE 15 ML: 20 SOLUTION ORAL; TOPICAL at 10:11

## 2022-11-20 RX ADMIN — Medication 10 ML: at 04:11

## 2022-11-20 RX ADMIN — ASPIRIN 81 MG: 81 TABLET, COATED ORAL at 08:11

## 2022-11-20 RX ADMIN — Medication 10 ML: at 05:11

## 2022-11-20 RX ADMIN — FERROUS SULFATE TAB 325 MG (65 MG ELEMENTAL FE) 1 EACH: 325 (65 FE) TAB at 08:11

## 2022-11-20 RX ADMIN — CHOLESTYRAMINE 4 G: 4 POWDER, FOR SUSPENSION ORAL at 06:11

## 2022-11-20 RX ADMIN — SOYBEAN OIL 250 ML: 20 INJECTION, SOLUTION INTRAVENOUS at 11:11

## 2022-11-20 RX ADMIN — DIPHENOXYLATE HYDROCHLORIDE AND ATROPINE SULFATE 10 ML: 2.5; .025 SOLUTION ORAL at 08:11

## 2022-11-20 RX ADMIN — Medication 10 ML: at 12:11

## 2022-11-20 NOTE — ASSESSMENT & PLAN NOTE
68 yo F s/p gastric sleeve who presented with diarrhea with concerns of carcinoid evaluated by Heme-Onc at [facility] with PET scan done 10/18 showed focal nodular foci of radiotracer uptake at the pancreatic tail and near the region of the pancreatic head above liver background activity could indicate somatostatin receptor avid lesions. 5HIAA levels WNL. CT AP W contrast performed on 11/4 showed no pancreatic lesion and Enterocolitis. EGD and Colonoscopy with biopsy showed no abnormality. Chromogranin A elevated at 2000s. Stool studies were negative. Heme/oncworking her up for neuroendocrine tumor vs VIPoma.  AES consulted for EUS and potential biopsy given that her PET scan showed uptake but no mass was seen on CT pancreas protocol.discontinued eliquis. EUS showed few cystic lesions were seen in the pancreatic  head, genu of the pancreas and pancreatic body highly suspicious  for a branched intraductal papillary mucinous neoplasm.small for sampling. pancreatic head and pancreatic tail showed no mass. GI considering another biopsy procedure (small bowel and pancreas per EUS). Holding pantoprazole and repeat gastrin and chromogranin level once off PPI for at least 2-4 weeks.    - continue imodium, lomotil, cholestyramine for diarrhea  - GI consulted, greatly appreciate recommendations  - f/u serum tryptase, serum VIP and somatostatin pending -- per GI  - Hold PPI for one week (last dose 11/14) then recheck chromogranin and gastrin levels. PPIs may be source of elevation in both.  - planned to be discussed in tumor board Monday 11/21

## 2022-11-20 NOTE — PLAN OF CARE
Problem: Adult Inpatient Plan of Care  Goal: Plan of Care Review  Outcome: Ongoing, Progressing  Goal: Patient-Specific Goal (Individualized)  Outcome: Ongoing, Progressing  Goal: Absence of Hospital-Acquired Illness or Injury  Outcome: Ongoing, Progressing  Goal: Optimal Comfort and Wellbeing  Outcome: Ongoing, Progressing  Goal: Readiness for Transition of Care  Outcome: Ongoing, Progressing     Problem: Infection  Goal: Absence of Infection Signs and Symptoms  Outcome: Ongoing, Progressing     Problem: Skin Injury Risk Increased  Goal: Skin Health and Integrity  Outcome: Ongoing, Progressing     Problem: Fall Injury Risk  Goal: Absence of Fall and Fall-Related Injury  Outcome: Ongoing, Progressing     No acute events throughout shift.  Assessment and VS per flow sheet, plan of care reviewed with daughter and pt, pt progressing towards goals as tolerated, all concerns addressed. Will continue to monitor.

## 2022-11-20 NOTE — ASSESSMENT & PLAN NOTE
US on 11/7 showed nonocclusive thrombus/DVT in the superior-mid right femoral vein  - S/p IVC filter.   - Continue eliquis

## 2022-11-20 NOTE — ASSESSMENT & PLAN NOTE
Patient with Hypoxic Respiratory failure which is Acute.  she is not on home oxygen. Supplemental oxygen was provided and noted-  .   Signs/symptoms of respiratory failure include- respiratory distress. Contributing diagnoses includes - CHF Labs and images were reviewed. Patient Has not had a recent ABG. Will treat underlying causes and adjust management of respiratory failure as follows- 2/2 volume overload  - initially required 2L NC on step up to ICU, now on RA

## 2022-11-20 NOTE — ASSESSMENT & PLAN NOTE
Hs of OA spine, DJD, back surgery. Xray l spine 3/2021 - posterior fusion hardware from L2-L5 along with laminectomy change. Grade 1 anterolistheses are suspected of L3 over L4 and L4 over L5.   - pain meds prn

## 2022-11-20 NOTE — ASSESSMENT & PLAN NOTE
Patient hypotensive on the floor with MAPs as low as 57. Patient appeared volume overloaded with pitting edema and elevated JVD. CXR showed worsening pulmonary edema. Repeat BP with MAPs of 76  - obtain TTE (pending)  - patient on home midodrine, currently holding to prevent carcinoid syndrome  - caution with excess fluids given signs of fluid overload on time of step up to MICU (peripheral and pulmonary edema)  - diurese with lasix prn

## 2022-11-20 NOTE — SUBJECTIVE & OBJECTIVE
Interval History/Significant Events: Doing well. No acute events overnight. No significant pain. Stools better formed and less frequent.    Review of Systems   Constitutional:  Negative for fever.   HENT:  Negative for trouble swallowing.    Respiratory:  Negative for cough and shortness of breath.    Cardiovascular:  Positive for leg swelling. Negative for chest pain.   Gastrointestinal:  Positive for abdominal pain (Intermittent) and diarrhea (Improving). Negative for nausea and vomiting.   Neurological:  Negative for headaches.   Objective:     Vital Signs (Most Recent):  Temp: 98.2 °F (36.8 °C) (11/20/22 0701)  Pulse: 83 (11/20/22 0800)  Resp: 16 (11/20/22 0800)  BP: 99/63 (11/20/22 0800)  SpO2: 98 % (11/20/22 0800) Vital Signs (24h Range):  Temp:  [98.2 °F (36.8 °C)-99 °F (37.2 °C)] 98.2 °F (36.8 °C)  Pulse:  [67-94] 83  Resp:  [14-26] 16  SpO2:  [95 %-100 %] 98 %  BP: ()/(52-76) 99/63   Weight: 88.5 kg (195 lb)  Body mass index is 32.45 kg/m².      Intake/Output Summary (Last 24 hours) at 11/20/2022 0916  Last data filed at 11/20/2022 0500  Gross per 24 hour   Intake 1664.03 ml   Output 900 ml   Net 764.03 ml       Physical Exam  Constitutional:       General: She is not in acute distress.     Appearance: She is not ill-appearing or diaphoretic.      Comments: Fatigued but awake and alert, in NAD   HENT:      Nose: Nose normal.      Mouth/Throat:      Mouth: Mucous membranes are moist.   Eyes:      Conjunctiva/sclera: Conjunctivae normal.   Cardiovascular:      Rate and Rhythm: Normal rate and regular rhythm.      Pulses: Normal pulses.      Heart sounds: Normal heart sounds.   Pulmonary:      Effort: Pulmonary effort is normal. No respiratory distress.      Breath sounds: Normal breath sounds. No wheezing, rhonchi or rales.   Abdominal:      Palpations: Abdomen is soft.      Tenderness: There is no abdominal tenderness. There is no guarding or rebound.   Musculoskeletal:      Cervical back: Neck supple.  No rigidity.      Right lower leg: Edema present.      Left lower leg: Edema present.      Comments: 2+ pitting edema to b/l LE to the mid-thigh   Skin:     General: Skin is warm and dry.   Neurological:      General: No focal deficit present.      Mental Status: She is alert and oriented to person, place, and time.       Vents:     Lines/Drains/Airways       Peripherally Inserted Central Catheter Line  Duration             PICC Double Lumen 11/16/22 1713 right basilic 3 days              Drain  Duration             Female External Urinary Catheter 11/19/22 1101 <1 day                  Significant Labs:    CBC/Anemia Profile:  Recent Labs   Lab 11/19/22  0119 11/19/22  0715 11/20/22 0317   WBC 5.96 5.16 5.35   HGB 7.6* 7.1* 6.9*   HCT 24.2* 22.4* 22.3*   * 130* 125*   MCV 96 96 97   RDW 20.1* 20.1* 20.0*        Chemistries:  Recent Labs   Lab 11/18/22  1102 11/19/22  0119 11/19/22  0715 11/20/22 0317    147* 143 140   K 3.4* 3.5 3.6 4.9   * 115* 114* 110   CO2 24 24 24 24   BUN 6* 6* 7* 6*   CREATININE 1.0 1.1 1.1 1.1   CALCIUM 7.1* 6.9* 7.0* 7.3*   ALBUMIN 1.3* 1.3* 1.1* 1.5*   PROT  --  4.1*  --   --    BILITOT  --  0.4  --   --    ALKPHOS  --  75  --   --    ALT  --  9*  --   --    AST  --  58*  --   --    MG 1.4*  --  2.0 2.3   PHOS 2.2*  --  3.4 3.7       All pertinent labs within the past 24 hours have been reviewed.    Significant Imaging:  I have reviewed all pertinent imaging results/findings within the past 24 hours.

## 2022-11-20 NOTE — RESIDENT HANDOFF
Handoff     [place] [facility] [place] [facility] Room Number: [id number]     Patient Name: Cherry Santiago MRN: [id number]     Date of Birth: [id number] Allergies: Patient has no known allergies.     Age: 67 y.o. Admit Date: 11/6/2022     Sex: female  BMI: Body mass index is 32.45 kg/m².     Code Status: Full Code        Illness Level (current clinical status): Watcher - No    Reason for Admission: Diarrhea due to malabsorption    Brief HPI and Hospital Course: 67yoF w recently diagnosed carcinoid syndrome, + cryptosporidium Ag (10/'22), AFib on Eliquis, CHF, arthritis, GERD, glaucoma, hyperlipidemia, hypertension, prior MI, TIA presented with persistent diarrhea.  Admitted to  and given fluids.  Stepped up to MICU 11/19 for hypotension despite fluid resuscitation. She has had a prolonged hospital course which she has been evaluated by GI, surgical oncology, and oncology. She is to be presented to tumor board 11/21. AES consulted for EUS and potential biopsy given that her PET scan showed uptake but no mass was seen on CT pancreas protocolCourse complicated by nonocclusive DVT - on A/C. Additionally she had positive cryptosporidium antigens. Persistent diarrhea evaluated by GI. Has had upper and lower endoscopic interventions; no clear etiology yet.  Improved with steady IVFs and TPN.    Tasks: TTE did not get completed    Estimated Discharge Date: 11/22/22    Discharge Disposition: [place]Health Care [facility]    Mentored By: Dr. Palacios

## 2022-11-20 NOTE — ASSESSMENT & PLAN NOTE
Elevated iron levels and ferritin. Required one unit PRBC for Hgb 6.7. Likely due to CKD  - daily CBC  - continue iron supplementation  - transfuse if <7 Hgb  - deferred transfusion 11/20 with Hgb 6.9, patient's baseline is 7.1-8.8 this hospitalization, only 0.2pt drop from yesterday, no overt bleeding or hemodynamic changes, will CTM and transfuse as needed

## 2022-11-20 NOTE — ASSESSMENT & PLAN NOTE
Echo from last month showed:  EF of 55% with GIDD. Patient on lasix 20 daily at home which was initially held on admission due to hypotension.   - Diuresed with lasix 20mg IV x2 11/19 and x1 11/20. Initial UOP inaccurate in chart due to poor seal around pure-wick. Will schedule additional dose of lasix 20mg IV for 11/21, recommend reassessment for additional diuresis subsequently

## 2022-11-20 NOTE — PROGRESS NOTES
[facility] - Intensive Care ([place] [address])  Critical Care Medicine  Progress Note    Patient Name: Cherry Santiago  MRN: [id number]  Admission Date: 11/6/2022  Hospital Length of Stay: 13 days  Code Status: Full Code  Attending Provider: Jennifer Saini MD  Primary Care Provider: Amarilys Romero MD   Principal Problem: Diarrhea due to malabsorption    Subjective:     HPI:  67yoF w PMHx recently diagnosed carcinoid syndrome, + cryptosporidium Ag (10/'22), AFib on Eliquis, CHF, arthritis, GERD, glaucoma, hyperlipidemia, hypertension, prior MI, TIA presented with persistent diarrhea. Per ED records:      August 2022 more recently diarrhea has become worse over past few days -several episodes daily  associated nausea with intermittent vomiting. c/o lower cramping abdominal pain - 5/10.   symptoms concerned likely though to be secondary to carcinoid syndrome patient evaluated by Heme-Onc at [facility] with PET scan done 10/18 - Focal nodular foci of radiotracer uptake at the pancreatic tail and near the region of the pancreatic head above liver background activity could indicate somatostatin receptor avid lesions, but no corresponding mass is seen on CT portion of the study.  Recommend follow-up contrast enhanced pancreas protocol CT to further assess. 5HIAA levels on 10/21 WNL . CT AP W contrast done 11/4 shows No pancreatic lesion and Enterocolitis,. recent EGD -Non-bleeding gastric ulcers with no stigmata of  bleeding. Biopsied. Treated with a monopolar probe. colonoscopy with biopsy -No significant pathologic abnormality. No morphologic evidence of active inflammatory bowel disease, microscopic colitis, or neoplasia. Patient was  referred to [facility] for further workup and has scheduled appointment at Heme-Onc at [facility] this upcoming week. . c/o  dizziness,  and shortness of breath on minimal exertion     ER course -electrolyte derangement with hypokalemia to 2.4, hypomagsemia of  1.5 . replaced . UA + started on IV cefriaxone     Critical care consulted on the night of 11/19 for hypotension despite fluid resuscitation. She has had a prolonged hospital course which she has been evaluated by GI, surgical oncology, and oncology. She is to be presented to tumor board 11/21. AES consulted for EUS and potential biopsy given that her PET scan showed uptake but no mass was seen on CT pancreas protocolCourse complicated by nonocclusive DVT - on A/C. Additionally she had positive cryptosporidium antigens. Persistent diarrhea evaluated by GI. Has had upper and lower endoscopic interventions. Fluid volume excess in the setting of severe malnutrition and malabsorption. She was treated for a Klebsiella UTI.     At the time of evaluation, BP 77/54 while lying in bed. She reports intermittent lightheadedness. She has 2+ pitting edema bilaterally, and rales on exam. Her diuresis had not been resumed. Today alone she was given IVF bolus as well as continuous IVF in addition to her TPN/Fat Emulsion. She will transfer to the ICU for vasopressor support.       Hospital/ICU Course:  No notes on file    Interval History/Significant Events: Doing well. No acute events overnight. No significant pain. Stools better formed and less frequent.    Review of Systems   Constitutional:  Negative for fever.   HENT:  Negative for trouble swallowing.    Respiratory:  Negative for cough and shortness of breath.    Cardiovascular:  Positive for leg swelling. Negative for chest pain.   Gastrointestinal:  Positive for abdominal pain (Intermittent) and diarrhea (Improving). Negative for nausea and vomiting.   Neurological:  Negative for headaches.   Objective:     Vital Signs (Most Recent):  Temp: 98.2 °F (36.8 °C) (11/20/22 0701)  Pulse: 83 (11/20/22 0800)  Resp: 16 (11/20/22 0800)  BP: 99/63 (11/20/22 0800)  SpO2: 98 % (11/20/22 0800) Vital Signs (24h Range):  Temp:  [98.2 °F (36.8 °C)-99 °F (37.2 °C)] 98.2 °F (36.8 °C)  Pulse:   [67-94] 83  Resp:  [14-26] 16  SpO2:  [95 %-100 %] 98 %  BP: ()/(52-76) 99/63   Weight: 88.5 kg (195 lb)  Body mass index is 32.45 kg/m².      Intake/Output Summary (Last 24 hours) at 11/20/2022 0916  Last data filed at 11/20/2022 0500  Gross per 24 hour   Intake 1664.03 ml   Output 900 ml   Net 764.03 ml       Physical Exam  Constitutional:       General: She is not in acute distress.     Appearance: She is not ill-appearing or diaphoretic.      Comments: Fatigued but awake and alert, in NAD   HENT:      Nose: Nose normal.      Mouth/Throat:      Mouth: Mucous membranes are moist.   Eyes:      Conjunctiva/sclera: Conjunctivae normal.   Cardiovascular:      Rate and Rhythm: Normal rate and regular rhythm.      Pulses: Normal pulses.      Heart sounds: Normal heart sounds.   Pulmonary:      Effort: Pulmonary effort is normal. No respiratory distress.      Breath sounds: Normal breath sounds. No wheezing, rhonchi or rales.   Abdominal:      Palpations: Abdomen is soft.      Tenderness: There is no abdominal tenderness. There is no guarding or rebound.   Musculoskeletal:      Cervical back: Neck supple. No rigidity.      Right lower leg: Edema present.      Left lower leg: Edema present.      Comments: 2+ pitting edema to b/l LE to the mid-thigh   Skin:     General: Skin is warm and dry.   Neurological:      General: No focal deficit present.      Mental Status: She is alert and oriented to person, place, and time.       Vents:     Lines/Drains/Airways       Peripherally Inserted Central Catheter Line  Duration             PICC Double Lumen 11/16/22 1713 right basilic 3 days              Drain  Duration             Female External Urinary Catheter 11/19/22 1101 <1 day                  Significant Labs:    CBC/Anemia Profile:  Recent Labs   Lab 11/19/22  0119 11/19/22  0715 11/20/22  0317   WBC 5.96 5.16 5.35   HGB 7.6* 7.1* 6.9*   HCT 24.2* 22.4* 22.3*   * 130* 125*   MCV 96 96 97   RDW 20.1* 20.1* 20.0*         Chemistries:  Recent Labs   Lab 11/18/22  1102 11/19/22  0119 11/19/22  0715 11/20/22  0317    147* 143 140   K 3.4* 3.5 3.6 4.9   * 115* 114* 110   CO2 24 24 24 24   BUN 6* 6* 7* 6*   CREATININE 1.0 1.1 1.1 1.1   CALCIUM 7.1* 6.9* 7.0* 7.3*   ALBUMIN 1.3* 1.3* 1.1* 1.5*   PROT  --  4.1*  --   --    BILITOT  --  0.4  --   --    ALKPHOS  --  75  --   --    ALT  --  9*  --   --    AST  --  58*  --   --    MG 1.4*  --  2.0 2.3   PHOS 2.2*  --  3.4 3.7       All pertinent labs within the past 24 hours have been reviewed.    Significant Imaging:  I have reviewed all pertinent imaging results/findings within the past 24 hours.      ABG  Recent Labs   Lab 11/19/22  0513   PO2 40     Assessment/Plan:     Neuro  Seizures  Patient on keppra at home. Chart review unclear why patient on keppra.   - continue home keppra    Pulmonary  Acute hypoxemic respiratory failure  Patient with Hypoxic Respiratory failure which is Acute.  she is not on home oxygen. Supplemental oxygen was provided and noted-  .   Signs/symptoms of respiratory failure include- respiratory distress. Contributing diagnoses includes - CHF Labs and images were reviewed. Patient Has not had a recent ABG. Will treat underlying causes and adjust management of respiratory failure as follows- 2/2 volume overload  - initially required 2L NC on step up to ICU, now on RA    Cardiac/Vascular  Hypotension  Patient hypotensive on the floor with MAPs as low as 57. Patient appeared volume overloaded with pitting edema and elevated JVD. CXR showed worsening pulmonary edema. Repeat BP with MAPs of 76  - obtain TTE (pending)  - patient on home midodrine, currently holding to prevent carcinoid syndrome  - caution with excess fluids given signs of fluid overload on time of step up to MICU (peripheral and pulmonary edema)  - diurese with lasix prn    Chronic heart failure with preserved ejection fraction (HFpEF) NICM NYHA2   Echo from last month showed:  EF of  55% with GIDD. Patient on lasix 20 daily at home which was initially held on admission due to hypotension.   - Diuresed with lasix 20mg IV x2 11/19 and x1 11/20. Initial UOP inaccurate in chart due to poor seal around pure-wick. Will schedule additional dose of lasix 20mg IV for 11/21, recommend reassessment for additional diuresis subsequently    Essential hypertension  Holding home antihypertensives in setting of hypo/normotension    Renal/  Hypophosphatemia  Phos 2.2    - replete prn    Hypomagnesemia  Mag 1.4.     - replete prn    Stage 3a chronic kidney disease  - Lasix 20mg IV prn  - 25g albumin bid x4 doses 11/18-21    Hypokalemia  Replete as needed for K goal >4    Hematology  Chronic deep vein thrombosis (DVT): right common femoral vein  US on 11/7 showed nonocclusive thrombus/DVT in the superior-mid right femoral vein  - S/p IVC filter.   - Continue eliquis    Oncology  Anemia of chronic disease  Elevated iron levels and ferritin. Required one unit PRBC for Hgb 6.7. Likely due to CKD  - daily CBC  - continue iron supplementation  - transfuse if <7 Hgb  - deferred transfusion 11/20 with Hgb 6.9, patient's baseline is 7.1-8.8 this hospitalization, only 0.2pt drop from yesterday, no overt bleeding or hemodynamic changes, will CTM and transfuse as needed    Endocrine  Severe protein-calorie malnutrition   Nutrition consulted. Most recent weight and BMI monitored. Currently on TPN as started per primary team prior to step up. Tolerating some po.                        Measurements:  Wt Readings from Last 1 Encounters:   11/17/22 88.5 kg (195 lb)   Body mass index is 32.45 kg/m².    Recommendations: Recommendation/Intervention: 1) Increase TPN: 157 g D + 44 g AA -Provides 709 kcals, 44g protein (GIR: 1.23) (50% of calorie and protein needs)   2) Continue regular diet w/ boost plus  - monitor renal labs  3) RD following  Goals: Meet % EEN/EPN by next RD f/u    Patient has been screened and assessed by RD.  RD will follow patient.      Class 1 obesity due to excess calories with serious comorbidity and body mass index (BMI) of 32.0 to 32.9 in adult  Body mass index is 32.45 kg/m².   - s/p Sleeve gastrectomy in 2006      GI  * Diarrhea due to malabsorption  66 yo F s/p gastric sleeve who presented with diarrhea with concerns of carcinoid evaluated by Heme-Onc at [facility] with PET scan done 10/18 showed focal nodular foci of radiotracer uptake at the pancreatic tail and near the region of the pancreatic head above liver background activity could indicate somatostatin receptor avid lesions. 5HIAA levels WNL. CT AP W contrast performed on 11/4 showed no pancreatic lesion and Enterocolitis. EGD and Colonoscopy with biopsy showed no abnormality. Chromogranin A elevated at 2000s. Stool studies were negative. Heme/oncworking her up for neuroendocrine tumor vs VIPoma.  AES consulted for EUS and potential biopsy given that her PET scan showed uptake but no mass was seen on CT pancreas protocol.discontinued eliquis. EUS showed few cystic lesions were seen in the pancreatic  head, genu of the pancreas and pancreatic body highly suspicious  for a branched intraductal papillary mucinous neoplasm.small for sampling. pancreatic head and pancreatic tail showed no mass. GI considering another biopsy procedure (small bowel and pancreas per EUS). Holding pantoprazole and repeat gastrin and chromogranin level once off PPI for at least 2-4 weeks.    - continue imodium, lomotil, cholestyramine for diarrhea  - GI consulted, greatly appreciate recommendations  - f/u serum tryptase, serum VIP and somatostatin pending -- per GI  - Hold PPI for one week (last dose 11/14) then recheck chromogranin and gastrin levels. PPIs may be source of elevation in both.  - planned to be discussed in tumor board Monday 11/21    Orthopedic  Intractable back pain  Hs of OA spine, DJD, back surgery. Xray l spine 3/2021 - posterior fusion  hardware from L2-L5 along with laminectomy change. Grade 1 anterolistheses are suspected of L3 over L4 and L4 over L5.   - pain meds prn       Critical Care Daily Checklist:    A: Awake: RASS Goal/Actual Goal:    Actual: Chávez Agitation Sedation Scale (RASS): Alert and calm   B: Spontaneous Breathing Trial Performed?     C: SAT & SBT Coordinated?  N/A                      D: Delirium: CAM-ICU Overall CAM-ICU: Negative   E: Early Mobility Performed? No   F: Feeding Goal: Goals: Meet % EEN/EPN by next RD f/u  Status: Nutrition Goal Status: new   Current Diet Order   Procedures    Diet renal [facility]; Cardiac (Low Na/Chol); Fluid - 1500mL     Order Specific Question:   Indicate patient location for additional diet options:     [facility]     Order Specific Question:   Additional Diet Options:     Answer:   Cardiac (Low Na/Chol)     Order Specific Question:   Fluid restriction:     Answer:   Fluid - 1500mL      AS: Analgesia/Sedation No   T: Thromboembolic Prophylaxis apixaban   H: HOB > 300 Yes   U: Stress Ulcer Prophylaxis (if needed) N/A   G: Glucose Control SSI   B: Bowel Function Stool Occurrence: 1   I: Indwelling Catheter (Lines & Hollis) Necessity PIVs   D: De-escalation of Antimicrobials/Pharmacotherapies N/A    Plan for the day/ETD Step down    Code Status:  Family/Goals of Care: Full Code         Critical secondary to Patient has a condition that poses threat to life and bodily function: Hypotension (resolved)      Critical care was time spent personally by me on the following activities: development of treatment plan with patient or surrogate and bedside caregivers, discussions with consultants, evaluation of patient's response to treatment, examination of patient, ordering and performing treatments and interventions, ordering and review of laboratory studies, ordering and review of radiographic studies, pulse oximetry, re-evaluation of patient's condition. This critical care  time did not overlap with that of any other provider or involve time for any procedures.     Paola Benavides MD  Critical Care Medicine  [facility] - Intensive Care ([facility]

## 2022-11-20 NOTE — ASSESSMENT & PLAN NOTE
Nutrition consulted. Most recent weight and BMI monitored. Currently on TPN as started per primary team prior to step up. Tolerating some po.                        Measurements:  Wt Readings from Last 1 Encounters:   11/17/22 88.5 kg (195 lb)   Body mass index is 32.45 kg/m².    Recommendations: Recommendation/Intervention: 1) Increase TPN: 157 g D + 44 g AA -Provides 709 kcals, 44g protein (GIR: 1.23) (50% of calorie and protein needs)   2) Continue regular diet w/ boost plus  - monitor renal labs  3) RD following  Goals: Meet % EEN/EPN by next RD f/u    Patient has been screened and assessed by RD. RD will follow patient.

## 2022-11-21 LAB
ALBUMIN SERPL BCP-MCNC: 1.9 G/DL (ref 3.5–5.2)
ANION GAP SERPL CALC-SCNC: 5 MMOL/L (ref 8–16)
ASCENDING AORTA: 3.74 CM
AV INDEX (PROSTH): 1.02
AV MEAN GRADIENT: 6 MMHG
AV PEAK GRADIENT: 8 MMHG
AV VALVE AREA: 4.72 CM2
AV VELOCITY RATIO: 0.96
BASOPHILS # BLD AUTO: 0.02 K/UL (ref 0–0.2)
BASOPHILS NFR BLD: 0.4 % (ref 0–1.9)
BILIRUB SERPL-MCNC: 0.5 MG/DL (ref 0.1–1)
BLD PROD TYP BPU: NORMAL
BLOOD UNIT EXPIRATION DATE: NORMAL
BLOOD UNIT TYPE CODE: 6200
BLOOD UNIT TYPE: NORMAL
BSA FOR ECHO PROCEDURE: 2.01 M2
BUN SERPL-MCNC: 8 MG/DL (ref 8–23)
CALCIUM SERPL-MCNC: 7.6 MG/DL (ref 8.7–10.5)
CHLORIDE SERPL-SCNC: 111 MMOL/L (ref 95–110)
CO2 SERPL-SCNC: 27 MMOL/L (ref 23–29)
CODING SYSTEM: NORMAL
CREAT SERPL-MCNC: 0.8 MG/DL (ref 0.5–1.4)
CV ECHO LV RWT: 0.41 CM
DIFFERENTIAL METHOD: ABNORMAL
DISPENSE STATUS: NORMAL
DOP CALC AO PEAK VEL: 1.39 M/S
DOP CALC AO VTI: 21.28 CM
DOP CALC LVOT AREA: 4.6 CM2
DOP CALC LVOT DIAMETER: 2.43 CM
DOP CALC LVOT PEAK VEL: 1.33 M/S
DOP CALC LVOT STROKE VOLUME: 100.36 CM3
DOP CALCLVOT PEAK VEL VTI: 21.65 CM
E/E' RATIO: 15.86 M/S
ECHO LV POSTERIOR WALL: 0.93 CM (ref 0.6–1.1)
EJECTION FRACTION: 70 %
EOSINOPHIL # BLD AUTO: 0.1 K/UL (ref 0–0.5)
EOSINOPHIL NFR BLD: 1.9 % (ref 0–8)
ERYTHROCYTE [DISTWIDTH] IN BLOOD BY AUTOMATED COUNT: 19.6 % (ref 11.5–14.5)
EST. GFR  (NO RACE VARIABLE): >60 ML/MIN/1.73 M^2
FERRITIN SERPL-MCNC: 693 NG/ML (ref 20–300)
FOLATE SERPL-MCNC: 6 NG/ML (ref 4–24)
FRACTIONAL SHORTENING: 45 % (ref 28–44)
GLUCOSE SERPL-MCNC: 100 MG/DL (ref 70–110)
HCT VFR BLD AUTO: 19.7 % (ref 37–48.5)
HGB BLD-MCNC: 6.1 G/DL (ref 12–16)
IMM GRANULOCYTES # BLD AUTO: 0.02 K/UL (ref 0–0.04)
IMM GRANULOCYTES NFR BLD AUTO: 0.4 % (ref 0–0.5)
INTERVENTRICULAR SEPTUM: 0.93 CM (ref 0.6–1.1)
IRON SERPL-MCNC: 40 UG/DL (ref 30–160)
LA MAJOR: 5.85 CM
LA MINOR: 5.49 CM
LA WIDTH: 4.42 CM
LEFT ATRIUM SIZE: 3.17 CM
LEFT ATRIUM VOLUME INDEX MOD: 40.3 ML/M2
LEFT ATRIUM VOLUME INDEX: 34.4 ML/M2
LEFT ATRIUM VOLUME MOD: 79.06 CM3
LEFT ATRIUM VOLUME: 67.46 CM3
LEFT INTERNAL DIMENSION IN SYSTOLE: 2.5 CM (ref 2.1–4)
LEFT VENTRICLE DIASTOLIC VOLUME INDEX: 48.03 ML/M2
LEFT VENTRICLE DIASTOLIC VOLUME: 94.13 ML
LEFT VENTRICLE MASS INDEX: 72 G/M2
LEFT VENTRICLE SYSTOLIC VOLUME INDEX: 11.4 ML/M2
LEFT VENTRICLE SYSTOLIC VOLUME: 22.35 ML
LEFT VENTRICULAR INTERNAL DIMENSION IN DIASTOLE: 4.53 CM (ref 3.5–6)
LEFT VENTRICULAR MASS: 140.34 G
LV LATERAL E/E' RATIO: 10.09 M/S
LV SEPTAL E/E' RATIO: 37 M/S
LYMPHOCYTES # BLD AUTO: 1.7 K/UL (ref 1–4.8)
LYMPHOCYTES NFR BLD: 34.6 % (ref 18–48)
MAGNESIUM SERPL-MCNC: 1.9 MG/DL (ref 1.6–2.6)
MCH RBC QN AUTO: 30 PG (ref 27–31)
MCHC RBC AUTO-ENTMCNC: 31 G/DL (ref 32–36)
MCV RBC AUTO: 97 FL (ref 82–98)
MONOCYTES # BLD AUTO: 0.8 K/UL (ref 0.3–1)
MONOCYTES NFR BLD: 15.8 % (ref 4–15)
MV PEAK E VEL: 1.11 M/S
NEUTROPHILS # BLD AUTO: 2.3 K/UL (ref 1.8–7.7)
NEUTROPHILS NFR BLD: 46.9 % (ref 38–73)
NRBC BLD-RTO: 0 /100 WBC
NUM UNITS TRANS PACKED RBC: NORMAL
OB PNL STL: POSITIVE
PHOSPHATE SERPL-MCNC: 2.3 MG/DL (ref 2.7–4.5)
PISA TR MAX VEL: 2.53 M/S
PLATELET # BLD AUTO: 113 K/UL (ref 150–450)
PMV BLD AUTO: 10.6 FL (ref 9.2–12.9)
POCT GLUCOSE: 119 MG/DL (ref 70–110)
POCT GLUCOSE: 127 MG/DL (ref 70–110)
POCT GLUCOSE: 138 MG/DL (ref 70–110)
POTASSIUM SERPL-SCNC: 3.9 MMOL/L (ref 3.5–5.1)
RA MAJOR: 5.22 CM
RA PRESSURE: 3 MMHG
RA WIDTH: 3.57 CM
RBC # BLD AUTO: 2.03 M/UL (ref 4–5.4)
RETICS/RBC NFR AUTO: 1.6 % (ref 0.5–2.5)
RIGHT VENTRICULAR END-DIASTOLIC DIMENSION: 2.34 CM
RV TISSUE DOPPLER FREE WALL SYSTOLIC VELOCITY 1 (APICAL 4 CHAMBER VIEW): 21.14 CM/S
SATURATED IRON: 129 % (ref 20–50)
SINUS: 3.58 CM
SODIUM SERPL-SCNC: 143 MMOL/L (ref 136–145)
STJ: 3.32 CM
TDI LATERAL: 0.11 M/S
TDI SEPTAL: 0.03 M/S
TDI: 0.07 M/S
TOTAL IRON BINDING CAPACITY: 31 UG/DL (ref 250–450)
TR MAX PG: 26 MMHG
TRANSFERRIN SERPL-MCNC: 21 MG/DL (ref 200–375)
TRICUSPID ANNULAR PLANE SYSTOLIC EXCURSION: 2.22 CM
TV REST PULMONARY ARTERY PRESSURE: 29 MMHG
VIT B12 SERPL-MCNC: 927 PG/ML (ref 210–950)
WBC # BLD AUTO: 4.86 K/UL (ref 3.9–12.7)

## 2022-11-21 PROCEDURE — 99233 PR SUBSEQUENT HOSPITAL CARE,LEVL III: ICD-10-PCS | Mod: ,,, | Performed by: INTERNAL MEDICINE

## 2022-11-21 PROCEDURE — 84466 ASSAY OF TRANSFERRIN: CPT | Performed by: INTERNAL MEDICINE

## 2022-11-21 PROCEDURE — P9047 ALBUMIN (HUMAN), 25%, 50ML: HCPCS | Mod: JG | Performed by: STUDENT IN AN ORGANIZED HEALTH CARE EDUCATION/TRAINING PROGRAM

## 2022-11-21 PROCEDURE — 25000003 PHARM REV CODE 250: Performed by: HOSPITALIST

## 2022-11-21 PROCEDURE — A4216 STERILE WATER/SALINE, 10 ML: HCPCS | Performed by: HOSPITALIST

## 2022-11-21 PROCEDURE — B4185 PARENTERAL SOL 10 GM LIPIDS: HCPCS | Performed by: INTERNAL MEDICINE

## 2022-11-21 PROCEDURE — 82247 BILIRUBIN TOTAL: CPT | Performed by: INTERNAL MEDICINE

## 2022-11-21 PROCEDURE — 82728 ASSAY OF FERRITIN: CPT | Performed by: INTERNAL MEDICINE

## 2022-11-21 PROCEDURE — 63600175 PHARM REV CODE 636 W HCPCS: Performed by: INTERNAL MEDICINE

## 2022-11-21 PROCEDURE — 83735 ASSAY OF MAGNESIUM: CPT | Performed by: INTERNAL MEDICINE

## 2022-11-21 PROCEDURE — P9016 RBC LEUKOCYTES REDUCED: HCPCS | Performed by: INTERNAL MEDICINE

## 2022-11-21 PROCEDURE — 82746 ASSAY OF FOLIC ACID SERUM: CPT | Performed by: INTERNAL MEDICINE

## 2022-11-21 PROCEDURE — 25000003 PHARM REV CODE 250: Performed by: INTERNAL MEDICINE

## 2022-11-21 PROCEDURE — 80069 RENAL FUNCTION PANEL: CPT | Performed by: INTERNAL MEDICINE

## 2022-11-21 PROCEDURE — 36430 TRANSFUSION BLD/BLD COMPNT: CPT

## 2022-11-21 PROCEDURE — 12000002 HC ACUTE/MED SURGE SEMI-PRIVATE ROOM

## 2022-11-21 PROCEDURE — 82272 OCCULT BLD FECES 1-3 TESTS: CPT | Performed by: INTERNAL MEDICINE

## 2022-11-21 PROCEDURE — 85025 COMPLETE CBC W/AUTO DIFF WBC: CPT | Performed by: INTERNAL MEDICINE

## 2022-11-21 PROCEDURE — 82607 VITAMIN B-12: CPT | Performed by: INTERNAL MEDICINE

## 2022-11-21 PROCEDURE — 99233 SBSQ HOSP IP/OBS HIGH 50: CPT | Mod: ,,, | Performed by: INTERNAL MEDICINE

## 2022-11-21 PROCEDURE — A4217 STERILE WATER/SALINE, 500 ML: HCPCS | Performed by: INTERNAL MEDICINE

## 2022-11-21 PROCEDURE — 85045 AUTOMATED RETICULOCYTE COUNT: CPT | Performed by: INTERNAL MEDICINE

## 2022-11-21 PROCEDURE — 63600175 PHARM REV CODE 636 W HCPCS: Mod: JG | Performed by: STUDENT IN AN ORGANIZED HEALTH CARE EDUCATION/TRAINING PROGRAM

## 2022-11-21 RX ORDER — HYDROCODONE BITARTRATE AND ACETAMINOPHEN 500; 5 MG/1; MG/1
TABLET ORAL
Status: DISCONTINUED | OUTPATIENT
Start: 2022-11-21 | End: 2022-11-23

## 2022-11-21 RX ADMIN — CHOLESTYRAMINE 4 G: 4 POWDER, FOR SUSPENSION ORAL at 06:11

## 2022-11-21 RX ADMIN — SOYBEAN OIL 250 ML: 20 INJECTION, SOLUTION INTRAVENOUS at 10:11

## 2022-11-21 RX ADMIN — ASPIRIN 81 MG: 81 TABLET, COATED ORAL at 08:11

## 2022-11-21 RX ADMIN — MICONAZOLE NITRATE 2 % TOPICAL POWDER: at 08:11

## 2022-11-21 RX ADMIN — FERROUS SULFATE TAB 325 MG (65 MG ELEMENTAL FE) 1 EACH: 325 (65 FE) TAB at 08:11

## 2022-11-21 RX ADMIN — MUPIROCIN: 20 OINTMENT TOPICAL at 09:11

## 2022-11-21 RX ADMIN — CHOLESTYRAMINE 4 G: 4 POWDER, FOR SUSPENSION ORAL at 03:11

## 2022-11-21 RX ADMIN — LOPERAMIDE HYDROCHLORIDE 2 MG: 2 CAPSULE ORAL at 08:11

## 2022-11-21 RX ADMIN — APIXABAN 5 MG: 5 TABLET, FILM COATED ORAL at 09:11

## 2022-11-21 RX ADMIN — MAGNESIUM SULFATE HEPTAHYDRATE: 500 INJECTION, SOLUTION INTRAMUSCULAR; INTRAVENOUS at 10:11

## 2022-11-21 RX ADMIN — DIPHENOXYLATE HYDROCHLORIDE AND ATROPINE SULFATE 10 ML: 2.5; .025 SOLUTION ORAL at 09:11

## 2022-11-21 RX ADMIN — MICONAZOLE NITRATE 2 % TOPICAL POWDER: at 09:11

## 2022-11-21 RX ADMIN — DIPHENOXYLATE HYDROCHLORIDE AND ATROPINE SULFATE 10 ML: 2.5; .025 SOLUTION ORAL at 05:11

## 2022-11-21 RX ADMIN — CHOLECALCIFEROL TAB 25 MCG (1000 UNIT) 2000 UNITS: 25 TAB at 08:11

## 2022-11-21 RX ADMIN — ALBUMIN (HUMAN) 25 G: 12.5 SOLUTION INTRAVENOUS at 08:11

## 2022-11-21 RX ADMIN — MUPIROCIN: 20 OINTMENT TOPICAL at 08:11

## 2022-11-21 RX ADMIN — Medication 10 ML: at 01:11

## 2022-11-21 RX ADMIN — LOPERAMIDE HYDROCHLORIDE 2 MG: 2 CAPSULE ORAL at 09:11

## 2022-11-21 RX ADMIN — POTASSIUM PHOSPHATE, MONOBASIC AND POTASSIUM PHOSPHATE, DIBASIC 20 MMOL: 224; 236 INJECTION, SOLUTION, CONCENTRATE INTRAVENOUS at 11:11

## 2022-11-21 RX ADMIN — DIPHENOXYLATE HYDROCHLORIDE AND ATROPINE SULFATE 10 ML: 2.5; .025 SOLUTION ORAL at 08:11

## 2022-11-21 RX ADMIN — LOPERAMIDE HYDROCHLORIDE 2 MG: 2 CAPSULE ORAL at 03:11

## 2022-11-21 RX ADMIN — ATORVASTATIN CALCIUM 40 MG: 20 TABLET, FILM COATED ORAL at 09:11

## 2022-11-21 RX ADMIN — Medication 10 ML: at 05:11

## 2022-11-21 RX ADMIN — CHOLESTYRAMINE 4 G: 4 POWDER, FOR SUSPENSION ORAL at 11:11

## 2022-11-21 RX ADMIN — APIXABAN 5 MG: 5 TABLET, FILM COATED ORAL at 08:11

## 2022-11-21 RX ADMIN — FUROSEMIDE 20 MG: 10 INJECTION, SOLUTION INTRAVENOUS at 03:11

## 2022-11-21 RX ADMIN — DIPHENOXYLATE HYDROCHLORIDE AND ATROPINE SULFATE 10 ML: 2.5; .025 SOLUTION ORAL at 03:11

## 2022-11-21 NOTE — NURSING
Physician notified of patient's H/H 6.1/19.7, and BP 96/54.  Patient is asymptomatic and has no complaints at this time.  New orders were to hold lasix for now and give with future blood transfusion.  Will continue to monitor.

## 2022-11-21 NOTE — PT/OT/SLP PROGRESS
Occupational Therapy      Patient Name:  Cherry Santiago   MRN:  [id number]    Patient not seen today secondary to pt having low H/H this AM and pt receiving blood this PM. Will follow-up as appropriate.    11/21/2022

## 2022-11-21 NOTE — TREATMENT PLAN
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 9/30 CT AP showed partial small bowel resection. There are some thickened segments of large and small bowel, some with adjacent mesenteric edema. Symptoms concerned likely though to be secondary to carcinoid syndrome patient evaluated by Heme-Onc at [facility] with PET scan done 10/18 - Focal nodular foci of radiotracer uptake at the pancreatic tail and near the region of the pancreatic head above liver background activity could indicate somatostatin receptor avid lesions, but no corresponding mass is seen on CT portion of the study.  Recommend follow-up contrast enhanced pancreas protocol CT to further assess. 5HIAA levels on 10/21 WNL. CT AP W contrast done 11/4 shows No pancreatic lesion and Enterocolitis,. recent EGD -Non-bleeding gastric ulcers with no stigmata of  bleeding. Biopsy neg for H pylori. Treated with a monopolar probe. Colonoscopy with biopsy -No significant pathologic abnormality. No morphologic evidence of active inflammatory bowel disease, microscopic colitis, or neoplasia. Patient was  referred to [facility] for further workup and has scheduled appointment at Heme-Onc at [facility] this upcoming week. Chromogranin A elevated at 2000s. C diff negative. Fecal fat level normal. Fecal elastase low. AES consulted for EUS and potential biopsy given that her PET scan showed uptake but no mass was seen on CT pancreas protocol. Started and discontinued octreotide since it failed to help. Repeat cryptosporidium Ag negative. 11/9 EUS - sleeve gastrectomy was found, characterized by healthy appearing mucosa. Widely patent duodenoenterostomy, characterized   by healthy appearing mucosa was found. few cystic lesions were seen in the pancreatic head, genu of the pancreas and pancreatic body highly suspicious for a branched intraductal papillary mucinous neoplasm. Quite small for sampling. Pancreatic head and pancreatic tail showed no mass. Oncology reviewed results on 11/12 and feel patient with low suspicion for neuroendocrine tumor at this point. CT chest with contrast 11/13 significant for subcentimeter nodules bilaterally. Surgery oncology consulted - testing thus far not convincing for need for surgical exploration. Will present to tumor board 11/21. Chomogranin A level elevated due to PPI. Somatostatin level normal. Tryptase negative. Insulin and proinsulin levels normal. VIPoma level pending, but can be elevated from PPI. Will need repeat gastrin and chromogranin level once off PPI for at least 2-4 weeks. EGD and small bowel biopsies done 11/17. Increase questran to TID. Continue loperamide 2 mg TID. Add lomotil 1 cap QID    Low fecal elastase - will discuss significance with GI. Consider starting pancreatic enzymes. Ferrtin slightly elevated 272 and high iron saturation 83, elevated gastrin (need repeat due to PPI), normal IgG and negative Tissue Transglutaminase Iga.     Hypotension - unclear etiology  Required ICU stay after not being responsive to IV fluids. Did not require pressors  Maybe due to neuroendocrine tumor (unlikely)  Malnutrition/refeeding - continue TPN  ECHO pending    Hypophosphatemia  Requires replacing  NaPhos 40 mmol  Consider refeeding    Hypomagnesemia  Replace by IV and through TPN    Severe protein-calorie malnutrition   Refeeding syndrome  Albumin 1.3, Prealbumin 7, and CRP 4.3. Lost about 37 lbs in 6 months. Malabsorption suspected. Small bowel biopsies 11/17 pending.   Eating 25-75% of meals  Starting TPN. May need several days of titration in anticipation of refeeding syndrome.   Will need f/u labs weekly, f/u oncology, pcp, surg  oncology, GI.     Measurements:  Wt Readings from Last 1 Encounters:   11/17/22 88.5 kg (195 lb)   Body mass index is 32.45 kg/m².    Recommendations: Recommendation/Intervention: 1) Increase TPN: 157 g D + 44 g AA -Provides 709 kcals, 44g protein (GIR: 1.23) (50% of calorie and protein needs)   2) Continue regular diet w/ boost plus  - monitor renal labs  3) RD following  Goals: Meet % EEN/EPN by next RD f/u    Patient has been screened and assessed by RD. RD will follow patient.    Hypocalcemia  mild  PTH elevated 114    Started on TPN    Hypokalemia  Replace by IV and through TPN    Orthostatic hypotension  Due to dehydration from diarrhea  IVF hydration for support  May improve with improved nutritional status    Dizziness  likely secondary to diarrhea. orthostasis. IV hydration    Cryptosporidium exposure  Repeat Ag neg, colon biopsies neg.     Seizures  continue with keppra BID    UTI (urinary tract infection)  Flank pain  11/8 UC with klebsiella; s/p x5 doses CTX - sensitive and transitioned to levoquin for total of 7 days.     Stage 3a chronic kidney disease  seems to be at baseline. Creatine stable for now. BMP reviewed- noted Estimated Creatinine Clearance: 60 mL/min (based on SCr of 1 mg/dL). according to latest data. Monitor UOP and serial BMP and adjust therapy as needed. Renally dose meds.  Kid bx 10/2022 - no amyloid, + diab nephropathy    Intractable back pain  Hs of OA spine, DJD, back surgery  Xray l spine 3/2021 - posterior fusion hardware from L2-L5 along with laminectomy change.  Grade 1 anterolistheses are suspected of L3 over L4 and L4 over L5.   Resolved with pain management and treatment of UTI    Chronic heart failure with preserved ejection fraction (HFpEF) NICM NYHA2   Patient admitted without  signs and symptoms of CHF exacerbation    Results for orders placed or performed during the hospital encounter of 11/06/22   Brain natriuretic peptide   Result Value Ref Range    BNP 82 0 -  99 pg/mL   No results found for this or any previous visit.  echo 10/7/22 left ventricle is normal in size. Global left ventricular systolic function is normal. The left ventricular ejection fraction is 55%. Left ventricular diastolic function is abnormal (stage I impaired relaxation). Noted left ventricular hypertrophy. Concentric left ventricular hypertrophy is present. It is mild. Mild (1+) tricuspid regurgitation. The right ventricle is normal in size. Right ventricular systolic function is normal.    Furosemide on hold due to hypovolemia    Chronic deep vein thrombosis (DVT): right common femoral vein  DVT sonogram 8/22 and 10/22 negative for DVT. On 11/7 DVT sonogram -Nonocclusive thrombus/DVT in the superior-mid right femoral vein. S/p IVC filter. Eliquis held intermittently due to procedures. Continue eliquis    Anemia of chronic disease  Elevated iron levels and ferritin  Required one unit PRBC for Hgb 6.7    Essential hypertension  Medications on hold due to hypotension      Class 1 obesity due to excess calories with serious comorbidity and body mass index (BMI) of 32.0 to 32.9 in adult  Patient now with sarcopenia due to diarrhea  - s/p Sleeve gastrectomy

## 2022-11-21 NOTE — PLAN OF CARE
Plan of care reviewed with patient.  Patient verbalized understanding and had no further questions. Patient continues to receive TPN/lipids throughout the shift.  Patient has 1/1 unit of PRBCs transfusing at this time.  PAtient now resting comfortably in bed locked in lowest position, side rails up x3, and call bell in reach.  Will continue to monitor.       Problem: Adult Inpatient Plan of Care  Goal: Plan of Care Review  Outcome: Ongoing, Progressing     Problem: Adult Inpatient Plan of Care  Goal: Patient-Specific Goal (Individualized)  Outcome: Ongoing, Progressing

## 2022-11-22 LAB
ALBUMIN SERPL BCP-MCNC: 2 G/DL (ref 3.5–5.2)
ANION GAP SERPL CALC-SCNC: 6 MMOL/L (ref 8–16)
BASOPHILS # BLD AUTO: 0.02 K/UL (ref 0–0.2)
BASOPHILS NFR BLD: 0.3 % (ref 0–1.9)
BUN SERPL-MCNC: 8 MG/DL (ref 8–23)
CALCIUM SERPL-MCNC: 7.9 MG/DL (ref 8.7–10.5)
CHLORIDE SERPL-SCNC: 108 MMOL/L (ref 95–110)
CO2 SERPL-SCNC: 28 MMOL/L (ref 23–29)
CREAT SERPL-MCNC: 0.9 MG/DL (ref 0.5–1.4)
DIFFERENTIAL METHOD: ABNORMAL
EOSINOPHIL # BLD AUTO: 0.1 K/UL (ref 0–0.5)
EOSINOPHIL NFR BLD: 1.3 % (ref 0–8)
ERYTHROCYTE [DISTWIDTH] IN BLOOD BY AUTOMATED COUNT: 19.5 % (ref 11.5–14.5)
EST. GFR  (NO RACE VARIABLE): >60 ML/MIN/1.73 M^2
GLUCOSE SERPL-MCNC: 144 MG/DL (ref 70–110)
HCT VFR BLD AUTO: 23.4 % (ref 37–48.5)
HGB BLD-MCNC: 7.3 G/DL (ref 12–16)
IMM GRANULOCYTES # BLD AUTO: 0.01 K/UL (ref 0–0.04)
IMM GRANULOCYTES NFR BLD AUTO: 0.2 % (ref 0–0.5)
LYMPHOCYTES # BLD AUTO: 1.9 K/UL (ref 1–4.8)
LYMPHOCYTES NFR BLD: 31.2 % (ref 18–48)
MAGNESIUM SERPL-MCNC: 2.1 MG/DL (ref 1.6–2.6)
MCH RBC QN AUTO: 29.9 PG (ref 27–31)
MCHC RBC AUTO-ENTMCNC: 31.2 G/DL (ref 32–36)
MCV RBC AUTO: 96 FL (ref 82–98)
MONOCYTES # BLD AUTO: 0.7 K/UL (ref 0.3–1)
MONOCYTES NFR BLD: 12.3 % (ref 4–15)
NEUTROPHILS # BLD AUTO: 3.3 K/UL (ref 1.8–7.7)
NEUTROPHILS NFR BLD: 54.7 % (ref 38–73)
NRBC BLD-RTO: 0 /100 WBC
PHOSPHATE SERPL-MCNC: 2.4 MG/DL (ref 2.7–4.5)
PLATELET # BLD AUTO: 132 K/UL (ref 150–450)
PMV BLD AUTO: 9.9 FL (ref 9.2–12.9)
POCT GLUCOSE: 123 MG/DL (ref 70–110)
POCT GLUCOSE: 133 MG/DL (ref 70–110)
POCT GLUCOSE: 135 MG/DL (ref 70–110)
POCT GLUCOSE: 146 MG/DL (ref 70–110)
POTASSIUM SERPL-SCNC: 3.6 MMOL/L (ref 3.5–5.1)
RBC # BLD AUTO: 2.44 M/UL (ref 4–5.4)
SODIUM SERPL-SCNC: 142 MMOL/L (ref 136–145)
WBC # BLD AUTO: 6.03 K/UL (ref 3.9–12.7)

## 2022-11-22 PROCEDURE — 83735 ASSAY OF MAGNESIUM: CPT | Performed by: INTERNAL MEDICINE

## 2022-11-22 PROCEDURE — 12000002 HC ACUTE/MED SURGE SEMI-PRIVATE ROOM

## 2022-11-22 PROCEDURE — 99233 SBSQ HOSP IP/OBS HIGH 50: CPT | Mod: ,,, | Performed by: INTERNAL MEDICINE

## 2022-11-22 PROCEDURE — A4216 STERILE WATER/SALINE, 10 ML: HCPCS | Performed by: HOSPITALIST

## 2022-11-22 PROCEDURE — 85025 COMPLETE CBC W/AUTO DIFF WBC: CPT | Performed by: INTERNAL MEDICINE

## 2022-11-22 PROCEDURE — 25000003 PHARM REV CODE 250: Performed by: HOSPITALIST

## 2022-11-22 PROCEDURE — 99233 PR SUBSEQUENT HOSPITAL CARE,LEVL III: ICD-10-PCS | Mod: ,,, | Performed by: INTERNAL MEDICINE

## 2022-11-22 PROCEDURE — 25000003 PHARM REV CODE 250: Performed by: INTERNAL MEDICINE

## 2022-11-22 PROCEDURE — 63600175 PHARM REV CODE 636 W HCPCS: Performed by: INTERNAL MEDICINE

## 2022-11-22 PROCEDURE — 80069 RENAL FUNCTION PANEL: CPT | Performed by: INTERNAL MEDICINE

## 2022-11-22 RX ORDER — FUROSEMIDE 10 MG/ML
20 INJECTION INTRAMUSCULAR; INTRAVENOUS ONCE
Status: COMPLETED | OUTPATIENT
Start: 2022-11-22 | End: 2022-11-22

## 2022-11-22 RX ORDER — SODIUM,POTASSIUM PHOSPHATES 280-250MG
2 POWDER IN PACKET (EA) ORAL
Status: DISCONTINUED | OUTPATIENT
Start: 2022-11-22 | End: 2022-11-25 | Stop reason: HOSPADM

## 2022-11-22 RX ORDER — POTASSIUM CHLORIDE 750 MG/1
30 CAPSULE, EXTENDED RELEASE ORAL
Status: DISCONTINUED | OUTPATIENT
Start: 2022-11-22 | End: 2022-11-25 | Stop reason: HOSPADM

## 2022-11-22 RX ADMIN — DIPHENOXYLATE HYDROCHLORIDE AND ATROPINE SULFATE 10 ML: 2.5; .025 SOLUTION ORAL at 09:11

## 2022-11-22 RX ADMIN — POTASSIUM & SODIUM PHOSPHATES POWDER PACK 280-160-250 MG 2 PACKET: 280-160-250 PACK at 11:11

## 2022-11-22 RX ADMIN — Medication 10 ML: at 02:11

## 2022-11-22 RX ADMIN — ASPIRIN 81 MG: 81 TABLET, COATED ORAL at 09:11

## 2022-11-22 RX ADMIN — Medication 10 ML: at 12:11

## 2022-11-22 RX ADMIN — LOPERAMIDE HYDROCHLORIDE 2 MG: 2 CAPSULE ORAL at 09:11

## 2022-11-22 RX ADMIN — DIPHENOXYLATE HYDROCHLORIDE AND ATROPINE SULFATE 10 ML: 2.5; .025 SOLUTION ORAL at 05:11

## 2022-11-22 RX ADMIN — APIXABAN 5 MG: 5 TABLET, FILM COATED ORAL at 09:11

## 2022-11-22 RX ADMIN — CHOLESTYRAMINE 4 G: 4 POWDER, FOR SUSPENSION ORAL at 06:11

## 2022-11-22 RX ADMIN — Medication 10 ML: at 06:11

## 2022-11-22 RX ADMIN — LOPERAMIDE HYDROCHLORIDE 2 MG: 2 CAPSULE ORAL at 02:11

## 2022-11-22 RX ADMIN — CHOLECALCIFEROL TAB 25 MCG (1000 UNIT) 2000 UNITS: 25 TAB at 09:11

## 2022-11-22 RX ADMIN — DIPHENOXYLATE HYDROCHLORIDE AND ATROPINE SULFATE 10 ML: 2.5; .025 SOLUTION ORAL at 02:11

## 2022-11-22 RX ADMIN — Medication 10 ML: at 05:11

## 2022-11-22 RX ADMIN — FERROUS SULFATE TAB 325 MG (65 MG ELEMENTAL FE) 1 EACH: 325 (65 FE) TAB at 09:11

## 2022-11-22 RX ADMIN — ATORVASTATIN CALCIUM 40 MG: 20 TABLET, FILM COATED ORAL at 09:11

## 2022-11-22 RX ADMIN — MUPIROCIN: 20 OINTMENT TOPICAL at 09:11

## 2022-11-22 RX ADMIN — POTASSIUM & SODIUM PHOSPHATES POWDER PACK 280-160-250 MG 2 PACKET: 280-160-250 PACK at 09:11

## 2022-11-22 RX ADMIN — FUROSEMIDE 20 MG: 10 INJECTION, SOLUTION INTRAMUSCULAR; INTRAVENOUS at 06:11

## 2022-11-22 RX ADMIN — MICONAZOLE NITRATE 2 % TOPICAL POWDER: at 09:11

## 2022-11-22 RX ADMIN — POTASSIUM & SODIUM PHOSPHATES POWDER PACK 280-160-250 MG 2 PACKET: 280-160-250 PACK at 05:11

## 2022-11-22 RX ADMIN — POTASSIUM CHLORIDE 30 MEQ: 10 CAPSULE, COATED, EXTENDED RELEASE ORAL at 11:11

## 2022-11-22 NOTE — PLAN OF CARE
There is a chance per Dr Saini that pt will dc on tpn CM will cont to monitor and setup needs for pt

## 2022-11-22 NOTE — PT/OT/SLP PROGRESS
Occupational Therapy      Patient Name:  Cherry Santiago   MRN:  [id number]    Patient not seen today secondary to Patient reporting fatigue due to pt taking shower earlier today. Pt actively on phone with family upon arrival of therapist and asked therapy to return tomorrow. Will follow-up as appropriate.    11/22/2022

## 2022-11-22 NOTE — PROGRESS NOTES
Hospital Medicine  Progress note    Team: [facility] HOSP MED Z Jennifer Saini MD  Admit Date: 11/6/2022    Principal Problem:  Diarrhea due to malabsorption    Interval hx:  Diarrhea improved this morning    ROS   Respiratory: neg for cough neg for shortness of breath  Cardiovascular: neg for chest pain neg for palpitations  Gastrointestinal: neg for nausea neg for vomiting, neg for abdominal pain neg for diarrhea neg for constipation   Behavioral/Psych: neg for depression neg for anxiety    PEx  Temp:  [98.8 °F (37.1 °C)-99.7 °F (37.6 °C)]   Pulse:  []   Resp:  [14-20]   BP: ()/(54-76)   SpO2:  [95 %-98 %]     Intake/Output Summary (Last 24 hours) at 11/21/2022 2324  Last data filed at 11/21/2022 1821  Gross per 24 hour   Intake 531.25 ml   Output --   Net 531.25 ml       General Appearance: no acute distress, WD, not malnourished appearing  Heart: regular rate and rhythm, no heave  Respiratory: Normal respiratory effort, symmetric excursion, bilateral vesicular breath sounds   Abdomen: Soft, non-tender; bowel sounds active  Skin: intact, no rash, no ulcers  Neurologic:  No focal numbness or weakness  Mental status: Alert, oriented x 4, affect appropriate     Recent Labs   Lab 11/19/22  0715 11/20/22  0317 11/21/22  0527   WBC 5.16 5.35 4.86   HGB 7.1* 6.9* 6.1*   HCT 22.4* 22.3* 19.7*   * 125* 113*       Recent Labs   Lab 11/19/22  0715 11/20/22  0317 11/21/22  0527    140 143   K 3.6 4.9 3.9   * 110 111*   CO2 24 24 27   BUN 7* 6* 8   CREATININE 1.1 1.1 0.8   * 403* 100   CALCIUM 7.0* 7.3* 7.6*   MG 2.0 2.3 1.9   PHOS 3.4 3.7 2.3*       Recent Labs   Lab 11/15/22  0751 11/17/22  0048 11/18/22  1102 11/19/22  0119 11/19/22  0715 11/20/22  0317 11/21/22  0527 11/21/22  1023   ALKPHOS 75 75  --  75  --   --   --   --    ALT 6* 7*  --  9*  --   --   --   --    AST 36 42*  --  58*  --   --   --   --    ALBUMIN 1.3* 1.3*   < > 1.3* 1.1* 1.5* 1.9*  --    PROT 4.0* 4.0*  --  4.1*  --   --    --   --    BILITOT 0.6 0.5  --  0.4  --   --   --  0.5    < > = values in this interval not displayed.          Recent Labs   Lab 11/20/22  0522 11/20/22  1156 11/20/22  1632 11/21/22  0813 11/21/22  1200 11/21/22  1709   POCTGLUCOSE 119* 124* 129* 119* 138* 127*         Scheduled Meds:   alteplase  2 mg Other Once    apixaban  5 mg Oral BID    aspirin  81 mg Oral Daily    atorvastatin  40 mg Oral QHS    cholestyramine  1 packet Oral TID AC    diphenoxylate-atropine 2.5-0.025 mg/5 ml  10 mL Oral QID    fat emulsion 20%  250 mL Intravenous Daily    ferrous sulfate  1 tablet Oral Daily    LIDOcaine  1 patch Transdermal Daily    loperamide  2 mg Oral TID    miconazole NITRATE 2 %   Topical (Top) BID    mupirocin   Nasal BID    sodium chloride 0.9%  10 mL Intravenous Q6H    vitamin D  2,000 Units Oral Daily     Continuous Infusions:   dextrose 10 % in water (D10W)      TPN ADULT CENTRAL LINE CUSTOM 45 mL/hr at 11/21/22 2203     As Needed:  sodium chloride, sodium chloride, aluminum-magnesium hydroxide-simethicone **AND** [COMPLETED] LIDOcaine HCl 2%, dextrose 10 % in water (D10W), dextrose 10%, dextrose 10%, glucagon (human recombinant), glucose, glucose, HYDROcodone-acetaminophen, insulin aspart U-100, naloxone, ondansetron, promethazine (PHENERGAN) IVPB, Flushing PICC Protocol **AND** sodium chloride 0.9% **AND** sodium chloride 0.9%    Assessment and Plan  / Problems managed today    * Diarrhea due to malabsorption  unlikely carcinoid  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 9/30 CT AP showed partial small bowel resection. There are some thickened segments of large and small bowel, some with adjacent mesenteric edema. Symptoms concerned likely though to be secondary to carcinoid syndrome  patient evaluated by Hem[facility]Onc at [facility] with PET scan done 10/18 - Focal nodular foci of radiotracer uptake at the pancreatic tail and near the region of the pancreatic head above liver background activity could indicate somatostatin receptor avid lesions, but no corresponding mass is seen on CT portion of the study.  Recommend follow-up contrast enhanced pancreas protocol CT to further assess. 5HIAA levels on 10/21 WNL. CT AP W contrast done 11/4 shows No pancreatic lesion and Enterocolitis,. recent EGD -Non-bleeding gastric ulcers with no stigmata of  bleeding. Biopsy neg for H pylori. Treated with a monopolar probe. Colonoscopy with biopsy -No significant pathologic abnormality. No morphologic evidence of active inflammatory bowel disease, microscopic colitis, or neoplasia. Patient was  referred to [facility] for further workup and has scheduled appointment at Heme-Onc at [facility] this upcoming week. Chromogranin A elevated at 2000s. C diff negative. Fecal fat level normal. Fecal elastase low. AES consulted for EUS and potential biopsy given that her PET scan showed uptake but no mass was seen on CT pancreas protocol. Started and discontinued octreotide since it failed to help. Repeat cryptosporidium Ag negative. 11/9 EUS - sleeve gastrectomy was found, characterized by healthy appearing mucosa. Widely patent duodenoenterostomy, characterized  by healthy appearing mucosa was found. few cystic lesions were seen in the pancreatic head, genu of the pancreas and pancreatic body highly suspicious for a branched intraductal papillary mucinous neoplasm. Quite small for sampling. Pancreatic head and pancreatic tail showed no mass. Oncology reviewed results on 11/12 and feel patient with low suspicion for neuroendocrine tumor at this point. CT chest with contrast 11/13 significant for subcentimeter nodules bilaterally. Surgery oncology consulted - testing thus far not convincing for  need for surgical exploration. Will present to tumor board 11/21. Chomogranin A level elevated due to PPI. Somatostatin level normal. Tryptase negative. Insulin and proinsulin levels normal. VIPoma level pending, but can be elevated from PPI. Will need repeat gastrin and chromogranin level once off PPI for at least 2-4 weeks. EGD and small bowel biopsies done 11/17. Increase questran to TID. Continue loperamide 2 mg TID. Add lomotil 1 cap QID    Low fecal elastase - will discuss significance with GI. Consider starting pancreatic enzymes. Ferrtin slightly elevated 272 and high iron saturation 83, elevated gastrin (need repeat due to PPI), normal IgG and negative Tissue Transglutaminase Iga.     Hypophosphatemia  Requires replacing  NaPhos 40 mmol  Consider refeeding    Hypomagnesemia  Replace by IV and through TPN    Severe protein-calorie malnutrition   Refeeding syndrome  Albumin 1.3, Prealbumin 7, and CRP 4.3. Lost about 37 lbs in 6 months. Malabsorption suspected. Small bowel biopsies 11/17 pending.   Eating 25-75% of meals  Starting TPN. May need several days of titration in anticipation of refeeding syndrome.   Will need f/u labs weekly, f/u oncology, pcp, surg oncology, GI.     Measurements:  Wt Readings from Last 1 Encounters:   11/17/22 88.5 kg (195 lb)   Body mass index is 32.45 kg/m².    Recommendations: Recommendation/Intervention: 1) Increase TPN: 157 g D + 44 g AA -Provides 709 kcals, 44g protein (GIR: 1.23) (50% of calorie and protein needs)   2) Continue regular diet w/ boost plus  - monitor renal labs  3) RD following  Goals: Meet % EEN/EPN by next RD f/u    Patient has been screened and assessed by RD. RD will follow patient.    Hypocalcemia  mild  PTH elevated 114    Started on TPN    Hypokalemia  Replace by IV and through TPN    Orthostatic hypotension  Due to dehydration from diarrhea  IVF hydration for support  May improve with improved nutritional status    Dizziness  likely secondary to  diarrhea. orthostasis. IV hydration    Cryptosporidium exposure  Repeat Ag neg, colon biopsies neg.     Seizures  continue with keppra BID    UTI (urinary tract infection)  Flank pain  11/8 UC with klebsiella; s/p x5 doses CTX - sensitive and transitioned to levoquin for total of 7 days.     Stage 3a chronic kidney disease  seems to be at baseline. Creatine stable for now. BMP reviewed- noted Estimated Creatinine Clearance: 60 mL/min (based on SCr of 1 mg/dL). according to latest data. Monitor UOP and serial BMP and adjust therapy as needed. Renally dose meds.  Kid bx 10/2022 - no amyloid, + diab nephropathy    Intractable back pain  Hs of OA spine, DJD, back surgery  Xray l spine 3/2021 - posterior fusion hardware from L2-L5 along with laminectomy change.  Grade 1 anterolistheses are suspected of L3 over L4 and L4 over L5.   Resolved with pain management and treatment of UTI    Chronic heart failure with preserved ejection fraction (HFpEF) NICM NYHA2   Patient admitted without  signs and symptoms of CHF exacerbation    Results for orders placed or performed during the hospital encounter of 11/06/22   Brain natriuretic peptide   Result Value Ref Range    BNP 82 0 - 99 pg/mL   No results found for this or any previous visit.  echo 10/7/22 left ventricle is normal in size. Global left ventricular systolic function is normal. The left ventricular ejection fraction is 55%. Left ventricular diastolic function is abnormal (stage I impaired relaxation). Noted left ventricular hypertrophy. Concentric left ventricular hypertrophy is present. It is mild. Mild (1+) tricuspid regurgitation. The right ventricle is normal in size. Right ventricular systolic function is normal.    Furosemide on hold due to hypovolemia    Chronic deep vein thrombosis (DVT): right common femoral vein  DVT sonogram 8/22 and 10/22 negative for DVT. On 11/7 DVT sonogram -Nonocclusive thrombus/DVT in the superior-mid right femoral vein. S/p IVC filter.  Eliquis held intermittently due to procedures. Continue eliquis    Anemia of chronic disease  Elevated iron levels and ferritin  Required one unit PRBC for Hgb 6.7    Essential hypertension  Medications on hold due to hypotension      Class 1 obesity due to excess calories with serious comorbidity and body mass index (BMI) of 32.0 to 32.9 in adult  Patient now with sarcopenia due to diarrhea  - s/p Sleeve gastrectomy        Discharge Planning   MELYSSA: 11/21/2022     Code Status: Full Code   Is the patient medically ready for discharge?: No    Reason for patient still in hospital (select all that apply): Treatment, Consult recommendations, and PT / OT recommendations  Discharge Plan A: Home with family   Discharge Delays: None known at this time    Diet:  regular diet with boost  GI PPx: not needed  DVT PPx:  apixaban  Airways: room air  Wounds: none    Goals of Care:  Return to prior functional status       Time (minutes) spent in care of the patient (Greater than 1/2 spent in direct face-to-face contact and care coordination on unit)  35 min    Jennifer Saini MD

## 2022-11-23 LAB
ABO + RH BLD: NORMAL
ALBUMIN SERPL BCP-MCNC: 1.7 G/DL (ref 3.5–5.2)
ANION GAP SERPL CALC-SCNC: 3 MMOL/L (ref 8–16)
BASOPHILS # BLD AUTO: 0.03 K/UL (ref 0–0.2)
BASOPHILS NFR BLD: 0.5 % (ref 0–1.9)
BLD GP AB SCN CELLS X3 SERPL QL: NORMAL
BLD PROD TYP BPU: NORMAL
BLOOD UNIT EXPIRATION DATE: NORMAL
BLOOD UNIT TYPE CODE: 6200
BLOOD UNIT TYPE: NORMAL
BUN SERPL-MCNC: 9 MG/DL (ref 8–23)
CALCIUM SERPL-MCNC: 7.7 MG/DL (ref 8.7–10.5)
CHLORIDE SERPL-SCNC: 107 MMOL/L (ref 95–110)
CO2 SERPL-SCNC: 29 MMOL/L (ref 23–29)
CODING SYSTEM: NORMAL
CREAT SERPL-MCNC: 0.8 MG/DL (ref 0.5–1.4)
DIFFERENTIAL METHOD: ABNORMAL
DISPENSE STATUS: NORMAL
EOSINOPHIL # BLD AUTO: 0.1 K/UL (ref 0–0.5)
EOSINOPHIL NFR BLD: 2.4 % (ref 0–8)
ERYTHROCYTE [DISTWIDTH] IN BLOOD BY AUTOMATED COUNT: 19.1 % (ref 11.5–14.5)
EST. GFR  (NO RACE VARIABLE): >60 ML/MIN/1.73 M^2
GLUCOSE SERPL-MCNC: 103 MG/DL (ref 70–110)
HCT VFR BLD AUTO: 21.2 % (ref 37–48.5)
HGB BLD-MCNC: 6.7 G/DL (ref 12–16)
IMM GRANULOCYTES # BLD AUTO: 0.02 K/UL (ref 0–0.04)
IMM GRANULOCYTES NFR BLD AUTO: 0.3 % (ref 0–0.5)
INR PPP: 1.3 (ref 0.8–1.2)
LYMPHOCYTES # BLD AUTO: 1.7 K/UL (ref 1–4.8)
LYMPHOCYTES NFR BLD: 29.5 % (ref 18–48)
MAGNESIUM SERPL-MCNC: 1.8 MG/DL (ref 1.6–2.6)
MCH RBC QN AUTO: 30.3 PG (ref 27–31)
MCHC RBC AUTO-ENTMCNC: 31.6 G/DL (ref 32–36)
MCV RBC AUTO: 96 FL (ref 82–98)
MONOCYTES # BLD AUTO: 0.8 K/UL (ref 0.3–1)
MONOCYTES NFR BLD: 13 % (ref 4–15)
NEUTROPHILS # BLD AUTO: 3.1 K/UL (ref 1.8–7.7)
NEUTROPHILS NFR BLD: 54.3 % (ref 38–73)
NRBC BLD-RTO: 0 /100 WBC
NUM UNITS TRANS PACKED RBC: NORMAL
PHOSPHATE SERPL-MCNC: 3.1 MG/DL (ref 2.7–4.5)
PLATELET # BLD AUTO: 131 K/UL (ref 150–450)
PMV BLD AUTO: 10.5 FL (ref 9.2–12.9)
POCT GLUCOSE: 112 MG/DL (ref 70–110)
POCT GLUCOSE: 120 MG/DL (ref 70–110)
POCT GLUCOSE: 120 MG/DL (ref 70–110)
POCT GLUCOSE: 122 MG/DL (ref 70–110)
POCT GLUCOSE: 130 MG/DL (ref 70–110)
POCT GLUCOSE: 134 MG/DL (ref 70–110)
POTASSIUM SERPL-SCNC: 3.9 MMOL/L (ref 3.5–5.1)
PROTHROMBIN TIME: 13 SEC (ref 9–12.5)
RBC # BLD AUTO: 2.21 M/UL (ref 4–5.4)
SODIUM SERPL-SCNC: 139 MMOL/L (ref 136–145)
WBC # BLD AUTO: 5.76 K/UL (ref 3.9–12.7)

## 2022-11-23 PROCEDURE — 99233 PR SUBSEQUENT HOSPITAL CARE,LEVL III: ICD-10-PCS | Mod: ,,, | Performed by: INTERNAL MEDICINE

## 2022-11-23 PROCEDURE — P9016 RBC LEUKOCYTES REDUCED: HCPCS | Performed by: INTERNAL MEDICINE

## 2022-11-23 PROCEDURE — 83735 ASSAY OF MAGNESIUM: CPT | Performed by: INTERNAL MEDICINE

## 2022-11-23 PROCEDURE — 25000003 PHARM REV CODE 250: Performed by: HOSPITALIST

## 2022-11-23 PROCEDURE — A4216 STERILE WATER/SALINE, 10 ML: HCPCS | Performed by: HOSPITALIST

## 2022-11-23 PROCEDURE — 99233 SBSQ HOSP IP/OBS HIGH 50: CPT | Mod: ,,, | Performed by: INTERNAL MEDICINE

## 2022-11-23 PROCEDURE — 25000003 PHARM REV CODE 250: Performed by: INTERNAL MEDICINE

## 2022-11-23 PROCEDURE — 12000002 HC ACUTE/MED SURGE SEMI-PRIVATE ROOM

## 2022-11-23 PROCEDURE — B4185 PARENTERAL SOL 10 GM LIPIDS: HCPCS | Performed by: INTERNAL MEDICINE

## 2022-11-23 PROCEDURE — 85610 PROTHROMBIN TIME: CPT | Performed by: INTERNAL MEDICINE

## 2022-11-23 PROCEDURE — 63600175 PHARM REV CODE 636 W HCPCS: Performed by: INTERNAL MEDICINE

## 2022-11-23 PROCEDURE — 85025 COMPLETE CBC W/AUTO DIFF WBC: CPT | Performed by: INTERNAL MEDICINE

## 2022-11-23 PROCEDURE — 36430 TRANSFUSION BLD/BLD COMPNT: CPT

## 2022-11-23 PROCEDURE — A4217 STERILE WATER/SALINE, 500 ML: HCPCS | Performed by: INTERNAL MEDICINE

## 2022-11-23 PROCEDURE — 80069 RENAL FUNCTION PANEL: CPT | Performed by: INTERNAL MEDICINE

## 2022-11-23 PROCEDURE — 86850 RBC ANTIBODY SCREEN: CPT | Performed by: INTERNAL MEDICINE

## 2022-11-23 RX ORDER — FUROSEMIDE 10 MG/ML
20 INJECTION INTRAMUSCULAR; INTRAVENOUS 2 TIMES DAILY
Status: COMPLETED | OUTPATIENT
Start: 2022-11-23 | End: 2022-11-23

## 2022-11-23 RX ORDER — HYDROCODONE BITARTRATE AND ACETAMINOPHEN 500; 5 MG/1; MG/1
TABLET ORAL
Status: DISCONTINUED | OUTPATIENT
Start: 2022-11-23 | End: 2022-11-25 | Stop reason: HOSPADM

## 2022-11-23 RX ADMIN — Medication 10 ML: at 11:11

## 2022-11-23 RX ADMIN — POTASSIUM & SODIUM PHOSPHATES POWDER PACK 280-160-250 MG 2 PACKET: 280-160-250 PACK at 12:11

## 2022-11-23 RX ADMIN — DIPHENOXYLATE HYDROCHLORIDE AND ATROPINE SULFATE 10 ML: 2.5; .025 SOLUTION ORAL at 09:11

## 2022-11-23 RX ADMIN — DIPHENOXYLATE HYDROCHLORIDE AND ATROPINE SULFATE 10 ML: 2.5; .025 SOLUTION ORAL at 06:11

## 2022-11-23 RX ADMIN — CHOLECALCIFEROL TAB 25 MCG (1000 UNIT) 2000 UNITS: 25 TAB at 09:11

## 2022-11-23 RX ADMIN — Medication 10 ML: at 06:11

## 2022-11-23 RX ADMIN — SOYBEAN OIL 250 ML: 20 INJECTION, SOLUTION INTRAVENOUS at 12:11

## 2022-11-23 RX ADMIN — FUROSEMIDE 20 MG: 10 INJECTION, SOLUTION INTRAMUSCULAR; INTRAVENOUS at 11:11

## 2022-11-23 RX ADMIN — MICONAZOLE NITRATE 2 % TOPICAL POWDER: at 09:11

## 2022-11-23 RX ADMIN — POTASSIUM & SODIUM PHOSPHATES POWDER PACK 280-160-250 MG 2 PACKET: 280-160-250 PACK at 06:11

## 2022-11-23 RX ADMIN — MAGNESIUM SULFATE HEPTAHYDRATE: 500 INJECTION, SOLUTION INTRAMUSCULAR; INTRAVENOUS at 09:11

## 2022-11-23 RX ADMIN — ASPIRIN 81 MG: 81 TABLET, COATED ORAL at 09:11

## 2022-11-23 RX ADMIN — Medication 10 ML: at 12:11

## 2022-11-23 RX ADMIN — FERROUS SULFATE TAB 325 MG (65 MG ELEMENTAL FE) 1 EACH: 325 (65 FE) TAB at 09:11

## 2022-11-23 RX ADMIN — LOPERAMIDE HYDROCHLORIDE 2 MG: 2 CAPSULE ORAL at 09:11

## 2022-11-23 RX ADMIN — Medication 10 ML: at 09:11

## 2022-11-23 RX ADMIN — MAGNESIUM SULFATE HEPTAHYDRATE: 500 INJECTION, SOLUTION INTRAMUSCULAR; INTRAVENOUS at 12:11

## 2022-11-23 RX ADMIN — APIXABAN 5 MG: 5 TABLET, FILM COATED ORAL at 09:11

## 2022-11-23 RX ADMIN — MUPIROCIN: 20 OINTMENT TOPICAL at 09:11

## 2022-11-23 RX ADMIN — DIPHENOXYLATE HYDROCHLORIDE AND ATROPINE SULFATE 10 ML: 2.5; .025 SOLUTION ORAL at 02:11

## 2022-11-23 RX ADMIN — POTASSIUM CHLORIDE 30 MEQ: 10 CAPSULE, COATED, EXTENDED RELEASE ORAL at 09:11

## 2022-11-23 RX ADMIN — FUROSEMIDE 20 MG: 10 INJECTION, SOLUTION INTRAMUSCULAR; INTRAVENOUS at 09:11

## 2022-11-23 RX ADMIN — LOPERAMIDE HYDROCHLORIDE 2 MG: 2 CAPSULE ORAL at 02:11

## 2022-11-23 RX ADMIN — ATORVASTATIN CALCIUM 40 MG: 20 TABLET, FILM COATED ORAL at 09:11

## 2022-11-23 NOTE — ASSESSMENT & PLAN NOTE
Nutrition Problem:  Severe Protein-Calorie Malnutrition  Malnutrition in the context of Chronic Illness/Injury    Related to (etiology):  Inadequate nutrient intake    Signs and Symptoms (as evidenced by):  Energy Intake: <75% of estimated energy requirement for > 1 month  Weight Loss: 16% x 6 months   Fluid Accumulation: mild    Interventions(treatment strategy):  Collaboration with other providers    Nutrition Diagnosis Status:  New

## 2022-11-23 NOTE — PROGRESS NOTES
Hospital Medicine  Progress note    Team: [facility] Jennifer Saini MD  Admit Date: 11/6/2022    Principal Problem:  Diarrhea due to malabsorption    Interval hx:  Diarrhea improved this morning    ROS   Respiratory: neg for cough neg for shortness of breath  Cardiovascular: neg for chest pain neg for palpitations  Gastrointestinal: neg for nausea neg for vomiting, neg for abdominal pain neg for diarrhea neg for constipation   Behavioral/Psych: neg for depression neg for anxiety    PEx  Temp:  [98.6 °F (37 °C)-99.7 °F (37.6 °C)]   Pulse:  []   Resp:  [16-20]   BP: ()/(51-72)   SpO2:  [91 %-96 %]     Intake/Output Summary (Last 24 hours) at 11/23/2022 0034  Last data filed at 11/22/2022 2345  Gross per 24 hour   Intake --   Output 700 ml   Net -700 ml       General Appearance: no acute distress, WD, not malnourished appearing  Heart: regular rate and rhythm, no heave  Respiratory: Normal respiratory effort, symmetric excursion, bilateral vesicular breath sounds   Abdomen: Soft, non-tender; bowel sounds active  Skin: intact, no rash, no ulcers  Neurologic:  No focal numbness or weakness  Mental status: Alert, oriented x 4, affect appropriate     Recent Labs   Lab 11/20/22  0317 11/21/22  0527 11/22/22  0702   WBC 5.35 4.86 6.03   HGB 6.9* 6.1* 7.3*   HCT 22.3* 19.7* 23.4*   * 113* 132*       Recent Labs   Lab 11/20/22  0317 11/21/22  0527 11/22/22  0702    143 142   K 4.9 3.9 3.6    111* 108   CO2 24 27 28   BUN 6* 8 8   CREATININE 1.1 0.8 0.9   * 100 144*   CALCIUM 7.3* 7.6* 7.9*   MG 2.3 1.9 2.1   PHOS 3.7 2.3* 2.4*       Recent Labs   Lab 11/17/22  0048 11/18/22  1102 11/19/22  0119 11/19/22  0715 11/20/22  0317 11/21/22  0527 11/21/22  1023 11/22/22  0702   ALKPHOS 75  --  75  --   --   --   --   --    ALT 7*  --  9*  --   --   --   --   --    AST 42*  --  58*  --   --   --   --   --    ALBUMIN 1.3*   < > 1.3*   < > 1.5* 1.9*  --  2.0*   PROT 4.0*  --  4.1*  --   --   --    --   --    BILITOT 0.5  --  0.4  --   --   --  0.5  --     < > = values in this interval not displayed.          Recent Labs   Lab 11/21/22  1200 11/21/22  1709 11/22/22  0843 11/22/22  1129 11/22/22  1521 11/22/22 2006   POCTGLUCOSE 138* 127* 123* 133* 146* 135*         Scheduled Meds:   alteplase  2 mg Other Once    apixaban  5 mg Oral BID    aspirin  81 mg Oral Daily    atorvastatin  40 mg Oral QHS    diphenoxylate-atropine 2.5-0.025 mg/5 ml  10 mL Oral QID    fat emulsion 20%  250 mL Intravenous Daily    ferrous sulfate  1 tablet Oral Daily    LIDOcaine  1 patch Transdermal Daily    loperamide  2 mg Oral TID    miconazole NITRATE 2 %   Topical (Top) BID    mupirocin   Nasal BID    potassium chloride  30 mEq Oral Daily with breakfast    potassium, sodium phosphates  2 packet Oral QID (AC & HS)    sodium chloride 0.9%  10 mL Intravenous Q6H    vitamin D  2,000 Units Oral Daily     Continuous Infusions:   dextrose 10 % in water (D10W)      TPN ADULT CENTRAL LINE CUSTOM 45 mL/hr at 11/23/22 0028     As Needed:  sodium chloride, sodium chloride, aluminum-magnesium hydroxide-simethicone **AND** [COMPLETED] LIDOcaine HCl 2%, dextrose 10 % in water (D10W), dextrose 10%, dextrose 10%, glucagon (human recombinant), glucose, glucose, HYDROcodone-acetaminophen, insulin aspart U-100, naloxone, ondansetron, promethazine (PHENERGAN) IVPB, Flushing PICC Protocol **AND** sodium chloride 0.9% **AND** sodium chloride 0.9%    Assessment and Plan  / Problems managed today    * Diarrhea due to malabsorption  unlikely carcinoid  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 9/30 CT AP showed partial small bowel resection. There are some thickened segments of large and small bowel, some with adjacent mesenteric  edema. Symptoms concerned likely though to be secondary to carcinoid syndrome patient evaluated by Heme-Onc at [facility] with PET scan done 10/18 - Focal nodular foci of radiotracer uptake at the pancreatic tail and near the region of the pancreatic head above liver background activity could indicate somatostatin receptor avid lesions, but no corresponding mass is seen on CT portion of the study.  Recommend follow-up contrast enhanced pancreas protocol CT to further assess. 5HIAA levels on 10/21 WNL. CT AP W contrast done 11/4 shows No pancreatic lesion and Enterocolitis,. recent EGD -Non-bleeding gastric ulcers with no stigmata of  bleeding. Biopsy neg for H pylori. Treated with a monopolar probe. Colonoscopy with biopsy -No significant pathologic abnormality. No morphologic evidence of active inflammatory bowel disease, microscopic colitis, or neoplasia. Patient was  referred to [facility] for further workup and has scheduled appointment at [facility] at [facility] this upcoming week. Chromogranin A elevated at 2000s. C diff negative. Fecal fat level normal. Fecal elastase low. AES consulted for EUS and potential biopsy given that her PET scan showed uptake but no mass was seen on CT pancreas protocol. Started and discontinued octreotide since it failed to help. Repeat cryptosporidium Ag negative. 11/9 EUS - sleeve gastrectomy was found, characterized by healthy appearing mucosa. Widely patent duodenoenterostomy, characterized  by healthy appearing mucosa was found. few cystic lesions were seen in the pancreatic head, genu of the pancreas and pancreatic body highly suspicious for a branched intraductal papillary mucinous neoplasm. Quite small for sampling. Pancreatic head and pancreatic tail showed no mass. Oncology reviewed results on 11/12 and feel patient with low suspicion for neuroendocrine tumor at this point. CT chest with contrast 11/13 significant for subcentimeter nodules  bilaterally. Surgery oncology consulted - testing thus far not convincing for need for surgical exploration. Will present to tumor board 11/21. Chomogranin A level elevated due to PPI. Somatostatin level normal. Tryptase negative. Insulin and proinsulin levels normal. VIPoma level pending, but can be elevated from PPI. Will need repeat gastrin and chromogranin level once off PPI for at least 2-4 weeks. EGD and small bowel biopsies done 11/17. Increase questran to TID. Continue loperamide 2 mg TID. Add lomotil 1 cap QID    Low fecal elastase - will discuss significance with GI. Consider starting pancreatic enzymes. Ferrtin slightly elevated 272 and high iron saturation 83, elevated gastrin (need repeat due to PPI), normal IgG and negative Tissue Transglutaminase Iga.     Hypophosphatemia  Requires replacing  NaPhos 40 mmol  Consider refeeding    Hypomagnesemia  Replace by IV and through TPN    Severe protein-calorie malnutrition   Refeeding syndrome  Albumin 1.3, Prealbumin 7, and CRP 4.3. Lost about 37 lbs in 6 months. Malabsorption suspected. Small bowel biopsies 11/17 pending.   Eating 25-75% of meals  Starting TPN. May need several days of titration in anticipation of refeeding syndrome.   Will need f/u labs weekly, f/u oncology, pcp, surg oncology, GI.     Measurements:  Wt Readings from Last 1 Encounters:   11/17/22 88.5 kg (195 lb)   Body mass index is 32.45 kg/m².    Recommendations: Recommendation/Intervention: 1) Increase TPN: 157 g D + 44 g AA -Provides 709 kcals, 44g protein (GIR: 1.23) (50% of calorie and protein needs)   2) Continue regular diet w/ boost plus  - monitor renal labs  3) RD following  Goals: Meet % EEN/EPN by next RD f/u    Patient has been screened and assessed by RD. RD will follow patient.    Hypocalcemia  mild  PTH elevated 114    Started on TPN    Hypokalemia  Replace by IV and through TPN    Orthostatic hypotension  Due to dehydration from diarrhea  IVF hydration for  support  May improve with improved nutritional status    Dizziness  likely secondary to diarrhea. orthostasis. IV hydration    Cryptosporidium exposure  Repeat Ag neg, colon biopsies neg.     Seizures  continue with keppra BID    UTI (urinary tract infection)  Flank pain  11/8 UC with klebsiella; s/p x5 doses CTX - sensitive and transitioned to levoquin for total of 7 days.     Stage 3a chronic kidney disease  seems to be at baseline. Creatine stable for now. BMP reviewed- noted Estimated Creatinine Clearance: 60 mL/min (based on SCr of 1 mg/dL). according to latest data. Monitor UOP and serial BMP and adjust therapy as needed. Renally dose meds.  Kid bx 10/2022 - no amyloid, + diab nephropathy    Intractable back pain  Hs of OA spine, DJD, back surgery  Xray l spine 3/2021 - posterior fusion hardware from L2-L5 along with laminectomy change.  Grade 1 anterolistheses are suspected of L3 over L4 and L4 over L5.   Resolved with pain management and treatment of UTI    Chronic heart failure with preserved ejection fraction (HFpEF) NICM NYHA2   Patient admitted without  signs and symptoms of CHF exacerbation    Results for orders placed or performed during the hospital encounter of 11/06/22   Brain natriuretic peptide   Result Value Ref Range    BNP 82 0 - 99 pg/mL   No results found for this or any previous visit.  echo 10/7/22 left ventricle is normal in size. Global left ventricular systolic function is normal. The left ventricular ejection fraction is 55%. Left ventricular diastolic function is abnormal (stage I impaired relaxation). Noted left ventricular hypertrophy. Concentric left ventricular hypertrophy is present. It is mild. Mild (1+) tricuspid regurgitation. The right ventricle is normal in size. Right ventricular systolic function is normal.    Furosemide on hold due to hypovolemia    Chronic deep vein thrombosis (DVT): right common femoral vein  DVT sonogram 8/22 and 10/22 negative for DVT. On 11/7 DVT  sonogram -Nonocclusive thrombus/DVT in the superior-mid right femoral vein. S/p IVC filter. Eliquis held intermittently due to procedures. Continue eliquis    Anemia of chronic disease  Elevated iron levels and ferritin  Required one unit PRBC for Hgb 6.7    Essential hypertension  Medications on hold due to hypotension      Class 1 obesity due to excess calories with serious comorbidity and body mass index (BMI) of 32.0 to 32.9 in adult  Patient now with sarcopenia due to diarrhea  - s/p Sleeve gastrectomy        Discharge Planning   MELYSSA: 11/25/2022     Code Status: Full Code   Is the patient medically ready for discharge?: No    Reason for patient still in hospital (select all that apply): Treatment, Consult recommendations, and PT / OT recommendations  Discharge Plan A: Home with family   Discharge Delays: None known at this time    Diet:  regular diet with boost  GI PPx: not needed  DVT PPx:  apixaban  Airways: room air  Wounds: none    Goals of Care:  Return to prior functional status       Time (minutes) spent in care of the patient (Greater than 1/2 spent in direct face-to-face contact and care coordination on unit)  35 min    Jennifer Saini MD

## 2022-11-23 NOTE — PLAN OF CARE
SW met with the patient at bedside to discuss  services. Patient reports being current with [facility] via [facility], pending  orders ([phone]).       Rosario Le LMSW  Case Management   [facility]   Ext. [phone]

## 2022-11-23 NOTE — PLAN OF CARE
Problem: Adult Inpatient Plan of Care  Goal: Plan of Care Review  Outcome: Ongoing, Progressing  Flowsheets (Taken 11/23/2022 0706)  Plan of Care Reviewed With:   patient   daughter  Goal: Patient-Specific Goal (Individualized)  Outcome: Ongoing, Progressing  Goal: Absence of Hospital-Acquired Illness or Injury  Outcome: Ongoing, Progressing  Intervention: Identify and Manage Fall Risk  Flowsheets (Taken 11/23/2022 0706)  Safety Promotion/Fall Prevention:   assistive device/personal item within reach   instructed to call staff for mobility  Intervention: Prevent Skin Injury  Flowsheets (Taken 11/23/2022 0706)  Skin Protection: tubing/devices free from skin contact  Intervention: Prevent and Manage VTE (Venous Thromboembolism) Risk  Flowsheets (Taken 11/23/2022 0706)  Activity Management:   Ambulated -L4   Ambulated to bathroom - L4  VTE Prevention/Management: bleeding risk assessed

## 2022-11-23 NOTE — PLAN OF CARE
Recommendations     1. Continue Regular diet + Boost Plus TID       2. Continue current TPN regimen: 200g D, 100g AA to provide 1080 kcal. GIR 1.57mg/kg/min    - May d/c if PO intake >60% of EEN       3. Add Multivitamin and vitamin B12 to prevent deficiency     4. Calorie count in progress, RD to collect on 11/25       4. RD to monitor and follow     Goals: Meet % EEN/EPN by next RD f/u  Nutrition Goal Status: progressing towards goal  Communication of RD Recs: reviewed with physician

## 2022-11-23 NOTE — CONSULTS
[facility] - Intensive Care ([facility]  Adult Nutrition  Consult Note    SUMMARY     Recommendations    1. Continue Regular diet + Boost Plus TID      2. Continue current TPN regimen: 200g D, 100g AA to provide 1080 kcal. GIR 1.57mg/kg/min    - May d/c if PO intake >60% of EEN      3. Add Multivitamin and vitamin B12 to prevent deficiency    4. Calorie count in progress, RD to collect on 11/25      4. RD to monitor and follow    Goals: Meet % EEN/EPN by next RD f/u  Nutrition Goal Status: progressing towards goal  Communication of RD Recs: reviewed with physician    Assessment and Plan    Nutrition Problem:  Severe Protein-Calorie Malnutrition  Malnutrition in the context of Chronic Illness/Injury    Related to (etiology):  Inadequate nutrient intake    Signs and Symptoms (as evidenced by):  Energy Intake: <75% of estimated energy requirement for > 1 month  Weight Loss: 16% x 6 months   Fluid Accumulation: mild    Interventions(treatment strategy):  Collaboration with other providers    Nutrition Diagnosis Status:  New    Reason for Assessment    Reason For Assessment: consult  Diagnosis: other (see comments) (Carcinoid syndrome related chronic diarrhea)  Relevant Medical History: HTN, seizures, CKD-3a, HFpef, Anemia  Interdisciplinary Rounds: did not attend    General Information Comments:  RD consulted for the needs of TPN. Pt with hx of sleeve gastrectomy. Pt was on TPN and Renal/Cardiac diet during RD visits. Pt reports low appetite, only tolerate a few bites of food each meals. Pt stated food was too bland and unappetizing. Communicated with MD and liberalized diet to Regular diet to maximize food selection. Diarrhea improved with medication. Per MD, consider starting pancreatic enzymes. Denies N/V, chewing/swallowing difficulties. Calorie count in progress. Pt agreeable to ONS. Per wt hx, noted wt loss of 37lb (16%) x 6 months (significant). Pt with 2+ edema per chart. NFPE completed today, pt  "appears nourished physically. Pt meet the criteria for severe malnutrition, please see PES statement for details.     Nutrition Discharge Planning: adeqaute intake    Nutrition Risk Screen    Nutrition Risk Screen: no indicators present    Nutrition/Diet History    Spiritual, Cultural Beliefs, Buddhist Practices, Values that Affect Care: no  Food Allergies: NKFA    Anthropometrics    Temp: 98.3 °F (36.8 °C)  Height Method: Stated  Height: 5' 5" (165.1 cm)  Height (inches): 65 in  Weight Method: Bed Scale  Weight: 88.5 kg (195 lb)  Weight (lb): 195 lb  Ideal Body Weight (IBW), Female: 125 lb  % Ideal Body Weight, Female (lb): 156 %  BMI (Calculated): 32.4  BMI Grade: 30 - 34.9- obesity - grade I     Lab/Procedures/Meds    Pertinent Labs Reviewed: reviewed  Pertinent Labs Comments: -  Pertinent Medications Reviewed: reviewed  Pertinent Medications Comments: furosemide, KCl, vit D, ferrous sulfatem atorvastatin    Estimated/Assessed Needs    Weight Used For Calorie Calculations: 88.5 kg (195 lb 1.7 oz)  Energy Calorie Requirements (kcal): 1420  Energy Need Method: [employer]  Protein Requirements: 88-97 (1-1.1 g/kg)  Weight Used For Protein Calculations: 88.5 kg (195 lb 1.7 oz)  RDA Method (mL): 1420     Nutrition Prescription Ordered    Current Diet Order: Regular  Oral Nutrition Supplement: Boost plus    Evaluation of Received Nutrient/Fluid Intake    Parenteral Calories (kcal): 1080  Parenteral Protein (gm): 100  % Kcal Needs: 76  % Protein Needs: 100  I/O: -  Energy Calories Required: not meeting needs  Protein Required: not meeting needs  Comments: LBM 11/22  Tolerance: not tolerating    Nutrition Risk    Level of Risk/Frequency of Follow-up: high       Monitor and Evaluation    Food and Nutrient Intake: energy intake, food and beverage intake, parenteral nutrition intake  Food and Nutrient Adminstration: diet order, enteral and parenteral nutrition administration  Knowledge/Beliefs/Attitudes: food and " nutrition knowledge/skill, beliefs and attitudes  Physical Activity and Function: nutrition-related ADLs and IADLs  Anthropometric Measurements: height/length, weight, body mass index, weight change  Biochemical Data, Medical Tests and Procedures: electrolyte and renal panel, gastrointestinal profile, glucose/endocrine profile, inflammatory profile, lipid profile  Nutrition-Focused Physical Findings: overall appearance       Nutrition Follow-Up    RD Follow-up?: Yes    Lindsey Iniguez,  Registration Eligible, Provisional LDN

## 2022-11-24 PROBLEM — J96.01 ACUTE HYPOXEMIC RESPIRATORY FAILURE: Status: RESOLVED | Noted: 2022-11-19 | Resolved: 2022-11-24

## 2022-11-24 PROBLEM — E87.8 REFEEDING SYNDROME: Status: RESOLVED | Noted: 2022-11-18 | Resolved: 2022-11-24

## 2022-11-24 LAB
ALBUMIN SERPL BCP-MCNC: 1.7 G/DL (ref 3.5–5.2)
ANION GAP SERPL CALC-SCNC: 5 MMOL/L (ref 8–16)
BACTERIA BLD CULT: NORMAL
BACTERIA BLD CULT: NORMAL
BASOPHILS # BLD AUTO: 0.03 K/UL (ref 0–0.2)
BASOPHILS NFR BLD: 0.6 % (ref 0–1.9)
BUN SERPL-MCNC: 10 MG/DL (ref 8–23)
CALCIUM SERPL-MCNC: 7.6 MG/DL (ref 8.7–10.5)
CHLORIDE SERPL-SCNC: 104 MMOL/L (ref 95–110)
CO2 SERPL-SCNC: 30 MMOL/L (ref 23–29)
CREAT SERPL-MCNC: 0.8 MG/DL (ref 0.5–1.4)
DIFFERENTIAL METHOD: ABNORMAL
EOSINOPHIL # BLD AUTO: 0.2 K/UL (ref 0–0.5)
EOSINOPHIL NFR BLD: 3.4 % (ref 0–8)
ERYTHROCYTE [DISTWIDTH] IN BLOOD BY AUTOMATED COUNT: 21.2 % (ref 11.5–14.5)
EST. GFR  (NO RACE VARIABLE): >60 ML/MIN/1.73 M^2
GLUCOSE SERPL-MCNC: 104 MG/DL (ref 70–110)
HCT VFR BLD AUTO: 22.5 % (ref 37–48.5)
HGB BLD-MCNC: 6.9 G/DL (ref 12–16)
IMM GRANULOCYTES # BLD AUTO: 0.01 K/UL (ref 0–0.04)
IMM GRANULOCYTES NFR BLD AUTO: 0.2 % (ref 0–0.5)
LYMPHOCYTES # BLD AUTO: 1.8 K/UL (ref 1–4.8)
LYMPHOCYTES NFR BLD: 34.4 % (ref 18–48)
MAGNESIUM SERPL-MCNC: 1.6 MG/DL (ref 1.6–2.6)
MCH RBC QN AUTO: 29 PG (ref 27–31)
MCHC RBC AUTO-ENTMCNC: 30.7 G/DL (ref 32–36)
MCV RBC AUTO: 95 FL (ref 82–98)
MONOCYTES # BLD AUTO: 0.7 K/UL (ref 0.3–1)
MONOCYTES NFR BLD: 13.9 % (ref 4–15)
NEUTROPHILS # BLD AUTO: 2.5 K/UL (ref 1.8–7.7)
NEUTROPHILS NFR BLD: 47.5 % (ref 38–73)
NRBC BLD-RTO: 0 /100 WBC
PHOSPHATE SERPL-MCNC: 3.8 MG/DL (ref 2.7–4.5)
PLATELET # BLD AUTO: 137 K/UL (ref 150–450)
PMV BLD AUTO: 9.7 FL (ref 9.2–12.9)
POCT GLUCOSE: 115 MG/DL (ref 70–110)
POCT GLUCOSE: 122 MG/DL (ref 70–110)
POCT GLUCOSE: 125 MG/DL (ref 70–110)
POCT GLUCOSE: 135 MG/DL (ref 70–110)
POCT GLUCOSE: 140 MG/DL (ref 70–110)
POCT GLUCOSE: 172 MG/DL (ref 70–110)
POCT GLUCOSE: 98 MG/DL (ref 70–110)
POTASSIUM SERPL-SCNC: 3.9 MMOL/L (ref 3.5–5.1)
RBC # BLD AUTO: 2.38 M/UL (ref 4–5.4)
SODIUM SERPL-SCNC: 139 MMOL/L (ref 136–145)
WBC # BLD AUTO: 5.26 K/UL (ref 3.9–12.7)

## 2022-11-24 PROCEDURE — 83735 ASSAY OF MAGNESIUM: CPT | Performed by: INTERNAL MEDICINE

## 2022-11-24 PROCEDURE — 25000003 PHARM REV CODE 250: Performed by: HOSPITALIST

## 2022-11-24 PROCEDURE — 63600175 PHARM REV CODE 636 W HCPCS: Performed by: HOSPITALIST

## 2022-11-24 PROCEDURE — 85025 COMPLETE CBC W/AUTO DIFF WBC: CPT | Performed by: INTERNAL MEDICINE

## 2022-11-24 PROCEDURE — 99232 PR SUBSEQUENT HOSPITAL CARE,LEVL II: ICD-10-PCS | Mod: ,,, | Performed by: HOSPITALIST

## 2022-11-24 PROCEDURE — 80069 RENAL FUNCTION PANEL: CPT | Performed by: INTERNAL MEDICINE

## 2022-11-24 PROCEDURE — A4216 STERILE WATER/SALINE, 10 ML: HCPCS | Performed by: HOSPITALIST

## 2022-11-24 PROCEDURE — 12000002 HC ACUTE/MED SURGE SEMI-PRIVATE ROOM

## 2022-11-24 PROCEDURE — 25000003 PHARM REV CODE 250: Performed by: INTERNAL MEDICINE

## 2022-11-24 PROCEDURE — 99232 SBSQ HOSP IP/OBS MODERATE 35: CPT | Mod: ,,, | Performed by: HOSPITALIST

## 2022-11-24 PROCEDURE — A4217 STERILE WATER/SALINE, 500 ML: HCPCS | Performed by: HOSPITALIST

## 2022-11-24 RX ORDER — LANOLIN ALCOHOL/MO/W.PET/CERES
1000 CREAM (GRAM) TOPICAL DAILY
Status: DISCONTINUED | OUTPATIENT
Start: 2022-11-24 | End: 2022-11-25 | Stop reason: HOSPADM

## 2022-11-24 RX ADMIN — DIPHENOXYLATE HYDROCHLORIDE AND ATROPINE SULFATE 10 ML: 2.5; .025 SOLUTION ORAL at 05:11

## 2022-11-24 RX ADMIN — FERROUS SULFATE TAB 325 MG (65 MG ELEMENTAL FE) 1 EACH: 325 (65 FE) TAB at 08:11

## 2022-11-24 RX ADMIN — DIPHENOXYLATE HYDROCHLORIDE AND ATROPINE SULFATE 10 ML: 2.5; .025 SOLUTION ORAL at 08:11

## 2022-11-24 RX ADMIN — Medication 10 ML: at 12:11

## 2022-11-24 RX ADMIN — MICONAZOLE NITRATE 2 % TOPICAL POWDER: at 08:11

## 2022-11-24 RX ADMIN — POTASSIUM & SODIUM PHOSPHATES POWDER PACK 280-160-250 MG 2 PACKET: 280-160-250 PACK at 09:11

## 2022-11-24 RX ADMIN — POTASSIUM & SODIUM PHOSPHATES POWDER PACK 280-160-250 MG 2 PACKET: 280-160-250 PACK at 06:11

## 2022-11-24 RX ADMIN — APIXABAN 5 MG: 5 TABLET, FILM COATED ORAL at 08:11

## 2022-11-24 RX ADMIN — MICONAZOLE NITRATE 2 % TOPICAL POWDER: at 09:11

## 2022-11-24 RX ADMIN — DIPHENOXYLATE HYDROCHLORIDE AND ATROPINE SULFATE 10 ML: 2.5; .025 SOLUTION ORAL at 02:11

## 2022-11-24 RX ADMIN — Medication 10 ML: at 05:11

## 2022-11-24 RX ADMIN — CHOLECALCIFEROL TAB 25 MCG (1000 UNIT) 2000 UNITS: 25 TAB at 08:11

## 2022-11-24 RX ADMIN — CYANOCOBALAMIN TAB 1000 MCG 1000 MCG: 1000 TAB at 08:11

## 2022-11-24 RX ADMIN — HYDROCODONE BITARTRATE AND ACETAMINOPHEN 1 TABLET: 7.5; 325 TABLET ORAL at 06:11

## 2022-11-24 RX ADMIN — DIPHENOXYLATE HYDROCHLORIDE AND ATROPINE SULFATE 10 ML: 2.5; .025 SOLUTION ORAL at 09:11

## 2022-11-24 RX ADMIN — THERA TABS 1 TABLET: TAB at 08:11

## 2022-11-24 RX ADMIN — ATORVASTATIN CALCIUM 40 MG: 20 TABLET, FILM COATED ORAL at 09:11

## 2022-11-24 RX ADMIN — POTASSIUM & SODIUM PHOSPHATES POWDER PACK 280-160-250 MG 2 PACKET: 280-160-250 PACK at 05:11

## 2022-11-24 RX ADMIN — MAGNESIUM SULFATE HEPTAHYDRATE: 500 INJECTION, SOLUTION INTRAMUSCULAR; INTRAVENOUS at 10:11

## 2022-11-24 RX ADMIN — POTASSIUM CHLORIDE 30 MEQ: 10 CAPSULE, COATED, EXTENDED RELEASE ORAL at 08:11

## 2022-11-24 RX ADMIN — POTASSIUM & SODIUM PHOSPHATES POWDER PACK 280-160-250 MG 2 PACKET: 280-160-250 PACK at 11:11

## 2022-11-24 RX ADMIN — APIXABAN 5 MG: 5 TABLET, FILM COATED ORAL at 09:11

## 2022-11-24 RX ADMIN — ASPIRIN 81 MG: 81 TABLET, COATED ORAL at 08:11

## 2022-11-24 NOTE — PROGRESS NOTES
Hospital Medicine  Progress note    Team: [facility] MED Z Jennifer Saini MD  Admit Date: 11/6/2022    Principal Problem:  Diarrhea due to malabsorption    Interval hx:  Diarrhea improved this morning    ROS   Respiratory: neg for cough neg for shortness of breath  Cardiovascular: neg for chest pain neg for palpitations  Gastrointestinal: neg for nausea neg for vomiting, neg for abdominal pain neg for diarrhea neg for constipation   Behavioral/Psych: neg for depression neg for anxiety    PEx  Temp:  [97.5 °F (36.4 °C)-99.4 °F (37.4 °C)]   Pulse:  []   Resp:  [17-19]   BP: ()/(50-73)   SpO2:  [91 %-100 %]     Intake/Output Summary (Last 24 hours) at 11/24/2022 0058  Last data filed at 11/23/2022 2255  Gross per 24 hour   Intake 613.33 ml   Output 1950 ml   Net -1336.67 ml       General Appearance: no acute distress, WD, not malnourished appearing  Heart: regular rate and rhythm, no heave  Respiratory: Normal respiratory effort, symmetric excursion, bilateral vesicular breath sounds   Abdomen: Soft, non-tender; bowel sounds active  Skin: intact, no rash, no ulcers  Neurologic:  No focal numbness or weakness  Mental status: Alert, oriented x 4, affect appropriate     Recent Labs   Lab 11/21/22  0527 11/22/22  0702 11/23/22  0643   WBC 4.86 6.03 5.76   HGB 6.1* 7.3* 6.7*   HCT 19.7* 23.4* 21.2*   * 132* 131*       Recent Labs   Lab 11/21/22  0527 11/22/22  0702 11/23/22  0643    142 139   K 3.9 3.6 3.9   * 108 107   CO2 27 28 29   BUN 8 8 9   CREATININE 0.8 0.9 0.8    144* 103   CALCIUM 7.6* 7.9* 7.7*   MG 1.9 2.1 1.8   PHOS 2.3* 2.4* 3.1       Recent Labs   Lab 11/19/22  0119 11/19/22  0715 11/21/22  0527 11/21/22  1023 11/22/22  0702 11/23/22  0643 11/23/22  1127   ALKPHOS 75  --   --   --   --   --   --    ALT 9*  --   --   --   --   --   --    AST 58*  --   --   --   --   --   --    ALBUMIN 1.3*   < > 1.9*  --  2.0* 1.7*  --    PROT 4.1*  --   --   --   --   --   --    BILITOT 0.4   --   --  0.5  --   --   --    INR  --   --   --   --   --   --  1.3*    < > = values in this interval not displayed.          Recent Labs   Lab 11/23/22  0500 11/23/22  0852 11/23/22  1133 11/23/22  1652 11/23/22  1943 11/24/22  0000   POCTGLUCOSE 120* 120* 130* 112* 134* 98         Scheduled Meds:   alteplase  2 mg Other Once    apixaban  5 mg Oral BID    aspirin  81 mg Oral Daily    atorvastatin  40 mg Oral QHS    cyanocobalamin  1,000 mcg Oral Daily    diphenoxylate-atropine 2.5-0.025 mg/5 ml  10 mL Oral QID    ferrous sulfate  1 tablet Oral Daily    LIDOcaine  1 patch Transdermal Daily    miconazole NITRATE 2 %   Topical (Top) BID    multivitamin  1 tablet Oral Daily    potassium chloride  30 mEq Oral Daily with breakfast    potassium, sodium phosphates  2 packet Oral QID (AC & HS)    sodium chloride 0.9%  10 mL Intravenous Q6H    vitamin D  2,000 Units Oral Daily     Continuous Infusions:   dextrose 10 % in water (D10W)      TPN ADULT CENTRAL LINE CUSTOM 45 mL/hr at 11/23/22 2116     As Needed:  sodium chloride, aluminum-magnesium hydroxide-simethicone **AND** [COMPLETED] LIDOcaine HCl 2%, dextrose 10 % in water (D10W), dextrose 10%, dextrose 10%, glucagon (human recombinant), glucose, glucose, HYDROcodone-acetaminophen, insulin aspart U-100, naloxone, ondansetron, promethazine (PHENERGAN) IVPB, Flushing PICC Protocol **AND** sodium chloride 0.9% **AND** sodium chloride 0.9%    Assessment and Plan  / Problems managed today    * Diarrhea due to malabsorption  unlikely neuroendocrine tumor  Responding to anti-diarrheals loperamide, cholestyramine and lomotil. Responded very well with lomotil. Stopped cholestyramine. Will trial off loperamide  Extensive work up remarkable for low pancreatic elastase. Per GI, no need to start pancreatic enzymes as her diarrhea has improved    Hypophosphatemia  Requires replacing  NaPhos 40 mmol  Added po    Hypomagnesemia  Replace by IV and through TPN    Severe protein-calorie  malnutrition   Refeeding syndrome - resolved  Albumin 1.3, Prealbumin 7, and CRP 4.3. Lost about 37 lbs in 6 months. Malabsorption suspected. Small bowel biopsies 11/17 pending.   Eating 25-75% of meals  Started on TPN prior to improvement of diarrhea  Per GI, TPN not needed if able to take adequate nutrition orally  Lipids stopped to encourage hunger. Continue TPN for protein replacement.   RD consulted for calorie count. If she is able to eat >60% of meals, no need for TPN    Measurements:  Wt Readings from Last 1 Encounters:   11/17/22 88.5 kg (195 lb)   Body mass index is 32.45 kg/m².    Hypocalcemia  mild  PTH elevated 114    Started on TPN    Hypokalemia  Replace by IV and through TPN. Added po    Orthostatic hypotension  Due to dehydration and malnutrition from diarrhea  IVF hydration for support  May improve with improved nutritional status  Was in ICU night of 11/19 due to hypotension with failure to respond to fluids - arrived to ICU and BP slightly improved to where she did not require pressures. She was given albumin infusions and stepped to floor the following day.   BP remains low normal or slightly hypotension    Dizziness  likely secondary to diarrhea. orthostasis. IV hydration    Cryptosporidium exposure  Repeat Ag neg, colon biopsies neg.     Seizures  continue with keppra BID    UTI (urinary tract infection)  Flank pain  11/8 UC with klebsiella; s/p x5 doses CTX - sensitive and transitioned to levoquin for total of 7 days.     Stage 3a chronic kidney disease  seems to be at baseline. Creatine stable for now. BMP reviewed- noted Estimated Creatinine Clearance: 60 mL/min (based on SCr of 1 mg/dL). according to latest data. Monitor UOP and serial BMP and adjust therapy as needed. Renally dose meds.  Kid bx 10/2022 - no amyloid, + diab nephropathy    Intractable back pain  Hs of OA spine, DJD, back surgery  Xray l spine 3/2021 - posterior fusion hardware from L2-L5 along with laminectomy change.   Grade 1 anterolistheses are suspected of L3 over L4 and L4 over L5.   Resolved with pain management and treatment of UTI    Chronic heart failure with preserved ejection fraction (HFpEF) NICM NYHA2   Patient admitted without  signs and symptoms of CHF exacerbation    Results for orders placed or performed during the hospital encounter of 11/06/22   Brain natriuretic peptide   Result Value Ref Range    BNP 82 0 - 99 pg/mL   No results found for this or any previous visit.  echo 10/7/22 left ventricle is normal in size. Global left ventricular systolic function is normal. The left ventricular ejection fraction is 55%. Left ventricular diastolic function is abnormal (stage I impaired relaxation). Noted left ventricular hypertrophy. Concentric left ventricular hypertrophy is present. It is mild. Mild (1+) tricuspid regurgitation. The right ventricle is normal in size. Right ventricular systolic function is normal.    Furosemide IV intermittently given due to symptomatic swelling BLE as tolerated    Chronic deep vein thrombosis (DVT): right common femoral vein  DVT sonogram 8/22 and 10/22 negative for DVT. On 11/7 DVT sonogram -Nonocclusive thrombus/DVT in the superior-mid right femoral vein. S/p IVC filter. Eliquis held intermittently due to procedures. Continue eliquis    Anemia of chronic disease  Elevated iron levels and ferritin  blood loss not apparent.  Likely due to malnutrition  Required intermittent PRBCs transfusions    Essential hypertension  Medications on hold due to hypotension    Class 1 obesity due to excess calories with serious comorbidity and body mass index (BMI) of 32.0 to 32.9 in adult  Patient now with sarcopenia due to diarrhea  - s/p Sleeve gastrectomy      Discharge Planning   MELYSSA: 11/25/2022     Code Status: Full Code   Is the patient medically ready for discharge?: No    Reason for patient still in hospital (select all that apply): Treatment, Consult recommendations, and PT / OT  recommendations  Discharge Plan A: Home with family   Discharge Delays: None known at this time    Diet:  regular diet with boost  GI PPx: not needed  DVT PPx:  apixaban  Airways: room air  Wounds: none    Goals of Care:  Return to prior functional status       Time (minutes) spent in care of the patient (Greater than 1/2 spent in direct face-to-face contact and care coordination on unit)  35 min    Jennifer Saini MD

## 2022-11-24 NOTE — PLAN OF CARE
Problem: Adult Inpatient Plan of Care  Goal: Plan of Care Review  11/23/2022 1937 by Yolanda Lagunas RN  Outcome: Ongoing, Progressing  Flowsheets (Taken 11/23/2022 1937)  Plan of Care Reviewed With: patient  11/23/2022 0706 by Yolanda Lagunas RN  Outcome: Ongoing, Progressing  Flowsheets (Taken 11/23/2022 0706)  Plan of Care Reviewed With:   patient   daughter  Goal: Patient-Specific Goal (Individualized)  11/23/2022 1937 by Yolanda Lagunas RN  Outcome: Ongoing, Progressing  11/23/2022 0706 by Yolanda Lagunas RN  Outcome: Ongoing, Progressing  Goal: Absence of Hospital-Acquired Illness or Injury  11/23/2022 1937 by Yolanda Lagunas RN  Outcome: Ongoing, Progressing  11/23/2022 0706 by Yolanda Lagunas RN  Outcome: Ongoing, Progressing  Intervention: Identify and Manage Fall Risk  11/23/2022 1937 by Yolanda Lagunas RN  Flowsheets (Taken 11/23/2022 1937)  Safety Promotion/Fall Prevention:   assistive device/personal item within reach   side rails raised x 2  11/23/2022 0706 by Yolanda Lagunas RN  Flowsheets (Taken 11/23/2022 0706)  Safety Promotion/Fall Prevention:   assistive device/personal item within reach   instructed to call staff for mobility  Intervention: Prevent Skin Injury  11/23/2022 1937 by Yolanda Lagunas RN  Flowsheets (Taken 11/23/2022 1937)  Skin Protection: tubing/devices free from skin contact  11/23/2022 0706 by Yolanda Lagunas RN  Flowsheets (Taken 11/23/2022 0706)  Skin Protection: tubing/devices free from skin contact  Intervention: Prevent and Manage VTE (Venous Thromboembolism) Risk  Flowsheets (Taken 11/23/2022 0706)  Activity Management:   Ambulated -L4   Ambulated to bathroom - L4  VTE Prevention/Management: bleeding risk assessed

## 2022-11-25 VITALS
HEIGHT: 65 IN | DIASTOLIC BLOOD PRESSURE: 57 MMHG | WEIGHT: 195 LBS | TEMPERATURE: 99 F | SYSTOLIC BLOOD PRESSURE: 93 MMHG | OXYGEN SATURATION: 94 % | HEART RATE: 113 BPM | RESPIRATION RATE: 16 BRPM | BODY MASS INDEX: 32.49 KG/M2

## 2022-11-25 LAB
ALBUMIN SERPL BCP-MCNC: 1.7 G/DL (ref 3.5–5.2)
ANION GAP SERPL CALC-SCNC: 5 MMOL/L (ref 8–16)
BASOPHILS # BLD AUTO: 0.03 K/UL (ref 0–0.2)
BASOPHILS NFR BLD: 0.6 % (ref 0–1.9)
BUN SERPL-MCNC: 13 MG/DL (ref 8–23)
CALCIUM SERPL-MCNC: 7.7 MG/DL (ref 8.7–10.5)
CHLORIDE SERPL-SCNC: 101 MMOL/L (ref 95–110)
CO2 SERPL-SCNC: 32 MMOL/L (ref 23–29)
CREAT SERPL-MCNC: 0.7 MG/DL (ref 0.5–1.4)
DIFFERENTIAL METHOD: ABNORMAL
EOSINOPHIL # BLD AUTO: 0.1 K/UL (ref 0–0.5)
EOSINOPHIL NFR BLD: 2.8 % (ref 0–8)
ERYTHROCYTE [DISTWIDTH] IN BLOOD BY AUTOMATED COUNT: 19.8 % (ref 11.5–14.5)
EST. GFR  (NO RACE VARIABLE): >60 ML/MIN/1.73 M^2
GLUCOSE SERPL-MCNC: 104 MG/DL (ref 70–110)
HCT VFR BLD AUTO: 23.7 % (ref 37–48.5)
HGB BLD-MCNC: 7.1 G/DL (ref 12–16)
IMM GRANULOCYTES # BLD AUTO: 0.02 K/UL (ref 0–0.04)
IMM GRANULOCYTES NFR BLD AUTO: 0.4 % (ref 0–0.5)
LYMPHOCYTES # BLD AUTO: 1.8 K/UL (ref 1–4.8)
LYMPHOCYTES NFR BLD: 34.6 % (ref 18–48)
MAGNESIUM SERPL-MCNC: 1.9 MG/DL (ref 1.6–2.6)
MCH RBC QN AUTO: 29.2 PG (ref 27–31)
MCHC RBC AUTO-ENTMCNC: 30 G/DL (ref 32–36)
MCV RBC AUTO: 98 FL (ref 82–98)
MONOCYTES # BLD AUTO: 0.8 K/UL (ref 0.3–1)
MONOCYTES NFR BLD: 15.7 % (ref 4–15)
NEUTROPHILS # BLD AUTO: 2.3 K/UL (ref 1.8–7.7)
NEUTROPHILS NFR BLD: 45.9 % (ref 38–73)
NRBC BLD-RTO: 0 /100 WBC
PHOSPHATE SERPL-MCNC: 3.8 MG/DL (ref 2.7–4.5)
PLATELET # BLD AUTO: 145 K/UL (ref 150–450)
PMV BLD AUTO: 9.6 FL (ref 9.2–12.9)
POCT GLUCOSE: 112 MG/DL (ref 70–110)
POCT GLUCOSE: 113 MG/DL (ref 70–110)
POTASSIUM SERPL-SCNC: 4.4 MMOL/L (ref 3.5–5.1)
RBC # BLD AUTO: 2.43 M/UL (ref 4–5.4)
SODIUM SERPL-SCNC: 138 MMOL/L (ref 136–145)
WBC # BLD AUTO: 5.08 K/UL (ref 3.9–12.7)

## 2022-11-25 PROCEDURE — A4216 STERILE WATER/SALINE, 10 ML: HCPCS | Performed by: HOSPITALIST

## 2022-11-25 PROCEDURE — 85025 COMPLETE CBC W/AUTO DIFF WBC: CPT | Performed by: INTERNAL MEDICINE

## 2022-11-25 PROCEDURE — 99238 PR HOSPITAL DISCHARGE DAY,<30 MIN: ICD-10-PCS | Mod: ,,, | Performed by: HOSPITALIST

## 2022-11-25 PROCEDURE — 25000003 PHARM REV CODE 250: Performed by: HOSPITALIST

## 2022-11-25 PROCEDURE — 99238 HOSP IP/OBS DSCHRG MGMT 30/<: CPT | Mod: ,,, | Performed by: HOSPITALIST

## 2022-11-25 PROCEDURE — 80069 RENAL FUNCTION PANEL: CPT | Performed by: HOSPITALIST

## 2022-11-25 PROCEDURE — 25000003 PHARM REV CODE 250: Performed by: INTERNAL MEDICINE

## 2022-11-25 PROCEDURE — 83735 ASSAY OF MAGNESIUM: CPT | Performed by: HOSPITALIST

## 2022-11-25 RX ORDER — POTASSIUM CHLORIDE 750 MG/1
20 CAPSULE, EXTENDED RELEASE ORAL
Qty: 40 CAPSULE | Refills: 0 | Status: SHIPPED | OUTPATIENT
Start: 2022-11-26 | End: 2022-12-16

## 2022-11-25 RX ORDER — CHOLECALCIFEROL (VITAMIN D3) 50 MCG
2000 TABLET ORAL DAILY
Qty: 30 TABLET | Refills: 6 | Status: SHIPPED | OUTPATIENT
Start: 2022-11-26

## 2022-11-25 RX ORDER — LANOLIN ALCOHOL/MO/W.PET/CERES
1000 CREAM (GRAM) TOPICAL DAILY
Qty: 30 TABLET | Refills: 3 | Status: SHIPPED | OUTPATIENT
Start: 2022-11-26

## 2022-11-25 RX ORDER — DIPHENOXYLATE HCL/ATROPINE 2.5-.025/5
5 LIQUID (ML) ORAL 4 TIMES DAILY PRN
Qty: 400 ML | Refills: 0 | Status: SHIPPED | OUTPATIENT
Start: 2022-11-25 | End: 2022-12-05

## 2022-11-25 RX ORDER — DIPHENOXYLATE HCL/ATROPINE 2.5-.025/5
5 LIQUID (ML) ORAL 4 TIMES DAILY PRN
Qty: 400 ML | Refills: 0 | Status: SHIPPED | OUTPATIENT
Start: 2022-11-25 | End: 2022-11-25 | Stop reason: SDUPTHER

## 2022-11-25 RX ADMIN — POTASSIUM & SODIUM PHOSPHATES POWDER PACK 280-160-250 MG 2 PACKET: 280-160-250 PACK at 05:11

## 2022-11-25 RX ADMIN — CHOLECALCIFEROL TAB 25 MCG (1000 UNIT) 2000 UNITS: 25 TAB at 08:11

## 2022-11-25 RX ADMIN — POTASSIUM CHLORIDE 30 MEQ: 10 CAPSULE, COATED, EXTENDED RELEASE ORAL at 08:11

## 2022-11-25 RX ADMIN — ASPIRIN 81 MG: 81 TABLET, COATED ORAL at 08:11

## 2022-11-25 RX ADMIN — FERROUS SULFATE TAB 325 MG (65 MG ELEMENTAL FE) 1 EACH: 325 (65 FE) TAB at 08:11

## 2022-11-25 RX ADMIN — Medication 10 ML: at 08:11

## 2022-11-25 RX ADMIN — MICONAZOLE NITRATE 2 % TOPICAL POWDER: at 08:11

## 2022-11-25 RX ADMIN — APIXABAN 5 MG: 5 TABLET, FILM COATED ORAL at 08:11

## 2022-11-25 RX ADMIN — DIPHENOXYLATE HYDROCHLORIDE AND ATROPINE SULFATE 10 ML: 2.5; .025 SOLUTION ORAL at 12:11

## 2022-11-25 RX ADMIN — THERA TABS 1 TABLET: TAB at 08:11

## 2022-11-25 RX ADMIN — POTASSIUM & SODIUM PHOSPHATES POWDER PACK 280-160-250 MG 2 PACKET: 280-160-250 PACK at 11:11

## 2022-11-25 RX ADMIN — DIPHENOXYLATE HYDROCHLORIDE AND ATROPINE SULFATE 10 ML: 2.5; .025 SOLUTION ORAL at 08:11

## 2022-11-25 RX ADMIN — Medication 10 ML: at 11:11

## 2022-11-25 RX ADMIN — CYANOCOBALAMIN TAB 1000 MCG 1000 MCG: 1000 TAB at 08:11

## 2022-11-25 RX ADMIN — Medication 10 ML: at 05:11

## 2022-11-25 NOTE — PLAN OF CARE
SSC met with patient/family at bedside. Patient experience rounding completed and reviewed the following.     Do you know your discharge plan? Yes  If yes, what is the plan? (Home)    If you are discharging home, do you have help at home?  No  Do you think you will need help at home at discharge? No     Have you discussed your needs and preferences with your SW/CM? Yes     Assigned SW/CM notified of any patient/family needs or concerns. No needs/concerns

## 2022-11-25 NOTE — PLAN OF CARE
[facility] - [facility] ([place] [address])      HOME HEALTH ORDERS  FACE TO FACE ENCOUNTER    Patient Name: Cherry Santiago  YOB: 1955    PCP: Amarilys Romero MD   PCP Address: [facility] [address] [address]  PCP Phone Number: [phone]  PCP Fax: [phone]    Encounter Date: 11/6/22    Admit to Home Health    Diagnoses:  Active Hospital Problems    Diagnosis  POA    *Diarrhea due to malabsorption [K90.9, R19.7]  Yes     Priority: 1 - High    Severe protein-calorie malnutrition  [E43]  Yes     Priority: 2     Anemia of chronic disease [D63.8]  Yes     Priority: 3     Chronic deep vein thrombosis (DVT): right common femoral vein [I82.509]  Yes     Priority: 4     Hypocalcemia [E83.51]  Yes     Priority: 5     Stage 3a chronic kidney disease [N18.31]  Yes     Priority: 6     Hypotension [I95.9]  No    Hypophosphatemia [E83.39]  Yes    Cryptosporidium exposure [Z20.89]  Yes    Orthostatic hypotension [I95.1]  Yes    Seizures [R56.9]  Yes    Hypomagnesemia [E83.42]  Yes    Intractable back pain [M54.9]  Yes    Chronic heart failure with preserved ejection fraction (HFpEF) NICM NYHA2  [I50.32]  Yes    Dizziness [R42]  Yes    Hypokalemia [E87.6]  Yes    Class 1 obesity due to excess calories with serious comorbidity and body mass index (BMI) of 32.0 to 32.9 in adult [E66.09, Z68.32]  Not Applicable    Essential hypertension [I10]  Yes     Chronic      Resolved Hospital Problems    Diagnosis Date Resolved POA    Acute hypoxemic respiratory failure [J96.01] 11/24/2022 No    Refeeding syndrome [E87.8] 11/24/2022 Yes    Type 2 diabetes mellitus, without long-term current use of insulin [E11.9] 11/06/2022 Yes     Chronic       Follow Up Appointments:  Future Appointments   Date Time Provider Department Center   12/23/2022 11:00 AM Lupis Beck MD [facility]       Allergies:Review of patient's allergies indicates:  No Known Allergies    Medications: Review discharge medications  with patient and family and provide education.    Current Facility-Administered Medications   Medication Dose Route Frequency Provider Last Rate Last Admin    0.9%  NaCl infusion (for blood administration)   Intravenous Q24H PRN Jennifer Saini MD        aluminum-magnesium hydroxide-simethicone 200-200-20 mg/5 mL suspension 30 mL  30 mL Oral Q6H PRN René Murphy MD        apixaban tablet 5 mg  5 mg Oral BID Jennifer Saini MD   5 mg at 11/25/22 0821    aspirin EC tablet 81 mg  81 mg Oral Daily Kyle Vera MD   81 mg at 11/25/22 0823    atorvastatin tablet 40 mg  40 mg Oral QHS Kyle Vera MD   40 mg at 11/24/22 2111    cyanocobalamin tablet 1,000 mcg  1,000 mcg Oral Daily Jennifer Saini MD   1,000 mcg at 11/25/22 0820    dextrose 10 % infusion   Intravenous Continuous PRN Paola Benavides MD        dextrose 10% bolus 125 mL  12.5 g Intravenous PRN Kyle Vera MD        dextrose 10% bolus 250 mL  25 g Intravenous PRN Kyle Vera MD        diphenoxylate-atropine 2.5-0.025 mg/5 ml liquid 10 mL  10 mL Oral QID Jennifer Saini MD   10 mL at 11/25/22 1249    ferrous sulfate tablet 1 each  1 tablet Oral Daily Kyle Vera MD   1 each at 11/25/22 0821    glucagon (human recombinant) injection 1 mg  1 mg Intramuscular PRN Kyle Vera MD        glucose chewable tablet 16 g  16 g Oral PRN Kyle Vera MD        glucose chewable tablet 24 g  24 g Oral PRN Kyle Vera MD        HYDROcodone-acetaminophen 7.5-325 mg per tablet 1 tablet  1 tablet Oral Q6H PRN Sonia Gramajo MD   1 tablet at 11/24/22 1832    insulin aspart U-100 pen 1-10 Units  1-10 Units Subcutaneous Q4H PRN Paola Benavides MD        LIDOcaine 5 % patch 1 patch  1 patch Transdermal Daily René Murphy MD   1 patch at 11/19/22 0804    miconazole NITRATE 2 % top powder   Topical (Top) BID Kyle Vera MD   Given at 11/25/22 0824    multivitamin tablet  1 tablet Oral Daily Jennifer Saini MD   1 tablet at  11/25/22 0821    naloxone 0.4 mg/mL injection 0.02 mg  0.02 mg Intravenous PRN Kyle Vera MD        ondansetron injection 4 mg  4 mg Intravenous Q6H PRN Jun Quinn, DO   4 mg at 11/19/22 0939    potassium chloride CR capsule 30 mEq  30 mEq Oral Daily with breakfast Jennifer Saini MD   30 mEq at 11/25/22 0821    potassium, sodium phosphates 280-160-250 mg packet 2 packet  2 packet Oral QID (AC & HS) Jennifer Saini MD   2 packet at 11/25/22 1108    promethazine (PHENERGAN) 12.5 mg in dextrose 5 % 50 mL IVPB  12.5 mg Intravenous Q6H PRN Jun Quinn, DO   Stopped at 11/15/22 1026    sodium chloride 0.9% flush 10 mL  10 mL Intravenous Q6H Sonia Gramajo MD   10 mL at 11/25/22 1109    And    sodium chloride 0.9% flush 10 mL  10 mL Intravenous PRN Sonia Gramajo MD        TPN ADULT CENTRAL LINE CUSTOM   Intravenous Continuous Patti Mariscal MD 45 mL/hr at 11/24/22 2207 New Bag at 11/24/22 2207    vitamin D 1000 units tablet 2,000 Units  2,000 Units Oral Daily Kyle Vera MD   2,000 Units at 11/25/22 0821     Current Discharge Medication List        START taking these medications    Details   cyanocobalamin (VITAMIN B-12) 1000 MCG tablet Take 1 tablet (1,000 mcg total) by mouth once daily.  Qty: 1 tablet, Refills: 3      diphenoxylate-atropine 2.5-0.025 mg/5 ml (LOMOTIL) 2.5-0.025 mg/5 mL liquid Take 5 mLs by mouth 4 (four) times daily as needed (diarrhea).  Qty: 400 mL, Refills: 0      multivitamin Tab Take 1 tablet by mouth once daily.  Qty: 30 tablet, Refills: 6      potassium chloride (MICRO-K) 10 MEQ CpSR Take 2 capsules (20 mEq total) by mouth daily with breakfast. for 20 days  Qty: 40 capsule, Refills: 0      cholecalciferol, vitamin D3, (VITAMIN D3) 50 mcg (2,000 unit) Tab Take 1 tablet (2,000 Units total) by mouth once daily.  Qty: 30 tablet, Refills: 6           CONTINUE these medications which have NOT CHANGED    Details   apixaban (ELIQUIS) 5 mg Tab Take 1 tablet (5 mg total) by mouth 2  (two) times daily.      aspirin (ECOTRIN) 81 MG EC tablet Take 81 mg by mouth once daily.      ferrous sulfate (FEOSOL) 325 mg (65 mg iron) Tab tablet Take 325 mg by mouth daily with breakfast.      atorvastatin (LIPITOR) 40 MG tablet Take 1 tablet (40 mg total) by mouth every evening.  Qty: 90 tablet, Refills: 3           STOP taking these medications       furosemide (LASIX) 20 MG tablet Comments:   Reason for Stopping:         levETIRAcetam (KEPPRA) 250 MG Tab Comments:   Reason for Stopping:         loperamide (IMODIUM) 2 mg capsule Comments:   Reason for Stopping:         midodrine (PROAMATINE) 5 MG Tab Comments:   Reason for Stopping:         pantoprazole (PROTONIX) 40 MG tablet Comments:   Reason for Stopping:         potassium chloride SA (K-DUR,KLOR-CON) 20 MEQ tablet Comments:   Reason for Stopping:         timolol maleate 0.5% (TIMOPTIC) 0.5 % Drop Comments:   Reason for Stopping:         citric acid-potassium citrate (POLYCITRA) 1,100-334 mg/5 mL solution Comments:   Reason for Stopping:         diphenoxylate-atropine 2.5-0.025 mg (LOMOTIL) 2.5-0.025 mg per tablet Comments:   Reason for Stopping:         metoclopramide HCl (REGLAN) 10 MG tablet Comments:   Reason for Stopping:         metoprolol tartrate (LOPRESSOR) 25 MG tablet Comments:   Reason for Stopping:         sodium bicarbonate 650 MG tablet Comments:   Reason for Stopping:                 I have seen and examined this patient within the last 30 days. My clinical findings that support the need for the home health skilled services and home bound status are the following:no   Requiring assistive device to leave home due to unsteady gait caused by  Weakness/Debility and Anemia.     Diet:   regular diet    Labs:  none    Referrals/ Consults  Physical Therapy to evaluate and treat. Evaluate for home safety and equipment needs; Establish/upgrade home exercise program. Perform / instruct on therapeutic exercises, gait training, transfer training, and  Range of Motion.  Aide to provide assistance with personal care, ADLs, and vital signs.    Activities:   activity as tolerated    Nursing:   Agency to admit patient within 24 hours of hospital discharge unless specified on physician order or at patient request    SN to complete comprehensive assessment including routine vital signs. Instruct on disease process and s/s of complications to report to MD. Review/verify medication list sent home with the patient at time of discharge  and instruct patient/caregiver as needed. Frequency may be adjusted depending on start of care date.     Skilled nurse to perform up to 3 visits PRN for symptoms related to diagnosis    Notify MD if SBP > 160 or < 90; DBP > 90 or < 50; HR > 120 or < 50; Temp > 101; O2 < 88%; Other:       Ok to schedule additional visits based on staff availability and patient request on consecutive days within the home health episode.    When multiple disciplines ordered:    Start of Care occurs on Sunday - Wednesday schedule remaining discipline evaluations as ordered on separate consecutive days following the start of care.    Thursday SOC -schedule subsequent evaluations Friday and Monday the following week.     Friday - Saturday SOC - schedule subsequent discipline evaluations on consecutive days starting Monday of the following week.    For all post-discharge communication and subsequent orders please contact patient's primary care physician. If unable to reach primary care physician or do not receive response within 30 minutes, please contact physician call service for clinical staff order clarification    Miscellaneous       Home Health Aide:  Physical Therapy Twice weekly and Home Health Aide Twice weekly    Wound Care Orders  no    I certify that this patient is confined to her home and needs intermittent skilled nursing care and physical therapy.

## 2022-11-25 NOTE — SUBJECTIVE & OBJECTIVE
Interval History: Patient had somewhat solid BM this AM.  No ab pain today.  Tolerating about 50% of food.  Main complaint today is generalized weakness.     Review of Systems   Constitutional:  Positive for activity change, appetite change and fatigue. Negative for fever.   Respiratory:  Negative for cough and shortness of breath.    Cardiovascular:  Negative for chest pain and leg swelling.   Gastrointestinal:  Negative for abdominal pain, diarrhea, nausea and vomiting.   Genitourinary:  Negative for difficulty urinating and dysuria.   Neurological:  Positive for weakness. Negative for headaches.   Psychiatric/Behavioral:  Negative for decreased concentration and sleep disturbance.    Objective:     Vital Signs (Most Recent):  Temp: 98.5 °F (36.9 °C) (11/24/22 1607)  Pulse: 99 (11/24/22 1607)  Resp: 16 (11/24/22 1832)  BP: 118/67 (11/24/22 1607)  SpO2: (!) 94 % (11/24/22 1607)   Vital Signs (24h Range):  Temp:  [98 °F (36.7 °C)-99.4 °F (37.4 °C)] 98.5 °F (36.9 °C)  Pulse:  [88-99] 99  Resp:  [16-18] 16  SpO2:  [94 %-98 %] 94 %  BP: ()/(49-67) 118/67     Weight: 88.5 kg (195 lb)  Body mass index is 32.45 kg/m².    Intake/Output Summary (Last 24 hours) at 11/24/2022 2013  Last data filed at 11/24/2022 1730  Gross per 24 hour   Intake 200 ml   Output 1700 ml   Net -1500 ml      Physical Exam  Vitals reviewed.   Constitutional:       General: She is awake.      Appearance: Normal appearance. She is well-developed.   HENT:      Head: Normocephalic and atraumatic.   Eyes:      General: Lids are normal.      Conjunctiva/sclera: Conjunctivae normal.      Pupils: Pupils are equal, round, and reactive to light.   Cardiovascular:      Rate and Rhythm: Normal rate and regular rhythm.      Heart sounds: No murmur heard.  Pulmonary:      Effort: Pulmonary effort is normal.      Breath sounds: Normal breath sounds.   Abdominal:      General: Bowel sounds are normal.      Palpations: Abdomen is soft.   Musculoskeletal:          General: No deformity.      Right lower leg: No edema.      Left lower leg: No edema.   Skin:     General: Skin is warm and dry.   Neurological:      General: No focal deficit present.      Mental Status: She is oriented to person, place, and time. Mental status is at baseline.   Psychiatric:         Attention and Perception: Attention normal.         Mood and Affect: Mood normal.         Behavior: Behavior is cooperative.       Significant Labs: All pertinent labs within the past 24 hours have been reviewed.    Significant Imaging: I have reviewed all pertinent imaging results/findings within the past 24 hours.

## 2022-11-25 NOTE — PLAN OF CARE
[facility] - [facility] ([facility])  Discharge Final Note    Primary Care Provider: Amarilys Romero MD  Expected Discharge Date: 11/25/2022  Patient medically ready for discharge to home today.  Hospital follow up scheduled, CHW called to schedule PCP follow up for pt, but provider's office was closed. Contact info added to AVS.  SW [facility] HH [facility] (pt's established agency). Agency accepted pt for return on 11/23.     Final Discharge Note (most recent)       Final Note - 11/25/22 1328          Final Note    Assessment Type Final Discharge Note     Anticip[place] [facility] [place]ospital Resources/Appts/Education Provided Provided patient/caregiver with written discharge plan information;Provided education on problems/symptoms using teachback        Post-Acute Status    Post-Acute Authorization Home Health     Home Health Status Set-up Complete/Auth obtained     Discharge Delays None known at this time                   Important Message from Medicare         Referral Info (most recent)       Referral Info    No documentation.                 Contact Info       Amarilys Romero MD   Specialty: Family Medicine   Relationship: PCP - General    [address] [place] [id number]   Phone: [phone]       Next Steps: Schedule an appointment as soon as possible for a visit in 1 week(s)    Instructions: Hospital follow up    [facility]   Specialty: Home Health Services, Home Therapy Services, Home Living Aide Services    [address] [facility] [address]   Phone: [phone]       Next Steps: Follow up    Instructions: Home Health          Future Appointments   Date Time Provider Department Center   12/23/2022 11:00 AM Lupis Beck MD [facility]     JUSTA Meeks  Case Management  Ext: [phone]  11/25/2022

## 2022-11-25 NOTE — NURSING
Patient being discharged to home via wheelchair to personal vehicle with all belongings. PICC removed from RUE, pressure held to site, cath intact, no bleeding noted, pt tolerated well. Pt showered. Discharge instructions reviewed with patient, verbalized 100% understanding.

## 2022-11-25 NOTE — PT/OT/SLP DISCHARGE
Physical Therapy Discharge Summary    Name: Cherry Santiago  MRN: [id number]   Principal Problem: Diarrhea due to malabsorption     Patient Discharged from acute Physical Therapy on 2022.  Please refer to prior PT noted date on 2022 for functional status.     Assessment:     Patient transferred to higher level of care on 2022 for persistent hypotension.    Objective:     GOALS:   Multidisciplinary Problems       Physical Therapy Goals          Problem: Physical Therapy    Goal Priority Disciplines Outcome Goal Variances Interventions   Physical Therapy Goal     PT, PT/OT Ongoing, Progressing     Description: Goals to be met by: 2022     Patient will increase functional independence with mobility by performin. Supine to sit with Set-up [place]  2. Sit to supine with Set-up [place]  3. Sit to stand transfer with Supervision  4. Bed to chair transfer with Supervision using Rolling Walker  5. Gait  x 250 feet with Supervision using Rolling Walker.   6. Lower extremity exercise program x15 reps per handout, with supervision                         Reasons for Discontinuation of Therapy Services  Transfer to alternate level of care.      Plan:     Patient Discharged to: ICU 2022

## 2022-11-25 NOTE — PROGRESS NOTES
[facility] - Intensive Care ([address] Medicine  Progress Note    Patient Name: Cherry Santiago  MRN: [id number]  Patient Class: IP- Inpatient   Admission Date: 11/6/2022  Length of Stay: 17 days  Attending Physician: Patti Mariscal MD  Primary Care Provider: Amarilys Romero MD        Subjective:     Principal Problem:Diarrhea due to malabsorption        HPI:  Cherry Pozo is a 67-year-old past medical history of recently diagnosed carcinoid syndrome, AFib on Eliquis, CHF, arthritis, GERD, glaucoma, hyperlipidemia, hypertension, prior MI, TIA presenting with persistent diarrhea.      AAOx 3 . accompanied by the daughter at the bedside. Patient mentions having diarrhea intermittently since August 2022 more recently diarrhea has become worse over past few days -several episodes daily  associated nausea with intermittent vomiting. c/o lower cramping abdominal pain - 5/10.   symptoms concerned likely though to be secondary to carcinoid syndrome patient evaluated by Heme-Onc at [facility] with PET scan done 10/18 - Focal nodular foci of radiotracer uptake at the pancreatic tail and near the region of the pancreatic head above liver background activity could indicate somatostatin receptor avid lesions, but no corresponding mass is seen on CT portion of the study.  Recommend follow-up contrast enhanced pancreas protocol CT to further assess. 5HIAA levels on 10/21 WNL . CT AP W contrast done 11/4 shows No pancreatic lesion and Enterocolitis,. recent EGD -Non-bleeding gastric ulcers with no stigmata of  bleeding. Biopsied. Treated with a monopolar probe. colonoscopy with biopsy -No significant pathologic abnormality. No morphologic evidence of active inflammatory bowel disease, microscopic colitis, or neoplasia. Patient was  referred to [facility] for further workup and has scheduled appointment at Heme-Onc at [facility] this upcoming week. . c/o  dizziness,  and  shortness of breath on minimal exertion    ER course -electrolyte derangement with hypokalemia to 2.4, hypomagsemia of 1.5 . replaced . UA + started on IV cefriaxone       Overview/Hospital Course:  67-year-old past medical history of recently diagnosed carcinoid syndrome, having diarrhea intermittently since August 2022 more recently diarrhea has become worse over past few days -several episodes daily  associated nausea with intermittent vomiting. 9/30 CT AP showed partial small bowel resection. There are some thickened segments of large and small bowel, some with adjacent mesenteric edema. Symptoms concerned likely though to be secondary to carcinoid syndrome patient evaluated by Heme-Onc at [facility] with PET scan done 10/18 - Focal nodular foci of radiotracer uptake at the pancreatic tail and near the region of the pancreatic head above liver background activity could indicate somatostatin receptor avid lesions, but no corresponding mass is seen on CT portion of the study.  Recommend follow-up contrast enhanced pancreas protocol CT to further assess. 5HIAA levels on 10/21 WNL. CT AP W contrast done 11/4 shows No pancreatic lesion and Enterocolitis,. recent EGD -Non-bleeding gastric ulcers with no stigmata of  bleeding. Biopsy neg for H pylori. Treated with a monopolar probe. Colonoscopy with biopsy -No significant pathologic abnormality. No morphologic evidence of active inflammatory bowel disease, microscopic colitis, or neoplasia. Patient was  referred to [facility] for further workup and has scheduled appointment at Heme-Onc at [facility] this upcoming week. Chromogranin A elevated at 2000s. C diff negative. Fecal fat level normal. Fecal elastase low. AES consulted for EUS and potential biopsy given that her PET scan showed uptake but no mass was seen on CT pancreas protocol. Started and discontinued octreotide since it failed to help. Repeat cryptosporidium Ag negative. 11/9  EUS - sleeve gastrectomy was found, characterized by healthy appearing mucosa. Widely patent duodenoenterostomy, characterized  by healthy appearing mucosa was found. few cystic lesions were seen in the pancreatic head, genu of the pancreas and pancreatic body highly suspicious for a branched intraductal papillary mucinous neoplasm. Quite small for sampling. Pancreatic head and pancreatic tail showed no mass. Oncology reviewed results on 11/12 and feel patient with low suspicion for neuroendocrine tumor at this point. CT chest with contrast 11/13 significant for subcentimeter nodules bilaterally. Surgery oncology consulted - testing thus far not convincing for need for surgical exploration. Will present to tumor board 11/21. Chomogranin A level elevated due to PPI. Somatostatin level normal. Tryptase negative. Normal IgG and negative Tissue Transglutaminase Iga. Insulin and proinsulin levels normal. VIPoma level normal  Will need repeat gastrin and chromogranin level once off PPI for at least 2-4 weeks. EGD and small bowel biopsies done 11/17 and pending. Increase questran to TID. Continue loperamide 2 mg TID. Add lomotil 1 cap QID. Improved to 1-2 BMs a day. Still improved when questran discontinued. Will see how she does off loperamide. Patient with Low fecal elastase and ferrtin slightly elevated 272 and high iron saturation 83. Discussed with GI - will discuss significance with GI. As responding to anti-diarrheal medication, not need to start pancreatic enzymes at this time. Follow up with GI as outpatient after discharge. Patient with intermittent hypotension treated with fluids. Transferred to ICU 11/19 when no BPs not responsive to fluids. Patient intermittently treated with furosemide due to symptomatic BLE peripheral edema.       Interval History: Patient had somewhat solid BM this AM.  No ab pain today.  Tolerating about 50% of food.  Main complaint today is generalized weakness.     Review of Systems    Constitutional:  Positive for activity change, appetite change and fatigue. Negative for fever.   Respiratory:  Negative for cough and shortness of breath.    Cardiovascular:  Negative for chest pain and leg swelling.   Gastrointestinal:  Negative for abdominal pain, diarrhea, nausea and vomiting.   Genitourinary:  Negative for difficulty urinating and dysuria.   Neurological:  Positive for weakness. Negative for headaches.   Psychiatric/Behavioral:  Negative for decreased concentration and sleep disturbance.    Objective:     Vital Signs (Most Recent):  Temp: 98.5 °F (36.9 °C) (11/24/22 1607)  Pulse: 99 (11/24/22 1607)  Resp: 16 (11/24/22 1832)  BP: 118/67 (11/24/22 1607)  SpO2: (!) 94 % (11/24/22 1607)   Vital Signs (24h Range):  Temp:  [98 °F (36.7 °C)-99.4 °F (37.4 °C)] 98.5 °F (36.9 °C)  Pulse:  [88-99] 99  Resp:  [16-18] 16  SpO2:  [94 %-98 %] 94 %  BP: ()/(49-67) 118/67     Weight: 88.5 kg (195 lb)  Body mass index is 32.45 kg/m².    Intake/Output Summary (Last 24 hours) at 11/24/2022 2013  Last data filed at 11/24/2022 1730  Gross per 24 hour   Intake 200 ml   Output 1700 ml   Net -1500 ml      Physical Exam  Vitals reviewed.   Constitutional:       General: She is awake.      Appearance: Normal appearance. She is well-developed.   HENT:      Head: Normocephalic and atraumatic.   Eyes:      General: Lids are normal.      Conjunctiva/sclera: Conjunctivae normal.      Pupils: Pupils are equal, round, and reactive to light.   Cardiovascular:      Rate and Rhythm: Normal rate and regular rhythm.      Heart sounds: No murmur heard.  Pulmonary:      Effort: Pulmonary effort is normal.      Breath sounds: Normal breath sounds.   Abdominal:      General: Bowel sounds are normal.      Palpations: Abdomen is soft.   Musculoskeletal:         General: No deformity.      Right lower leg: No edema.      Left lower leg: No edema.   Skin:     General: Skin is warm and dry.   Neurological:      General: No focal  deficit present.      Mental Status: She is oriented to person, place, and time. Mental status is at baseline.   Psychiatric:         Attention and Perception: Attention normal.         Mood and Affect: Mood normal.         Behavior: Behavior is cooperative.       Significant Labs: All pertinent labs within the past 24 hours have been reviewed.    Significant Imaging: I have reviewed all pertinent imaging results/findings within the past 24 hours.      Assessment/Plan:      * Diarrhea due to malabsorption  unlikely neuroendocrine tumor  Responding to anti-diarrheals loperamide, cholestyramine and lomotil. Responded very well with lomotil. Stopped cholestyramine. Will trial off loperamide  Extensive work up remarkable for low pancreatic elastase. Per GI, no need to start pancreatic enzymes as her diarrhea has improved    Severe protein-calorie malnutrition   Refeeding syndrome - resolved  Albumin 1.3, Prealbumin 7, and CRP 4.3. Lost about 37 lbs in 6 months. Malabsorption suspected. Small bowel biopsies 11/17 pending.   Eating 25-75% of meals  Started on TPN prior to improvement of diarrhea  Per GI, TPN not needed if able to take adequate nutrition orally  Lipids stopped to encourage hunger. Continue TPN for protein replacement.   RD consulted for calorie count. If she is able to eat >60% of meals, no need for TPN    Measurements:  Wt Readings from Last 1 Encounters:   11/17/22 88.5 kg (195 lb)   Body mass index is 32.45 kg/m².    Anemia of chronic disease  Elevated iron levels and ferritin  blood loss not apparent.  Likely due to malnutrition  Required intermittent PRBCs transfusions    Chronic deep vein thrombosis (DVT): right common femoral vein  DVT sonogram 8/22 and 10/22 negative for DVT. On 11/7 DVT sonogram -Nonocclusive thrombus/DVT in the superior-mid right femoral vein. S/p IVC filter. Eliquis held intermittently due to procedures. Continue eliquis    Hypocalcemia  mild  PTH elevated 114    Started on  TPN    Stage 3a chronic kidney disease  seems to be at baseline. Creatine stable for now. BMP reviewed- noted Estimated Creatinine Clearance: 60 mL/min (based on SCr of 1 mg/dL). according to latest data. Monitor UOP and serial BMP and adjust therapy as needed. Renally dose meds.  Kid bx 10/2022 - no amyloid, + diab nephropathy    Hypotension  - holding home BP meds  - IV fluids      Hypophosphatemia  Requires replacing  NaPhos 40 mmol  Added po    Cryptosporidium exposure  Repeat Ag neg, colon biopsies neg.     Orthostatic hypotension  Due to dehydration and malnutrition from diarrhea  IVF hydration for support  May improve with improved nutritional status  Was in ICU night of 11/19 due to hypotension with failure to respond to fluids - arrived to ICU and BP slightly improved to where she did not require pressures. She was given albumin infusions and stepped to floor the following day.   BP remains low normal or slightly hypotension    Seizures  continue with keppra BID    UTI (urinary tract infection)  Flank pain  11/8 UC with klebsiella; s/p x5 doses CTX - sensitive and transitioned to levoquin for total of 7 days.     Hypomagnesemia  Replace by IV and through TPN    Intractable back pain  Hs of OA spine, DJD, back surgery  Xray l spine 3/2021 - posterior fusion hardware from L2-L5 along with laminectomy change.  Grade 1 anterolistheses are suspected of L3 over L4 and L4 over L5.   Resolved with pain management and treatment of UTI    Chronic heart failure with preserved ejection fraction (HFpEF) NICM NYHA2   Patient admitted without  signs and symptoms of CHF exacerbation    Results for orders placed or performed during the hospital encounter of 11/06/22   Brain natriuretic peptide   Result Value Ref Range    BNP 82 0 - 99 pg/mL   No results found for this or any previous visit.  echo 10/7/22 left ventricle is normal in size. Global left ventricular systolic function is normal. The left ventricular ejection  fraction is 55%. Left ventricular diastolic function is abnormal (stage I impaired relaxation). Noted left ventricular hypertrophy. Concentric left ventricular hypertrophy is present. It is mild. Mild (1+) tricuspid regurgitation. The right ventricle is normal in size. Right ventricular systolic function is normal.    Furosemide IV intermittently given due to symptomatic swelling BLE as tolerated    Dizziness  likely secondary to diarrhea. orthostasis. IV hydration    Hypokalemia  Replace by IV and through TPN. Added po    Essential hypertension  Medications on hold due to hypotension    Class 1 obesity due to excess calories with serious comorbidity and body mass index (BMI) of 32.0 to 32.9 in adult  Patient now with sarcopenia due to diarrhea  - s/p Sleeve gastrectomy      VTE Risk Mitigation (From admission, onward)         Ordered     apixaban tablet 5 mg  2 times daily         11/18/22 0603     IP VTE HIGH RISK PATIENT  Once         11/06/22 1614     Place sequential compression device  Until discontinued         11/06/22 1614                Discharge Planning   MELYSSA: 11/25/2022     Code Status: Full Code   Is the patient medically ready for discharge?: No    Reason for patient still in hospital (select all that apply): Patient trending condition, Laboratory test and Treatment  Discharge Plan A: Home with family   Discharge Delays: None known at this time              Patti Mariscal MD  Department of Hospital Medicine   [facility] - Intensive Care ([place]-16)

## 2022-11-25 NOTE — PT/OT/SLP DISCHARGE
Occupational Therapy Discharge Summary    Cherry Santiago  MRN: [id number]   Principal Problem: Diarrhea due to malabsorption      Patient Discharged from acute Occupational Therapy on 11/25/2022.  Please refer to prior OT note dated 11/18/2022 for functional status.    Assessment:      Patient transferred to higher level of care secondary to hypotension.    Objective:     GOALS:   Multidisciplinary Problems       Occupational Therapy Goals          Problem: Occupational Therapy    Goal Priority Disciplines Outcome Interventions   Occupational Therapy Goal     OT, PT/OT Ongoing, Progressing    Description: Goals to be met by: 12/2/2022     Patient will increase functional independence with ADLs by performing:    UE Dressing with Modified [place].  LE Dressing with Modified [place].  Grooming while standing at sink with Modified [place].  Toileting from toilet with Modified [place] for hygiene and clothing management.   Toilet transfer to toilet with Minimal Assistance.                         Reasons for Discontinuation of Therapy Services  Transfer to alternate level of care.      Plan:     Patient Discharged to:  ICU 11/19/2022 11/25/2022

## 2022-11-25 NOTE — PROGRESS NOTES
Calorie count  11/23: 25% Lunch, 50% Dinner, 100% apple juice, 100% sprite  11/24: 25% Dinner  11/25: 25% Breakfast    Pt tolerate 25-50% of meals in average. Unable to calculate calorie intake due to unspecified food item eaten. Spoke with pt at bedside. Pt was not drinking Boost due to the concern that it might cause her diarrhea. Explain to pt that Boost is lactose free and encourage pt to try it. Pt agree to try some. Please see note on 11/23 for full nutrition assessment.     Recommendations     1. Continue Regular diet + Boost Plus TID       2. Continue current TPN regimen: 200g D, 100g AA to provide 1080 kcal. GIR 1.57mg/kg/min    - May d/c if PO intake >60% of EEN       3. Add Multivitamin and vitamin B12 to prevent deficiency      4. RD to monitor and follow     Goals: Meet % EEN/EPN by next RD f/u  Nutrition Goal Status: progressing towards goal  Communication of RD Recs: POC

## 2022-11-27 NOTE — DISCHARGE SUMMARY
Armando Jenkins - Intensive Care ([place] [address])  [facility] Medicine  Discharge Summary      Patient Name: Cherry Santiago  MRN: [id number]  [facility]: [id number]  Patient Class: IP- Inpatient  Admission Date: 11/6/2022  Hospital Length of Stay: 18 days  Discharge Date and Time:  11/26/2022 6:15 PM  Attending Physician: No att. providers found   Discharging Provider: Patti Mariscal MD  Primary Care Provider: Amarilys Romero MD  [facility] Medicine Team: [facility] HOSP MED  Patti Mariscal MD  Primary Care Team: [facility] HOSP MED     HPI:   Cherry Pozo is a 67-year-old past medical history of recently diagnosed carcinoid syndrome, AFib on Eliquis, CHF, arthritis, GERD, glaucoma, hyperlipidemia, hypertension, prior MI, TIA presenting with persistent diarrhea.      AAOx 3 . accompanied by the daughter at the bedside. Patient mentions having diarrhea intermittently since August 2022 more recently diarrhea has become worse over past few days -several episodes daily  associated nausea with intermittent vomiting. c/o lower cramping abdominal pain - 5/10.   symptoms concerned likely though to be secondary to carcinoid syndrome patient evaluated by Heme-Onc at [facility] with PET scan done 10/18 - Focal nodular foci of radiotracer uptake at the pancreatic tail and near the region of the pancreatic head above liver background activity could indicate somatostatin receptor avid lesions, but no corresponding mass is seen on CT portion of the study.  Recommend follow-up contrast enhanced pancreas protocol CT to further assess. 5HIAA levels on 10/21 WNL . CT AP W contrast done 11/4 shows No pancreatic lesion and Enterocolitis,. recent EGD -Non-bleeding gastric ulcers with no stigmata of  bleeding. Biopsied. Treated with a monopolar probe. colonoscopy with biopsy -No significant pathologic abnormality. No morphologic evidence of active inflammatory bowel disease, microscopic colitis, or neoplasia. Patient was  referred  to [facility] for further workup and has scheduled appointment at Hem[facility] at [facility] this upcoming week. . c/o  dizziness,  and shortness of breath on minimal exertion    ER course -electrolyte derangement with hypokalemia to 2.4, hypomagsemia of 1.5 . replaced . UA + started on IV cefriaxone       Procedure(s) (LRB):  EGD (ESOPHAGOGASTRODUODENOSCOPY) (N/A)      Hospital Course:   67-year-old past medical history of recently diagnosed carcinoid syndrome, having diarrhea intermittently since August 2022 more recently diarrhea has become worse over past few days -several episodes daily  associated nausea with intermittent vomiting. 9/30 CT AP showed partial small bowel resection. There are some thickened segments of large and small bowel, some with adjacent mesenteric edema. Symptoms concerned likely though to be secondary to carcinoid syndrome patient evaluated by Heme-Onc at [facility] with PET scan done 10/18 - Focal nodular foci of radiotracer uptake at the pancreatic tail and near the region of the pancreatic head above liver background activity could indicate somatostatin receptor avid lesions, but no corresponding mass is seen on CT portion of the study.  Recommend follow-up contrast enhanced pancreas protocol CT to further assess. 5HIAA levels on 10/21 WNL. CT AP W contrast done 11/4 shows No pancreatic lesion and Enterocolitis,. recent EGD -Non-bleeding gastric ulcers with no stigmata of  bleeding. Biopsy neg for H pylori. Treated with a monopolar probe. Colonoscopy with biopsy -No significant pathologic abnormality. No morphologic evidence of active inflammatory bowel disease, microscopic colitis, or neoplasia. Patient was  referred to [facility] for further workup and has scheduled appointment at Hem[facility]Onc at [facility] this upcoming week. Chromogranin A elevated at 2000s. C diff negative. Fecal fat level normal. Fecal elastase low. [facility] consulted for EUS and  potential biopsy given that her PET scan showed uptake but no mass was seen on CT pancreas protocol. Started and discontinued octreotide since it failed to help. Repeat cryptosporidium Ag negative. 11/9 EUS - sleeve gastrectomy was found, characterized by healthy appearing mucosa. Widely patent duodenoenterostomy, characterized  by healthy appearing mucosa was found. few cystic lesions were seen in the pancreatic head, genu of the pancreas and pancreatic body highly suspicious for a branched intraductal papillary mucinous neoplasm. Quite small for sampling. Pancreatic head and pancreatic tail showed no mass. Oncology reviewed results on 11/12 and feel patient with low suspicion for neuroendocrine tumor at this point. CT chest with contrast 11/13 significant for subcentimeter nodules bilaterally. Surgery oncology consulted - testing thus far not convincing for need for surgical exploration. Will present to tumor board 11/21. Chomogranin A level elevated due to PPI. Somatostatin level normal. Tryptase negative. Normal IgG and negative Tissue Transglutaminase Iga. Insulin and proinsulin levels normal. VIPoma level normal  Will need repeat gastrin and chromogranin level once off PPI for at least 2-4 weeks. EGD and small bowel biopsies done 11/17 and pending. Increase questran to TID. Continue loperamide 2 mg TID. Add lomotil 1 cap QID. Improved to 1-2 BMs a day. Still improved when questran discontinued. Will see how she does off loperamide. Patient with Low fecal elastase and ferrtin slightly elevated 272 and high iron saturation 83. Discussed with GI - will discuss significance with GI. As responding to anti-diarrheal medication, not need to start pancreatic enzymes at this time. Follow up with GI as outpatient after discharge. Patient with intermittent hypotension treated with fluids. . Patient intermittently treated with furosemide due to symptomatic BLE peripheral edema.   Diarrhea eventually stopped.  Patient was  taken off of TPN and tolerated PO.        Goals of Care Treatment Preferences:  Code Status: Full Code      Consults:   Consults (From admission, onward)        Status Ordering Provider     Inpatient consult to Registered Dietitian/Nutritionist  Once        Provider:  (Not yet assigned)    Completed YARIEL KUMAR     Inpatient consult to Registered Dietitian/Nutritionist  Once        Provider:  (Not yet assigned)    Completed YARIEL KUMAR     Inpatient consult to Critical Care Medicine  Once        Provider:  (Not yet assigned)    Completed GI PARK     Inpatient consult to PICC team ([facility])  Once        Provider:  (Not yet assigned)    Completed ANGELES GUARDADO     Inpatient consult to Surgical Oncology  Once        Provider:  (Not yet assigned)    Completed ANGELES GUARDADO     Inpatient consult to Gastroenterology  Once        Provider:  (Not yet assigned)    Completed ANGELES GUARDADO     Inpatient consult to PICC team ([facility])  Once        Provider:  (Not yet assigned)    Completed LAMONTE JIMENEZ K.     Inpatient consult to Infectious Diseases  Once        Provider:  (Not yet assigned)    Completed LAMONTE JIMENEZ K.     Inpatient consult to Critical Care Medicine  Once        Provider:  (Not yet assigned)    Completed DAVKARL LAMONTE K.     Inpatient consult to [facility] ([facility])  Once        Provider:  (Not yet assigned)    Completed DAVKARL LAMONTE K.     Inpatient consult to PICC team ([facility])  Once        Provider:  (Not yet assigned)    Completed DAVALLY BARAJASYA K.     Inpatient consult to Hematology/Oncology  Once        Provider:  (Not yet assigned)    Completed DAVKARL LAMONTE K.          * Diarrhea due to malabsorption  unlikely neuroendocrine tumor  Responding to anti-diarrheals loperamide, cholestyramine and lomotil. Responded very well with lomotil. Stopped cholestyramine. Will trial off loperamide  Extensive work up remarkable for low pancreatic elastase. Per GI, no need to start  pancreatic enzymes as her diarrhea has improved    Severe protein-calorie malnutrition   Refeeding syndrome - resolved  Albumin 1.3, Prealbumin 7, and CRP 4.3. Lost about 37 lbs in 6 months. Malabsorption suspected. Small bowel biopsies 11/17 pending.   Eating 25-75% of meals  Started on TPN prior to improvement of diarrhea  Per GI, TPN not needed if able to take adequate nutrition orally  Lipids stopped to encourage hunger. Continue TPN for protein replacement.   RD consulted for calorie count. If she is able to eat >60% of meals, no need for TPN    Measurements:  Wt Readings from Last 1 Encounters:   11/21/22 88.5 kg (195 lb)   Body mass index is 32.45 kg/m².    Anemia of chronic disease  Elevated iron levels and ferritin  blood loss not apparent.  Likely due to malnutrition  Required intermittent PRBCs transfusions    Chronic deep vein thrombosis (DVT): right common femoral vein  DVT sonogram 8/22 and 10/22 negative for DVT. On 11/7 DVT sonogram -Nonocclusive thrombus/DVT in the superior-mid right femoral vein. S/p IVC filter. Eliquis held intermittently due to procedures. Continue eliquis      Final Active Diagnoses:    Diagnosis Date Noted POA    PRINCIPAL PROBLEM:  Diarrhea due to malabsorption [K90.9, R19.7] 10/07/2022 Yes    Severe protein-calorie malnutrition  [E43] 08/23/2022 Yes    Anemia of chronic disease [D63.8] 07/14/2017 Yes    Chronic deep vein thrombosis (DVT): right common femoral vein [I82.509] 02/03/2021 Yes    Hypocalcemia [E83.51] 02/03/2021 Yes    Stage 3a chronic kidney disease [N18.31] 10/07/2022 Yes    Hypotension [I95.9] 11/19/2022 No    Hypophosphatemia [E83.39] 11/09/2022 Yes    Cryptosporidium exposure [Z20.89] 11/08/2022 Yes    Orthostatic hypotension [I95.1] 11/07/2022 Yes    Seizures [R56.9] 11/06/2022 Yes    Hypomagnesemia [E83.42] 10/10/2022 Yes    Intractable back pain [M54.9] 08/22/2022 Yes    Chronic heart failure with preserved ejection fraction (HFpEF) NICM  NYHA2  [I50.32] 02/03/2021 Yes    Dizziness [R42] 10/06/2020 Yes    Hypokalemia [E87.6] 07/15/2017 Yes    Class 1 obesity due to excess calories with serious comorbidity and body mass index (BMI) of 32.0 to 32.9 in adult [E66.09, Z68.32] 07/12/2017 Not Applicable    Essential hypertension [I10] 07/12/2017 Yes     Chronic      Problems Resolved During this Admission:    Diagnosis Date Noted Date Resolved POA    Acute hypoxemic respiratory failure [J96.01] 11/19/2022 11/24/2022 No    Refeeding syndrome [E87.8] 11/18/2022 11/24/2022 Yes    Type 2 diabetes mellitus, without long-term current use of insulin [E11.9] 02/03/2021 11/06/2022 Yes     Chronic       Discharged Condition: stable    Disposition: Home-Health Care [facility]    Follow Up:   Follow-up Information     Amarilys Romero MD. Schedule an appointment as soon as possible for a visit in 1 week(s).    Specialty: Family Medicine  Why: Hospital follow up  Contact information:  [address] [place] [id number]  [phone]             [facility] Follow up.    Specialties: Home Health Services, Home Therapy Services, Home Living Aide Services  Why: Home Health  Contact information:  [address] [facility] [address]  [phone]                     Patient Instructions:      Ambulatory referral/consult to Hematology / Oncology   Standing Status: Future   Referral Priority: Routine Referral Type: Consultation   Referral Reason: Specialty Services Required   Requested Specialty: Hematology and Oncology   Number of Visits Requested: 1     Ambulatory referral/consult to Gastroenterology   Standing Status: Future   Referral Priority: Routine Referral Type: Consultation   Referral Reason: Specialty Services Required   Requested Specialty: Gastroenterology   Number of Visits Requested: 1     Ambulatory referral/consult to Surgical Oncology   Standing Status: Future   Referral Priority: Routine Referral Type: Consultation   Referral  Reason: Specialty Services Required   Requested Specialty: Surgical Oncology   Number of Visits Requested: 1     Ambulatory referral/consult to Cardiology   Standing Status: Future   Referral Priority: Routine Referral Type: Consultation   Referral Reason: Specialty Services Required   Requested Specialty: Cardiology   Number of Visits Requested: 1       Significant Diagnostic Studies: Labs: All labs within the past 24 hours have been reviewed    Pending Diagnostic Studies:     Procedure Component Value Units Date/Time    CBC Auto Differential [[id number]] Collected: 11/12/22 1048    Order Status: Sent Lab Status: In process Updated: 11/12/22 1049    Specimen: Blood     Comprehensive Metabolic Panel [[id number]] Collected: 11/12/22 1048    Order Status: Sent Lab Status: In process Updated: 11/12/22 1049    Specimen: Blood     EKG 12-lead [[id number]]     Order Status: Sent Lab Status: No result     HIV 1/2 Ag/Ab (4th Gen) [[id number]] Collected: 11/06/22 1344    Order Status: Sent Lab Status: In process Updated: 11/06/22 1344    Specimen: Blood     Specimen to Pathology, Surgery Gastrointestinal tract [[id number]] Collected: 11/17/22 1029    Order Status: Sent Lab Status: In process Updated: 11/18/22 0307    Specimen: Tissue          Medications:  Reconciled Home Medications:      Medication List      START taking these medications    atorvastatin 40 MG tablet  Commonly known as: LIPITOR  Take 1 tablet (40 mg total) by mouth every evening.     cyanocobalamin 1000 MCG tablet  Commonly known as: VITAMIN B-12  Take 1 tablet (1,000 mcg total) by mouth once daily.     diphenoxylate-atropine 2.5-0.025 mg/5 ml 2.5-0.025 mg/5 mL liquid  Commonly known as: LOMOTIL  Take 5 mLs by mouth 4 (four) times daily as needed (diarrhea).  Replaces: diphenoxylate-atropine 2.5-0.025 mg 2.5-0.025 mg per tablet     multivitamin Tab  Take 1 tablet by mouth once daily.     potassium chloride 10 MEQ Cpsr  Commonly known as: MICRO-K  Take 2  capsules (20 mEq total) by mouth daily with breakfast. for 20 days  Replaces: potassium chloride SA 20 MEQ tablet     VITAMIN D3 50 mcg (2,000 unit) Tab  Generic drug: cholecalciferol (vitamin D3)  Take 1 tablet (2,000 Units total) by mouth once daily.        CONTINUE taking these medications    apixaban 5 mg Tab  Commonly known as: ELIQUIS  Take 1 tablet (5 mg total) by mouth 2 (two) times daily.     aspirin 81 MG EC tablet  Commonly known as: ECOTRIN  Take 81 mg by mouth once daily.     ferrous sulfate 325 mg (65 mg iron) Tab tablet  Commonly known as: FEOSOL  Take 325 mg by mouth daily with breakfast.        STOP taking these medications    citric acid-potassium citrate 1,100-334 mg/5 mL solution  Commonly known as: POLYCITRA     diphenoxylate-atropine 2.5-0.025 mg 2.5-0.025 mg per tablet  Commonly known as: LomotiL  Replaced by: diphenoxylate-atropine 2.5-0.025 mg/5 ml 2.5-0.025 mg/5 mL liquid     furosemide 20 MG tablet  Commonly known as: LASIX     levETIRAcetam 250 MG Tab  Commonly known as: KEPPRA     loperamide 2 mg capsule  Commonly known as: IMODIUM     metoclopramide HCl 10 MG tablet  Commonly known as: REGLAN     metoprolol tartrate 25 MG tablet  Commonly known as: LOPRESSOR     midodrine 5 MG Tab  Commonly known as: PROAMATINE     pantoprazole 40 MG tablet  Commonly known as: PROTONIX     potassium chloride SA 20 MEQ tablet  Commonly known as: K-DUR,KLOR-CON  Replaced by: potassium chloride 10 MEQ Cpsr     sodium bicarbonate 650 MG tablet     timolol maleate 0.5% 0.5 % Drop  Commonly known as: TIMOPTIC            Indwelling Lines/Drains at time of discharge:   Lines/Drains/Airways     None                 Time spent on the discharge of patient: 30 minutes         Patti Mariscal MD  Department of Hospital Medicine  [facility] Intensive Care ([place]-[address])

## 2022-11-27 NOTE — ASSESSMENT & PLAN NOTE
Refeeding syndrome - resolved  Albumin 1.3, Prealbumin 7, and CRP 4.3. Lost about 37 lbs in 6 months. Malabsorption suspected. Small bowel biopsies 11/17 pending.   Eating 25-75% of meals  Started on TPN prior to improvement of diarrhea  Per GI, TPN not needed if able to take adequate nutrition orally  Lipids stopped to encourage hunger. Continue TPN for protein replacement.   RD consulted for calorie count. If she is able to eat >60% of meals, no need for TPN    Measurements:  Wt Readings from Last 1 Encounters:   11/21/22 88.5 kg (195 lb)   Body mass index is 32.45 kg/m².

## 2022-11-28 ENCOUNTER — OFFICE VISIT (OUTPATIENT)
Dept: GYNECOLOGIC ONCOLOGY | Facility: CLINIC | Age: 67
End: 2022-11-28
Payer: MEDICARE

## 2022-11-28 ENCOUNTER — TELEPHONE (OUTPATIENT)
Dept: ADMINISTRATIVE | Facility: CLINIC | Age: 67
End: 2022-11-28
Payer: MEDICARE

## 2022-11-28 VITALS
BODY MASS INDEX: 36.87 KG/M2 | WEIGHT: 221.56 LBS | HEART RATE: 93 BPM | SYSTOLIC BLOOD PRESSURE: 123 MMHG | DIASTOLIC BLOOD PRESSURE: 69 MMHG

## 2022-11-28 DIAGNOSIS — K52.9 DIARRHEA CONCURRENT WITH AND DUE TO CARCINOID SYNDROME: ICD-10-CM

## 2022-11-28 DIAGNOSIS — E34.0 DIARRHEA CONCURRENT WITH AND DUE TO CARCINOID SYNDROME: ICD-10-CM

## 2022-11-28 DIAGNOSIS — N89.8 VAGINAL CYST: Primary | ICD-10-CM

## 2022-11-28 LAB
COMMENT: NORMAL
FINAL PATHOLOGIC DIAGNOSIS: NORMAL
GROSS: NORMAL
Lab: NORMAL

## 2022-11-28 PROCEDURE — 1111F PR DISCHARGE MEDS RECONCILED W/ CURRENT OUTPATIENT MED LIST: ICD-10-PCS | Mod: CPTII,S$GLB,, | Performed by: OBSTETRICS & GYNECOLOGY

## 2022-11-28 PROCEDURE — 1101F PT FALLS ASSESS-DOCD LE1/YR: CPT | Mod: CPTII,S$GLB,, | Performed by: OBSTETRICS & GYNECOLOGY

## 2022-11-28 PROCEDURE — 3078F DIAST BP <80 MM HG: CPT | Mod: CPTII,S$GLB,, | Performed by: OBSTETRICS & GYNECOLOGY

## 2022-11-28 PROCEDURE — 3078F PR MOST RECENT DIASTOLIC BLOOD PRESSURE < 80 MM HG: ICD-10-PCS | Mod: CPTII,S$GLB,, | Performed by: OBSTETRICS & GYNECOLOGY

## 2022-11-28 PROCEDURE — 3288F PR FALLS RISK ASSESSMENT DOCUMENTED: ICD-10-PCS | Mod: CPTII,S$GLB,, | Performed by: OBSTETRICS & GYNECOLOGY

## 2022-11-28 PROCEDURE — 3044F PR MOST RECENT HEMOGLOBIN A1C LEVEL <7.0%: ICD-10-PCS | Mod: CPTII,S$GLB,, | Performed by: OBSTETRICS & GYNECOLOGY

## 2022-11-28 PROCEDURE — 3074F PR MOST RECENT SYSTOLIC BLOOD PRESSURE < 130 MM HG: ICD-10-PCS | Mod: CPTII,S$GLB,, | Performed by: OBSTETRICS & GYNECOLOGY

## 2022-11-28 PROCEDURE — 3044F HG A1C LEVEL LT 7.0%: CPT | Mod: CPTII,S$GLB,, | Performed by: OBSTETRICS & GYNECOLOGY

## 2022-11-28 PROCEDURE — 1101F PR PT FALLS ASSESS DOC 0-1 FALLS W/OUT INJ PAST YR: ICD-10-PCS | Mod: CPTII,S$GLB,, | Performed by: OBSTETRICS & GYNECOLOGY

## 2022-11-28 PROCEDURE — 99999 PR PBB SHADOW E&M-EST. PATIENT-LVL III: ICD-10-PCS | Mod: PBBFAC,,, | Performed by: OBSTETRICS & GYNECOLOGY

## 2022-11-28 PROCEDURE — 99205 PR OFFICE/OUTPT VISIT, NEW, LEVL V, 60-74 MIN: ICD-10-PCS | Mod: S$GLB,,, | Performed by: OBSTETRICS & GYNECOLOGY

## 2022-11-28 PROCEDURE — 1125F PR PAIN SEVERITY QUANTIFIED, PAIN PRESENT: ICD-10-PCS | Mod: CPTII,S$GLB,, | Performed by: OBSTETRICS & GYNECOLOGY

## 2022-11-28 PROCEDURE — 1125F AMNT PAIN NOTED PAIN PRSNT: CPT | Mod: CPTII,S$GLB,, | Performed by: OBSTETRICS & GYNECOLOGY

## 2022-11-28 PROCEDURE — 3008F PR BODY MASS INDEX (BMI) DOCUMENTED: ICD-10-PCS | Mod: CPTII,S$GLB,, | Performed by: OBSTETRICS & GYNECOLOGY

## 2022-11-28 PROCEDURE — 1111F DSCHRG MED/CURRENT MED MERGE: CPT | Mod: CPTII,S$GLB,, | Performed by: OBSTETRICS & GYNECOLOGY

## 2022-11-28 PROCEDURE — 99205 OFFICE O/P NEW HI 60 MIN: CPT | Mod: S$GLB,,, | Performed by: OBSTETRICS & GYNECOLOGY

## 2022-11-28 PROCEDURE — 3074F SYST BP LT 130 MM HG: CPT | Mod: CPTII,S$GLB,, | Performed by: OBSTETRICS & GYNECOLOGY

## 2022-11-28 PROCEDURE — 3008F BODY MASS INDEX DOCD: CPT | Mod: CPTII,S$GLB,, | Performed by: OBSTETRICS & GYNECOLOGY

## 2022-11-28 PROCEDURE — 3288F FALL RISK ASSESSMENT DOCD: CPT | Mod: CPTII,S$GLB,, | Performed by: OBSTETRICS & GYNECOLOGY

## 2022-11-28 PROCEDURE — 99999 PR PBB SHADOW E&M-EST. PATIENT-LVL III: CPT | Mod: PBBFAC,,, | Performed by: OBSTETRICS & GYNECOLOGY

## 2022-11-28 NOTE — PROGRESS NOTES
"REFERRING PROVIDER  Sonia Gramajo MD Meng, Lesley, MD    HISTORY OF PRESENT CONDITION  CC: 2.5 cm collection in the region of the vagina.    Cherry Santiago is a 67 y.o. with multiple medical problems recently admitted 11/6/2022 - 11/26/2022 for intractable diarrhea in the setting of   [possible] carcinoid syndrome, AFib on Eliquis, CHF, arthritis, GERD, glaucoma, hyperlipidemia, hypertension, prior MI, TIA.    Patient also c/o left breast swelling since leaving the hospital and scant vaginal versus rectal bleeding today.    Per Dr. Hernandez's note 11/4/2022:   "Past CT abdomen pelvis in September revealed 2.5 cm collection in the region of the vagina. She was referred to gyn for sampling. Unfortunately we have been calling Dr Ceron's office in [place] for an update and today I received her note. No sampling has been performed of the abnormal vaginal lesion only an ultrasound revealing a complex cyst."     Data Reviewed  8/9/2017 CT AP:  1.  No acute findings.  2.  Small hiatal hernia. (On my review, 2.8 cm ovoid lesion in the region of the vagina similar to 2022 imaging studies)    8/26/2022 CT AP:  2.8 x 2.8 cm ovoid hypodense area with an enhancing rim in the region of the vagina, suggesting a nonspecific fluid collection.  Correlate with physical exam.  Hepatic steatosis.  Multilevel posterior fusion hardware and laminectomy change of the lumbar spine.  Please see the reports from the recent MRI and CT of the lumbar spine for further details.  Additional observations as above.    9/22/2022 CT AP:  1.  a 2.5 cm collection in the region of the vagina similar in appearance to the previous study  2. Fatty liver  3. 1.5 cm pericardial effusion 4. Fatty liver  5. Postsurgical changes of gastric sleeve and postsurgical changes in the lower abdomen and upper pelvis in the midline with mild prominence of the bowel of the region of the anastomotic site felt to be postsurgical unchanged  6. Mild prominence of " the wall of the sigmoid colon and small bowel question mild enterocolitis    9/30/2022 CT AP:  1. Hepatic steatosis.  2. Prior gastric sleeve procedure and small hiatal hernia.  3. Trace amount of pelvic free fluid.  4. Small pericardial fluid collection    10/21/2022 US Pelvis:  The uterus and left ovary are surgically absent by history.  Along the vaginal canal there is a 2.8 x 1.7 x 2.7 cm complex cystic lesion with internal septations which contains color flow, internal echoes, and nodular soft tissue thickening.  The right ovary is not visualized.  There are no adnexal cysts or masses identified.  There is no free fluid within the pelvis. (Report only, no remote comparison done)    11/3/2022 PET 68GA Dotatate:  Focal nodular foci of radiotracer uptake at the pancreatic tail and near the region of the pancreatic head above liver background activity could indicate somatostatin receptor avid lesions, but no corresponding mass is seen on CT portion of the study.  Recommend follow-up contrast enhanced pancreas protocol CT to further assess.    11/4/2022 CT AP:  1. Motion on some images. 2. No pancreatic lesion.  3. Enterocolitis. 4. Small amount of gas in the urinary bladder, nonspecific and could be related to recent instrumentation. 5. Hepatic steatosis.    Past Medical History:   Diagnosis Date    Anticoagulant long-term use     Arthritis     Atrial fibrillation     CHF (congestive heart failure)     Diabetes mellitus     Type2 resolved after weight loss surgery    DVT (deep venous thrombosis)     Encounter for blood transfusion     GERD (gastroesophageal reflux disease)     Glaucoma     Hypercholesterolemia     Hyperlipemia     Hypertension     Memory changes     Myocardial infarction     Renal failure     resolved    TIA (transient ischemic attack)       Current Outpatient Medications   Medication Sig    apixaban (ELIQUIS) 5 mg Tab Take 1 tablet (5 mg total) by mouth 2 (two) times daily.    aspirin (ECOTRIN) 81  MG EC tablet Take 81 mg by mouth once daily.    atorvastatin (LIPITOR) 40 MG tablet Take 1 tablet (40 mg total) by mouth every evening.    cyanocobalamin (VITAMIN B-12) 1000 MCG tablet Take 1 tablet (1,000 mcg total) by mouth once daily.    diphenoxylate-atropine 2.5-0.025 mg/5 ml (LOMOTIL) 2.5-0.025 mg/5 mL liquid Take 5 mLs by mouth 4 (four) times daily as needed (diarrhea).    ferrous sulfate (FEOSOL) 325 mg (65 mg iron) Tab tablet Take 325 mg by mouth daily with breakfast.    multivitamin Tab Take 1 tablet by mouth once daily.    potassium chloride (MICRO-K) 10 MEQ CpSR Take 2 capsules (20 mEq total) by mouth daily with breakfast. for 20 days    cholecalciferol, vitamin D3, (VITAMIN D3) 50 mcg (2,000 unit) Tab Take 1 tablet (2,000 Units total) by mouth once daily.     No current facility-administered medications for this visit.     Review of patient's allergies indicates:  No Known Allergies    Past Surgical History:   Procedure Laterality Date    BACK SURGERY  06/21/2017    lumbar fusion 6/21/17 and surgery for hematoma to site on 6/26/17    CHOLECYSTECTOMY  1978    COLONOSCOPY N/A 10/3/2022    Procedure: COLONOSCOPY;  Surgeon: Jourdan Parks MD;  Location: [facility];  Service: General;  Laterality: N/A;    COLONOSCOPY N/A 10/11/2022    Procedure: COLONOSCOPY;  Surgeon: Stephen Cooper MD;  Location: [facility];  Service: Endoscopy;  Laterality: N/A;    ENDOSCOPIC ULTRASOUND OF UPPER GASTROINTESTINAL TRACT N/A 11/9/2022    Procedure: ULTRASOUND, UPPER GI TRACT, ENDOSCOPIC;  Surgeon: Jake Corona MD;  Location: [facility] (2ND FLR);  Service: Endoscopy;  Laterality: N/A;    ESOPHAGOGASTRODUODENOSCOPY N/A 10/3/2022    Procedure: EGD (ESOPHAGOGASTRODUODENOSCOPY);  Surgeon: Jourdan Parks MD;  Location: [facility];  Service: General;  Laterality: N/A;    ESOPHAGOGASTRODUODENOSCOPY N/A 11/15/2022    Procedure: EGD (ESOPHAGOGASTRODUODENOSCOPY);  Surgeon: Roque Soliman MD;  Location: [facility] (2ND  FLR);  Service: Endoscopy;  Laterality: N/A;    ESOPHAGOGASTRODUODENOSCOPY N/A 11/17/2022    Procedure: EGD (ESOPHAGOGASTRODUODENOSCOPY);  Surgeon: Roque Soliman MD;  Location: [facility] ENDO (2ND FLR);  Service: Endoscopy;  Laterality: N/A;    GASTRIC BYPASS      HYSTERECTOMY  2012    JOINT REPLACEMENT      TKR    LEFT HEART CATHETERIZATION Left 2/5/2021    Procedure: Left heart cath;  Surgeon: Shane Pacheco MD;  Location: [facility] CATH;  Service: Cardiovascular;  Laterality: Left;    PELVIC LAPAROSCOPY Left     OOPH    RENAL BIOPSY N/A 10/5/2022    Procedure: BIOPSY, KIDNEY;  Surgeon: Velia Diagnostic Provider;  Location: [facility] OR;  Service: General;  Laterality: N/A;    RIGHT HEART CATHETERIZATION Right 2/5/2021    Procedure: INSERTION, CATHETER, RIGHT HEART. via left radial and left brachial;  Surgeon: Shane Pacheco MD;  Location: [facility] CATH;  Service: Cardiovascular;  Laterality: Right;    TOTAL ABDOMINAL HYSTERECTOMY W/ BILATERAL SALPINGOOPHORECTOMY          FAMILY HISTORY  Family History   Problem Relation Age of Onset    Arthritis Mother     Breast cancer Mother     Heart disease Mother     Hypertension Mother     Cancer Father     Heart disease Father     Hypertension Father     Diabetes Daughter        SOCIAL HISTORY    reports that she has never smoked. She has never used smokeless tobacco. She reports current alcohol use. She reports that she does not use drugs.    OBJECTIVE   Vitals:    11/28/22 1109   BP: 123/69   Pulse: 93      Body mass index is 36.87 kg/m².     Physical Exam:   Constitutional: She is oriented to person, place, and time. She appears well-developed. No distress.    HENT:   Head: Normocephalic and atraumatic.    Eyes: Conjunctivae and EOM are normal.      Pulmonary/Chest: Effort normal. No respiratory distress. Right breast exhibits no inverted nipple, no mass, no nipple discharge, no skin change, no tenderness, no bleeding and no swelling. Left breast exhibits tenderness and swelling.  Left breast exhibits no inverted nipple, no mass, no nipple discharge, no skin change and no bleeding.        Abdominal: Soft. She exhibits no distension and no mass. There is no abdominal tenderness. There is no rebound and no guarding. No hernia.     Genitourinary:    Genitourinary Comments: Vulva:  Normal  Vagina:  No lesions visualized or palpate, no blood or discharge, exam quite uncomfortable for patient.  Cervix: Surgically absent  Uterus: Surgically absent                 Neurological: She is alert and oriented to person, place, and time.    Skin: No rash noted.    Psychiatric: She has a normal mood and affect. Judgment and thought content normal.       LABORATORY DATA  Lab data reviewed.    RADIOLOGICAL DATA  Radiology data reviewed.    PATHOLOGY DATA  Pathology data reviewed.    ASSESSMENT    1. 2.8 cm complex cyst of vaginal wall     2. Diarrhea concurrent with and due to carcinoid syndrome    3. Carcinoid syndrome related chronic diarrhea     Ms. Santiago has a 2.8 cm complex cyst of the vaginal wall noted on recent CT imaging and confirmed by ultrasound.  I do not see a visible lesion on vaginal exam.  Upon reviewing her CT from 2017, it appears the lesion was present at that time.  This provides strong evidence for the stability and likely benign nature of the lesion.  Based on this in isolation, my recommendation would be a repeat ultrasound in 6 months to insure stability.    If because of all of her other issues it is imperative to do more to definitively rule out significant pathology sooner, an MRI of the pelvis could be done or even IR guided biopsy (though the latter may be challenging).    I do not see any source or evidence of vaginal bleeding and feel it would be very difficult based on performance status for the patient to discern this versus rectal bleeding.  In the context of her diarrhea, I would favor a GI origin (especially in the absence of a uterus).  I gave her precautions that if  bleeding recurred / persisted, she should have this further evaluated.     Because of her concern for left breast swelling, I did do an exam as noted above.  I think the swelling is due to her anasarca / fluid retention, but advised her that if concerns persisted, she should have this further evaluated.      PLAN  Consider follow-up ultrasound as noted above in 6 months.  Would be helpful to have radiologist due a formal comparison to her 2017 imaging.  2.  Follow-up with breast care team if left breast concerns persist.            Anthony Lala MD

## 2022-11-28 NOTE — PROGRESS NOTES
"Phoned patient in response to reply of "2" to post-discharge texting tracker. Spoke to patient's daughter, Padmini. She states that patient is retaining fluid all over her body and it has now traveled to her breasts. She said they were told to discontinue the furosemide oral tablets (as confirmed by discharge paperwork). She also mentioned that patient was receiving a fluid medication in the hospital (furosemide IV per medications list) but was not discharged with one and she does not know what to do. I advised patient I would send a secure chat to the discharging provider to see if they could offer a recommendation or order medication and would call her back. Padmini verbalized understanding.  Discharging provider agreed to send in RX for a fluid medication, but Padmini declined since patient is scheduled to see the Cardiologist on tomorrow. She will discuss further at that time. I notified d/c provider via secure chat that the RX was no longer needed.  "

## 2022-11-29 ENCOUNTER — OFFICE VISIT (OUTPATIENT)
Dept: CARDIOLOGY | Facility: CLINIC | Age: 67
End: 2022-11-29
Payer: MEDICARE

## 2022-11-29 VITALS
HEART RATE: 85 BPM | WEIGHT: 222 LBS | DIASTOLIC BLOOD PRESSURE: 61 MMHG | HEIGHT: 65 IN | BODY MASS INDEX: 36.99 KG/M2 | OXYGEN SATURATION: 96 % | SYSTOLIC BLOOD PRESSURE: 114 MMHG

## 2022-11-29 DIAGNOSIS — I50.32 CHRONIC HEART FAILURE WITH PRESERVED EJECTION FRACTION (HFPEF): Primary | ICD-10-CM

## 2022-11-29 DIAGNOSIS — I73.9 PAD (PERIPHERAL ARTERY DISEASE): ICD-10-CM

## 2022-11-29 DIAGNOSIS — I10 ESSENTIAL HYPERTENSION: Chronic | ICD-10-CM

## 2022-11-29 DIAGNOSIS — I82.411 ACUTE DEEP VEIN THROMBOSIS (DVT) OF FEMORAL VEIN OF RIGHT LOWER EXTREMITY: ICD-10-CM

## 2022-11-29 DIAGNOSIS — E87.79 OTHER HYPERVOLEMIA: ICD-10-CM

## 2022-11-29 DIAGNOSIS — I48.0 PAF (PAROXYSMAL ATRIAL FIBRILLATION): ICD-10-CM

## 2022-11-29 PROCEDURE — 99999 PR PBB SHADOW E&M-EST. PATIENT-LVL IV: CPT | Mod: PBBFAC,,, | Performed by: INTERNAL MEDICINE

## 2022-11-29 PROCEDURE — 1101F PT FALLS ASSESS-DOCD LE1/YR: CPT | Mod: CPTII,S$GLB,, | Performed by: INTERNAL MEDICINE

## 2022-11-29 PROCEDURE — 1126F AMNT PAIN NOTED NONE PRSNT: CPT | Mod: CPTII,S$GLB,, | Performed by: INTERNAL MEDICINE

## 2022-11-29 PROCEDURE — 3074F SYST BP LT 130 MM HG: CPT | Mod: CPTII,S$GLB,, | Performed by: INTERNAL MEDICINE

## 2022-11-29 PROCEDURE — 3288F FALL RISK ASSESSMENT DOCD: CPT | Mod: CPTII,S$GLB,, | Performed by: INTERNAL MEDICINE

## 2022-11-29 PROCEDURE — 1111F DSCHRG MED/CURRENT MED MERGE: CPT | Mod: CPTII,S$GLB,, | Performed by: INTERNAL MEDICINE

## 2022-11-29 PROCEDURE — 99999 PR PBB SHADOW E&M-EST. PATIENT-LVL IV: ICD-10-PCS | Mod: PBBFAC,,, | Performed by: INTERNAL MEDICINE

## 2022-11-29 PROCEDURE — 1159F PR MEDICATION LIST DOCUMENTED IN MEDICAL RECORD: ICD-10-PCS | Mod: CPTII,S$GLB,, | Performed by: INTERNAL MEDICINE

## 2022-11-29 PROCEDURE — 3008F PR BODY MASS INDEX (BMI) DOCUMENTED: ICD-10-PCS | Mod: CPTII,S$GLB,, | Performed by: INTERNAL MEDICINE

## 2022-11-29 PROCEDURE — 1160F PR REVIEW ALL MEDS BY PRESCRIBER/CLIN PHARMACIST DOCUMENTED: ICD-10-PCS | Mod: CPTII,S$GLB,, | Performed by: INTERNAL MEDICINE

## 2022-11-29 PROCEDURE — 1101F PR PT FALLS ASSESS DOC 0-1 FALLS W/OUT INJ PAST YR: ICD-10-PCS | Mod: CPTII,S$GLB,, | Performed by: INTERNAL MEDICINE

## 2022-11-29 PROCEDURE — 3008F BODY MASS INDEX DOCD: CPT | Mod: CPTII,S$GLB,, | Performed by: INTERNAL MEDICINE

## 2022-11-29 PROCEDURE — 99204 OFFICE O/P NEW MOD 45 MIN: CPT | Mod: S$GLB,,, | Performed by: INTERNAL MEDICINE

## 2022-11-29 PROCEDURE — 1159F MED LIST DOCD IN RCRD: CPT | Mod: CPTII,S$GLB,, | Performed by: INTERNAL MEDICINE

## 2022-11-29 PROCEDURE — 3288F PR FALLS RISK ASSESSMENT DOCUMENTED: ICD-10-PCS | Mod: CPTII,S$GLB,, | Performed by: INTERNAL MEDICINE

## 2022-11-29 PROCEDURE — 1111F PR DISCHARGE MEDS RECONCILED W/ CURRENT OUTPATIENT MED LIST: ICD-10-PCS | Mod: CPTII,S$GLB,, | Performed by: INTERNAL MEDICINE

## 2022-11-29 PROCEDURE — 3074F PR MOST RECENT SYSTOLIC BLOOD PRESSURE < 130 MM HG: ICD-10-PCS | Mod: CPTII,S$GLB,, | Performed by: INTERNAL MEDICINE

## 2022-11-29 PROCEDURE — 1160F RVW MEDS BY RX/DR IN RCRD: CPT | Mod: CPTII,S$GLB,, | Performed by: INTERNAL MEDICINE

## 2022-11-29 PROCEDURE — 3044F PR MOST RECENT HEMOGLOBIN A1C LEVEL <7.0%: ICD-10-PCS | Mod: CPTII,S$GLB,, | Performed by: INTERNAL MEDICINE

## 2022-11-29 PROCEDURE — 3078F DIAST BP <80 MM HG: CPT | Mod: CPTII,S$GLB,, | Performed by: INTERNAL MEDICINE

## 2022-11-29 PROCEDURE — 99204 PR OFFICE/OUTPT VISIT, NEW, LEVL IV, 45-59 MIN: ICD-10-PCS | Mod: S$GLB,,, | Performed by: INTERNAL MEDICINE

## 2022-11-29 PROCEDURE — 3044F HG A1C LEVEL LT 7.0%: CPT | Mod: CPTII,S$GLB,, | Performed by: INTERNAL MEDICINE

## 2022-11-29 PROCEDURE — 1126F PR PAIN SEVERITY QUANTIFIED, NO PAIN PRESENT: ICD-10-PCS | Mod: CPTII,S$GLB,, | Performed by: INTERNAL MEDICINE

## 2022-11-29 PROCEDURE — 3078F PR MOST RECENT DIASTOLIC BLOOD PRESSURE < 80 MM HG: ICD-10-PCS | Mod: CPTII,S$GLB,, | Performed by: INTERNAL MEDICINE

## 2022-11-29 RX ORDER — PANTOPRAZOLE SODIUM 40 MG/1
40 TABLET, DELAYED RELEASE ORAL DAILY
COMMUNITY

## 2022-11-29 RX ORDER — TIMOLOL MALEATE 5 MG/ML
1 SOLUTION/ DROPS OPHTHALMIC 2 TIMES DAILY
COMMUNITY

## 2022-11-29 RX ORDER — LEVETIRACETAM 500 MG/1
500 TABLET ORAL 2 TIMES DAILY
COMMUNITY

## 2022-11-29 RX ORDER — TORSEMIDE 20 MG/1
40 TABLET ORAL DAILY
Qty: 60 TABLET | Refills: 11 | Status: SHIPPED | OUTPATIENT
Start: 2022-11-29 | End: 2023-02-14

## 2022-11-29 NOTE — PROGRESS NOTES
Subjective:   Patient ID:  Cherry Santiago is a 67 y.o. female who presents for evaluation of Establish Care, Follow-up, Shortness of Breath, Dizziness (Upon exertion), and Edema      HPI: Very pleasant woman here with her daughter for evaluation of marked fluid retention.  She was previously on lasix for chronic heart failure but had it held during an extended diarrheal illness that required hospitalization.  See hospital course below.    She states that her regular weight is a little under 200# and she is 222 today.  She states that swelling is all through her legs and thighs and even includes her left breast and flank.      An echocardiogram in the hospital 8 days ago noted an IVC of 3mm Hg, but the images were suboptimal.    Hospital Course:   67-year-old past medical history of recently diagnosed carcinoid syndrome, having diarrhea intermittently since August 2022 more recently diarrhea has become worse over past few days -several episodes daily  associated nausea with intermittent vomiting. 9/30 CT AP showed partial small bowel resection. There are some thickened segments of large and small bowel, some with adjacent mesenteric edema. Symptoms concerned likely though to be secondary to carcinoid syndrome patient evaluated by Heme-Onc at [facility] with PET scan done 10/18 - Focal nodular foci of radiotracer uptake at the pancreatic tail and near the region of the pancreatic head above liver background activity could indicate somatostatin receptor avid lesions, but no corresponding mass is seen on CT portion of the study.  Recommend follow-up contrast enhanced pancreas protocol CT to further assess. 5HIAA levels on 10/21 WNL. CT AP W contrast done 11/4 shows No pancreatic lesion and Enterocolitis,. recent EGD -Non-bleeding gastric ulcers with no stigmata of  bleeding. Biopsy neg for H pylori. Treated with a monopolar probe. Colonoscopy with biopsy -No significant pathologic abnormality. No  morphologic evidence of active inflammatory bowel disease, microscopic colitis, or neoplasia. Patient was  referred to [facility] for further workup and has scheduled appointment at Hem[facility]Onc at [facility] this upcoming week. Chromogranin A elevated at 2000s. C diff negative. Fecal fat level normal. Fecal elastase low. AES consulted for EUS and potential biopsy given that her PET scan showed uptake but no mass was seen on CT pancreas protocol. Started and discontinued octreotide since it failed to help. Repeat cryptosporidium Ag negative. 11/9 EUS - sleeve gastrectomy was found, characterized by healthy appearing mucosa. Widely patent duodenoenterostomy, characterized  by healthy appearing mucosa was found. few cystic lesions were seen in the pancreatic head, genu of the pancreas and pancreatic body highly suspicious for a branched intraductal papillary mucinous neoplasm. Quite small for sampling. Pancreatic head and pancreatic tail showed no mass. Oncology reviewed results on 11/12 and feel patient with low suspicion for neuroendocrine tumor at this point. CT chest with contrast 11/13 significant for subcentimeter nodules bilaterally. Surgery oncology consulted - testing thus far not convincing for need for surgical exploration. Will present to tumor board 11/21. Chomogranin A level elevated due to PPI. Somatostatin level normal. Tryptase negative. Normal IgG and negative Tissue Transglutaminase Iga. Insulin and proinsulin levels normal. VIPoma level normal  Will need repeat gastrin and chromogranin level once off PPI for at least 2-4 weeks. EGD and small bowel biopsies done 11/17 and pending. Increase questran to TID. Continue loperamide 2 mg TID. Add lomotil 1 cap QID. Improved to 1-2 BMs a day. Still improved when questran discontinued. Will see how she does off loperamide. Patient with Low fecal elastase and ferrtin slightly elevated 272 and high iron saturation 83. Discussed with GI - will discuss  significance with GI. As responding to anti-diarrheal medication, not need to start pancreatic enzymes at this time. Follow up with GI as outpatient after discharge. Patient with intermittent hypotension treated with fluids. . Patient intermittently treated with furosemide due to symptomatic BLE peripheral edema.   Diarrhea eventually stopped.  Patient was taken off of TPN and tolerated PO.     Past Medical History:   Diagnosis Date    Anticoagulant long-term use     Arthritis     Atrial fibrillation     CHF (congestive heart failure)     Diabetes mellitus     Type2 resolved after weight loss surgery    DVT (deep venous thrombosis)     Encounter for blood transfusion     GERD (gastroesophageal reflux disease)     Glaucoma     Hypercholesterolemia     Hyperlipemia     Hypertension     Memory changes     Myocardial infarction     Renal failure     resolved    TIA (transient ischemic attack)        Past Surgical History:   Procedure Laterality Date    BACK SURGERY  06/21/2017    lumbar fusion 6/21/17 and surgery for hematoma to site on 6/26/17    CHOLECYSTECTOMY  1978    COLONOSCOPY N/A 10/3/2022    Procedure: COLONOSCOPY;  Surgeon: Jourdan Parks MD;  Location: [facility];  Service: General;  Laterality: N/A;    COLONOSCOPY N/A 10/11/2022    Procedure: COLONOSCOPY;  Surgeon: Stephen Cooper MD;  Location: [facility];  Service: Endoscopy;  Laterality: N/A;    ENDOSCOPIC ULTRASOUND OF UPPER GASTROINTESTINAL TRACT N/A 11/9/2022    Procedure: ULTRASOUND, UPPER GI TRACT, ENDOSCOPIC;  Surgeon: Jake Corona MD;  Location: [facility] (2ND FLR);  Service: Endoscopy;  Laterality: N/A;    ESOPHAGOGASTRODUODENOSCOPY N/A 10/3/2022    Procedure: EGD (ESOPHAGOGASTRODUODENOSCOPY);  Surgeon: Jourdan Parks MD;  Location: [facility];  Service: General;  Laterality: N/A;    ESOPHAGOGASTRODUODENOSCOPY N/A 11/15/2022    Procedure: EGD (ESOPHAGOGASTRODUODENOSCOPY);  Surgeon: Roque Soliman MD;  Location: [facility] (2ND FLR);   Service: Endoscopy;  Laterality: N/A;    ESOPHAGOGASTRODUODENOSCOPY N/A 11/17/2022    Procedure: EGD (ESOPHAGOGASTRODUODENOSCOPY);  Surgeon: Roque Soliman MD;  [facility]  Service: Endoscopy;  Laterality: N/A;    GASTRIC BYPASS      HYSTERECTOMY  2012    JOINT REPLACEMENT      TKR    LEFT HEART CATHETERIZATION Left 2/5/2021    Procedure: Left heart cath;  Surgeon: Shane Pacheco MD;  [facility]: [facility] CATH;  Service: Cardiovascular;  Laterality: Left;    PELVIC LAPAROSCOPY Left     OOPH    RENAL BIOPSY N/A 10/5/2022    Procedure: BIOPSY, KIDNEY;  Surgeon: Velia Diagnostic Provider;  Location: [facility] OR;  Service: General;  Laterality: N/A;    RIGHT HEART CATHETERIZATION Right 2/5/2021    Procedure: INSERTION, CATHETER, RIGHT HEART. via left radial and left brachial;  Surgeon: Shane Pacheco MD;  Location: [facility] C[facility];  Service: Cardiovascular;  Laterality: Right;    TOTAL ABDOMINAL HYSTERECTOMY W/ BILATERAL SALPINGOOPHORECTOMY         Social History     Tobacco Use    Smoking status: Never    Smokeless tobacco: Never   Substance Use Topics    Alcohol use: Yes     Comment: occasional    Drug use: No       Family History   Problem Relation Age of Onset    Arthritis Mother     Breast cancer Mother     Heart disease Mother     Hypertension Mother     Cancer Father     Heart disease Father     Hypertension Father     Diabetes Daughter        Current Outpatient Medications   Medication Sig    apixaban (ELIQUIS) 5 mg Tab Take 1 tablet (5 mg total) by mouth 2 (two) times daily.    atorvastatin (LIPITOR) 40 MG tablet Take 1 tablet (40 mg total) by mouth every evening.    cholecalciferol, vitamin D3, (VITAMIN D3) 50 mcg (2,000 unit) Tab Take 1 tablet (2,000 Units total) by mouth once daily.    cyanocobalamin (VITAMIN B-12) 1000 MCG tablet Take 1 tablet (1,000 mcg total) by mouth once daily.    diphenoxylate-atropine 2.5-0.025 mg/5 ml (LOMOTIL) 2.5-0.025 mg/5 mL liquid Take 5 mLs by mouth 4 (four) times daily  as needed (diarrhea).    levETIRAcetam (KEPPRA) 500 MG Tab Take 500 mg by mouth 2 (two) times daily.    multivitamin Tab Take 1 tablet by mouth once daily.    pantoprazole (PROTONIX) 40 MG tablet Take 40 mg by mouth once daily.    potassium chloride (MICRO-K) 10 MEQ CpSR Take 2 capsules (20 mEq total) by mouth daily with breakfast. for 20 days    timolol maleate 0.5% (TIMOPTIC) 0.5 % Drop 1 drop 2 (two) times daily.    aspirin (ECOTRIN) 81 MG EC tablet Take 81 mg by mouth once daily.    ferrous sulfate (FEOSOL) 325 mg (65 mg iron) Tab tablet Take 325 mg by mouth daily with breakfast.     No current facility-administered medications for this visit.       Review of patient's allergies indicates:  No Known Allergies    Review of Systems   Constitutional: Negative.   HENT: Negative.     Eyes: Negative.    Cardiovascular:  Positive for dyspnea on exertion and leg swelling. Negative for chest pain, near-syncope, orthopnea and palpitations.   Respiratory:  Positive for shortness of breath. Negative for cough and hemoptysis.    Endocrine: Negative.    Hematologic/Lymphatic: Negative.    Skin: Negative.    Musculoskeletal: Negative.    Gastrointestinal: Negative.    Genitourinary: Negative.    Neurological: Negative.    Psychiatric/Behavioral: Negative.     Objective:   Physical Exam  Vitals reviewed.   Constitutional:       Appearance: She is well-developed.   HENT:      Head: Normocephalic and atraumatic.   Eyes:      General: No scleral icterus.     Conjunctiva/sclera: Conjunctivae normal.   Neck:      Vascular: No JVD.   Cardiovascular:      Rate and Rhythm: Normal rate and regular rhythm.      Pulses: Intact distal pulses.      Heart sounds: Normal heart sounds. No murmur heard.    No friction rub. No gallop.      Comments: Jugular veins are difficult to assess  Pulmonary:      Effort: Pulmonary effort is normal.      Breath sounds: Normal breath sounds. No wheezing or rales.   Abdominal:      General: Bowel sounds are  normal. There is no distension.      Palpations: Abdomen is soft.      Tenderness: There is no abdominal tenderness.   Musculoskeletal:         General: Normal range of motion.      Cervical back: Normal range of motion and neck supple.      Comments: 4+ lower extremity bilaterally extending to the thighs, the sacrum, and even the left breast.   Skin:     General: Skin is warm and dry.      Findings: No erythema or rash.   Neurological:      Mental Status: She is alert and oriented to person, place, and time.   Psychiatric:         Behavior: Behavior normal.         Thought Content: Thought content normal.         Judgment: Judgment normal.       Lab Results   Component Value Date    WBC 5.08 11/25/2022    HGB 7.1 (L) 11/25/2022    HCT 23.7 (L) 11/25/2022    MCV 98 11/25/2022     (L) 11/25/2022         Chemistry        Component Value Date/Time     11/25/2022 1114    K 4.4 11/25/2022 1114     11/25/2022 1114    CO2 32 (H) 11/25/2022 1114    BUN 13 11/25/2022 1114    CREATININE 0.7 11/25/2022 1114     11/25/2022 1114        Component Value Date/Time    CALCIUM 7.7 (L) 11/25/2022 1114    ALKPHOS 75 11/19/2022 0119    AST 58 (H) 11/19/2022 0119    ALT 9 (L) 11/19/2022 0119    BILITOT 0.5 11/21/2022 1023    ESTGFRAFRICA 51 (A) 05/11/2022 1748    EGFRNONAA 44 (A) 05/11/2022 1748            Lab Results   Component Value Date    CHOL 124 04/12/2021     Lab Results   Component Value Date    HDL 66 (H) 04/12/2021     Lab Results   Component Value Date    LDLCALC 47 04/12/2021     Lab Results   Component Value Date    TRIG 38 11/20/2022    TRIG 55 04/12/2021     Lab Results   Component Value Date    CHOLHDL 1.88 04/12/2021       Lab Results   Component Value Date    TSH 4.475 (H) 11/19/2022       Lab Results   Component Value Date    HGBA1C 4.7 10/13/2022         Assessment:     1. Chronic heart failure with preserved ejection fraction (HFpEF) NICM NYHA2     2. PAD (peripheral artery disease)    3.  PAF (paroxysmal atrial fibrillation)    4. Essential hypertension    5. Acute deep vein thrombosis (DVT) of femoral vein of right lower extremity        Plan:     Trial of torsemide 40mg daily by mouth.  I'm worried she'll need IV diuresis for this, but she is clearly markedly overloaded, with entirely right heart failure symptoms/signs.    RTC in 1 week with oscar GALICIA

## 2022-11-30 ENCOUNTER — TELEPHONE (OUTPATIENT)
Dept: GASTROENTEROLOGY | Facility: CLINIC | Age: 67
End: 2022-11-30
Payer: MEDICARE

## 2022-11-30 NOTE — TELEPHONE ENCOUNTER
----- Message from Nubia Lord MA sent at 11/29/2022  1:01 PM CST -----    ----- Message -----  From: Roque Soliman MD  Sent: 11/29/2022   8:19 AM CST  To: Jourdan SAUCEDO Staff    Please schedule a follow up with  in clinic.

## 2022-12-05 ENCOUNTER — PATIENT MESSAGE (OUTPATIENT)
Dept: GASTROENTEROLOGY | Facility: CLINIC | Age: 67
End: 2022-12-05
Payer: MEDICARE

## 2022-12-05 ENCOUNTER — TELEPHONE (OUTPATIENT)
Dept: ENDOSCOPY | Facility: HOSPITAL | Age: 67
End: 2022-12-05
Payer: MEDICARE

## 2022-12-05 NOTE — TELEPHONE ENCOUNTER
"----- Message from Anthony Pizarro sent at 12/5/2022  9:24 AM CST -----  Consult/Advisory:          Name Of Caller: Self      Contact Preference?: [phone]      Provider Name: Cj      Does patient feel the need to be seen today? No      What is the nature of the call?: Calling to speak w/ office about results for 11/9 Endoscopy and other labs          Additional Notes:  "Thank you for all that you do for our patients"      "

## 2022-12-06 NOTE — TELEPHONE ENCOUNTER
Called to discuss results of biopsies and other lab results. Small bowel biopsies from 11/17 showed benign small bowel mucosa with focal increase in intraepithelial lymphocytes. Discussed that this is nonspecific and can be seen in multiple conditions including celiac disease (although she has negative celiac serum testing), medication use (NSAIDS), SIBO, microscopic colitis (negative colon biopsies), among others. She does report her diarrhea has drastically improved, still taking immodium and lomotil. Having 2-3 partially formed stools per day. Peripheral edema improving with diuresis per cardiology. Informed her of normal VIP and somatostatin levels as well. She does have follow up with GI clinic next month, advised that she should let us know via patient portal if symptoms worsen before then.    Jet Paz  Gastroenterology Fellow, PGY-IV

## 2022-12-06 NOTE — TELEPHONE ENCOUNTER
----- Message from Дмитрий Garcia MA sent at 12/6/2022 12:35 PM CST -----  Contact: [phone]  Pt is calling to speak with staff about test results.  Pt would like a call back [phone]

## 2023-01-23 ENCOUNTER — OFFICE VISIT (OUTPATIENT)
Dept: GASTROENTEROLOGY | Facility: CLINIC | Age: 68
End: 2023-01-23
Payer: MEDICARE

## 2023-01-23 VITALS
BODY MASS INDEX: 31.4 KG/M2 | HEIGHT: 65 IN | WEIGHT: 188.5 LBS | HEART RATE: 110 BPM | DIASTOLIC BLOOD PRESSURE: 69 MMHG | SYSTOLIC BLOOD PRESSURE: 105 MMHG

## 2023-01-23 DIAGNOSIS — K52.9 CHRONIC DIARRHEA: Primary | ICD-10-CM

## 2023-01-23 DIAGNOSIS — R63.4 WEIGHT LOSS: ICD-10-CM

## 2023-01-23 PROCEDURE — 1160F PR REVIEW ALL MEDS BY PRESCRIBER/CLIN PHARMACIST DOCUMENTED: ICD-10-PCS | Mod: CPTII,GC,S$GLB, | Performed by: STUDENT IN AN ORGANIZED HEALTH CARE EDUCATION/TRAINING PROGRAM

## 2023-01-23 PROCEDURE — 1100F PR PT FALLS ASSESS DOC 2+ FALLS/FALL W/INJURY/YR: ICD-10-PCS | Mod: CPTII,GC,S$GLB, | Performed by: STUDENT IN AN ORGANIZED HEALTH CARE EDUCATION/TRAINING PROGRAM

## 2023-01-23 PROCEDURE — 1125F AMNT PAIN NOTED PAIN PRSNT: CPT | Mod: CPTII,GC,S$GLB, | Performed by: STUDENT IN AN ORGANIZED HEALTH CARE EDUCATION/TRAINING PROGRAM

## 2023-01-23 PROCEDURE — 99999 PR PBB SHADOW E&M-EST. PATIENT-LVL IV: ICD-10-PCS | Mod: PBBFAC,GC,, | Performed by: STUDENT IN AN ORGANIZED HEALTH CARE EDUCATION/TRAINING PROGRAM

## 2023-01-23 PROCEDURE — 99999 PR PBB SHADOW E&M-EST. PATIENT-LVL IV: CPT | Mod: PBBFAC,GC,, | Performed by: STUDENT IN AN ORGANIZED HEALTH CARE EDUCATION/TRAINING PROGRAM

## 2023-01-23 PROCEDURE — 3288F PR FALLS RISK ASSESSMENT DOCUMENTED: ICD-10-PCS | Mod: CPTII,GC,S$GLB, | Performed by: STUDENT IN AN ORGANIZED HEALTH CARE EDUCATION/TRAINING PROGRAM

## 2023-01-23 PROCEDURE — 3078F PR MOST RECENT DIASTOLIC BLOOD PRESSURE < 80 MM HG: ICD-10-PCS | Mod: CPTII,GC,S$GLB, | Performed by: STUDENT IN AN ORGANIZED HEALTH CARE EDUCATION/TRAINING PROGRAM

## 2023-01-23 PROCEDURE — 99213 OFFICE O/P EST LOW 20 MIN: CPT | Mod: GC,S$GLB,, | Performed by: STUDENT IN AN ORGANIZED HEALTH CARE EDUCATION/TRAINING PROGRAM

## 2023-01-23 PROCEDURE — 1125F PR PAIN SEVERITY QUANTIFIED, PAIN PRESENT: ICD-10-PCS | Mod: CPTII,GC,S$GLB, | Performed by: STUDENT IN AN ORGANIZED HEALTH CARE EDUCATION/TRAINING PROGRAM

## 2023-01-23 PROCEDURE — 99213 PR OFFICE/OUTPT VISIT, EST, LEVL III, 20-29 MIN: ICD-10-PCS | Mod: GC,S$GLB,, | Performed by: STUDENT IN AN ORGANIZED HEALTH CARE EDUCATION/TRAINING PROGRAM

## 2023-01-23 PROCEDURE — 1159F PR MEDICATION LIST DOCUMENTED IN MEDICAL RECORD: ICD-10-PCS | Mod: CPTII,GC,S$GLB, | Performed by: STUDENT IN AN ORGANIZED HEALTH CARE EDUCATION/TRAINING PROGRAM

## 2023-01-23 PROCEDURE — 3074F SYST BP LT 130 MM HG: CPT | Mod: CPTII,GC,S$GLB, | Performed by: STUDENT IN AN ORGANIZED HEALTH CARE EDUCATION/TRAINING PROGRAM

## 2023-01-23 PROCEDURE — 1159F MED LIST DOCD IN RCRD: CPT | Mod: CPTII,GC,S$GLB, | Performed by: STUDENT IN AN ORGANIZED HEALTH CARE EDUCATION/TRAINING PROGRAM

## 2023-01-23 PROCEDURE — 1100F PTFALLS ASSESS-DOCD GE2>/YR: CPT | Mod: CPTII,GC,S$GLB, | Performed by: STUDENT IN AN ORGANIZED HEALTH CARE EDUCATION/TRAINING PROGRAM

## 2023-01-23 PROCEDURE — 3008F BODY MASS INDEX DOCD: CPT | Mod: CPTII,GC,S$GLB, | Performed by: STUDENT IN AN ORGANIZED HEALTH CARE EDUCATION/TRAINING PROGRAM

## 2023-01-23 PROCEDURE — 3074F PR MOST RECENT SYSTOLIC BLOOD PRESSURE < 130 MM HG: ICD-10-PCS | Mod: CPTII,GC,S$GLB, | Performed by: STUDENT IN AN ORGANIZED HEALTH CARE EDUCATION/TRAINING PROGRAM

## 2023-01-23 PROCEDURE — 3288F FALL RISK ASSESSMENT DOCD: CPT | Mod: CPTII,GC,S$GLB, | Performed by: STUDENT IN AN ORGANIZED HEALTH CARE EDUCATION/TRAINING PROGRAM

## 2023-01-23 PROCEDURE — 3078F DIAST BP <80 MM HG: CPT | Mod: CPTII,GC,S$GLB, | Performed by: STUDENT IN AN ORGANIZED HEALTH CARE EDUCATION/TRAINING PROGRAM

## 2023-01-23 PROCEDURE — 1160F RVW MEDS BY RX/DR IN RCRD: CPT | Mod: CPTII,GC,S$GLB, | Performed by: STUDENT IN AN ORGANIZED HEALTH CARE EDUCATION/TRAINING PROGRAM

## 2023-01-23 PROCEDURE — 3008F PR BODY MASS INDEX (BMI) DOCUMENTED: ICD-10-PCS | Mod: CPTII,GC,S$GLB, | Performed by: STUDENT IN AN ORGANIZED HEALTH CARE EDUCATION/TRAINING PROGRAM

## 2023-01-23 RX ORDER — ONDANSETRON 4 MG/1
4 TABLET, FILM COATED ORAL EVERY 6 HOURS PRN
Qty: 1 TABLET | Refills: 14 | Status: SHIPPED | OUTPATIENT
Start: 2023-01-23 | End: 2023-02-07

## 2023-01-24 ENCOUNTER — LAB VISIT (OUTPATIENT)
Dept: LAB | Facility: HOSPITAL | Age: 68
End: 2023-01-24
Attending: STUDENT IN AN ORGANIZED HEALTH CARE EDUCATION/TRAINING PROGRAM
Payer: MEDICARE

## 2023-01-24 DIAGNOSIS — K52.9 CHRONIC DIARRHEA: ICD-10-CM

## 2023-01-24 PROCEDURE — 82653 EL-1 FECAL QUANTITATIVE: CPT | Performed by: STUDENT IN AN ORGANIZED HEALTH CARE EDUCATION/TRAINING PROGRAM

## 2023-01-25 ENCOUNTER — TELEPHONE (OUTPATIENT)
Dept: GASTROENTEROLOGY | Facility: CLINIC | Age: 68
End: 2023-01-25
Payer: MEDICARE

## 2023-01-25 ENCOUNTER — TELEPHONE (OUTPATIENT)
Dept: ENDOSCOPY | Facility: HOSPITAL | Age: 68
End: 2023-01-25
Payer: MEDICARE

## 2023-01-25 NOTE — TELEPHONE ENCOUNTER
----- Message from Helen Monroe sent at 1/25/2023  1:45 PM CST -----  Contact: pt  Pt requesting call back RE: returning call      Confirmed contact below:  Contact Name:Cherry Santiago  Phone Number: [phone]

## 2023-01-25 NOTE — TELEPHONE ENCOUNTER
----- Message from Esperanza Houser sent at 1/25/2023  8:31 AM CST -----  CHARITY NOLASCO Daughter Bridgette calling regarding Appointment Access for wanting to reschedule the appt that is on 01/31/23 to 02/07/23 because the Pt is scheduled for another that day and it will be more convenient due to the distance to the appt's. call back [phone]

## 2023-01-25 NOTE — TELEPHONE ENCOUNTER
----- Message from Yrn Anderson sent at 1/25/2023  8:59 AM CST -----  Contact: @[phone]  Caller is calling in to reschedule the pt appt on 1/31 to 2/7 if possible around 12pm or 1pm , please call to discuss further.

## 2023-01-27 LAB — ELASTASE 1, FECAL: 80 MCG/G

## 2023-02-07 ENCOUNTER — LAB VISIT (OUTPATIENT)
Dept: LAB | Facility: HOSPITAL | Age: 68
End: 2023-02-07
Payer: MEDICARE

## 2023-02-07 ENCOUNTER — NUTRITION (OUTPATIENT)
Dept: GASTROENTEROLOGY | Facility: CLINIC | Age: 68
End: 2023-02-07
Payer: MEDICARE

## 2023-02-07 ENCOUNTER — PATIENT MESSAGE (OUTPATIENT)
Dept: GYNECOLOGIC ONCOLOGY | Facility: CLINIC | Age: 68
End: 2023-02-07
Payer: MEDICARE

## 2023-02-07 ENCOUNTER — OFFICE VISIT (OUTPATIENT)
Dept: INTERNAL MEDICINE | Facility: CLINIC | Age: 68
End: 2023-02-07
Payer: MEDICARE

## 2023-02-07 VITALS
OXYGEN SATURATION: 99 % | HEIGHT: 65 IN | DIASTOLIC BLOOD PRESSURE: 62 MMHG | WEIGHT: 175.94 LBS | HEART RATE: 56 BPM | SYSTOLIC BLOOD PRESSURE: 96 MMHG | BODY MASS INDEX: 29.31 KG/M2

## 2023-02-07 VITALS — BODY MASS INDEX: 29.64 KG/M2 | WEIGHT: 178.13 LBS

## 2023-02-07 DIAGNOSIS — K52.9 CHRONIC DIARRHEA: Primary | ICD-10-CM

## 2023-02-07 DIAGNOSIS — D64.9 ANEMIA, UNSPECIFIED TYPE: ICD-10-CM

## 2023-02-07 DIAGNOSIS — K52.9 CHRONIC DIARRHEA: ICD-10-CM

## 2023-02-07 DIAGNOSIS — R63.4 WEIGHT LOSS: ICD-10-CM

## 2023-02-07 DIAGNOSIS — Z12.31 ENCOUNTER FOR SCREENING MAMMOGRAM FOR MALIGNANT NEOPLASM OF BREAST: ICD-10-CM

## 2023-02-07 DIAGNOSIS — R45.89 DEPRESSED MOOD: ICD-10-CM

## 2023-02-07 LAB
ANION GAP SERPL CALC-SCNC: 16 MMOL/L (ref 8–16)
BASOPHILS # BLD AUTO: 0.02 K/UL (ref 0–0.2)
BASOPHILS NFR BLD: 0.3 % (ref 0–1.9)
BUN SERPL-MCNC: 35 MG/DL (ref 8–23)
CALCIUM SERPL-MCNC: 7.7 MG/DL (ref 8.7–10.5)
CHLORIDE SERPL-SCNC: 110 MMOL/L (ref 95–110)
CO2 SERPL-SCNC: 20 MMOL/L (ref 23–29)
CREAT SERPL-MCNC: 2.2 MG/DL (ref 0.5–1.4)
DIFFERENTIAL METHOD: ABNORMAL
EOSINOPHIL # BLD AUTO: 0 K/UL (ref 0–0.5)
EOSINOPHIL NFR BLD: 0.2 % (ref 0–8)
ERYTHROCYTE [DISTWIDTH] IN BLOOD BY AUTOMATED COUNT: 17.1 % (ref 11.5–14.5)
EST. GFR  (NO RACE VARIABLE): 24 ML/MIN/1.73 M^2
FERRITIN SERPL-MCNC: 505 NG/ML (ref 20–300)
GLUCOSE SERPL-MCNC: 75 MG/DL (ref 70–110)
HCT VFR BLD AUTO: 28.5 % (ref 37–48.5)
HGB BLD-MCNC: 8.9 G/DL (ref 12–16)
IMM GRANULOCYTES # BLD AUTO: 0.02 K/UL (ref 0–0.04)
IMM GRANULOCYTES NFR BLD AUTO: 0.3 % (ref 0–0.5)
IRON SERPL-MCNC: 66 UG/DL (ref 30–160)
LYMPHOCYTES # BLD AUTO: 1.6 K/UL (ref 1–4.8)
LYMPHOCYTES NFR BLD: 26.9 % (ref 18–48)
MCH RBC QN AUTO: 29.2 PG (ref 27–31)
MCHC RBC AUTO-ENTMCNC: 31.2 G/DL (ref 32–36)
MCV RBC AUTO: 93 FL (ref 82–98)
MONOCYTES # BLD AUTO: 0.6 K/UL (ref 0.3–1)
MONOCYTES NFR BLD: 10.4 % (ref 4–15)
NEUTROPHILS # BLD AUTO: 3.6 K/UL (ref 1.8–7.7)
NEUTROPHILS NFR BLD: 61.9 % (ref 38–73)
NRBC BLD-RTO: 0 /100 WBC
PLATELET # BLD AUTO: 198 K/UL (ref 150–450)
PMV BLD AUTO: 10.5 FL (ref 9.2–12.9)
POTASSIUM SERPL-SCNC: 3.3 MMOL/L (ref 3.5–5.1)
RBC # BLD AUTO: 3.05 M/UL (ref 4–5.4)
SATURATED IRON: 125 % (ref 20–50)
SODIUM SERPL-SCNC: 146 MMOL/L (ref 136–145)
TOTAL IRON BINDING CAPACITY: 53 UG/DL (ref 250–450)
TRANSFERRIN SERPL-MCNC: 36 MG/DL (ref 200–375)
WBC # BLD AUTO: 5.84 K/UL (ref 3.9–12.7)

## 2023-02-07 PROCEDURE — 3078F PR MOST RECENT DIASTOLIC BLOOD PRESSURE < 80 MM HG: ICD-10-PCS | Mod: CPTII,S$GLB,, | Performed by: PHYSICIAN ASSISTANT

## 2023-02-07 PROCEDURE — 99999 PR PBB SHADOW E&M-EST. PATIENT-LVL V: CPT | Mod: PBBFAC,,, | Performed by: PHYSICIAN ASSISTANT

## 2023-02-07 PROCEDURE — 3074F PR MOST RECENT SYSTOLIC BLOOD PRESSURE < 130 MM HG: ICD-10-PCS | Mod: CPTII,S$GLB,, | Performed by: PHYSICIAN ASSISTANT

## 2023-02-07 PROCEDURE — 3288F PR FALLS RISK ASSESSMENT DOCUMENTED: ICD-10-PCS | Mod: CPTII,S$GLB,, | Performed by: PHYSICIAN ASSISTANT

## 2023-02-07 PROCEDURE — 3008F PR BODY MASS INDEX (BMI) DOCUMENTED: ICD-10-PCS | Mod: CPTII,S$GLB,, | Performed by: PHYSICIAN ASSISTANT

## 2023-02-07 PROCEDURE — 1101F PR PT FALLS ASSESS DOC 0-1 FALLS W/OUT INJ PAST YR: ICD-10-PCS | Mod: CPTII,S$GLB,, | Performed by: PHYSICIAN ASSISTANT

## 2023-02-07 PROCEDURE — 80048 BASIC METABOLIC PNL TOTAL CA: CPT | Performed by: PHYSICIAN ASSISTANT

## 2023-02-07 PROCEDURE — 3008F BODY MASS INDEX DOCD: CPT | Mod: CPTII,S$GLB,, | Performed by: PHYSICIAN ASSISTANT

## 2023-02-07 PROCEDURE — 3074F SYST BP LT 130 MM HG: CPT | Mod: CPTII,S$GLB,, | Performed by: PHYSICIAN ASSISTANT

## 2023-02-07 PROCEDURE — 99999 PR PBB SHADOW E&M-EST. PATIENT-LVL V: ICD-10-PCS | Mod: PBBFAC,,, | Performed by: PHYSICIAN ASSISTANT

## 2023-02-07 PROCEDURE — 85025 COMPLETE CBC W/AUTO DIFF WBC: CPT | Performed by: PHYSICIAN ASSISTANT

## 2023-02-07 PROCEDURE — 1125F PR PAIN SEVERITY QUANTIFIED, PAIN PRESENT: ICD-10-PCS | Mod: CPTII,S$GLB,, | Performed by: PHYSICIAN ASSISTANT

## 2023-02-07 PROCEDURE — 82728 ASSAY OF FERRITIN: CPT | Performed by: PHYSICIAN ASSISTANT

## 2023-02-07 PROCEDURE — 3288F FALL RISK ASSESSMENT DOCD: CPT | Mod: CPTII,S$GLB,, | Performed by: PHYSICIAN ASSISTANT

## 2023-02-07 PROCEDURE — 99999 PR PBB SHADOW E&M-EST. PATIENT-LVL II: CPT | Mod: PBBFAC,,,

## 2023-02-07 PROCEDURE — 84466 ASSAY OF TRANSFERRIN: CPT | Performed by: PHYSICIAN ASSISTANT

## 2023-02-07 PROCEDURE — 99204 OFFICE O/P NEW MOD 45 MIN: CPT | Mod: S$GLB,,, | Performed by: PHYSICIAN ASSISTANT

## 2023-02-07 PROCEDURE — 1101F PT FALLS ASSESS-DOCD LE1/YR: CPT | Mod: CPTII,S$GLB,, | Performed by: PHYSICIAN ASSISTANT

## 2023-02-07 PROCEDURE — 99999 PR PBB SHADOW E&M-EST. PATIENT-LVL II: ICD-10-PCS | Mod: PBBFAC,,,

## 2023-02-07 PROCEDURE — 99204 PR OFFICE/OUTPT VISIT, NEW, LEVL IV, 45-59 MIN: ICD-10-PCS | Mod: S$GLB,,, | Performed by: PHYSICIAN ASSISTANT

## 2023-02-07 PROCEDURE — 1125F AMNT PAIN NOTED PAIN PRSNT: CPT | Mod: CPTII,S$GLB,, | Performed by: PHYSICIAN ASSISTANT

## 2023-02-07 PROCEDURE — 36415 COLL VENOUS BLD VENIPUNCTURE: CPT | Performed by: PHYSICIAN ASSISTANT

## 2023-02-07 PROCEDURE — 3078F DIAST BP <80 MM HG: CPT | Mod: CPTII,S$GLB,, | Performed by: PHYSICIAN ASSISTANT

## 2023-02-07 NOTE — PATIENT INSTRUCTIONS
Schedule appt with one of the physicians I work with to be your new PCP    Guillermo Jenkins ([address] [place] [address]):  - Familia Joe M.D.  - Yeyo Good M.D.  - Angy Loera M.D.    Cardiology appt due (last saw Dr. David Gomez)    Lab today (based on the lab work more instruction about fluid pill)    Mammogram    Compression stockings and leg elevation.

## 2023-02-07 NOTE — PROGRESS NOTES
GI NUTRITIONAL ASSESSMENT  Initial visit   Referring Provider: Dr Haley   Early satiety ; taste changes ; regurgitation ( 50% of time) ; significant weight loss 43 lbs. within the last 3 month(s); memory changes ;protein calorie malnutrition     Reason for Visit: assessment     Age: 67 y.o.  Sex: female    Lab Results   Component Value Date     11/25/2022    K 4.4 11/25/2022    PHOS 3.8 11/25/2022    MG 1.9 11/25/2022    CHOL 124 04/12/2021    HDL 66 (H) 04/12/2021    TRIG 38 11/20/2022    ALBUMIN 1.7 (L) 11/25/2022    PREALBUMIN 7 (L) 11/18/2022    HGBA1C 4.7 10/13/2022    CALCIUM 7.7 (L) 11/25/2022 1/27/2023  4:10 PM - [facility]    Component Value Flag Ref Range Units Status   Elastase 1, Fecal 80   Low   >200 (Normal) mcg/g Final   Comment:   Interpretation: Abnormal (<100 mcg/g);   Consistent with pancreatic insufficiency     Test Performed by:   [facility]   [address] [place] [address]   : Ignacio Portillo M.D. Ph.D.; CLIA# [id number]    11/25/2022 11:56 AM - [facility]    Component Value Flag Ref Range Units Status   Glucose 104   70 - 110 mg/dL Final   Sodium 138   136 - 145 mmol/L Final   Potassium 4.4   3.5 - 5.1 mmol/L Final   Chloride 101   95 - 110 mmol/L Final   CO2 32   High   23 - 29 mmol/L Final   BUN 13   8 - 23 mg/dL Final   Calcium 7.7   Low   8.7 - 10.5 mg/dL Final   Creatinine 0.7   0.5 - 1.4 mg/dL Final   Albumin 1.7   Low   3.5 - 5.2 g/dL Final   Phosphorus 3.8   2.7 - 4.5 mg/dL Final   eGFR >60.0   >60 mL/min/1.73 m^2 Final   Anion Gap 5   Low   8 - 16 mmol/L Final            11/25/2022 11:23 AM - [facility]    Other Pertinent Labs:  Current Outpatient Medications   Medication Sig    apixaban (ELIQUIS) 5 mg Tab Take 1 tablet (5 mg total) by mouth 2 (two) times daily.    aspirin (ECOTRIN) 81 MG EC tablet Take 81 mg by mouth once daily.    atorvastatin (LIPITOR) 40 MG tablet  "Take 1 tablet (40 mg total) by mouth every evening. (Patient not taking: Reported on 1/23/2023)    cholecalciferol, vitamin D3, (VITAMIN D3) 50 mcg (2,000 unit) Tab Take 1 tablet (2,000 Units total) by mouth once daily. (Patient not taking: Reported on 1/23/2023)    cyanocobalamin (VITAMIN B-12) 1000 MCG tablet Take 1 tablet (1,000 mcg total) by mouth once daily. (Patient not taking: Reported on 1/23/2023)    ferrous sulfate (FEOSOL) 325 mg (65 mg iron) Tab tablet Take 325 mg by mouth daily with breakfast.    levETIRAcetam (KEPPRA) 500 MG Tab Take 500 mg by mouth 2 (two) times daily.    multivitamin Tab Take 1 tablet by mouth once daily.    ondansetron (ZOFRAN) 4 MG tablet Take 1 tablet (4 mg total) by mouth every 6 (six) hours as needed for Nausea.    pantoprazole (PROTONIX) 40 MG tablet Take 40 mg by mouth once daily.    timolol maleate 0.5% (TIMOPTIC) 0.5 % Drop 1 drop 2 (two) times daily.    torsemide (DEMADEX) 20 MG Tab Take 2 tablets (40 mg total) by mouth once daily. (Patient not taking: Reported on 1/23/2023)     No current facility-administered medications for this visit.     Allergies: Patient has no known allergies.    Ht Readings from Last 3 Encounters:   01/23/23 5' 5" (1.651 m)   11/29/22 5' 5" (1.651 m)   11/21/22 5' 5" (1.651 m)     Wt Readings from Last 3 Encounters:   01/23/23 85.5 kg (188 lb 7.9 oz)   11/29/22 100.7 kg (222 lb 0.1 oz)   11/28/22 100.5 kg (221 lb 9 oz)      Recent Changes: decreased interest in eating ; early satiety ; depression related to not being able to do things she used to do because of decrease in energy   Current Diet: Breakfast: egg  maybe toast ; Lunch: rice/gravy ; Supper sandwich - ham - 1-2 bites   Meal patterns: Bites and sips of foods   Fruit: decreased volume  Vegetables: decreased volume  Meal preparation/shopping: self, daughter  Nutrition beverages: mainly drinks water, drinks carbonated drinks, drinks some regular beverages  Dining out: none   Appetite/Current " Intake: poor related more to diminished interest in day to day activities ; grandchildren and adoptive children bring her cornelio   Nutritional/Herbal Supplements: stopped taking Bariatric vitamins ( Centrum, B12, Feosol , Vit D) during hospitalization ; takes no calcium     Nutritional Diagnoses  Problem: Weight loss   Etiology: due to diarrhea ( now resolved) ,decreased appetite and early satiety irrespective of food consumed   Symptoms: as evidenced by weight loss of 40# in 3 months     Involuntary weight loss  Educational Need? Patient voices desire to regain energy in order to interact with grandchildren   Motivational interviewing, Rewards/contingency management, and Other: relate energy and stamina to adequate protein sparing calories and high quality protein   Barriers: emotional/psychosocial related to dependence on others and declining independence   Discussed with: patient and daughter( who had Gastric sleeve surgery)   New Goals:   small portions of foods 1/2 cup 6 times per day cooked in crock pot to add moisture and facilitate swallow   Discussed protein needs : shakes ( discussed premier protein shakes to provide protein in low fat,low sugar form - patient is anxious about high calorie products such as BOOST triggering diarrhea   Fluid needs given Hx UTI and hx acute renal failure   Calorie needs for energy - discussed eating casserole or crock pot foods rather than broiled foods which are hard to swallow and unappetizing   Vitamin and mineral needs - provided updated bariatric nutrition book     Interventions: Patient taught nutrition information regarding above and discussed protein provides muscle preserving while starches provide energy for activities     Goals/Recommendations: separate liquids from solids at meals to minimize dumping syndrome ( sips of liquid between bites ok ) ; crock pot easier to swallow and digest ; shakes between meals - try one per day to begin     Actions Taken:  instruct/provide written information Bariatric nutrition book/ Short bowel syndrome ideas for snacks with protein and carb low in fiber ; ideas for meal replacements   Patient and/or family comprehend instructions: yes  Outcome: Verbalizes understanding  Monitoring: ashley Castrejon of need for follow up appt with Dr Haley and myself   nutrition support tolerance/ follow up appt virtually per    Counseling Time: 90 minutes

## 2023-02-07 NOTE — PROGRESS NOTES
Subjective:       Patient ID: Cherry Santiago is a 67 y.o. female with PMH of HF, DM, afib, chronic diarrhea, HLD, hx gastric sleeve.     Chief Complaint: Annual Exam      Established pt of Familia Joe MD (new to me)    HPI        Pt attended by dtr    Here to establish new PCP  Prior PCP in [place], discussed need to establish with one of the physicians    Has questions about prior lab, recent stool study, Following with GI in regards to chronic diarrhea since Aug 22, extensive testing rule out infection and carcinoid syndrome. Diarrhea is now resolved.     BP has been low normal  Torsemide started by Cardiology in Nov, repeat BMP due, has lost over 20lbs of fluid  Was prev on Hospice but discontinued services last month  Down mood, feeling overwhelmed in regards to health        Past Medical History:   Diagnosis Date    Anticoagulant long-term use     Arthritis     Atrial fibrillation     CHF (congestive heart failure)     Diabetes mellitus     Type2 resolved after weight loss surgery    DVT (deep venous thrombosis)     Encounter for blood transfusion     GERD (gastroesophageal reflux disease)     Glaucoma     Hypercholesterolemia     Hyperlipemia     Hypertension     Memory changes     Myocardial infarction     Renal failure     resolved    TIA (transient ischemic attack)        Social History     Tobacco Use    Smoking status: Never    Smokeless tobacco: Never   Substance Use Topics    Alcohol use: Yes     Comment: occasional    Drug use: No       Review of patient's allergies indicates:  No Known Allergies      Current Outpatient Medications:     apixaban (ELIQUIS) 5 mg Tab, Take 1 tablet (5 mg total) by mouth 2 (two) times daily., Disp: , Rfl:     levETIRAcetam (KEPPRA) 500 MG Tab, Take 500 mg by mouth 2 (two) times daily., Disp: , Rfl:     aspirin (ECOTRIN) 81 MG EC tablet, Take 81 mg by mouth once daily., Disp: , Rfl:     atorvastatin (LIPITOR) 40 MG tablet, Take 1 tablet (40 mg total) by mouth every  evening. (Patient not taking: Reported on 1/23/2023), Disp: 90 tablet, Rfl: 3    B-complex with vitamin C (Z-BEC OR EQUIV) tablet, Take 1 tablet by mouth once daily., Disp: , Rfl:     cephALEXin (KEFLEX) 500 MG capsule, Take 1 capsule (500 mg total) by mouth 4 (four) times daily. for 7 days, Disp: 28 capsule, Rfl: 0    cholecalciferol, vitamin D3, (VITAMIN D3) 50 mcg (2,000 unit) Tab, Take 1 tablet (2,000 Units total) by mouth once daily. (Patient not taking: Reported on 1/23/2023), Disp: 30 tablet, Rfl: 6    cyanocobalamin (VITAMIN B-12) 1000 MCG tablet, Take 1 tablet (1,000 mcg total) by mouth once daily. (Patient not taking: Reported on 1/23/2023), Disp: 30 tablet, Rfl: 3    ferrous sulfate (FEOSOL) 325 mg (65 mg iron) Tab tablet, Take 325 mg by mouth daily with breakfast., Disp: , Rfl:     multivitamin Tab, Take 1 tablet by mouth once daily., Disp: 30 tablet, Rfl: 6    pantoprazole (PROTONIX) 40 MG tablet, Take 40 mg by mouth once daily., Disp: , Rfl:     potassium chloride SA (K-DUR,KLOR-CON M) 10 MEQ tablet, Take 1 tablet (10 mEq total) by mouth once daily., Disp: 90 tablet, Rfl: 3    timolol maleate 0.5% (TIMOPTIC) 0.5 % Drop, 1 drop 2 (two) times daily., Disp: , Rfl:     torsemide (DEMADEX) 10 MG Tab, Take 1 tablet (10 mg total) by mouth once daily., Disp: 30 tablet, Rfl: 11    Family History   Problem Relation Age of Onset    Arthritis Mother     Breast cancer Mother     Heart disease Mother     Hypertension Mother     Cancer Father     Heart disease Father     Hypertension Father     Diabetes Daughter        Past Surgical History:   Procedure Laterality Date    BACK SURGERY  06/21/2017    lumbar fusion 6/21/17 and surgery for hematoma to site on 6/26/17    CHOLECYSTECTOMY  1978    COLONOSCOPY N/A 10/3/2022    Procedure: COLONOSCOPY;  Surgeon: Jourdan Parks MD;  Location: [facility];  Service: General;  Laterality: N/A;    COLONOSCOPY N/A 10/11/2022    Procedure: COLONOSCOPY;  Surgeon: Stephen Cooper,  MD;  Location: [facility] ENDO;  Service: Endoscopy;  Laterality: N/A;    ENDOSCOPIC ULTRASOUND OF UPPER GASTROINTESTINAL TRACT N/A 11/9/2022    Procedure: ULTRASOUND, UPPER GI TRACT, ENDOSCOPIC;  Surgeon: Jake Croona MD;  Location: [facility] (2ND FLR);  Service: Endoscopy;  Laterality: N/A;    ESOPHAGOGASTRODUODENOSCOPY N/A 10/3/2022    Procedure: EGD (ESOPHAGOGASTRODUODENOSCOPY);  Surgeon: Jourdan aPrks MD;  Location: [facility];  Service: General;  Laterality: N/A;    ESOPHAGOGASTRODUODENOSCOPY N/A 11/15/2022    Procedure: EGD (ESOPHAGOGASTRODUODENOSCOPY);  Surgeon: Roque Soliman MD;  Location: [facility] (2ND FLR);  Service: Endoscopy;  Laterality: N/A;    ESOPHAGOGASTRODUODENOSCOPY N/A 11/17/2022    Procedure: EGD (ESOPHAGOGASTRODUODENOSCOPY);  Surgeon: Roque Soliman MD;  Location: [facility] (2ND FLR);  Service: Endoscopy;  Laterality: N/A;    GASTRIC BYPASS      HYSTERECTOMY  2012    JOINT REPLACEMENT      TKR    LEFT HEART CATHETERIZATION Left 2/5/2021    Procedure: Left heart cath;  Surgeon: Shane Pacheco MD;  Location: [facility] CATH;  Service: Cardiovascular;  Laterality: Left;    PELVIC LAPAROSCOPY Left     OOPH    RENAL BIOPSY N/A 10/5/2022    Procedure: BIOPSY, KIDNEY;  Surgeon: Velia Diagnostic Provider;  Location: [facility] OR;  Service: General;  Laterality: N/A;    RIGHT HEART CATHETERIZATION Right 2/5/2021    Procedure: INSERTION, CATHETER, RIGHT HEART. via left radial and left brachial;  Surgeon: Shane Pacheco MD;  Location: [facility] CATH;  Service: Cardiovascular;  Laterality: Right;    TOTAL ABDOMINAL HYSTERECTOMY W/ BILATERAL SALPINGOOPHORECTOMY         Review of Systems   Constitutional:  Positive for fatigue.   Respiratory:  Positive for shortness of breath (stable). Negative for cough and wheezing.    Cardiovascular:  Positive for leg swelling (improve with torsemide). Negative for chest pain.   Gastrointestinal:  Negative for abdominal pain, diarrhea, nausea and vomiting.   Integumentary:   "Negative for rash.     Objective: BP 96/62 (BP Location: Right arm, Patient Position: Sitting, BP Method: Medium (Manual))   Pulse (!) 56   Ht 5' 5" (1.651 m)   Wt 79.8 kg (175 lb 14.8 oz)   LMP  (LMP Unknown)   SpO2 99%   BMI 29.28 kg/m²         Physical Exam  Constitutional:       General: She is not in acute distress.  Cardiovascular:      Rate and Rhythm: Bradycardia present.   Pulmonary:      Effort: Pulmonary effort is normal. No respiratory distress.   Musculoskeletal:      Right lower leg: Edema (1+) present.      Left lower leg: Edema (1+) present.      Comments: In wheelchair   Neurological:      Mental Status: She is alert.   Psychiatric:         Attention and Perception: Attention normal.         Mood and Affect: Mood is depressed. Affect is tearful.         Speech: Speech normal.         Behavior: Behavior normal. Behavior is cooperative.         Thought Content: Thought content does not include homicidal or suicidal ideation.       Assessment:       1. Chronic diarrhea    2. Anemia, unspecified type    3. Chronic asymptomatic hypotension    4. Encounter for screening mammogram for malignant neoplasm of breast    5. Depressed mood        Plan:             Cherry was seen today for annual exam.    Diagnoses and all orders for this visit:    Chronic diarrhea (followed by GI)  -     BASIC METABOLIC PANEL; Future    Anemia, unspecified type (repeat lab)  -     CBC Auto Differential; Future  -     Iron and TIBC; Future  -     Ferritin; Future    Encounter for screening mammogram for malignant neoplasm of breast  -     Mammo Digital Screening Bilat w/ Luis Felipe; Future    Depressed mood  -     Ambulatory referral/consult to Primary Care Behavioral Health (Non-Opioids); Future        Discussed need to choose MD as PCP to fully establish care with our practice site (list provided)      Future Appointments   Date Time Provider Department Center   2/24/2023 10:00 AM Familia Joe MD [facility] [place] "   2/24/2023  2:30 PM Tabatha Herrera LCSW [facility]   2/27/2023  9:45 AM Anthony Lala MD [facility] GYN ONC [facility]   3/17/2023  9:30 AM Andre Hernandez MD [facility] CARDIO [facility]   4/20/2023  1:00 PM Tung Haley MD [facility] GASTRO [facility]     KATT [facility]    Patient Instructions   Schedule appt with one of the physicians I work with to be your new PCP    Guillermo Jenkins ([address] [place] [id number]):  - Familia Joe M.D.  - Yeyo Good M.D.  - Angy Loera M.D.    Cardiology appt due (last saw Dr. David Gomez)    Lab today (based on the lab work more instruction about fluid pill)    Mammogram    Compression stockings and leg elevation.

## 2023-02-08 ENCOUNTER — TELEPHONE (OUTPATIENT)
Dept: GYNECOLOGIC ONCOLOGY | Facility: CLINIC | Age: 68
End: 2023-02-08
Payer: MEDICARE

## 2023-02-08 ENCOUNTER — PATIENT MESSAGE (OUTPATIENT)
Dept: GYNECOLOGIC ONCOLOGY | Facility: CLINIC | Age: 68
End: 2023-02-08
Payer: MEDICARE

## 2023-02-08 NOTE — TELEPHONE ENCOUNTER
----- Message from Loree Valdovinos RN sent at 2/8/2023  9:00 AM CST -----  Regarding: Return Onc Appt  Good morning Tabitha,     Can you please reach out to patient and set up a return onc appt with Dr. Lala.     Thank you,     Loree

## 2023-02-09 ENCOUNTER — OFFICE VISIT (OUTPATIENT)
Dept: INTERNAL MEDICINE | Facility: CLINIC | Age: 68
End: 2023-02-09
Payer: MEDICARE

## 2023-02-09 ENCOUNTER — PATIENT OUTREACH (OUTPATIENT)
Dept: BEHAVIORAL HEALTH | Facility: CLINIC | Age: 68
End: 2023-02-09
Payer: MEDICARE

## 2023-02-09 ENCOUNTER — TELEPHONE (OUTPATIENT)
Dept: BEHAVIORAL HEALTH | Facility: CLINIC | Age: 68
End: 2023-02-09
Payer: MEDICARE

## 2023-02-09 VITALS
HEART RATE: 65 BPM | SYSTOLIC BLOOD PRESSURE: 92 MMHG | BODY MASS INDEX: 30.34 KG/M2 | OXYGEN SATURATION: 98 % | DIASTOLIC BLOOD PRESSURE: 60 MMHG | WEIGHT: 182.13 LBS | HEIGHT: 65 IN

## 2023-02-09 DIAGNOSIS — R56.9 SEIZURES: ICD-10-CM

## 2023-02-09 DIAGNOSIS — R19.7 DIARRHEA DUE TO MALABSORPTION: ICD-10-CM

## 2023-02-09 DIAGNOSIS — Z86.718 HISTORY OF DVT (DEEP VEIN THROMBOSIS): ICD-10-CM

## 2023-02-09 DIAGNOSIS — K90.9 DIARRHEA DUE TO MALABSORPTION: ICD-10-CM

## 2023-02-09 DIAGNOSIS — I50.32 CHRONIC HEART FAILURE WITH PRESERVED EJECTION FRACTION (HFPEF): ICD-10-CM

## 2023-02-09 DIAGNOSIS — F32.1 CURRENT MODERATE EPISODE OF MAJOR DEPRESSIVE DISORDER WITHOUT PRIOR EPISODE: ICD-10-CM

## 2023-02-09 DIAGNOSIS — G30.0 EARLY ONSET ALZHEIMER'S DEMENTIA WITHOUT BEHAVIORAL DISTURBANCE: Chronic | ICD-10-CM

## 2023-02-09 DIAGNOSIS — F32.1 CURRENT MODERATE EPISODE OF MAJOR DEPRESSIVE DISORDER WITHOUT PRIOR EPISODE: Primary | ICD-10-CM

## 2023-02-09 DIAGNOSIS — I73.9 PAD (PERIPHERAL ARTERY DISEASE): ICD-10-CM

## 2023-02-09 DIAGNOSIS — F02.80 EARLY ONSET ALZHEIMER'S DEMENTIA WITHOUT BEHAVIORAL DISTURBANCE: Chronic | ICD-10-CM

## 2023-02-09 DIAGNOSIS — Z76.89 ESTABLISHING CARE WITH NEW DOCTOR, ENCOUNTER FOR: Primary | ICD-10-CM

## 2023-02-09 PROCEDURE — 99999 PR PBB SHADOW E&M-EST. PATIENT-LVL IV: ICD-10-PCS | Mod: PBBFAC,,, | Performed by: STUDENT IN AN ORGANIZED HEALTH CARE EDUCATION/TRAINING PROGRAM

## 2023-02-09 PROCEDURE — 1159F MED LIST DOCD IN RCRD: CPT | Mod: CPTII,S$GLB,, | Performed by: STUDENT IN AN ORGANIZED HEALTH CARE EDUCATION/TRAINING PROGRAM

## 2023-02-09 PROCEDURE — 99214 PR OFFICE/OUTPT VISIT, EST, LEVL IV, 30-39 MIN: ICD-10-PCS | Mod: S$GLB,,, | Performed by: STUDENT IN AN ORGANIZED HEALTH CARE EDUCATION/TRAINING PROGRAM

## 2023-02-09 PROCEDURE — 1125F PR PAIN SEVERITY QUANTIFIED, PAIN PRESENT: ICD-10-PCS | Mod: CPTII,S$GLB,, | Performed by: STUDENT IN AN ORGANIZED HEALTH CARE EDUCATION/TRAINING PROGRAM

## 2023-02-09 PROCEDURE — 1100F PTFALLS ASSESS-DOCD GE2>/YR: CPT | Mod: CPTII,S$GLB,, | Performed by: STUDENT IN AN ORGANIZED HEALTH CARE EDUCATION/TRAINING PROGRAM

## 2023-02-09 PROCEDURE — 3288F FALL RISK ASSESSMENT DOCD: CPT | Mod: CPTII,S$GLB,, | Performed by: STUDENT IN AN ORGANIZED HEALTH CARE EDUCATION/TRAINING PROGRAM

## 2023-02-09 PROCEDURE — 1159F PR MEDICATION LIST DOCUMENTED IN MEDICAL RECORD: ICD-10-PCS | Mod: CPTII,S$GLB,, | Performed by: STUDENT IN AN ORGANIZED HEALTH CARE EDUCATION/TRAINING PROGRAM

## 2023-02-09 PROCEDURE — 99484 PR CARE MGMT SVCS, BEHAVIORAL HEALTH, >= 20 MIN: ICD-10-PCS | Mod: S$GLB,,, | Performed by: SOCIAL WORKER

## 2023-02-09 PROCEDURE — 3288F PR FALLS RISK ASSESSMENT DOCUMENTED: ICD-10-PCS | Mod: CPTII,S$GLB,, | Performed by: STUDENT IN AN ORGANIZED HEALTH CARE EDUCATION/TRAINING PROGRAM

## 2023-02-09 PROCEDURE — 1160F PR REVIEW ALL MEDS BY PRESCRIBER/CLIN PHARMACIST DOCUMENTED: ICD-10-PCS | Mod: CPTII,S$GLB,, | Performed by: STUDENT IN AN ORGANIZED HEALTH CARE EDUCATION/TRAINING PROGRAM

## 2023-02-09 PROCEDURE — 1125F AMNT PAIN NOTED PAIN PRSNT: CPT | Mod: CPTII,S$GLB,, | Performed by: STUDENT IN AN ORGANIZED HEALTH CARE EDUCATION/TRAINING PROGRAM

## 2023-02-09 PROCEDURE — 1100F PR PT FALLS ASSESS DOC 2+ FALLS/FALL W/INJURY/YR: ICD-10-PCS | Mod: CPTII,S$GLB,, | Performed by: STUDENT IN AN ORGANIZED HEALTH CARE EDUCATION/TRAINING PROGRAM

## 2023-02-09 PROCEDURE — 99999 PR PBB SHADOW E&M-EST. PATIENT-LVL IV: CPT | Mod: PBBFAC,,, | Performed by: STUDENT IN AN ORGANIZED HEALTH CARE EDUCATION/TRAINING PROGRAM

## 2023-02-09 PROCEDURE — 99484 CARE MGMT SVC BHVL HLTH COND: CPT | Mod: S$GLB,,, | Performed by: SOCIAL WORKER

## 2023-02-09 PROCEDURE — 3078F DIAST BP <80 MM HG: CPT | Mod: CPTII,S$GLB,, | Performed by: STUDENT IN AN ORGANIZED HEALTH CARE EDUCATION/TRAINING PROGRAM

## 2023-02-09 PROCEDURE — 3078F PR MOST RECENT DIASTOLIC BLOOD PRESSURE < 80 MM HG: ICD-10-PCS | Mod: CPTII,S$GLB,, | Performed by: STUDENT IN AN ORGANIZED HEALTH CARE EDUCATION/TRAINING PROGRAM

## 2023-02-09 PROCEDURE — 3008F BODY MASS INDEX DOCD: CPT | Mod: CPTII,S$GLB,, | Performed by: STUDENT IN AN ORGANIZED HEALTH CARE EDUCATION/TRAINING PROGRAM

## 2023-02-09 PROCEDURE — 3074F SYST BP LT 130 MM HG: CPT | Mod: CPTII,S$GLB,, | Performed by: STUDENT IN AN ORGANIZED HEALTH CARE EDUCATION/TRAINING PROGRAM

## 2023-02-09 PROCEDURE — 3008F PR BODY MASS INDEX (BMI) DOCUMENTED: ICD-10-PCS | Mod: CPTII,S$GLB,, | Performed by: STUDENT IN AN ORGANIZED HEALTH CARE EDUCATION/TRAINING PROGRAM

## 2023-02-09 PROCEDURE — 3074F PR MOST RECENT SYSTOLIC BLOOD PRESSURE < 130 MM HG: ICD-10-PCS | Mod: CPTII,S$GLB,, | Performed by: STUDENT IN AN ORGANIZED HEALTH CARE EDUCATION/TRAINING PROGRAM

## 2023-02-09 PROCEDURE — 99214 OFFICE O/P EST MOD 30 MIN: CPT | Mod: S$GLB,,, | Performed by: STUDENT IN AN ORGANIZED HEALTH CARE EDUCATION/TRAINING PROGRAM

## 2023-02-09 PROCEDURE — 1160F RVW MEDS BY RX/DR IN RCRD: CPT | Mod: CPTII,S$GLB,, | Performed by: STUDENT IN AN ORGANIZED HEALTH CARE EDUCATION/TRAINING PROGRAM

## 2023-02-09 RX ORDER — POTASSIUM CHLORIDE 750 MG/1
10 TABLET, EXTENDED RELEASE ORAL DAILY
COMMUNITY
End: 2023-02-14 | Stop reason: SDUPTHER

## 2023-02-09 NOTE — PATIENT INSTRUCTIONS
Take only one pill of your Torsemide a day.   Continue checking your weight -- let us know if you gain more than 3 lbs in a day, or more than 5 lbs in a week.   Follow up with Dr. Hernandez  Go to the ER if you have any chest pain, shortness of breath at rest, or any other concerning symptoms.   
? Second A1c  ? Flu vaccination   ? Requirements if A1c is greater than 8 percent:  ? Engage with a [facility] diabetes educator at one of our hospital locations or an ambulatory care coordinator by phone, if your A1c is greater than 8 percent. We will be reviewing your [employer] account and sending you reminders for needed actions. Please remember if you dont have a [address], you will need to submit documentation to [email or web address] or by fax to [phone]. As a reminder, if programs requirements are not met, your benefit may be terminated and you will not be eligible to participate in the program next year. Thank you for participating in the program and taking steps to improve your health.

## 2023-02-09 NOTE — PROGRESS NOTES
Daughter reported the patient newly diagnosed with short term memory loss and beginning stages of Dementia.

## 2023-02-09 NOTE — PROGRESS NOTES
SUBJECTIVE     Chief Complaint   Patient presents with    Establish Care       HPI  Cherry Santiago is a 67 y.o. female with  Early-onset Alzheimer's Dementia, Nonruptured cerebral aneurysm, seizure disorder, HTN, HFpEF, PAD, orthostatic hypotension, CKD3a, h/o DVT, Anemia, NAFLD,   that presents for establishment of care.     This is a new patient to me but established to [facility]. Seen by MOIZ in clinic on 2/7/23. Blood work reviewed from that visit:   CBC - stable normocytic anemia, normal platelets (improved from piror studies).   BMP - Minimally elevated sodium, hypokalemia. elevated BUN. Cr 2.2, GFR 24 (was 0.7/>60 when last checked 2 months ago). Also chronic hypocalcemia.     Patient with HFpEF. Established with Dr. Hernandez. Last visit on 11/29/22. Started on trial of torsemide 40 mg qd for marked fluid overload. Is down over 20lbs since starting medication. Still has some lower extremity edema and dyspnea on exertion. Has had some increased weakness over past few months.        PAST MEDICAL HISTORY:  Past Medical History:   Diagnosis Date    Anticoagulant long-term use     Arthritis     Atrial fibrillation     CHF (congestive heart failure)     Diabetes mellitus     Type2 resolved after weight loss surgery    DVT (deep venous thrombosis)     Encounter for blood transfusion     GERD (gastroesophageal reflux disease)     Glaucoma     Hypercholesterolemia     Hyperlipemia     Hypertension     Memory changes     Myocardial infarction     Renal failure     resolved    TIA (transient ischemic attack)        PAST SURGICAL HISTORY:  Past Surgical History:   Procedure Laterality Date    BACK SURGERY  06/21/2017    lumbar fusion 6/21/17 and surgery for hematoma to site on 6/26/17    CHOLECYSTECTOMY  1978    COLONOSCOPY N/A 10/3/2022    Procedure: COLONOSCOPY;  Surgeon: Jourdan Parks MD;  Location: [facility];  Service: General;  Laterality: N/A;    COLONOSCOPY N/A 10/11/2022    Procedure: COLONOSCOPY;  Surgeon: Stephen  NICOLE Cooper MD;  Location: [facility] ENDO;  Service: Endoscopy;  Laterality: N/A;    ENDOSCOPIC ULTRASOUND OF UPPER GASTROINTESTINAL TRACT N/A 11/9/2022    Procedure: ULTRASOUND, UPPER GI TRACT, ENDOSCOPIC;  Surgeon: Jake Corona MD;  Location: [facility] (2ND FLR);  Service: Endoscopy;  Laterality: N/A;    ESOPHAGOGASTRODUODENOSCOPY N/A 10/3/2022    Procedure: EGD (ESOPHAGOGASTRODUODENOSCOPY);  Surgeon: Jourdan Parks MD;  Location: [facility];  Service: General;  Laterality: N/A;    ESOPHAGOGASTRODUODENOSCOPY N/A 11/15/2022    Procedure: EGD (ESOPHAGOGASTRODUODENOSCOPY);  Surgeon: Roque Soliman MD;  Location: [facility] (2ND FLR);  Service: Endoscopy;  Laterality: N/A;    ESOPHAGOGASTRODUODENOSCOPY N/A 11/17/2022    Procedure: EGD (ESOPHAGOGASTRODUODENOSCOPY);  Surgeon: Roque Soliman MD;  Location: [facility] (2ND FLR);  Service: Endoscopy;  Laterality: N/A;    GASTRIC BYPASS      HYSTERECTOMY  2012    JOINT REPLACEMENT      TKR    LEFT HEART CATHETERIZATION Left 2/5/2021    Procedure: Left heart cath;  Surgeon: Shane Pacheco MD;  Location: [facility] CATH;  Service: Cardiovascular;  Laterality: Left;    PELVIC LAPAROSCOPY Left     OOPH    RENAL BIOPSY N/A 10/5/2022    Procedure: BIOPSY, KIDNEY;  Surgeon: Velia Diagnostic Provider;  Location: [facility] OR;  Service: General;  Laterality: N/A;    RIGHT HEART CATHETERIZATION Right 2/5/2021    Procedure: INSERTION, CATHETER, RIGHT HEART. via left radial and left brachial;  Surgeon: Shane Pacheco MD;  Location: [facility] CATH;  Service: Cardiovascular;  Laterality: Right;    TOTAL ABDOMINAL HYSTERECTOMY W/ BILATERAL SALPINGOOPHORECTOMY         FAMILY HISTORY:  Family History   Problem Relation Age of Onset    Arthritis Mother     Breast cancer Mother     Heart disease Mother     Hypertension Mother     Cancer Father     Heart disease Father     Hypertension Father     Diabetes Daughter        ALLERGIES AND MEDICATIONS: updated and reviewed.  Review of patient's allergies  indicates:  No Known Allergies  Current Outpatient Medications   Medication Sig Dispense Refill    apixaban (ELIQUIS) 5 mg Tab Take 1 tablet (5 mg total) by mouth 2 (two) times daily.      aspirin (ECOTRIN) 81 MG EC tablet Take 81 mg by mouth once daily.      atorvastatin (LIPITOR) 40 MG tablet Take 1 tablet (40 mg total) by mouth every evening. (Patient not taking: Reported on 1/23/2023) 90 tablet 3    cholecalciferol, vitamin D3, (VITAMIN D3) 50 mcg (2,000 unit) Tab Take 1 tablet (2,000 Units total) by mouth once daily. (Patient not taking: Reported on 1/23/2023) 30 tablet 6    cyanocobalamin (VITAMIN B-12) 1000 MCG tablet Take 1 tablet (1,000 mcg total) by mouth once daily. (Patient not taking: Reported on 1/23/2023) 30 tablet 3    ferrous sulfate (FEOSOL) 325 mg (65 mg iron) Tab tablet Take 325 mg by mouth daily with breakfast.      levETIRAcetam (KEPPRA) 500 MG Tab Take 500 mg by mouth 2 (two) times daily.      multivitamin Tab Take 1 tablet by mouth once daily. (Patient not taking: Reported on 2/7/2023) 30 tablet 6    pantoprazole (PROTONIX) 40 MG tablet Take 40 mg by mouth once daily.      timolol maleate 0.5% (TIMOPTIC) 0.5 % Drop 1 drop 2 (two) times daily.      torsemide (DEMADEX) 20 MG Tab Take 2 tablets (40 mg total) by mouth once daily. 60 tablet 11     No current facility-administered medications for this visit.       ROS  Review of Systems   Constitutional:  Negative for activity change, chills and fever.   HENT:  Negative for congestion and hearing loss.    Eyes:  Negative for pain and visual disturbance.   Respiratory:  Positive for shortness of breath. Negative for cough.    Cardiovascular:  Positive for leg swelling. Negative for chest pain and palpitations.   Gastrointestinal:  Negative for abdominal pain, constipation, diarrhea, nausea and vomiting.   Endocrine: Negative.    Genitourinary: Negative.    Musculoskeletal:  Negative for arthralgias and myalgias.   Skin: Negative.     Allergic/Immunologic: Negative.    Neurological:  Positive for weakness. Negative for dizziness, light-headedness and headaches.   Hematological: Negative.        OBJECTIVE     Physical Exam  There were no vitals filed for this visit. There is no height or weight on file to calculate BMI.            Physical Exam  Vitals reviewed.   Constitutional:       General: She is not in acute distress.     Appearance: Normal appearance. She is obese.      Comments: Using wheelchair   HENT:      Head: Normocephalic and atraumatic.      Mouth/Throat:      Mouth: Mucous membranes are moist.      Pharynx: Oropharynx is clear.   Eyes:      Extraocular Movements: Extraocular movements intact.      Conjunctiva/sclera: Conjunctivae normal.      Pupils: Pupils are equal, round, and reactive to light.   Cardiovascular:      Rate and Rhythm: Normal rate and regular rhythm.      Pulses: Normal pulses.      Heart sounds: Normal heart sounds.   Pulmonary:      Effort: Pulmonary effort is normal.      Breath sounds: Normal breath sounds. No stridor or decreased air movement. No decreased breath sounds, wheezing, rhonchi or rales.   Abdominal:      General: Bowel sounds are normal. There is no distension.      Palpations: Abdomen is soft. There is no mass.      Tenderness: There is no abdominal tenderness. There is no guarding.   Musculoskeletal:         General: Normal range of motion.      Cervical back: Normal range of motion and neck supple. No rigidity or tenderness.      Right lower le+ Edema (up to knee) present.      Left lower le+ Edema (up to knee) present.   Lymphadenopathy:      Cervical: No cervical adenopathy.   Skin:     General: Skin is warm and dry.   Neurological:      General: No focal deficit present.      Mental Status: She is alert.   Psychiatric:         Mood and Affect: Mood normal.         Behavior: Behavior normal.         Health Maintenance         Date Due Completion Date    Shingles Vaccine ( of 2)  Never done ---    DEXA Scan Never done ---    COVID-19 Vaccine (4 - Booster for Krystian series) 02/22/2022 12/28/2021    Pneumococcal Vaccines (Age 65+) (2 - PPSV23 if available, else PCV20) 04/04/2023 4/4/2022    High Dose Statin 11/29/2023 11/29/2022    Mammogram 02/08/2024 2/8/2023    Hemoglobin A1c (Diabetic Prevention Screening) 10/13/2025 10/13/2022    Colorectal Cancer Screening 10/11/2027 10/11/2022    Lipid Panel 11/20/2027 11/20/2022    TETANUS VACCINE 10/05/2030 10/5/2020              ASSESSMENT     67 y.o. female with     1. Establishing care with new doctor, encounter for    2. Chronic heart failure with preserved ejection fraction (HFpEF) NICM NYHA2     3. Early onset Alzheimer's dementia without behavioral disturbance    4. Seizures    5. Current moderate episode of major depressive disorder without prior episode    6. PAD (peripheral artery disease)    7. History of DVT (deep vein thrombosis)    8. Diarrhea due to malabsorption        PLAN:     1. Establishing care with new doctor, encounter for  - Prior notes/labs/imaging reviewed.    2. Chronic heart failure with preserved ejection fraction (HFpEF) NICM NYHA2   - Worsening renal function on Torsemide 40 mg qd, will decrease patient to 20 mg qd as she still experiencing lower extremity swelling. Pulmonary exam absent of crackles.   - Recheck BMP in 2 weeks.   - Follow up with cardiology.   - ED precautions reviewed with patient and her daughters who verbalized understanding.   - BASIC METABOLIC PANEL; Future    3. Early onset Alzheimer's dementia without behavioral disturbance  - Able to give history today.   - Continue follow up with neurology.     4. Seizures  - Controlled on Keppra. Continue.     5. Current moderate episode of major depressive disorder without prior episode  - Stable without medication.     6. PAD (peripheral artery disease)  - On ASA. Stable. Continue.     7. History of DVT (deep vein thrombosis)  - Stable on Eliquis. Continue.      8. Diarrhea due to malabsorption  - Resolved. Continue to monitor.         RTC in 2 weeks.      Familia Joe MD  02/09/2023 8:12 AM        No follow-ups on file.

## 2023-02-10 ENCOUNTER — PATIENT MESSAGE (OUTPATIENT)
Dept: BEHAVIORAL HEALTH | Facility: CLINIC | Age: 68
End: 2023-02-10

## 2023-02-10 ENCOUNTER — TELEPHONE (OUTPATIENT)
Dept: INTERNAL MEDICINE | Facility: CLINIC | Age: 68
End: 2023-02-10
Payer: MEDICARE

## 2023-02-10 ENCOUNTER — OFFICE VISIT (OUTPATIENT)
Dept: BEHAVIORAL HEALTH | Facility: CLINIC | Age: 68
End: 2023-02-10
Payer: MEDICARE

## 2023-02-10 DIAGNOSIS — F32.1 CURRENT MODERATE EPISODE OF MAJOR DEPRESSIVE DISORDER WITHOUT PRIOR EPISODE: ICD-10-CM

## 2023-02-10 PROCEDURE — 90791 PR PSYCHIATRIC DIAGNOSTIC EVALUATION: ICD-10-PCS | Mod: 95,,, | Performed by: SOCIAL WORKER

## 2023-02-10 PROCEDURE — 90791 PSYCH DIAGNOSTIC EVALUATION: CPT | Mod: 95,,, | Performed by: SOCIAL WORKER

## 2023-02-10 NOTE — TELEPHONE ENCOUNTER
----- Message from Familia oJe MD sent at 2/9/2023  3:07 PM CST -----  Regarding: Cardiology follow up.  Please call patient and let her know that her cardiologist, Dr. Hernandez, said it was okay to lower the amount of her fluid pill we talked about today in her appointment. Please follow up with him.     Thanks,   TP

## 2023-02-10 NOTE — PROGRESS NOTES
Primary Care Behavioral Health Integration: Initial  Date:  2/10/2023  Referral Source:  Familia Joe MD  Type of Visit:  Video Session  Length of Appointment: 45    .  History of Present Illness:  Cherry Santiago, a 67 y.o. female with history of Major Depressive Disorder, Recurrent, Moderate (F33.1) referred by Familia Joe MD.  Patient was seen, examined and chart was reviewed.    Met with patient. PT stated she got sick in July and can no longer do the things she used to do. PT had a UTI and had back issues and could not walk for a month. PT then had diarrhea from August to November. PT went to pain management for her back and nothing helped, but the pain just went away. PT did have previous back surgery in 2017, but the recent back pain subsided without learning the cause. Prior to illness, PT would  her grandkids and go to the park and take them shopping, clean her house, make groceries, and cook. Now, PT is weak and has passed out before. PT went to the doctor Tuesday and had to stop and put her head down because she felt like she was going to pass out. The dosage of her blood pressure medication has been decreased to see if that is the cause. PT gets sad and depressed when she think about the active life she once had and the stagnant life she is currently living.    Current symptoms:  Depression: dysphoric mood, insomnia, hopelessness, fatigue, and decreased appetite.  Anxiety: excessive worrying and restlessness.  Sleep: early morning awakening and frequent night time awakening.  Tova:  denies.  Psychosis: denies .    PHQ9 2/9/2023   Total Score 9     GAD7 2/9/2023   1. Feeling nervous, anxious, or on edge? 0   2. Not being able to stop or control worrying? 1   3. Worrying too much about different things? 1   4. Trouble relaxing? 0   5. Being so restless that it is hard to sit still? 3   6. Becoming easily annoyed or irritable? 1   7. Feeling afraid as if something awful might happen? 0    8. If you checked off any problems, how difficult have these problems made it for you to do your work, take care of things at home, or get along with other people? 0   WILMA-7 Score 6       Risk assessment:  Patient reports no suicidal ideation  Patient reports no homicidal ideation  Patient reports no self-injurious behavior  Patient reports no violent behavior    Past Medical History:   Diagnosis Date    Anticoagulant long-term use     Arthritis     Atrial fibrillation     CHF (congestive heart failure)     Diabetes mellitus     Type2 resolved after weight loss surgery    DVT (deep venous thrombosis)     Encounter for blood transfusion     GERD (gastroesophageal reflux disease)     Glaucoma     Hypercholesterolemia     Hyperlipemia     Hypertension     Memory changes     Myocardial infarction     Renal failure     resolved    TIA (transient ischemic attack)          Current Outpatient Medications:     apixaban (ELIQUIS) 5 mg Tab, Take 1 tablet (5 mg total) by mouth 2 (two) times daily., Disp: , Rfl:     aspirin (ECOTRIN) 81 MG EC tablet, Take 81 mg by mouth once daily., Disp: , Rfl:     atorvastatin (LIPITOR) 40 MG tablet, Take 1 tablet (40 mg total) by mouth every evening. (Patient not taking: Reported on 1/23/2023), Disp: 90 tablet, Rfl: 3    cholecalciferol, vitamin D3, (VITAMIN D3) 50 mcg (2,000 unit) Tab, Take 1 tablet (2,000 Units total) by mouth once daily. (Patient not taking: Reported on 1/23/2023), Disp: 30 tablet, Rfl: 6    cyanocobalamin (VITAMIN B-12) 1000 MCG tablet, Take 1 tablet (1,000 mcg total) by mouth once daily. (Patient not taking: Reported on 1/23/2023), Disp: 30 tablet, Rfl: 3    ferrous sulfate (FEOSOL) 325 mg (65 mg iron) Tab tablet, Take 325 mg by mouth daily with breakfast., Disp: , Rfl:     levETIRAcetam (KEPPRA) 500 MG Tab, Take 500 mg by mouth 2 (two) times daily., Disp: , Rfl:     multivitamin Tab, Take 1 tablet by mouth once daily. (Patient not taking: Reported on 2/7/2023), Disp:  30 tablet, Rfl: 6    pantoprazole (PROTONIX) 40 MG tablet, Take 40 mg by mouth once daily., Disp: , Rfl:     potassium chloride (KLOR-CON) 10 MEQ TbSR, Take 10 mEq by mouth once daily., Disp: , Rfl:     timolol maleate 0.5% (TIMOPTIC) 0.5 % Drop, 1 drop 2 (two) times daily., Disp: , Rfl:     torsemide (DEMADEX) 20 MG Tab, Take 2 tablets (40 mg total) by mouth once daily., Disp: 60 tablet, Rfl: 11    Psychiatric History:  Psychiatric Diagnosis:  PT is not currently taking medication for mental health. They are, if needed interested in medication changes.  Previous Medication Trials: No   Previous Psychiatric Outpatient Treatment:  No  Previous Psychiatric Hospitalizations:  No  Previous Suicide Attempts:  No  History of Trauma:  Yes  Access to a Firearm:  Yes, PT keep it in a lock box    Substance Use History:  Tobacco/Nicotine:  No   Alcohol: none  Illicit Substances: No  Misuse of Prescription Medications:  No  Caffeine: Yes - three 12oz cokes    Mental Status Exam  General Appearance:  unremarkable, age appropriate   Speech: normal tone, normal rate, normal pitch, normal volume      Level of Cooperation: cooperative      Thought Processes: normal and logical   Mood: sad      Thought Content: normal, no suicidality, no homicidality, delusions, or paranoia   Affect: congruent and appropriate   Orientation: Oriented x3   Memory: recent >  impaired, remote >  intact   Attention Span & Concentration: intact   Fund of General Knowledge: intact and appropriate to age and level of education   Abstract Reasoning: interpretation of similarities was abstract   Judgment & Insight: fair     Language  intact       Impression:   My diagnostic impression is MDD single episode, as evidenced by intake assessment.     Provisional Diagnosis:  1. Depressed mood         Treatment Goals and Plan: Initial appointment focused on gathering history, identifying treatment goals and developing a treatment plan.      Depression: acquiring  relapse prevention skills, increasing energy, increasing motivation, reducing fatigue, and reducing negative automatic thoughts    Future treatment will utilize CBT, Mindfulness Techniques, Motivational Interviewing, and Relaxation Techniques .      RETURN TO CLINIC: 2 weeks

## 2023-02-12 ENCOUNTER — PATIENT MESSAGE (OUTPATIENT)
Dept: GASTROENTEROLOGY | Facility: CLINIC | Age: 68
End: 2023-02-12
Payer: MEDICARE

## 2023-02-13 ENCOUNTER — PATIENT MESSAGE (OUTPATIENT)
Dept: GASTROENTEROLOGY | Facility: CLINIC | Age: 68
End: 2023-02-13
Payer: MEDICARE

## 2023-02-14 ENCOUNTER — TELEPHONE (OUTPATIENT)
Dept: GYNECOLOGIC ONCOLOGY | Facility: CLINIC | Age: 68
End: 2023-02-14
Payer: MEDICARE

## 2023-02-14 ENCOUNTER — HOSPITAL ENCOUNTER (EMERGENCY)
Facility: HOSPITAL | Age: 68
Discharge: HOME | End: 2023-02-15
Attending: EMERGENCY MEDICINE
Payer: MEDICARE

## 2023-02-14 ENCOUNTER — PATIENT MESSAGE (OUTPATIENT)
Dept: INTERNAL MEDICINE | Facility: CLINIC | Age: 68
End: 2023-02-14
Payer: MEDICARE

## 2023-02-14 ENCOUNTER — OFFICE VISIT (OUTPATIENT)
Dept: CARDIOLOGY | Facility: CLINIC | Age: 68
End: 2023-02-14
Payer: MEDICARE

## 2023-02-14 VITALS
HEIGHT: 65 IN | BODY MASS INDEX: 29.79 KG/M2 | OXYGEN SATURATION: 94 % | SYSTOLIC BLOOD PRESSURE: 96 MMHG | WEIGHT: 178.81 LBS | DIASTOLIC BLOOD PRESSURE: 55 MMHG | HEART RATE: 83 BPM

## 2023-02-14 VITALS
HEIGHT: 65 IN | HEART RATE: 105 BPM | WEIGHT: 170 LBS | SYSTOLIC BLOOD PRESSURE: 105 MMHG | RESPIRATION RATE: 19 BRPM | BODY MASS INDEX: 28.32 KG/M2 | DIASTOLIC BLOOD PRESSURE: 61 MMHG | TEMPERATURE: 98 F | OXYGEN SATURATION: 98 %

## 2023-02-14 DIAGNOSIS — I10 ESSENTIAL HYPERTENSION: Chronic | ICD-10-CM

## 2023-02-14 DIAGNOSIS — N39.0 URINARY TRACT INFECTION WITHOUT HEMATURIA, SITE UNSPECIFIED: ICD-10-CM

## 2023-02-14 DIAGNOSIS — R55 SYNCOPE: ICD-10-CM

## 2023-02-14 DIAGNOSIS — E86.0 DEHYDRATION: Primary | ICD-10-CM

## 2023-02-14 DIAGNOSIS — R39.89 SUSPECTED UTI: ICD-10-CM

## 2023-02-14 DIAGNOSIS — I82.511 CHRONIC DEEP VEIN THROMBOSIS (DVT) OF FEMORAL VEIN OF RIGHT LOWER EXTREMITY: ICD-10-CM

## 2023-02-14 DIAGNOSIS — Z79.01 LONG TERM (CURRENT) USE OF ANTICOAGULANTS: ICD-10-CM

## 2023-02-14 DIAGNOSIS — N18.31 STAGE 3A CHRONIC KIDNEY DISEASE: ICD-10-CM

## 2023-02-14 DIAGNOSIS — N89.8 VAGINAL CYST: Primary | ICD-10-CM

## 2023-02-14 DIAGNOSIS — I50.32 CHRONIC HEART FAILURE WITH PRESERVED EJECTION FRACTION (HFPEF): Primary | ICD-10-CM

## 2023-02-14 LAB
ALBUMIN SERPL BCP-MCNC: 1.9 G/DL (ref 3.5–5.2)
ALP SERPL-CCNC: 108 U/L (ref 55–135)
ALT SERPL W/O P-5'-P-CCNC: 14 U/L (ref 10–44)
ANION GAP SERPL CALC-SCNC: 6 MMOL/L (ref 8–16)
AST SERPL-CCNC: 42 U/L (ref 10–40)
BACTERIA #/AREA URNS HPF: ABNORMAL /HPF
BASOPHILS # BLD AUTO: 0.04 K/UL (ref 0–0.2)
BASOPHILS NFR BLD: 0.7 % (ref 0–1.9)
BILIRUB SERPL-MCNC: 0.8 MG/DL (ref 0.1–1)
BILIRUB UR QL STRIP: NEGATIVE
BUN SERPL-MCNC: 43 MG/DL (ref 8–23)
CALCIUM SERPL-MCNC: 8 MG/DL (ref 8.7–10.5)
CHLORIDE SERPL-SCNC: 115 MMOL/L (ref 95–110)
CLARITY UR: ABNORMAL
CO2 SERPL-SCNC: 26 MMOL/L (ref 23–29)
COLOR UR: YELLOW
CREAT SERPL-MCNC: 2.2 MG/DL (ref 0.5–1.4)
DIFFERENTIAL METHOD: ABNORMAL
EOSINOPHIL # BLD AUTO: 0 K/UL (ref 0–0.5)
EOSINOPHIL NFR BLD: 0.4 % (ref 0–8)
ERYTHROCYTE [DISTWIDTH] IN BLOOD BY AUTOMATED COUNT: 17.2 % (ref 11.5–14.5)
EST. GFR  (NO RACE VARIABLE): 24 ML/MIN/1.73 M^2
GLUCOSE SERPL-MCNC: 78 MG/DL (ref 70–110)
GLUCOSE UR QL STRIP: NEGATIVE
HCT VFR BLD AUTO: 29.6 % (ref 37–48.5)
HGB BLD-MCNC: 9.9 G/DL (ref 12–16)
HGB UR QL STRIP: ABNORMAL
HYALINE CASTS #/AREA URNS LPF: 1.6 /LPF
IMM GRANULOCYTES # BLD AUTO: 0.04 K/UL (ref 0–0.04)
IMM GRANULOCYTES NFR BLD AUTO: 0.7 % (ref 0–0.5)
KETONES UR QL STRIP: NEGATIVE
LEUKOCYTE ESTERASE UR QL STRIP: ABNORMAL
LYMPHOCYTES # BLD AUTO: 2.4 K/UL (ref 1–4.8)
LYMPHOCYTES NFR BLD: 43 % (ref 18–48)
MCH RBC QN AUTO: 29.7 PG (ref 27–31)
MCHC RBC AUTO-ENTMCNC: 33.4 G/DL (ref 32–36)
MCV RBC AUTO: 89 FL (ref 82–98)
MICROSCOPIC COMMENT: ABNORMAL
MONOCYTES # BLD AUTO: 0.8 K/UL (ref 0.3–1)
MONOCYTES NFR BLD: 14.7 % (ref 4–15)
NEUTROPHILS # BLD AUTO: 2.2 K/UL (ref 1.8–7.7)
NEUTROPHILS NFR BLD: 40.5 % (ref 38–73)
NITRITE UR QL STRIP: POSITIVE
NRBC BLD-RTO: 0 /100 WBC
PH UR STRIP: 6 [PH] (ref 5–8)
PLATELET # BLD AUTO: 206 K/UL (ref 150–450)
PMV BLD AUTO: 9.6 FL (ref 9.2–12.9)
POTASSIUM SERPL-SCNC: 3 MMOL/L (ref 3.5–5.1)
PROT SERPL-MCNC: 5.8 G/DL (ref 6–8.4)
PROT UR QL STRIP: ABNORMAL
RBC # BLD AUTO: 3.33 M/UL (ref 4–5.4)
RBC #/AREA URNS HPF: 2 /HPF (ref 0–4)
SODIUM SERPL-SCNC: 147 MMOL/L (ref 136–145)
SP GR UR STRIP: 1.01 (ref 1–1.03)
SQUAMOUS #/AREA URNS HPF: 1 /HPF
URN SPEC COLLECT METH UR: ABNORMAL
UROBILINOGEN UR STRIP-ACNC: 1 EU/DL
WBC # BLD AUTO: 5.51 K/UL (ref 3.9–12.7)
WBC #/AREA URNS HPF: >100 /HPF (ref 0–5)

## 2023-02-14 PROCEDURE — 99999 PR PBB SHADOW E&M-EST. PATIENT-LVL IV: ICD-10-PCS | Mod: PBBFAC,,, | Performed by: INTERNAL MEDICINE

## 2023-02-14 PROCEDURE — 1125F PR PAIN SEVERITY QUANTIFIED, PAIN PRESENT: ICD-10-PCS | Mod: CPTII,S$GLB,, | Performed by: INTERNAL MEDICINE

## 2023-02-14 PROCEDURE — 93005 ELECTROCARDIOGRAM TRACING: CPT

## 2023-02-14 PROCEDURE — 1125F AMNT PAIN NOTED PAIN PRSNT: CPT | Mod: CPTII,S$GLB,, | Performed by: INTERNAL MEDICINE

## 2023-02-14 PROCEDURE — 36415 COLL VENOUS BLD VENIPUNCTURE: CPT | Performed by: EMERGENCY MEDICINE

## 2023-02-14 PROCEDURE — 99999 PR PBB SHADOW E&M-EST. PATIENT-LVL IV: CPT | Mod: PBBFAC,,, | Performed by: INTERNAL MEDICINE

## 2023-02-14 PROCEDURE — 1159F MED LIST DOCD IN RCRD: CPT | Mod: CPTII,S$GLB,, | Performed by: INTERNAL MEDICINE

## 2023-02-14 PROCEDURE — 99214 PR OFFICE/OUTPT VISIT, EST, LEVL IV, 30-39 MIN: ICD-10-PCS | Mod: S$GLB,,, | Performed by: INTERNAL MEDICINE

## 2023-02-14 PROCEDURE — 63600175 PHARM REV CODE 636 W HCPCS: Performed by: EMERGENCY MEDICINE

## 2023-02-14 PROCEDURE — 96361 HYDRATE IV INFUSION ADD-ON: CPT

## 2023-02-14 PROCEDURE — 96365 THER/PROPH/DIAG IV INF INIT: CPT

## 2023-02-14 PROCEDURE — 3074F PR MOST RECENT SYSTOLIC BLOOD PRESSURE < 130 MM HG: ICD-10-PCS | Mod: CPTII,S$GLB,, | Performed by: INTERNAL MEDICINE

## 2023-02-14 PROCEDURE — 1101F PR PT FALLS ASSESS DOC 0-1 FALLS W/OUT INJ PAST YR: ICD-10-PCS | Mod: CPTII,S$GLB,, | Performed by: INTERNAL MEDICINE

## 2023-02-14 PROCEDURE — 93010 ELECTROCARDIOGRAM REPORT: CPT | Mod: ,,, | Performed by: INTERNAL MEDICINE

## 2023-02-14 PROCEDURE — 3078F DIAST BP <80 MM HG: CPT | Mod: CPTII,S$GLB,, | Performed by: INTERNAL MEDICINE

## 2023-02-14 PROCEDURE — 87186 SC STD MICRODIL/AGAR DIL: CPT | Performed by: EMERGENCY MEDICINE

## 2023-02-14 PROCEDURE — 87077 CULTURE AEROBIC IDENTIFY: CPT | Performed by: EMERGENCY MEDICINE

## 2023-02-14 PROCEDURE — 1159F PR MEDICATION LIST DOCUMENTED IN MEDICAL RECORD: ICD-10-PCS | Mod: CPTII,S$GLB,, | Performed by: INTERNAL MEDICINE

## 2023-02-14 PROCEDURE — 3008F BODY MASS INDEX DOCD: CPT | Mod: CPTII,S$GLB,, | Performed by: INTERNAL MEDICINE

## 2023-02-14 PROCEDURE — 87088 URINE BACTERIA CULTURE: CPT | Performed by: EMERGENCY MEDICINE

## 2023-02-14 PROCEDURE — 99284 EMERGENCY DEPT VISIT MOD MDM: CPT | Mod: 25

## 2023-02-14 PROCEDURE — 3288F PR FALLS RISK ASSESSMENT DOCUMENTED: ICD-10-PCS | Mod: CPTII,S$GLB,, | Performed by: INTERNAL MEDICINE

## 2023-02-14 PROCEDURE — 85025 COMPLETE CBC W/AUTO DIFF WBC: CPT | Performed by: EMERGENCY MEDICINE

## 2023-02-14 PROCEDURE — 87086 URINE CULTURE/COLONY COUNT: CPT | Performed by: EMERGENCY MEDICINE

## 2023-02-14 PROCEDURE — 93010 EKG 12-LEAD: ICD-10-PCS | Mod: ,,, | Performed by: INTERNAL MEDICINE

## 2023-02-14 PROCEDURE — 3078F PR MOST RECENT DIASTOLIC BLOOD PRESSURE < 80 MM HG: ICD-10-PCS | Mod: CPTII,S$GLB,, | Performed by: INTERNAL MEDICINE

## 2023-02-14 PROCEDURE — 81000 URINALYSIS NONAUTO W/SCOPE: CPT | Performed by: EMERGENCY MEDICINE

## 2023-02-14 PROCEDURE — 1101F PT FALLS ASSESS-DOCD LE1/YR: CPT | Mod: CPTII,S$GLB,, | Performed by: INTERNAL MEDICINE

## 2023-02-14 PROCEDURE — 3074F SYST BP LT 130 MM HG: CPT | Mod: CPTII,S$GLB,, | Performed by: INTERNAL MEDICINE

## 2023-02-14 PROCEDURE — 99214 OFFICE O/P EST MOD 30 MIN: CPT | Mod: S$GLB,,, | Performed by: INTERNAL MEDICINE

## 2023-02-14 PROCEDURE — 3008F PR BODY MASS INDEX (BMI) DOCUMENTED: ICD-10-PCS | Mod: CPTII,S$GLB,, | Performed by: INTERNAL MEDICINE

## 2023-02-14 PROCEDURE — 25000003 PHARM REV CODE 250: Performed by: EMERGENCY MEDICINE

## 2023-02-14 PROCEDURE — 80053 COMPREHEN METABOLIC PANEL: CPT | Performed by: EMERGENCY MEDICINE

## 2023-02-14 PROCEDURE — 3288F FALL RISK ASSESSMENT DOCD: CPT | Mod: CPTII,S$GLB,, | Performed by: INTERNAL MEDICINE

## 2023-02-14 RX ORDER — POTASSIUM CHLORIDE 750 MG/1
10 TABLET, EXTENDED RELEASE ORAL DAILY
Qty: 90 TABLET | Refills: 3 | Status: SHIPPED | OUTPATIENT
Start: 2023-02-14 | End: 2023-02-24

## 2023-02-14 RX ORDER — B-COMPLEX WITH VITAMIN C
1 TABLET ORAL DAILY
COMMUNITY

## 2023-02-14 RX ORDER — TORSEMIDE 10 MG/1
10 TABLET ORAL DAILY
Qty: 30 TABLET | Refills: 11 | Status: SHIPPED | OUTPATIENT
Start: 2023-02-14 | End: 2024-02-14

## 2023-02-14 RX ORDER — CEPHALEXIN 500 MG/1
500 CAPSULE ORAL 4 TIMES DAILY
Qty: 28 CAPSULE | Refills: 0 | Status: SHIPPED | OUTPATIENT
Start: 2023-02-14 | End: 2023-02-21

## 2023-02-14 RX ADMIN — SODIUM CHLORIDE 500 ML: 9 INJECTION, SOLUTION INTRAVENOUS at 11:02

## 2023-02-14 RX ADMIN — POTASSIUM BICARBONATE 40 MEQ: 391 TABLET, EFFERVESCENT ORAL at 11:02

## 2023-02-14 RX ADMIN — CEFTRIAXONE SODIUM 1 G: 1 INJECTION, POWDER, FOR SOLUTION INTRAMUSCULAR; INTRAVENOUS at 11:02

## 2023-02-14 RX ADMIN — SODIUM CHLORIDE 500 ML: 9 INJECTION, SOLUTION INTRAVENOUS at 10:02

## 2023-02-14 NOTE — TELEPHONE ENCOUNTER
Returned call to patient's daughter Ms. Washington about urinalysis that she requested being ordered and added to lab appt 2/16. Also asked if she was ready to schedule follow up appt with Dr. Lala for her mother, she said that she was. Scheduled appt for Monday 2/27/23 for 9:45am at [facility].

## 2023-02-14 NOTE — TELEPHONE ENCOUNTER
2/14/22: Called patient and daughter. They were concerned about low fecal elastase level. The patient thinks her stool sample may have been watery at the time of collection (may lead to falsely low elastase level). Her symptoms have resolved for the most part. Given no hx of chronic pancreatitis & no changes seen on EUS, do not believe this is pancreatic insufficiency.     Advised the patient to try [employer] Enzymes in the interim.     Tung Haley MD  PGY-IV, Gastroenterology

## 2023-02-14 NOTE — PROGRESS NOTES
Subjective:   Patient ID:  Cehrry Santiago is a 67 y.o. female who presents for follow up of Establish Care, Follow-up, Shortness of Breath, Extremity Weakness, Fatigue, and Leg Swelling      HPI: Back for 3 month f/u, though I had planned to see her in one week following the initial visit in November, when she was markedly overloaded.  Fortunately, torsemide 40 has taken 44# off of her since we last saw each other but she overshot and her PCP dialed back the torsemide to 20.    She is doing better but BPs continue to be low and her creatinine has bumped to > 2.  She has lightheadedness and GARCIA.  No syncope or angina.  No palpitations.          My Nov 2022 HPI: Very pleasant woman here with her daughter for evaluation of marked fluid retention.  She was previously on lasix for chronic heart failure but had it held during an extended diarrheal illness that required hospitalization.  See hospital course below.     She states that her regular weight is a little under 200# and she is 222 today.  She states that swelling is all through her legs and thighs and even includes her left breast and flank.       An echocardiogram in the hospital 8 days ago noted an IVC of 3mm Hg, but the images were suboptimal.     Hospital Course:   67-year-old past medical history of recently diagnosed carcinoid syndrome, having diarrhea intermittently since August 2022 more recently diarrhea has become worse over past few days -several episodes daily  associated nausea with intermittent vomiting. 9/30 CT AP showed partial small bowel resection. There are some thickened segments of large and small bowel, some with adjacent mesenteric edema. Symptoms concerned likely though to be secondary to carcinoid syndrome patient evaluated by Heme-Onc at [facility] with PET scan done 10/18 - Focal nodular foci of radiotracer uptake at the pancreatic tail and near the region of the pancreatic head above liver background activity could  indicate somatostatin receptor avid lesions, but no corresponding mass is seen on CT portion of the study.  Recommend follow-up contrast enhanced pancreas protocol CT to further assess. 5HIAA levels on 10/21 WNL. CT AP W contrast done 11/4 shows No pancreatic lesion and Enterocolitis,. recent EGD -Non-bleeding gastric ulcers with no stigmata of  bleeding. Biopsy neg for H pylori. Treated with a monopolar probe. Colonoscopy with biopsy -No significant pathologic abnormality. No morphologic evidence of active inflammatory bowel disease, microscopic colitis, or neoplasia. Patient was  referred to [facility] for further workup and has scheduled appointment at Heme-Onc at [facility] this upcoming week. Chromogranin A elevated at 2000s. C diff negative. Fecal fat level normal. Fecal elastase low. AES consulted for EUS and potential biopsy given that her PET scan showed uptake but no mass was seen on CT pancreas protocol. Started and discontinued octreotide since it failed to help. Repeat cryptosporidium Ag negative. 11/9 EUS - sleeve gastrectomy was found, characterized by healthy appearing mucosa. Widely patent duodenoenterostomy, characterized  by healthy appearing mucosa was found. few cystic lesions were seen in the pancreatic head, genu of the pancreas and pancreatic body highly suspicious for a branched intraductal papillary mucinous neoplasm. Quite small for sampling. Pancreatic head and pancreatic tail showed no mass. Oncology reviewed results on 11/12 and feel patient with low suspicion for neuroendocrine tumor at this point. CT chest with contrast 11/13 significant for subcentimeter nodules bilaterally. Surgery oncology consulted - testing thus far not convincing for need for surgical exploration. Will present to tumor board 11/21. Chomogranin A level elevated due to PPI. Somatostatin level normal. Tryptase negative. Normal IgG and negative Tissue Transglutaminase Iga. Insulin and proinsulin levels  normal. VIPoma level normal  Will need repeat gastrin and chromogranin level once off PPI for at least 2-4 weeks. EGD and small bowel biopsies done 11/17 and pending. Increase questran to TID. Continue loperamide 2 mg TID. Add lomotil 1 cap QID. Improved to 1-2 BMs a day. Still improved when questran discontinued. Will see how she does off loperamide. Patient with Low fecal elastase and ferrtin slightly elevated 272 and high iron saturation 83. Discussed with GI - will discuss significance with GI. As responding to anti-diarrheal medication, not need to start pancreatic enzymes at this time. Follow up with GI as outpatient after discharge. Patient with intermittent hypotension treated with fluids. . Patient intermittently treated with furosemide due to symptomatic BLE peripheral edema.   Diarrhea eventually stopped.  Patient was taken off of TPN and tolerated PO.     Patient Active Problem List   Diagnosis    Acute deep vein thrombosis (DVT) of femoral vein of right lower extremity    Class 1 obesity due to excess calories with serious comorbidity and body mass index (BMI) of 32.0 to 32.9 in adult    SIRS (systemic inflammatory response syndrome)    Essential hypertension    Anemia of chronic disease    Hypokalemia    Bilateral groin pain    Lipoma of buttock    Iron deficiency anemia    Edema    Dizziness    Early onset Alzheimer's dementia without behavioral disturbance    Aneurysm, cerebral, nonruptured    Acute bilateral non-occlusive distal DVT    Chronic deep vein thrombosis (DVT): right common femoral vein    Chronic heart failure with preserved ejection fraction (HFpEF) NICM NYHA2     TIA (transient ischemic attack)    Normocytic anemia    Hypocalcemia    Transaminasemia    NAFLD (nonalcoholic fatty liver disease)    Uncomplicated opioid dependence    Syncope    Anginal equivalent    Syncope and collapse    Intractable back pain    Severe protein-calorie malnutrition      Hypopotassemia    Diarrhea due to malabsorption    Stage 3a chronic kidney disease    Inadequate dietary energy intake    Hypomagnesemia    Polypharmacy    Metabolic acidemia    Vitamin D deficiency    PAD (peripheral artery disease)    History of DVT (deep vein thrombosis)    Chronic asymptomatic hypotension    2.8 cm complex cyst of vaginal wall     UTI (urinary tract infection)    Seizures    Orthostatic hypotension    Cryptosporidium exposure    Hypophosphatemia    Hypotension    Current moderate episode of major depressive disorder without prior episode       Current Outpatient Medications   Medication Sig    apixaban (ELIQUIS) 5 mg Tab Take 1 tablet (5 mg total) by mouth 2 (two) times daily.    aspirin (ECOTRIN) 81 MG EC tablet Take 81 mg by mouth once daily.    ferrous sulfate (FEOSOL) 325 mg (65 mg iron) Tab tablet Take 325 mg by mouth daily with breakfast.    levETIRAcetam (KEPPRA) 500 MG Tab Take 500 mg by mouth 2 (two) times daily.    pantoprazole (PROTONIX) 40 MG tablet Take 40 mg by mouth once daily.    potassium chloride (KLOR-CON) 10 MEQ TbSR Take 10 mEq by mouth once daily.    timolol maleate 0.5% (TIMOPTIC) 0.5 % Drop 1 drop 2 (two) times daily.    torsemide (DEMADEX) 20 MG Tab Take 2 tablets (40 mg total) by mouth once daily.    atorvastatin (LIPITOR) 40 MG tablet Take 1 tablet (40 mg total) by mouth every evening. (Patient not taking: Reported on 1/23/2023)    cholecalciferol, vitamin D3, (VITAMIN D3) 50 mcg (2,000 unit) Tab Take 1 tablet (2,000 Units total) by mouth once daily. (Patient not taking: Reported on 1/23/2023)    cyanocobalamin (VITAMIN B-12) 1000 MCG tablet Take 1 tablet (1,000 mcg total) by mouth once daily. (Patient not taking: Reported on 1/23/2023)    multivitamin Tab Take 1 tablet by mouth once daily. (Patient not taking: Reported on 2/7/2023)     No current facility-administered medications for this visit.       Review of Systems   All other systems  reviewed and are negative.  Objective:   Physical Exam  Vitals reviewed.   Constitutional:       Appearance: She is well-developed.   HENT:      Head: Normocephalic and atraumatic.      Mouth/Throat:      Mouth: Mucous membranes are dry.   Eyes:      General: No scleral icterus.     Conjunctiva/sclera: Conjunctivae normal.   Neck:      Vascular: No JVD.   Cardiovascular:      Rate and Rhythm: Normal rate and regular rhythm.      Pulses: Intact distal pulses.      Heart sounds: Normal heart sounds. No murmur heard.    No friction rub. No gallop.   Pulmonary:      Effort: Pulmonary effort is normal.      Breath sounds: Normal breath sounds. No wheezing or rales.   Abdominal:      General: Bowel sounds are normal. There is no distension.      Palpations: Abdomen is soft.      Tenderness: There is no abdominal tenderness.   Musculoskeletal:         General: Normal range of motion.      Cervical back: Normal range of motion and neck supple.   Skin:     General: Skin is warm and dry.      Findings: No erythema or rash.   Neurological:      Mental Status: She is alert and oriented to person, place, and time.   Psychiatric:         Behavior: Behavior normal.         Thought Content: Thought content normal.         Judgment: Judgment normal.       Lab Results   Component Value Date    WBC 5.84 02/07/2023    HGB 8.9 (L) 02/07/2023    HCT 28.5 (L) 02/07/2023    MCV 93 02/07/2023     02/07/2023         Chemistry        Component Value Date/Time     (H) 02/07/2023 1121    K 3.3 (L) 02/07/2023 1121     02/07/2023 1121    CO2 20 (L) 02/07/2023 1121    BUN 35 (H) 02/07/2023 1121    CREATININE 2.2 (H) 02/07/2023 1121    GLU 75 02/07/2023 1121        Component Value Date/Time    CALCIUM 7.7 (L) 02/07/2023 1121    ALKPHOS 75 11/19/2022 0119    AST 58 (H) 11/19/2022 0119    ALT 9 (L) 11/19/2022 0119    BILITOT 0.5 11/21/2022 1023    ESTGFRAFRICA 51 (A) 05/11/2022 1748    EGFRNONAA 44 (A) 05/11/2022 1748             Lab Results   Component Value Date    CHOL 124 04/12/2021     Lab Results   Component Value Date    HDL 66 (H) 04/12/2021     Lab Results   Component Value Date    LDLCALC 47 04/12/2021     Lab Results   Component Value Date    TRIG 38 11/20/2022    TRIG 55 04/12/2021     Lab Results   Component Value Date    CHOLHDL 1.88 04/12/2021       Lab Results   Component Value Date    TSH 4.475 (H) 11/19/2022       Lab Results   Component Value Date    HGBA1C 4.7 10/13/2022       Assessment:     1. Chronic heart failure with preserved ejection fraction (HFpEF) NICM NYHA2     2. Essential hypertension    3. Chronic deep vein thrombosis (DVT) of femoral vein of right lower extremity    4. Stage 3a chronic kidney disease    5. Long term (current) use of anticoagulants        Plan:     Continue current medicines.  OK to drop torsemide to 10mg daily with use of 20mg total daily PRN weight gain.  Skip tomorrow's dose of diuretics.    BMP due in 2 days.    Diet/exercise goals reinforced.    Hand washing and social distancing stressed.    F/U 4 weeks

## 2023-02-15 ENCOUNTER — PATIENT MESSAGE (OUTPATIENT)
Dept: CARDIOLOGY | Facility: CLINIC | Age: 68
End: 2023-02-15
Payer: MEDICARE

## 2023-02-15 DIAGNOSIS — Z78.0 MENOPAUSE: ICD-10-CM

## 2023-02-15 NOTE — ED PROVIDER NOTES
Encounter Date: 2/14/2023       History     Chief Complaint   Patient presents with    Loss of Consciousness     Pt states started with diarrhea today and had been feeling cold all day.  Went to bed around 5p and then woke up around 730 to go and use restroom and passed out.  Thinks she may have gotten up too fast. She saw Dr Hernandez today at [facility] for cardiology. Her meds were adjusted d/t B/P low.      68 yo female with history as below here via EMS with syncope upon standing from bed to use bathroom just PTA. Patient with onset of diarrhea and dysuria x 1 day. No fever. Endorses chills. Gradual onset. Worse with standing. Alleviated at rest. No dyspnea. No CP. Cards just decreased diuretic dose today. No traumatic injury. Similar to previous episodes.     Review of patient's allergies indicates:  No Known Allergies  Past Medical History:   Diagnosis Date    Anticoagulant long-term use     Arthritis     Atrial fibrillation     CHF (congestive heart failure)     Diabetes mellitus     Type2 resolved after weight loss surgery    DVT (deep venous thrombosis)     Encounter for blood transfusion     GERD (gastroesophageal reflux disease)     Glaucoma     Hypercholesterolemia     Hyperlipemia     Hypertension     Memory changes     Myocardial infarction     Renal failure     resolved    TIA (transient ischemic attack)      Past Surgical History:   Procedure Laterality Date    BACK SURGERY  06/21/2017    lumbar fusion 6/21/17 and surgery for hematoma to site on 6/26/17    CHOLECYSTECTOMY  1978    COLONOSCOPY N/A 10/3/2022    Procedure: COLONOSCOPY;  Surgeon: Jourdan Parks MD;  Location: [facility] ENDO;  Service: General;  Laterality: N/A;    COLONOSCOPY N/A 10/11/2022    Procedure: COLONOSCOPY;  Surgeon: Stephen Cooper MD;  Location: [facility] ENDO;  Service: Endoscopy;  Laterality: N/A;    ENDOSCOPIC ULTRASOUND OF UPPER GASTROINTESTINAL TRACT N/A 11/9/2022    Procedure: ULTRASOUND, UPPER GI TRACT, ENDOSCOPIC;  Surgeon:  Jake Corona MD;  Location: [facility] (2ND FLR);  Service: Endoscopy;  Laterality: N/A;    ESOPHAGOGASTRODUODENOSCOPY N/A 10/3/2022    Procedure: EGD (ESOPHAGOGASTRODUODENOSCOPY);  Surgeon: Jourdan Parks MD;  Location: [facility] ENDO;  Service: General;  Laterality: N/A;    ESOPHAGOGASTRODUODENOSCOPY N/A 11/15/2022    Procedure: EGD (ESOPHAGOGASTRODUODENOSCOPY);  Surgeon: Roque Soliman MD;  Location: [facility] (2ND FLR);  Service: Endoscopy;  Laterality: N/A;    ESOPHAGOGASTRODUODENOSCOPY N/A 11/17/2022    Procedure: EGD (ESOPHAGOGASTRODUODENOSCOPY);  Surgeon: Roque Soliman MD;  Location: [facility] (2ND FLR);  Service: Endoscopy;  Laterality: N/A;    GASTRIC BYPASS      HYSTERECTOMY  2012    JOINT REPLACEMENT      TKR    LEFT HEART CATHETERIZATION Left 2/5/2021    Procedure: Left heart cath;  Surgeon: Shane Pacheco MD;  Location: [facility] CATH;  Service: Cardiovascular;  Laterality: Left;    PELVIC LAPAROSCOPY Left     OOPH    RENAL BIOPSY N/A 10/5/2022    Procedure: BIOPSY, KIDNEY;  Surgeon: [facility];  Location: [facility] OR;  Service: General;  Laterality: N/A;    RIGHT HEART CATHETERIZATION Right 2/5/2021    Procedure: INSERTION, CATHETER, RIGHT HEART. via left radial and left brachial;  Surgeon: Shane Pacheco MD;  Location: [facility] CATH;  Service: Cardiovascular;  Laterality: Right;    TOTAL ABDOMINAL HYSTERECTOMY W/ BILATERAL SALPINGOOPHORECTOMY       Family History   Problem Relation Age of Onset    Arthritis Mother     Breast cancer Mother     Heart disease Mother     Hypertension Mother     Cancer Father     Heart disease Father     Hypertension Father     Diabetes Daughter      Social History     Tobacco Use    Smoking status: Never    Smokeless tobacco: Never   Substance Use Topics    Alcohol use: Yes     Comment: occasional    Drug use: No     Review of Systems   Constitutional: Negative.    Respiratory: Negative.     Cardiovascular: Negative.    Gastrointestinal:  Positive for  diarrhea. Negative for abdominal pain.   Genitourinary:  Positive for dysuria.   All other systems reviewed and are negative.    Physical Exam     Initial Vitals   BP Pulse Resp Temp SpO2   02/14/23 2118 02/14/23 2118 02/14/23 2118 02/14/23 2148 02/14/23 2118   116/68 92 19 97.8 °F (36.6 °C) 99 %      MAP       --                Physical Exam    Nursing note and vitals reviewed.  Constitutional: She appears well-developed and well-nourished. She is not diaphoretic. No distress.   HENT:   Head: Normocephalic and atraumatic.   Dry oral mucosa   Eyes: EOM are normal. Pupils are equal, round, and reactive to light.   Neck: Neck supple.   Normal range of motion.  Cardiovascular:  Normal rate, regular rhythm and intact distal pulses.     Exam reveals no gallop and no friction rub.       No murmur heard.  Pulmonary/Chest: Breath sounds normal. No respiratory distress. She has no wheezes. She has no rales.   Abdominal: Abdomen is soft. Bowel sounds are normal. She exhibits no distension. There is no abdominal tenderness. There is no rebound.   Musculoskeletal:         General: No tenderness or edema. Normal range of motion.      Cervical back: Normal range of motion and neck supple.     Neurological: She is alert and oriented to person, place, and time. She has normal strength and normal reflexes. GCS score is 15. GCS eye subscore is 4. GCS verbal subscore is 5. GCS motor subscore is 6.   Skin: Skin is warm and dry. Capillary refill takes less than 2 seconds.       ED Course   Procedures  Labs Reviewed   CBC W/ AUTO DIFFERENTIAL - Abnormal; Notable for the following components:       Result Value    RBC 3.33 (*)     Hemoglobin 9.9 (*)     Hematocrit 29.6 (*)     RDW 17.2 (*)     Immature Granulocytes 0.7 (*)     All other components within normal limits   COMPREHENSIVE METABOLIC PANEL - Abnormal; Notable for the following components:    Sodium 147 (*)     Potassium 3.0 (*)     Chloride 115 (*)     BUN 43 (*)     Creatinine  2.2 (*)     Calcium 8.0 (*)     Total Protein 5.8 (*)     Albumin 1.9 (*)     AST 42 (*)     Anion Gap 6 (*)     eGFR 24.0 (*)     All other components within normal limits   URINALYSIS, REFLEX TO URINE CULTURE - Abnormal; Notable for the following components:    Appearance, UA Cloudy (*)     Protein, UA Trace (*)     Occult Blood UA Trace (*)     Nitrite, UA Positive (*)     Leukocytes, UA 3+ (*)     All other components within normal limits    Narrative:     Preferred Collection Type->Urine, Clean Catch  Specimen Source->Urine   URINALYSIS MICROSCOPIC - Abnormal; Notable for the following components:    WBC, UA >100 (*)     Bacteria Many (*)     Hyaline Casts, UA 1.6 (*)     All other components within normal limits    Narrative:     Preferred Collection Type->Urine, Clean Catch  Specimen Source->Urine   CULTURE, URINE     EKG Readings: (Independently Interpreted)   Initial Reading: No STEMI. Rhythm: Normal Sinus Rhythm. Heart Rate: 93. Ectopy: No Ectopy. Clinical Impression: Normal Sinus Rhythm     Imaging Results    None          Medications   cefTRIAXone (ROCEPHIN) 1 g in dextrose 5 % in water (D5W) 5 % 50 mL IVPB (MB+) (1 g Intravenous New Bag 2/14/23 2314)   sodium chloride 0.9% bolus 500 mL 500 mL (500 mLs Intravenous New Bag 2/14/23 2313)   sodium chloride 0.9% bolus 500 mL 500 mL (0 mLs Intravenous Stopped 2/14/23 2303)   potassium bicarbonate disintegrating tablet 40 mEq (40 mEq Oral Given 2/14/23 2314)     Medical Decision Making:   Clinical Tests:   Lab Tests: Reviewed and Ordered  Medical Tests: Reviewed and Ordered  ED Management:  Improved with IVF. Vitals reassuring. Will start abx to cover UTI as outpatient after IV dose here in ED. Last culture broadly sensitive.                         Clinical Impression:   Final diagnoses:  [R55] Syncope  [E86.0] Dehydration (Primary)  [N39.0] Urinary tract infection without hematuria, site unspecified        ED Disposition Condition    Discharge Stable           ED Prescriptions       Medication Sig Dispense Start Date End Date Auth. Provider    cephALEXin (KEFLEX) 500 MG capsule Take 1 capsule (500 mg total) by mouth 4 (four) times daily. for 7 days 28 capsule 2/14/2023 2/21/2023 Andre Bañuelos MD          Follow-up Information       Follow up With Specialties Details Why Contact Info    Familia Joe MD Family Medicine Schedule an appointment as soon as possible for a visit   [address]  [place] [address]  [phone]               Andre Bañuelos MD  02/14/23 9532

## 2023-02-16 LAB — BACTERIA UR CULT: ABNORMAL

## 2023-02-17 ENCOUNTER — PATIENT MESSAGE (OUTPATIENT)
Dept: INTERNAL MEDICINE | Facility: CLINIC | Age: 68
End: 2023-02-17
Payer: MEDICARE

## 2023-02-20 PROBLEM — N39.0 UTI (URINARY TRACT INFECTION): Status: RESOLVED | Noted: 2022-11-06 | Resolved: 2023-02-20

## 2023-02-22 ENCOUNTER — LAB VISIT (OUTPATIENT)
Dept: LAB | Facility: HOSPITAL | Age: 68
End: 2023-02-22
Attending: STUDENT IN AN ORGANIZED HEALTH CARE EDUCATION/TRAINING PROGRAM
Payer: MEDICARE

## 2023-02-22 DIAGNOSIS — I50.32 CHRONIC HEART FAILURE WITH PRESERVED EJECTION FRACTION (HFPEF): ICD-10-CM

## 2023-02-22 LAB
ANION GAP SERPL CALC-SCNC: 6 MMOL/L (ref 8–16)
BUN SERPL-MCNC: 35 MG/DL (ref 8–23)
CALCIUM SERPL-MCNC: 7.9 MG/DL (ref 8.7–10.5)
CHLORIDE SERPL-SCNC: 117 MMOL/L (ref 95–110)
CO2 SERPL-SCNC: 25 MMOL/L (ref 23–29)
CREAT SERPL-MCNC: 2 MG/DL (ref 0.5–1.4)
EST. GFR  (NO RACE VARIABLE): 26.9 ML/MIN/1.73 M^2
GLUCOSE SERPL-MCNC: 91 MG/DL (ref 70–110)
POTASSIUM SERPL-SCNC: 3.3 MMOL/L (ref 3.5–5.1)
SODIUM SERPL-SCNC: 148 MMOL/L (ref 136–145)

## 2023-02-22 PROCEDURE — 80048 BASIC METABOLIC PNL TOTAL CA: CPT | Performed by: STUDENT IN AN ORGANIZED HEALTH CARE EDUCATION/TRAINING PROGRAM

## 2023-02-22 PROCEDURE — 36415 COLL VENOUS BLD VENIPUNCTURE: CPT | Performed by: STUDENT IN AN ORGANIZED HEALTH CARE EDUCATION/TRAINING PROGRAM

## 2023-02-24 ENCOUNTER — PATIENT MESSAGE (OUTPATIENT)
Dept: INTERNAL MEDICINE | Facility: CLINIC | Age: 68
End: 2023-02-24

## 2023-02-24 ENCOUNTER — PATIENT MESSAGE (OUTPATIENT)
Dept: BEHAVIORAL HEALTH | Facility: CLINIC | Age: 68
End: 2023-02-24

## 2023-02-24 ENCOUNTER — OFFICE VISIT (OUTPATIENT)
Dept: INTERNAL MEDICINE | Facility: CLINIC | Age: 68
End: 2023-02-24
Payer: MEDICARE

## 2023-02-24 ENCOUNTER — HOSPITAL ENCOUNTER (OUTPATIENT)
Dept: RADIOLOGY | Facility: HOSPITAL | Age: 68
Discharge: HOME | End: 2023-02-24
Attending: STUDENT IN AN ORGANIZED HEALTH CARE EDUCATION/TRAINING PROGRAM
Payer: MEDICARE

## 2023-02-24 ENCOUNTER — PATIENT MESSAGE (OUTPATIENT)
Dept: CARDIOLOGY | Facility: CLINIC | Age: 68
End: 2023-02-24
Payer: MEDICARE

## 2023-02-24 ENCOUNTER — OFFICE VISIT (OUTPATIENT)
Dept: BEHAVIORAL HEALTH | Facility: CLINIC | Age: 68
End: 2023-02-24
Payer: MEDICARE

## 2023-02-24 VITALS
HEART RATE: 103 BPM | HEIGHT: 65 IN | BODY MASS INDEX: 30.56 KG/M2 | OXYGEN SATURATION: 96 % | SYSTOLIC BLOOD PRESSURE: 100 MMHG | WEIGHT: 183.44 LBS | DIASTOLIC BLOOD PRESSURE: 59 MMHG

## 2023-02-24 DIAGNOSIS — R06.09 DOE (DYSPNEA ON EXERTION): ICD-10-CM

## 2023-02-24 DIAGNOSIS — F32.1 CURRENT MODERATE EPISODE OF MAJOR DEPRESSIVE DISORDER WITHOUT PRIOR EPISODE: Primary | ICD-10-CM

## 2023-02-24 DIAGNOSIS — I50.32 CHRONIC HEART FAILURE WITH PRESERVED EJECTION FRACTION (HFPEF): Primary | ICD-10-CM

## 2023-02-24 PROCEDURE — 71046 X-RAY EXAM CHEST 2 VIEWS: CPT | Mod: 26,,, | Performed by: RADIOLOGY

## 2023-02-24 PROCEDURE — 3008F PR BODY MASS INDEX (BMI) DOCUMENTED: ICD-10-PCS | Mod: CPTII,S$GLB,, | Performed by: STUDENT IN AN ORGANIZED HEALTH CARE EDUCATION/TRAINING PROGRAM

## 2023-02-24 PROCEDURE — 1159F MED LIST DOCD IN RCRD: CPT | Mod: CPTII,S$GLB,, | Performed by: STUDENT IN AN ORGANIZED HEALTH CARE EDUCATION/TRAINING PROGRAM

## 2023-02-24 PROCEDURE — 90834 PR PSYCHOTHERAPY W/PATIENT, 45 MIN: ICD-10-PCS | Mod: 95,,, | Performed by: SOCIAL WORKER

## 2023-02-24 PROCEDURE — 3074F SYST BP LT 130 MM HG: CPT | Mod: CPTII,S$GLB,, | Performed by: STUDENT IN AN ORGANIZED HEALTH CARE EDUCATION/TRAINING PROGRAM

## 2023-02-24 PROCEDURE — 99999 PR PBB SHADOW E&M-EST. PATIENT-LVL IV: CPT | Mod: PBBFAC,,, | Performed by: STUDENT IN AN ORGANIZED HEALTH CARE EDUCATION/TRAINING PROGRAM

## 2023-02-24 PROCEDURE — 71046 X-RAY EXAM CHEST 2 VIEWS: CPT | Mod: TC

## 2023-02-24 PROCEDURE — 1101F PR PT FALLS ASSESS DOC 0-1 FALLS W/OUT INJ PAST YR: ICD-10-PCS | Mod: CPTII,S$GLB,, | Performed by: STUDENT IN AN ORGANIZED HEALTH CARE EDUCATION/TRAINING PROGRAM

## 2023-02-24 PROCEDURE — 99999 PR PBB SHADOW E&M-EST. PATIENT-LVL IV: ICD-10-PCS | Mod: PBBFAC,,, | Performed by: STUDENT IN AN ORGANIZED HEALTH CARE EDUCATION/TRAINING PROGRAM

## 2023-02-24 PROCEDURE — 3288F FALL RISK ASSESSMENT DOCD: CPT | Mod: CPTII,S$GLB,, | Performed by: STUDENT IN AN ORGANIZED HEALTH CARE EDUCATION/TRAINING PROGRAM

## 2023-02-24 PROCEDURE — 3008F BODY MASS INDEX DOCD: CPT | Mod: CPTII,S$GLB,, | Performed by: STUDENT IN AN ORGANIZED HEALTH CARE EDUCATION/TRAINING PROGRAM

## 2023-02-24 PROCEDURE — 99214 OFFICE O/P EST MOD 30 MIN: CPT | Mod: S$GLB,,, | Performed by: STUDENT IN AN ORGANIZED HEALTH CARE EDUCATION/TRAINING PROGRAM

## 2023-02-24 PROCEDURE — 1159F PR MEDICATION LIST DOCUMENTED IN MEDICAL RECORD: ICD-10-PCS | Mod: CPTII,S$GLB,, | Performed by: STUDENT IN AN ORGANIZED HEALTH CARE EDUCATION/TRAINING PROGRAM

## 2023-02-24 PROCEDURE — 1125F AMNT PAIN NOTED PAIN PRSNT: CPT | Mod: CPTII,S$GLB,, | Performed by: STUDENT IN AN ORGANIZED HEALTH CARE EDUCATION/TRAINING PROGRAM

## 2023-02-24 PROCEDURE — 3074F PR MOST RECENT SYSTOLIC BLOOD PRESSURE < 130 MM HG: ICD-10-PCS | Mod: CPTII,S$GLB,, | Performed by: STUDENT IN AN ORGANIZED HEALTH CARE EDUCATION/TRAINING PROGRAM

## 2023-02-24 PROCEDURE — 1101F PT FALLS ASSESS-DOCD LE1/YR: CPT | Mod: CPTII,S$GLB,, | Performed by: STUDENT IN AN ORGANIZED HEALTH CARE EDUCATION/TRAINING PROGRAM

## 2023-02-24 PROCEDURE — 3078F PR MOST RECENT DIASTOLIC BLOOD PRESSURE < 80 MM HG: ICD-10-PCS | Mod: CPTII,S$GLB,, | Performed by: STUDENT IN AN ORGANIZED HEALTH CARE EDUCATION/TRAINING PROGRAM

## 2023-02-24 PROCEDURE — 1160F PR REVIEW ALL MEDS BY PRESCRIBER/CLIN PHARMACIST DOCUMENTED: ICD-10-PCS | Mod: CPTII,S$GLB,, | Performed by: STUDENT IN AN ORGANIZED HEALTH CARE EDUCATION/TRAINING PROGRAM

## 2023-02-24 PROCEDURE — 99214 PR OFFICE/OUTPT VISIT, EST, LEVL IV, 30-39 MIN: ICD-10-PCS | Mod: S$GLB,,, | Performed by: STUDENT IN AN ORGANIZED HEALTH CARE EDUCATION/TRAINING PROGRAM

## 2023-02-24 PROCEDURE — 90834 PSYTX W PT 45 MINUTES: CPT | Mod: 95,,, | Performed by: SOCIAL WORKER

## 2023-02-24 PROCEDURE — 1125F PR PAIN SEVERITY QUANTIFIED, PAIN PRESENT: ICD-10-PCS | Mod: CPTII,S$GLB,, | Performed by: STUDENT IN AN ORGANIZED HEALTH CARE EDUCATION/TRAINING PROGRAM

## 2023-02-24 PROCEDURE — 3288F PR FALLS RISK ASSESSMENT DOCUMENTED: ICD-10-PCS | Mod: CPTII,S$GLB,, | Performed by: STUDENT IN AN ORGANIZED HEALTH CARE EDUCATION/TRAINING PROGRAM

## 2023-02-24 PROCEDURE — 1160F RVW MEDS BY RX/DR IN RCRD: CPT | Mod: CPTII,S$GLB,, | Performed by: STUDENT IN AN ORGANIZED HEALTH CARE EDUCATION/TRAINING PROGRAM

## 2023-02-24 PROCEDURE — 3078F DIAST BP <80 MM HG: CPT | Mod: CPTII,S$GLB,, | Performed by: STUDENT IN AN ORGANIZED HEALTH CARE EDUCATION/TRAINING PROGRAM

## 2023-02-24 PROCEDURE — 71046 XR CHEST PA AND LATERAL: ICD-10-PCS | Mod: 26,,, | Performed by: RADIOLOGY

## 2023-02-24 RX ORDER — POTASSIUM CHLORIDE 750 MG/1
20 TABLET, EXTENDED RELEASE ORAL DAILY
Qty: 90 TABLET | Refills: 3
Start: 2023-02-24

## 2023-02-24 NOTE — PROGRESS NOTES
SUBJECTIVE     Chief Complaint   Patient presents with    Follow-up       HPI  Cherry Santiago is a 67 y.o. female with   Early-onset Alzheimer's Dementia, Nonruptured cerebral aneurysm, seizure disorder, HTN, HFpEF, PAD, orthostatic hypotension, CKD3a, h/o DVT, Anemia, NAFLD  that presents for follow-up visit.     Last seen in clinic on 02/09/2023.  Patient noted to have worsening renal failure and bilateral lower extremity edema in the setting have path.  Had been started on torsemide 40 mg q.d. patient was instructed to lower torsemide 20 mg daily and follow-up with Cardiology.    Patient saw Cardiology on 02/14/2023 where she was instructed to drop torsemide to 10 mg daily with 20 mg total daily p.r.n. weight gain.  Blood pressure at that visit was 96/55.  Was instructed to hold diuretic for 1 day.    Patient was subsequently seen in the ED shortly after that visit due to syncopal episode sustained after standing up from bed to use bathroom.  CBC showed stable normocytic anemia. CMP with hypernatremia, hypokalemia, elevated BUN and creatinine, low calcium, mildly elevated AST, eGFR 24.  UA indicative of UTI.  EKG showing normal sinus rhythm with no acute ischemic wave changes.  Patient was given IV fluids with improvement of symptoms.  Started on Keflex for UTI.  Urine culture grew Klebsiella pneumoniae (>100K cfu/mL) that was resistant to bactrim, otherwise pan-sensitive.  Patient has finished her antibiotic and is no longer having any dysuria or other urinary symptoms.    Repeat BMP on 02/22/2023 showed stable sodium level, improvement of potassium level but still hypokalemic, BUN elevated but improved ED visit, creatinine elevated but improved, GFR 26.9.    Patient's weight today is 183.42 lbs, weight on our clinic scale during last visit was 182.1 lbs. Still reporting weakness and GARCIA while walking from kitchen to the front door.  Patient said that she has been watching her weight, and had to take a 2nd  dose of the torsemide yesterday.    Of note, patient has daughter on FaceTime during appointment who voices upset and frustration, stating that no one ever said she has heart failure.  Multiple documentation for evaluation of heart failure and discussions they are of present in the EHR.      No other complaints today.    PAST MEDICAL HISTORY:  Past Medical History:   Diagnosis Date    Anticoagulant long-term use     Arthritis     Atrial fibrillation     CHF (congestive heart failure)     Diabetes mellitus     Type2 resolved after weight loss surgery    DVT (deep venous thrombosis)     Encounter for blood transfusion     GERD (gastroesophageal reflux disease)     Glaucoma     Hypercholesterolemia     Hyperlipemia     Hypertension     Memory changes     Myocardial infarction     Renal failure     resolved    TIA (transient ischemic attack)        PAST SURGICAL HISTORY:  Past Surgical History:   Procedure Laterality Date    BACK SURGERY  06/21/2017    lumbar fusion 6/21/17 and surgery for hematoma to site on 6/26/17    CHOLECYSTECTOMY  1978    COLONOSCOPY N/A 10/3/2022    Procedure: COLONOSCOPY;  Surgeon: Jourdan Parks MD;  Location: [facility];  Service: General;  Laterality: N/A;    COLONOSCOPY N/A 10/11/2022    Procedure: COLONOSCOPY;  Surgeon: Stephen Cooper MD;  Location: [facility];  Service: Endoscopy;  Laterality: N/A;    ENDOSCOPIC ULTRASOUND OF UPPER GASTROINTESTINAL TRACT N/A 11/9/2022    Procedure: ULTRASOUND, UPPER GI TRACT, ENDOSCOPIC;  Surgeon: Jake Corona MD;  [facility]  Service: Endoscopy;  Laterality: N/A;    ESOPHAGOGASTRODUODENOSCOPY N/A 10/3/2022    Procedure: EGD (ESOPHAGOGASTRODUODENOSCOPY);  Surgeon: Jourdan Parks MD;  Location: [facility];  Service: General;  Laterality: N/A;    ESOPHAGOGASTRODUODENOSCOPY N/A 11/15/2022    Procedure: EGD (ESOPHAGOGASTRODUODENOSCOPY);  Surgeon: Roque Soliman MD;  Location: [facility] (2ND FLR);  Service: Endoscopy;   Laterality: N/A;    ESOPHAGOGASTRODUODENOSCOPY N/A 11/17/2022    Procedure: EGD (ESOPHAGOGASTRODUODENOSCOPY);  Surgeon: Roque Soliman MD;  [facility]  Service: Endoscopy;  Laterality: N/A;    GASTRIC BYPASS      HYSTERECTOMY  2012    JOINT REPLACEMENT      TKR    LEFT HEART CATHETERIZATION Left 2/5/2021    Procedure: Left heart cath;  Surgeon: Shane Pacheco MD;  Location: [facility] CATH;  Service: Cardiovascular;  Laterality: Left;    PELVIC LAPAROSCOPY Left     OOPH    RENAL BIOPSY N/A 10/5/2022    Procedure: BIOPSY, KIDNEY;  Surgeon: Velia Diagnostic Provider;  Location: [facility] OR;  Service: General;  Laterality: N/A;    RIGHT HEART CATHETERIZATION Right 2/5/2021    Procedure: INSERTION, CATHETER, RIGHT HEART. via left radial and left brachial;  Surgeon: Shane Pacheco MD;  Location: [facility] CATH;  Service: Cardiovascular;  Laterality: Right;    TOTAL ABDOMINAL HYSTERECTOMY W/ BILATERAL SALPINGOOPHORECTOMY         FAMILY HISTORY:  Family History   Problem Relation Age of Onset    Arthritis Mother     Breast cancer Mother     Heart disease Mother     Hypertension Mother     Cancer Father     Heart disease Father     Hypertension Father     Diabetes Daughter        ALLERGIES AND MEDICATIONS: updated and reviewed.  Review of patient's allergies indicates:  No Known Allergies  Current Outpatient Medications   Medication Sig Dispense Refill    apixaban (ELIQUIS) 5 mg Tab Take 1 tablet (5 mg total) by mouth 2 (two) times daily.      aspirin (ECOTRIN) 81 MG EC tablet Take 81 mg by mouth once daily.      atorvastatin (LIPITOR) 40 MG tablet Take 1 tablet (40 mg total) by mouth every evening. (Patient not taking: Reported on 1/23/2023) 90 tablet 3    B-complex with vitamin C (Z-BEC OR EQUIV) tablet Take 1 tablet by mouth once daily.      cholecalciferol, vitamin D3, (VITAMIN D3) 50 mcg (2,000 unit) Tab Take 1 tablet (2,000 Units total) by mouth once daily. (Patient not taking: Reported on 1/23/2023) 30  tablet 6    cyanocobalamin (VITAMIN B-12) 1000 MCG tablet Take 1 tablet (1,000 mcg total) by mouth once daily. (Patient not taking: Reported on 1/23/2023) 30 tablet 3    ferrous sulfate (FEOSOL) 325 mg (65 mg iron) Tab tablet Take 325 mg by mouth daily with breakfast.      levETIRAcetam (KEPPRA) 500 MG Tab Take 500 mg by mouth 2 (two) times daily.      multivitamin Tab Take 1 tablet by mouth once daily. 30 tablet 6    pantoprazole (PROTONIX) 40 MG tablet Take 40 mg by mouth once daily.      potassium chloride SA (K-DUR,KLOR-CON M) 10 MEQ tablet Take 1 tablet (10 mEq total) by mouth once daily. 90 tablet 3    timolol maleate 0.5% (TIMOPTIC) 0.5 % Drop 1 drop 2 (two) times daily.      torsemide (DEMADEX) 10 MG Tab Take 1 tablet (10 mg total) by mouth once daily. 30 tablet 11     No current facility-administered medications for this visit.       ROS  Review of Systems   Constitutional:  Positive for fatigue. Negative for activity change, chills and fever.   HENT:  Negative for congestion and hearing loss.    Eyes:  Negative for pain and visual disturbance.   Respiratory:  Positive for shortness of breath (Dyspnea on exertion). Negative for cough.    Cardiovascular:  Positive for leg swelling. Negative for chest pain and palpitations.   Gastrointestinal:  Negative for abdominal pain, constipation, diarrhea, nausea and vomiting.   Endocrine: Negative.    Genitourinary: Negative.    Musculoskeletal:  Negative for arthralgias and myalgias.   Skin: Negative.    Allergic/Immunologic: Negative.    Neurological:  Positive for weakness. Negative for dizziness, light-headedness and headaches.   Hematological: Negative.        OBJECTIVE     Physical Exam  Vitals:    02/24/23 0958   BP: (!) 100/59   Pulse: 103    Body mass index is 30.52 kg/m².            Physical Exam  Vitals reviewed.   Constitutional:       General: She is not in acute distress.     Appearance: Normal appearance.   HENT:      Head: Normocephalic and atraumatic.       Mouth/Throat:      Mouth: Mucous membranes are moist.      Pharynx: Oropharynx is clear.   Eyes:      Extraocular Movements: Extraocular movements intact.      Conjunctiva/sclera: Conjunctivae normal.      Pupils: Pupils are equal, round, and reactive to light.   Cardiovascular:      Rate and Rhythm: Normal rate and regular rhythm.      Pulses: Normal pulses.      Heart sounds: Normal heart sounds.   Pulmonary:      Effort: Pulmonary effort is normal.      Breath sounds: Normal breath sounds. No wheezing, rhonchi or rales.   Abdominal:      General: Bowel sounds are normal. There is no distension.      Palpations: Abdomen is soft. There is no mass.      Tenderness: There is no abdominal tenderness. There is no guarding.   Musculoskeletal:         General: Normal range of motion.      Cervical back: Normal range of motion and neck supple. No rigidity or tenderness.      Right lower leg: Edema (1+ pitting edema up to mid shin, improved from prior exam.) present.      Left lower leg: Edema (1+ pitting edema up to mid shin, improved from prior exam.) present.   Lymphadenopathy:      Cervical: No cervical adenopathy.   Skin:     General: Skin is warm and dry.   Neurological:      General: No focal deficit present.      Mental Status: She is alert.   Psychiatric:         Mood and Affect: Mood normal.         Behavior: Behavior normal.         Health Maintenance         Date Due Completion Date    Shingles Vaccine (1 of 2) Never done ---    DEXA Scan Never done ---    COVID-19 Vaccine (4 - Booster for Krystian series) 02/22/2022 12/28/2021    Pneumococcal Vaccines (Age 65+) (2 - PPSV23 if available, else PCV20) 05/30/2022 4/4/2022    High Dose Statin 11/29/2023 11/29/2022    Mammogram 02/08/2024 2/8/2023    Hemoglobin A1c (Diabetic Prevention Screening) 10/13/2025 10/13/2022    Colorectal Cancer Screening 10/11/2027 10/11/2022    Lipid Panel 11/20/2027 11/20/2022    TETANUS VACCINE 10/05/2030 10/5/2020               ASSESSMENT     67 y.o. female with     1. Chronic heart failure with preserved ejection fraction (HFpEF) NICM NYHA2     2. GARCIA (dyspnea on exertion)        PLAN:     1. Chronic heart failure with preserved ejection fraction (HFpEF) NICM NYHA2   - Spoke with patient and her daughter on face time at length about her current diagnosis as documented by what cardiology has stated in the past. Patient and daughter verbalized understanding.   - patient lower extremity edema is improved from prior exam.  Renal function stable.  Weight is stable.  Continue torsemide 10 mg q.d., 20 mg q.d. p.r.n. for weight gain of over 3 lb in a day, 5 lb in a week.  Reviewed this regimen with patient and her daughter, patient verbalized understanding.  - continue follow-up with Cardiology, appointment scheduled in March.    2. GARCIA (dyspnea on exertion)  - Chronic issue. Stable.   - spoke with patient and daughter at length that this symptom is due to her heart failure at baseline.  Daughter insisted on pulmonology referral, but assured her that her dyspnea on exertion is due to baseline cardiac issues.  We will obtain chest x-ray as it has been several months since her last CXR that showed opacity in the lower lung zones suggestive of pleural fluid.  - X-Ray Chest PA And Lateral; Future        RTC in 6 months with labs.      Familia Joe MD  Family Medicine  [facility]  02/24/2023          No follow-ups on file.

## 2023-02-27 NOTE — PROGRESS NOTES
The patient location is: [facility]  The chief complaint leading to consultation is: depression about health    Visit type: audiovisual    Face to Face time with patient: 40  45 minutes of total time spent on the encounter, which includes face to face time and non-face to face time preparing to see the patient (eg, review of tests), Obtaining and/or reviewing separately obtained history, Documenting clinical information in the electronic or other health record, Independently interpreting results (not separately reported) and communicating results to the patient/family/caregiver, or Care coordination (not separately reported).     Individual Psychotherapy (LCSW/PhD)  Cherry Santiago,  2/24/2023    REFERRAL SOURCE:  Familia Joe MD  TYPE OF VISIT:  Video Session  LENGTH OF SESSION: 45  Site:  [facility]    Therapeutic Intervention: Met with patient for individual psychotherapy.    Chief complaint/reason for encounter: anxiety     Interval history and content of current session: PT stated she got sick in July and can no longer do the things she used to do. PT had a UTI and had back issues and could not walk for a month. PT then had diarrhea from August to November. PT went to pain management for her back and nothing helped, but the pain just went away. PT did have previous back surgery in 2017, but the recent back pain subsided without learning the cause. Prior to illness, PT would  her grandkids and go to the park and take them shopping, clean her house, make groceries, and cook. Now, PT is weak and has passed out before. PT went to the doctor Tuesday and had to stop and put her head down because she felt like she was going to pass out. The dosage of her blood pressure medication has been decreased to see if that is the cause. PT gets sad and depressed when she think about the active life she once had and the stagnant life she is currently living.    Treatment plan:  Target symptoms:  depression  Why chosen therapy is appropriate versus another modality: relevant to diagnosis  Outcome monitoring methods: self-report  Therapeutic intervention type: insight oriented psychotherapy, behavior modifying psychotherapy, supportive psychotherapy    Risk parameters:  Patient reports no suicidal ideation  Patient reports no homicidal ideation  Patient reports no self-injurious behavior  Patient reports no violent behavior    Verbal deficits: None    Patient's response to intervention:  The patient's response to intervention is accepting.    Progress toward goals and other mental status changes:  The patient's progress toward goals is fair .  No flowsheet data found.   GAD7 2/9/2023   1. Feeling nervous, anxious, or on edge? 0   2. Not being able to stop or control worrying? 1   3. Worrying too much about different things? 1   4. Trouble relaxing? 0   5. Being so restless that it is hard to sit still? 3   6. Becoming easily annoyed or irritable? 1   7. Feeling afraid as if something awful might happen? 0   8. If you checked off any problems, how difficult have these problems made it for you to do your work, take care of things at home, or get along with other people? 0   WILMA-7 Score 6        Diagnosis:     ICD-10-CM ICD-9-CM   1. Current moderate episode of major depressive disorder without prior episode  F32.1 296.22         Plan: Pt plans to continue individual psychotherapy    Return to clinic: 2 weeks    Length of Service (minutes): 45       Each patient to whom he or she provides medical services by telemedicine is:  (1) informed of the relationship between the physician and patient and the respective role of any other health care provider with respect to management of the patient; and (2) notified that he or she may decline to receive medical services by telemedicine and may withdraw from such care at any time.

## 2023-03-24 ENCOUNTER — OFFICE VISIT (OUTPATIENT)
Dept: BEHAVIORAL HEALTH | Facility: CLINIC | Age: 68
End: 2023-03-24
Payer: MEDICARE

## 2023-03-24 DIAGNOSIS — F32.1 CURRENT MODERATE EPISODE OF MAJOR DEPRESSIVE DISORDER WITHOUT PRIOR EPISODE: Primary | ICD-10-CM

## 2023-03-24 PROCEDURE — 90834 PSYTX W PT 45 MINUTES: CPT | Mod: 95,,, | Performed by: SOCIAL WORKER

## 2023-03-24 PROCEDURE — 90834 PR PSYCHOTHERAPY W/PATIENT, 45 MIN: ICD-10-PCS | Mod: 95,,, | Performed by: SOCIAL WORKER

## 2023-03-30 NOTE — PROGRESS NOTES
The patient location is: [facility]  The chief complaint leading to consultation is: depression about health    Visit type: audiovisual    Face to Face time with patient: 40  45 minutes of total time spent on the encounter, which includes face to face time and non-face to face time preparing to see the patient (eg, review of tests), Obtaining and/or reviewing separately obtained history, Documenting clinical information in the electronic or other health record, Independently interpreting results (not separately reported) and communicating results to the patient/family/caregiver, or Care coordination (not separately reported).     Individual Psychotherapy (LCSW/PhD)  Cherry Santiago,  3/24/2023    REFERRAL SOURCE:  Familia Joe MD  TYPE OF VISIT:  Video Session  LENGTH OF SESSION: 45  Site:  [facility]    Therapeutic Intervention: Met with patient for individual psychotherapy.    Chief complaint/reason for encounter: anxiety     Interval history and content of current session: PT stated she got sick in July and can no longer do the things she used to do. PT had a UTI and had back issues and could not walk for a month. PT then had diarrhea from August to November. PT went to pain management for her back and nothing helped, but the pain just went away. PT did have previous back surgery in 2017, but the recent back pain subsided without learning the cause. Prior to illness, PT would  her grandkids and go to the park and take them shopping, clean her house, make groceries, and cook. Now, PT is weak and has passed out before. SW discussed PT shifting her perspaective and focusing on the things she is able to do well. SW discussed negative thinking and shared some skills to manage depression and anxiety. PT will continue applying the skills and report progress/outcome at the next visit.    Treatment plan:  Target symptoms: depression  Why chosen therapy is appropriate versus another  modality: relevant to diagnosis  Outcome monitoring methods: self-report  Therapeutic intervention type: insight oriented psychotherapy, behavior modifying psychotherapy, supportive psychotherapy    Risk parameters:  Patient reports no suicidal ideation  Patient reports no homicidal ideation  Patient reports no self-injurious behavior  Patient reports no violent behavior    Verbal deficits: None    Patient's response to intervention:  The patient's response to intervention is accepting.    Progress toward goals and other mental status changes:  The patient's progress toward goals is fair .  Results of the PHQ8 3/24/2023   1. Little interest or pleasure in doing things Not at all   2. Feeling down, depressed, or hopeless More than half the days   3. Trouble falling or staying asleep, or sleeping too much Not at all   4. Feeling tired or having little energy More than half the days   5. poor appetite or overeating More than half the days   6. Feeling bad about yourself - or that you are a failure or have let yourself or your family down More than half the days   7. Trouble concentrating on things, such as reading the newspaper or watching television Not at all   8. Moving or speaking so slowly that other people could have noticed? Or the opposite - being so fidgety or restless that you have been moving around a lot more than usual Not at all   Total Score  8      GAD7 3/24/2023 2/9/2023   1. Feeling nervous, anxious, or on edge? 0 0   2. Not being able to stop or control worrying? 0 1   3. Worrying too much about different things? 0 1   4. Trouble relaxing? 0 0   5. Being so restless that it is hard to sit still? 0 3   6. Becoming easily annoyed or irritable? 0 1   7. Feeling afraid as if something awful might happen? 0 0   8. If you checked off any problems, how difficult have these problems made it for you to do your work, take care of things at home, or get along with other people? - 0   WILMA-7 Score 0 6         Diagnosis:   No diagnosis found.        Plan: Pt plans to continue individual psychotherapy    Return to clinic: 2 weeks    Length of Service (minutes): 45       Each patient to whom he or she provides medical services by telemedicine is:  (1) informed of the relationship between the physician and patient and the respective role of any other health care provider with respect to management of the patient; and (2) notified that he or she may decline to receive medical services by telemedicine and may withdraw from such care at any time.

## 2023-04-06 DIAGNOSIS — I82.409 DVT (DEEP VENOUS THROMBOSIS): Primary | ICD-10-CM

## 2023-05-01 ENCOUNTER — PATIENT MESSAGE (OUTPATIENT)
Dept: BEHAVIORAL HEALTH | Facility: CLINIC | Age: 68
End: 2023-05-01
Payer: MEDICARE

## 2023-05-03 ENCOUNTER — PATIENT MESSAGE (OUTPATIENT)
Dept: BEHAVIORAL HEALTH | Facility: CLINIC | Age: 68
End: 2023-05-03
Payer: MEDICARE

## 2023-05-03 PROBLEM — D58.9 HEMOLYTIC ANEMIA: Status: ACTIVE | Noted: 2023-05-03

## 2023-05-03 PROBLEM — I82.539 CHRONIC DEEP VEIN THROMBOSIS (DVT) OF POPLITEAL VEIN: Status: ACTIVE | Noted: 2023-05-03

## 2023-05-03 NOTE — PROGRESS NOTES
Subjective:       Patient ID: Cherry Santiago is a 67 y.o. female.      Vascular surgeon: Dr. Kuldip Kaur  Cardiologist: Dr. Ryna Kwok  PCP: Dr. Familia Joe  GI: Dr. Urbano Sandhu  Nephrology: Unknown    1. h/o Recurrent RLE DVT--2012, 2017, 2020    2. 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  11/24/20--Prothrombin Gene mutation negative, Protein C functional 45% (low), Free Protein S 18%, Functional Protein S <8%, Total Protein S 43% (all low), antithrombin III 90% (normal).  1/5/21--ARIELLE, dsDNA negative.  3/22/21--PNH negative, SPEP with no M-spike, cold agglutinins negative.  5/10/21--G6PD normal.     Imaging:  CT C/A/P done 12/4/20 to r/o occult malignancy showed no CT evidence of malignancy in the chest, abdomen or pelvis, mild dilatation of the esophagus with a small hiatal hernia.  CT A/P w/o contrast 9/30/22--Hepatic steatosis, prior gastric sleeve procedure and small hiatal hernia, trace amount of pelvic free fluid, small pericardial fluid collection.  Dotatate PET 11/3/22--Focal nodular foci of radiotracer uptake at the pancreatic tail and near the region of the pancreatic head above liver background activity could indicate somatostatin receptor avid lesions, but no corresponding mass is seen on CT portion of the study.  Recommend follow-up contrast enhanced pancreas protocol CT to further assess.  CT A/P w/ contrast done 11/4/22--No pancreatic lesion, enterocolitis, small amount of gas in the urinary bladder, nonspecific and could be related to recent instrumentation, hepatic steatosis.  Venous US done 3/15/23--right lower extremity deep veins are  patent and compressible w/o evidence of thrombosis, right saphenous vein treated today using sclerotherapy.     Treatment history:  RLE 2012 following DARCY/left oophorectomy, treated with Coumadin, changed to Xarelto  RLE 2017 recurrence following back surgery, changed to Eliquis  RLE 10/2020 recurrence, unprovoked while on Eliquis, changed to Lovenox bridge to Coumadin.   RLE 1/2021 recurrence, unprovoked while on Coumadin, changed back to Eliquis.  Venogram RLE with stents placed in right common and external iliac veins done 5/2022.    Procedures:  1. EGD done by Dr. Urbano Sandhu 3/2/21--normal duodenum, hiatal hernia, biopsy with mild reactive gastropathy, no active inflammation, no H. Pylori, Grade A esophagitis at GE junction, may be some blood loss from hiatal hernia/Eduardo's erosions.  2. Colonoscopy done by Dr. Urbano Sandhu 3/11/21--mild diverticulosis, otherwise normal and no source of GI bleeding.  3. Colonoscopy 10/3/22--diverticulosis throughout entire colon, hemorrhoids. Repeat recommended in 5 years.   4. EGD 10/3/22--non-bleeding gastric ulcers, biopsied with path mild chronic gastritis, H. Pylori negative, and treated with monopolar probe, exam otherwise normal.  3. Upper EUS done 11/9/22--normal esophagus, evidence of sleeve gastrectomy in gastric body, widely patent duodenoenterostomy, few benign lymph nodes in fareed hepatis, few cystic lesion in pancreatic head, genu of pancreas and pancreatic body, highly suspicious for branched intraductal papillary mucinous neoplasm, no mass or worrisome features, quite small for sampling, no mass in pancreatic head or tail. Small bowel biopsy with benign small bowel mucosa with focally increased epithelial lymphocytes, stomach biopsy with mild chronic inactive gastritis, no H. Pylori.    Treatment history:  Injectafer x 2 doses on 1/14/21 and 1/21/21.     Current treatment:   Eliquis 5mg PO BID  Oral iron twice daily.   Folic acid 1mg daily started  3/22/21--currently not taking    Chief Complaint: Other Misc (NPH)    HPI  68 yo female, with h/o nina negative hemolytic anemia, recurrent DVT RLE, extensive work-up done in the past, also with Alzheimer's dementia, h/o cerebral aneurysm, h/o TIA, h/o heart failure, seizure disorder, HTN, Afib, CKD, anemia, NAFLD presents for evaluation for anemia. She has been lost to follow-up, last seen by be sometime in 2021. Patient presents for evaluation, referred by cardiologist for h/o DVT. Her daughter is on the phone and very upset, cursing at times, because she says she does not trust any of the doctors in [place] and that something is wrong with her mother and no one is finding anything. The patient has had extreme weight loss since 11/2022. She was hospitalized for diarrhea that was severe at the time and had extensive work-up including Dotatate PET that showed nodularity in the pancreas but follow-up CT unremarkable and EUS just showed small IPMN that was too small to biopsy. In addition, patient had extensive other GI work-up that was unremarkable and a kidney biopsy to r/o amyloid in 10/2022 and showed diabetic nephropathy and early nodular glomerulosclerosis, mild to moderate interstitial fibrosis and mild vascular sclerosis. Patient reports that the diarrhea has resolved. She is no longer having rapid weight loss. She has the ongoing edema and is using lymphedema pumps at night. She remains very weak and tired.     Past Medical History:   Diagnosis Date    Anticoagulant long-term use     Arthritis     Atrial fibrillation     CHF (congestive heart failure)     Diabetes mellitus     Type2 resolved after weight loss surgery    DVT (deep venous thrombosis)     Encounter for blood transfusion     GERD (gastroesophageal reflux disease)     Glaucoma     Hypercholesterolemia     Hyperlipemia     Hypertension     Memory changes     Myocardial infarction     Renal failure     resolved    TIA (transient ischemic  attack)       Review of patient's allergies indicates:  No Known Allergies   Current Outpatient Medications on File Prior to Visit   Medication Sig Dispense Refill    apixaban (ELIQUIS) 5 mg Tab Take 1 tablet (5 mg total) by mouth 2 (two) times daily.      B-complex with vitamin C (Z-BEC OR EQUIV) tablet Take 1 tablet by mouth once daily.      cyanocobalamin (VITAMIN B-12) 1000 MCG tablet Take 1 tablet (1,000 mcg total) by mouth once daily. 30 tablet 3    ergocalciferol (ERGOCALCIFEROL) 50,000 unit Cap Take by mouth.      ferrous sulfate (FEOSOL) 325 mg (65 mg iron) Tab tablet Take 325 mg by mouth daily with breakfast.      levETIRAcetam (KEPPRA) 500 MG Tab Take 500 mg by mouth 2 (two) times daily.      midodrine (PROAMATINE) 2.5 MG Tab Take 2.5 mg by mouth 2 (two) times daily.      multivitamin Tab Take 1 tablet by mouth once daily. 30 tablet 6    pantoprazole (PROTONIX) 40 MG tablet Take 40 mg by mouth once daily.      potassium chloride SA (K-DUR,KLOR-CON M) 10 MEQ tablet Take 2 tablets (20 mEq total) by mouth once daily. 90 tablet 3    timolol maleate 0.5% (TIMOPTIC) 0.5 % Drop 1 drop 2 (two) times daily.      torsemide (DEMADEX) 10 MG Tab Take 1 tablet (10 mg total) by mouth once daily. 30 tablet 11    aspirin (ECOTRIN) 81 MG EC tablet Take 81 mg by mouth once daily.      atorvastatin (LIPITOR) 40 MG tablet Take 1 tablet (40 mg total) by mouth every evening. (Patient not taking: Reported on 1/23/2023) 90 tablet 3    cholecalciferol, vitamin D3, (VITAMIN D3) 50 mcg (2,000 unit) Tab Take 1 tablet (2,000 Units total) by mouth once daily. (Patient not taking: Reported on 1/23/2023) 30 tablet 6     No current facility-administered medications on file prior to visit.      Review of Systems   All other systems reviewed and are negative.        Vitals:    05/08/23 1327   BP: 109/74   Pulse: 99   Resp: 14   Temp: 98.2 °F (36.8 °C)        Wt Readings from Last 3 Encounters:   05/08/23 1327 75.7 kg (166 lb 12.8 oz)    02/24/23 0958 83.2 kg (183 lb 6.8 oz)   02/14/23 2118 77.1 kg (170 lb)       Physical Exam  Constitutional:       Comments: Pleasant female, appears chronically ill   HENT:      Head: Normocephalic and atraumatic.      Mouth/Throat:      Mouth: Mucous membranes are moist.   Eyes:      Extraocular Movements: Extraocular movements intact.   Cardiovascular:      Rate and Rhythm: Normal rate and regular rhythm.   Pulmonary:      Effort: Pulmonary effort is normal.      Breath sounds: Normal breath sounds.   Abdominal:      Palpations: Abdomen is soft.      Comments: obese   Musculoskeletal:      Right lower leg: Edema present.      Left lower leg: Edema present.   Skin:     General: Skin is warm and dry.   Neurological:      General: No focal deficit present.      Mental Status: She is oriented to person, place, and time.       Office Visit on 05/08/2023   Component Date Value    Sodium Level 05/08/2023 145     Potassium Level 05/08/2023 3.6     Chloride 05/08/2023 112 (H)     Carbon Dioxide 05/08/2023 24     Glucose Level 05/08/2023 76 (L)     Blood Urea Nitrogen 05/08/2023 36.1 (H)     Creatinine 05/08/2023 2.32 (H)     Calcium Level Total 05/08/2023 8.1 (L)     Protein Total 05/08/2023 5.5 (L)     Albumin Level 05/08/2023 1.9 (L)     Globulin 05/08/2023 3.6 (H)     Albumin/Globulin Ratio 05/08/2023 0.5 (L)     Bilirubin Total 05/08/2023 0.7     Alkaline Phosphatase 05/08/2023 113     Alanine Aminotransferase 05/08/2023 19     Aspartate Aminotransfera* 05/08/2023 42 (H)     eGFR 05/08/2023 23     Iron Binding Capacity Un* 05/08/2023 <25 (L)     Iron Level 05/08/2023 77     Transferrin 05/08/2023 44 (L)     Iron Binding Capacity To* 05/08/2023 <102 (L)     Iron Saturation 05/08/2023      Ferritin Level 05/08/2023 376.78 (H)     Folate Level 05/08/2023 18.9     Vitamin B12 Level 05/08/2023 >2,000 (H)     Lactate Dehydrogenase 05/08/2023 279 (H)     Retic Cnt Auto 05/08/2023 4.24 (H)     RET# 05/08/2023 0.1217 (H)      Haptoglobin 05/08/2023 8 (L)     Thyroid Stimulating Horm* 05/08/2023 3.651     Thyroxine Free 05/08/2023 0.73     WBC 05/08/2023 5.85     RBC 05/08/2023 2.89 (L)     Hgb 05/08/2023 8.5 (L)     Hct 05/08/2023 28.5 (L)     MCV 05/08/2023 98.6 (H)     MCH 05/08/2023 29.4     MCHC 05/08/2023 29.8 (L)     RDW 05/08/2023 17.2 (H)     Platelet 05/08/2023 231     MPV 05/08/2023 9.3     Neut % 05/08/2023 57.8     Lymph % 05/08/2023 33.2     Mono % 05/08/2023 8.5     Eos % 05/08/2023 0.0     Basophil % 05/08/2023 0.3     Lymph # 05/08/2023 1.94     Neut # 05/08/2023 3.38     Mono # 05/08/2023 0.50     Eos # 05/08/2023 0.00     Baso # 05/08/2023 0.02     IG# 05/08/2023 0.01     IG% 05/08/2023 0.2     IgG Level 05/08/2023 1,722.00 (H)     IgA Level 05/08/2023 814.0 (H)     IgM Level 05/08/2023 125.0          Assessment:       1. History of DVT (deep vein thrombosis)    2. Chronic deep vein thrombosis (DVT) of popliteal vein of right lower extremity    3. Other specified hereditary hemolytic anemias    4. DVT (deep venous thrombosis)    5. Anemia in other chronic diseases classified elsewhere    6. Thyroid disease    7. Weight loss         Plan:       Patient with recurrent RLE DVT.  Hypercoagulable work-up showed Protein C and S levels low, this was off Coumadin.  Repeat levels showed Protein S levels very low. Suspected she likely has Protein S deficiency though difficult to tell while on Coumadin.  Patient was changed back over to Eliquis at some point, but she was lost to f/u here.  S/p right iliac stents in 5/2022. Venous US repeated with no evidence of DVT.   Recommend to continue Eliquis.     CT done for weight loss 12/4/20 and r/o occult malignancy showed no cancer but +hiatal hernia.  EGD/colonoscopy done 3/2020 by Dr. Urbano Sandhu showed hiatal hernia and mild diverticulosis, possible some blood loss from Eduardo erosions but no definite source.  Completed Injectafer x 2 doses on 1/21/21 with no response.  Further  work-up at that time showed Mark negative hemolysis.  PNH work-up and  SPEP all negative at that time.  Patient was placed on folic acid 1mg daily.    Patient lost to follow-up.   Now with recurrent anemia. Renal function is now worse.  LDH/haptoglobin and retic c/w some ongoing hemolysis.  Will need to restart her folic acid.  But suspect her progressive anemia is all likely related to her known CKD/s/p kidney biopsy in 10/2022 c/w glomerulosclerosis.    Patient having again repeat GI work-up soon with Dr. Sandhu. But honestly, I doubt we will find anything given that it was just done recently and her diarrhea is actually much improved.    RTC 3 weeks for follow-up.  Will likely plan to begin treatment with Procrit.  Also need to see if she has a nephrologist that she follows with for her progressive CKD and glomerulosclerosis.  Her weight loss seems to have leveled off. She did have IPMN on the EUS that was too small for sampling. Will need to see if she needs GI follow-up for this for repeat EUS at some point, maybe in a year.    Of note, she also had a vaginal cyst and was evaluated by Dr. Lala. He could not visualize it on exam, and recommended a follow-up US in 6 months, which would be due at the end of this month.     All questions answered at this time.     Rebecca Soliman MD      Supportive Plan Information  IV FLUIDS AND ELECTROLYTES   Yokasta Hernandez MD   Upcoming Treatment Dates - IV FLUIDS AND ELECTROLYTES    No upcoming days in selected categories.    Therapy Plan Information  sodium chloride 0.9% 500 mL with potassium chloride 10 mEq infusion  500 mL/hr, Intravenous, PRN

## 2023-05-04 NOTE — TELEPHONE ENCOUNTER
Called and spoke with daughter. Daughter wanted to express that her mother has been tearful the last few weeks and unable to express how she is feeling. Daughter inquired if we would be able to accommodate seeing patient more than once a month. I explained the LCSW had been out on leave, but we are absolutely able to make more frequent appointments if necessary. Patient is already scheduled to see LCSW tomorrow 5/5.

## 2023-05-05 ENCOUNTER — OFFICE VISIT (OUTPATIENT)
Dept: BEHAVIORAL HEALTH | Facility: CLINIC | Age: 68
End: 2023-05-05
Payer: MEDICARE

## 2023-05-05 DIAGNOSIS — F33.1 MODERATE EPISODE OF RECURRENT MAJOR DEPRESSIVE DISORDER: Primary | ICD-10-CM

## 2023-05-05 PROCEDURE — 90834 PR PSYCHOTHERAPY W/PATIENT, 45 MIN: ICD-10-PCS | Mod: 95,,, | Performed by: SOCIAL WORKER

## 2023-05-05 PROCEDURE — 90834 PSYTX W PT 45 MINUTES: CPT | Mod: 95,,, | Performed by: SOCIAL WORKER

## 2023-05-05 NOTE — Clinical Note
Dr. Joe,  Please see my addendum from today's I meeting.  She is interested in starting medication for her depressive symptoms.  I would recommend low dose Zoloft if you think this is reasonable.  Please reach out to me if you have any questions.  Shira Cardoso

## 2023-05-08 ENCOUNTER — OFFICE VISIT (OUTPATIENT)
Dept: HEMATOLOGY/ONCOLOGY | Facility: CLINIC | Age: 68
End: 2023-05-08
Payer: MEDICARE

## 2023-05-08 VITALS
TEMPERATURE: 98 F | OXYGEN SATURATION: 98 % | HEART RATE: 99 BPM | DIASTOLIC BLOOD PRESSURE: 74 MMHG | HEIGHT: 66 IN | BODY MASS INDEX: 26.81 KG/M2 | RESPIRATION RATE: 14 BRPM | WEIGHT: 166.81 LBS | SYSTOLIC BLOOD PRESSURE: 109 MMHG

## 2023-05-08 DIAGNOSIS — D63.8 ANEMIA IN OTHER CHRONIC DISEASES CLASSIFIED ELSEWHERE: ICD-10-CM

## 2023-05-08 DIAGNOSIS — R63.4 WEIGHT LOSS: ICD-10-CM

## 2023-05-08 DIAGNOSIS — D58.8 OTHER SPECIFIED HEREDITARY HEMOLYTIC ANEMIAS: ICD-10-CM

## 2023-05-08 DIAGNOSIS — E07.9 THYROID DISEASE: ICD-10-CM

## 2023-05-08 DIAGNOSIS — I82.531 CHRONIC DEEP VEIN THROMBOSIS (DVT) OF POPLITEAL VEIN OF RIGHT LOWER EXTREMITY: ICD-10-CM

## 2023-05-08 DIAGNOSIS — Z86.718 HISTORY OF DVT (DEEP VEIN THROMBOSIS): Primary | ICD-10-CM

## 2023-05-08 DIAGNOSIS — I82.409 DVT (DEEP VENOUS THROMBOSIS): ICD-10-CM

## 2023-05-08 LAB
ALBUMIN SERPL-MCNC: 1.9 G/DL (ref 3.4–4.8)
ALBUMIN/GLOB SERPL: 0.5 RATIO (ref 1.1–2)
ALP SERPL-CCNC: 113 UNIT/L (ref 40–150)
ALT SERPL-CCNC: 19 UNIT/L (ref 0–55)
AST SERPL-CCNC: 42 UNIT/L (ref 5–34)
BASOPHILS # BLD AUTO: 0.02 X10(3)/MCL
BASOPHILS NFR BLD AUTO: 0.3 %
BILIRUBIN DIRECT+TOT PNL SERPL-MCNC: 0.7 MG/DL
BUN SERPL-MCNC: 36.1 MG/DL (ref 9.8–20.1)
CALCIUM SERPL-MCNC: 8.1 MG/DL (ref 8.4–10.2)
CHLORIDE SERPL-SCNC: 112 MMOL/L (ref 98–107)
CO2 SERPL-SCNC: 24 MMOL/L (ref 23–31)
CREAT SERPL-MCNC: 2.32 MG/DL (ref 0.55–1.02)
DIRECT COOMBS (DAT): NORMAL
EOSINOPHIL # BLD AUTO: 0 X10(3)/MCL (ref 0–0.9)
EOSINOPHIL NFR BLD AUTO: 0 %
ERYTHROCYTE [DISTWIDTH] IN BLOOD BY AUTOMATED COUNT: 17.2 % (ref 11.5–17)
FERRITIN SERPL-MCNC: 376.78 NG/ML (ref 4.63–204)
FOLATE SERPL-MCNC: 18.9 NG/ML (ref 7–31.4)
GFR SERPLBLD CREATININE-BSD FMLA CKD-EPI: 23 MLS/MIN/1.73/M2
GLOBULIN SER-MCNC: 3.6 GM/DL (ref 2.4–3.5)
GLUCOSE SERPL-MCNC: 76 MG/DL (ref 82–115)
HAPTOGLOB SERPL-MCNC: 8 MG/DL (ref 63–273)
HCT VFR BLD AUTO: 28.5 % (ref 37–47)
HGB BLD-MCNC: 8.5 G/DL (ref 12–16)
IGA SERPL-MCNC: 814 MG/DL (ref 69–517)
IGG SERPL-MCNC: 1722 MG/DL (ref 522–1631)
IGM SERPL-MCNC: 125 MG/DL (ref 33–293)
IMM GRANULOCYTES # BLD AUTO: 0.01 X10(3)/MCL (ref 0–0.04)
IMM GRANULOCYTES NFR BLD AUTO: 0.2 %
IRON SATN MFR SERPL: ABNORMAL %
IRON SERPL-MCNC: 77 UG/DL (ref 50–170)
LDH SERPL-CCNC: 279 U/L (ref 125–220)
LYMPHOCYTES # BLD AUTO: 1.94 X10(3)/MCL (ref 0.6–4.6)
LYMPHOCYTES NFR BLD AUTO: 33.2 %
MCH RBC QN AUTO: 29.4 PG (ref 27–31)
MCHC RBC AUTO-ENTMCNC: 29.8 G/DL (ref 33–36)
MCV RBC AUTO: 98.6 FL (ref 80–94)
MONOCYTES # BLD AUTO: 0.5 X10(3)/MCL (ref 0.1–1.3)
MONOCYTES NFR BLD AUTO: 8.5 %
NEUTROPHILS # BLD AUTO: 3.38 X10(3)/MCL (ref 2.1–9.2)
NEUTROPHILS NFR BLD AUTO: 57.8 %
PLATELET # BLD AUTO: 231 X10(3)/MCL (ref 130–400)
PMV BLD AUTO: 9.3 FL (ref 7.4–10.4)
POTASSIUM SERPL-SCNC: 3.6 MMOL/L (ref 3.5–5.1)
PROT SERPL-MCNC: 5.5 GM/DL (ref 5.8–7.6)
RBC # BLD AUTO: 2.89 X10(6)/MCL (ref 4.2–5.4)
RET# (OHS): 0.12 (ref 0.02–0.08)
RETICULOCYTE COUNT AUTOMATED (OLG): 4.24 % (ref 1.1–2.1)
SODIUM SERPL-SCNC: 145 MMOL/L (ref 136–145)
T4 FREE SERPL-MCNC: 0.73 NG/DL (ref 0.7–1.48)
TIBC SERPL-MCNC: <102 UG/DL (ref 250–450)
TIBC SERPL-MCNC: <25 UG/DL (ref 70–310)
TRANSFERRIN SERPL-MCNC: 44 MG/DL (ref 173–360)
TSH SERPL-ACNC: 3.65 UIU/ML (ref 0.35–4.94)
VIT B12 SERPL-MCNC: >2000 PG/ML (ref 213–816)
WBC # SPEC AUTO: 5.85 X10(3)/MCL (ref 4.5–11.5)

## 2023-05-08 PROCEDURE — 3074F SYST BP LT 130 MM HG: CPT | Mod: CPTII,S$GLB,, | Performed by: INTERNAL MEDICINE

## 2023-05-08 PROCEDURE — 84443 ASSAY THYROID STIM HORMONE: CPT | Performed by: INTERNAL MEDICINE

## 2023-05-08 PROCEDURE — 84439 ASSAY OF FREE THYROXINE: CPT | Performed by: INTERNAL MEDICINE

## 2023-05-08 PROCEDURE — 1101F PT FALLS ASSESS-DOCD LE1/YR: CPT | Mod: CPTII,S$GLB,, | Performed by: INTERNAL MEDICINE

## 2023-05-08 PROCEDURE — 99999 PR PBB SHADOW E&M-EST. PATIENT-LVL V: ICD-10-PCS | Mod: PBBFAC,,, | Performed by: INTERNAL MEDICINE

## 2023-05-08 PROCEDURE — 36415 COLL VENOUS BLD VENIPUNCTURE: CPT | Performed by: INTERNAL MEDICINE

## 2023-05-08 PROCEDURE — 86334 IMMUNOFIX E-PHORESIS SERUM: CPT | Performed by: INTERNAL MEDICINE

## 2023-05-08 PROCEDURE — 3288F FALL RISK ASSESSMENT DOCD: CPT | Mod: CPTII,S$GLB,, | Performed by: INTERNAL MEDICINE

## 2023-05-08 PROCEDURE — 85045 AUTOMATED RETICULOCYTE COUNT: CPT | Performed by: INTERNAL MEDICINE

## 2023-05-08 PROCEDURE — 80053 COMPREHEN METABOLIC PANEL: CPT | Performed by: INTERNAL MEDICINE

## 2023-05-08 PROCEDURE — 82728 ASSAY OF FERRITIN: CPT | Performed by: INTERNAL MEDICINE

## 2023-05-08 PROCEDURE — 3078F PR MOST RECENT DIASTOLIC BLOOD PRESSURE < 80 MM HG: ICD-10-PCS | Mod: CPTII,S$GLB,, | Performed by: INTERNAL MEDICINE

## 2023-05-08 PROCEDURE — 1159F MED LIST DOCD IN RCRD: CPT | Mod: CPTII,S$GLB,, | Performed by: INTERNAL MEDICINE

## 2023-05-08 PROCEDURE — 1125F AMNT PAIN NOTED PAIN PRSNT: CPT | Mod: CPTII,S$GLB,, | Performed by: INTERNAL MEDICINE

## 2023-05-08 PROCEDURE — 3078F DIAST BP <80 MM HG: CPT | Mod: CPTII,S$GLB,, | Performed by: INTERNAL MEDICINE

## 2023-05-08 PROCEDURE — 3074F PR MOST RECENT SYSTOLIC BLOOD PRESSURE < 130 MM HG: ICD-10-PCS | Mod: CPTII,S$GLB,, | Performed by: INTERNAL MEDICINE

## 2023-05-08 PROCEDURE — 3288F PR FALLS RISK ASSESSMENT DOCUMENTED: ICD-10-PCS | Mod: CPTII,S$GLB,, | Performed by: INTERNAL MEDICINE

## 2023-05-08 PROCEDURE — 1125F PR PAIN SEVERITY QUANTIFIED, PAIN PRESENT: ICD-10-PCS | Mod: CPTII,S$GLB,, | Performed by: INTERNAL MEDICINE

## 2023-05-08 PROCEDURE — 86880 COOMBS TEST DIRECT: CPT | Performed by: INTERNAL MEDICINE

## 2023-05-08 PROCEDURE — 3008F PR BODY MASS INDEX (BMI) DOCUMENTED: ICD-10-PCS | Mod: CPTII,S$GLB,, | Performed by: INTERNAL MEDICINE

## 2023-05-08 PROCEDURE — 82784 ASSAY IGA/IGD/IGG/IGM EACH: CPT | Performed by: INTERNAL MEDICINE

## 2023-05-08 PROCEDURE — 1159F PR MEDICATION LIST DOCUMENTED IN MEDICAL RECORD: ICD-10-PCS | Mod: CPTII,S$GLB,, | Performed by: INTERNAL MEDICINE

## 2023-05-08 PROCEDURE — 84165 PROTEIN E-PHORESIS SERUM: CPT | Performed by: INTERNAL MEDICINE

## 2023-05-08 PROCEDURE — 99999 PR PBB SHADOW E&M-EST. PATIENT-LVL V: CPT | Mod: PBBFAC,,, | Performed by: INTERNAL MEDICINE

## 2023-05-08 PROCEDURE — 1160F PR REVIEW ALL MEDS BY PRESCRIBER/CLIN PHARMACIST DOCUMENTED: ICD-10-PCS | Mod: CPTII,S$GLB,, | Performed by: INTERNAL MEDICINE

## 2023-05-08 PROCEDURE — 3008F BODY MASS INDEX DOCD: CPT | Mod: CPTII,S$GLB,, | Performed by: INTERNAL MEDICINE

## 2023-05-08 PROCEDURE — 99204 OFFICE O/P NEW MOD 45 MIN: CPT | Mod: S$GLB,,, | Performed by: INTERNAL MEDICINE

## 2023-05-08 PROCEDURE — 83550 IRON BINDING TEST: CPT | Performed by: INTERNAL MEDICINE

## 2023-05-08 PROCEDURE — 82746 ASSAY OF FOLIC ACID SERUM: CPT | Performed by: INTERNAL MEDICINE

## 2023-05-08 PROCEDURE — 99204 PR OFFICE/OUTPT VISIT, NEW, LEVL IV, 45-59 MIN: ICD-10-PCS | Mod: S$GLB,,, | Performed by: INTERNAL MEDICINE

## 2023-05-08 PROCEDURE — 1101F PR PT FALLS ASSESS DOC 0-1 FALLS W/OUT INJ PAST YR: ICD-10-PCS | Mod: CPTII,S$GLB,, | Performed by: INTERNAL MEDICINE

## 2023-05-08 PROCEDURE — 82607 VITAMIN B-12: CPT | Performed by: INTERNAL MEDICINE

## 2023-05-08 PROCEDURE — 83010 ASSAY OF HAPTOGLOBIN QUANT: CPT | Performed by: INTERNAL MEDICINE

## 2023-05-08 PROCEDURE — 83521 IG LIGHT CHAINS FREE EACH: CPT | Mod: 90 | Performed by: INTERNAL MEDICINE

## 2023-05-08 PROCEDURE — 1160F RVW MEDS BY RX/DR IN RCRD: CPT | Mod: CPTII,S$GLB,, | Performed by: INTERNAL MEDICINE

## 2023-05-08 PROCEDURE — 85025 COMPLETE CBC W/AUTO DIFF WBC: CPT | Performed by: INTERNAL MEDICINE

## 2023-05-08 PROCEDURE — 83615 LACTATE (LD) (LDH) ENZYME: CPT | Performed by: INTERNAL MEDICINE

## 2023-05-08 RX ORDER — ERGOCALCIFEROL 1.25 MG/1
CAPSULE ORAL
COMMUNITY
Start: 2023-04-21

## 2023-05-08 RX ORDER — MIDODRINE HYDROCHLORIDE 2.5 MG/1
2.5 TABLET ORAL 2 TIMES DAILY
COMMUNITY
Start: 2023-03-30

## 2023-05-09 ENCOUNTER — PATIENT MESSAGE (OUTPATIENT)
Dept: HEMATOLOGY/ONCOLOGY | Facility: CLINIC | Age: 68
End: 2023-05-09
Payer: MEDICARE

## 2023-05-09 LAB
ALBUMIN % SPEP (OHS): 40.31
ALBUMIN SERPL-MCNC: 2.2 G/DL (ref 3.4–4.8)
ALBUMIN/GLOB SERPL: 0.7 RATIO (ref 1.1–2)
ALPHA 1 GLOB (OHS): 0.14 GM/DL
ALPHA 1 GLOB% (OHS): 2.5
ALPHA 2 GLOB % (OHS): 7.52
ALPHA 2 GLOB (OHS): 0.41 GM/DL
BETA GLOB (OHS): 0.81 GM/DL
BETA GLOB% (OHS): 14.99
GAMMA GLOBULIN % (OHS): 34.68
GAMMA GLOBULIN (OHS): 1.87 GM/DL
GLOBULIN SER-MCNC: 3.2 GM/DL (ref 2.4–3.5)
KAPPA LC FREE SER-MCNC: 11.5 MG/DL (ref 0.33–1.94)
KAPPA LC FREE SER-MCNC: 13.9 MG/DL (ref 0.33–1.94)
KAPPA LC FREE/LAMBDA FREE SER: 0.81 {RATIO} (ref 0.26–1.65)
KAPPA LC FREE/LAMBDA FREE SER: 0.95 {RATIO} (ref 0.26–1.65)
LAMBDA LC FREE SERPL-MCNC: 14.2 MG/DL (ref 0.57–2.63)
LAMBDA LC FREE SERPL-MCNC: 14.7 MG/DL (ref 0.57–2.63)
M SPIKE % (OHS): ABNORMAL
M SPIKE (OHS): ABNORMAL
PATH REV: NORMAL
PROT SERPL-MCNC: 5.4 GM/DL (ref 5.8–7.6)

## 2023-05-09 NOTE — PROGRESS NOTES
The patient location is: [facility]  The chief complaint leading to consultation is: depression about health    Visit type: audiovisual    Face to Face time with patient: 40  45 minutes of total time spent on the encounter, which includes face to face time and non-face to face time preparing to see the patient (eg, review of tests), Obtaining and/or reviewing separately obtained history, Documenting clinical information in the electronic or other health record, Independently interpreting results (not separately reported) and communicating results to the patient/family/caregiver, or Care coordination (not separately reported).     Individual Psychotherapy (LCSW/PhD)  Cherry Santiago,  5/5/2023    REFERRAL SOURCE:  Familia Joe MD  TYPE OF VISIT:  Video Session  LENGTH OF SESSION: 45  Site:  [facility]    Therapeutic Intervention: Met with patient for individual psychotherapy.    Chief complaint/reason for encounter: anxiety     Interval history and content of current session: PT stated she got sick in July and can no longer do the things she used to do. PT had a UTI and had back issues and could not walk for a month. PT then had diarrhea from August to November. PT went to pain management for her back and nothing helped, but the pain just went away. PT did have previous back surgery in 2017, but the recent back pain subsided without learning the cause. Prior to illness, PT would  her grandkids and go to the park and take them shopping, clean her house, make groceries, and cook. Now, PT is weak and has passed out before. SW discussed PT shifting her perspaective and focusing on the things she is able to do well. SW discussed negative thinking and shared some skills to manage depression and anxiety. PT will continue applying the skills and report progress/outcome at the next visit.    Treatment plan:  Target symptoms: depression  Why chosen therapy is appropriate versus another  modality: relevant to diagnosis  Outcome monitoring methods: self-report  Therapeutic intervention type: insight oriented psychotherapy, behavior modifying psychotherapy, supportive psychotherapy    Risk parameters:  Patient reports no suicidal ideation  Patient reports no homicidal ideation  Patient reports no self-injurious behavior  Patient reports no violent behavior    Verbal deficits: None    Patient's response to intervention:  The patient's response to intervention is accepting.    Progress toward goals and other mental status changes:  The patient's progress toward goals is fair .  Results of the PHQ8 5/5/2023 3/24/2023   1. Little interest or pleasure in doing things Not at all Not at all   2. Feeling down, depressed, or hopeless Not at all More than half the days   3. Trouble falling or staying asleep, or sleeping too much Not at all Not at all   4. Feeling tired or having little energy Not at all More than half the days   5. poor appetite or overeating More than half the days More than half the days   6. Feeling bad about yourself - or that you are a failure or have let yourself or your family down Not at all More than half the days   7. Trouble concentrating on things, such as reading the newspaper or watching television Not at all Not at all   8. Moving or speaking so slowly that other people could have noticed? Or the opposite - being so fidgety or restless that you have been moving around a lot more than usual Not at all Not at all   Total Score  2 8      GAD7 5/5/2023 3/24/2023 2/9/2023   1. Feeling nervous, anxious, or on edge? 2 0 0   2. Not being able to stop or control worrying? 0 0 1   3. Worrying too much about different things? 0 0 1   4. Trouble relaxing? 0 0 0   5. Being so restless that it is hard to sit still? 0 0 3   6. Becoming easily annoyed or irritable? 0 0 1   7. Feeling afraid as if something awful might happen? 0 0 0   8. If you checked off any problems, how difficult have these  problems made it for you to do your work, take care of things at home, or get along with other people? - - 0   WILMA-7 Score 2 0 6        Diagnosis:   No diagnosis found.        Plan: Pt plans to continue individual psychotherapy    Return to clinic: 2 weeks    Length of Service (minutes): 45       Each patient to whom he or she provides medical services by telemedicine is:  (1) informed of the relationship between the physician and patient and the respective role of any other health care provider with respect to management of the patient; and (2) notified that he or she may decline to receive medical services by telemedicine and may withdraw from such care at any time.

## 2023-05-09 NOTE — PROGRESS NOTES
Patient discussed during [facility] meeting today, 05/09/2023.  Patient with symptoms of poorly controlled depression in the setting of multiple medical issues.  She has not tried medication but is now interested.  I would recommend starting very low dose SSRI, such as Zoloft 12.5mg daily x 1 week and then increase to 25mg daily.  I will send recommendation to PCP so that he can discuss risks versus benefits with patient.      Time: 15 minutes    The above treatment considerations and suggestions are based on consultations with the patients Behavioral Health Integration therapist and a review of information available in the patient's chart.  I have not personally examined the patient.  All recommendations should be implemented with consideration of the patients relevant prior history and current clinical status.  It remains the responsibility of the patient's Primary Care Physician to prescribe medications and to educate the patient on risks versus benefits, alternative treatments, side effect profile and the inherent unpredictability of individual responses to medications.  Please feel free to call me with any questions about the care of this patient.

## 2023-05-11 ENCOUNTER — PATIENT MESSAGE (OUTPATIENT)
Dept: HEMATOLOGY/ONCOLOGY | Facility: CLINIC | Age: 68
End: 2023-05-11
Payer: MEDICARE

## 2023-05-11 ENCOUNTER — TELEPHONE (OUTPATIENT)
Dept: HEMATOLOGY/ONCOLOGY | Facility: CLINIC | Age: 68
End: 2023-05-11
Payer: MEDICARE

## 2023-05-11 DIAGNOSIS — D59.4 OTHER NON-AUTOIMMUNE HEMOLYTIC ANEMIAS: Primary | ICD-10-CM

## 2023-05-11 RX ORDER — FOLIC ACID 1 MG/1
1 TABLET ORAL DAILY
Qty: 100 TABLET | Refills: 3 | Status: SHIPPED | OUTPATIENT
Start: 2023-05-11 | End: 2024-05-10

## 2023-05-11 NOTE — TELEPHONE ENCOUNTER
Called patient to give her results for the labs.   Looks like a combination of anemia in chronic kidney disease and mild non-immune hemolytic anemia.  Will start oral folic acid 1mg daily. Plan to start on Procrit on RTC.   This all discussed with her daughter over the phone, who, at first acted like she was the patient and then later revealed to me that she was the daughter.     Dr. Kwok is her cardiologist. May need to clear with him before starting the Procrit.  Dr. Victor is her nephrologist.     Rebecca Soliman MD

## 2023-05-21 NOTE — PROGRESS NOTES
Subjective:       Patient ID: Cherry Santiago is a 67 y.o. female.      Vascular surgeon: Dr. Kuldip Kaur  Cardiologist: Dr. Ryan Kwok  PCP: Dr. Familia Joe  GI: Dr. Urbano Sandhu  Nephrology: Unknown    1. h/o Recurrent RLE DVT--2012, 2017, 2020    2. 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  11/24/20--Prothrombin Gene mutation negative, Protein C functional 45% (low), Free Protein S 18%, Functional Protein S <8%, Total Protein S 43% (all low), antithrombin III 90% (normal).  1/5/21--ARIELLE, dsDNA negative.  3/22/21--PNH negative, SPEP with no M-spike, cold agglutinins negative.  5/10/21--G6PD normal.   5/8/23--K/L ratio normal, serum YE with no M-spike, normal TSH/FT4, haptoglobin low 8, retic 4.24(increased),  (slightly high), vitamin B12 >2000, ferritin 376, iron <25 and TIBC <102, Mark negative.     Imaging:  CT C/A/P done 12/4/20 to r/o occult malignancy showed no CT evidence of malignancy in the chest, abdomen or pelvis, mild dilatation of the esophagus with a small hiatal hernia.  CT A/P w/o contrast 9/30/22--Hepatic steatosis, prior gastric sleeve procedure and small hiatal hernia, trace amount of pelvic free fluid, small pericardial fluid collection.  Dotatate PET 11/3/22--Focal nodular foci of radiotracer uptake at the pancreatic tail and near the region of the pancreatic head above liver background activity could indicate somatostatin receptor avid lesions, but no corresponding mass is seen on CT portion of the study.  Recommend follow-up contrast enhanced pancreas protocol CT to further assess.  CT A/P w/ contrast done 11/4/22--No pancreatic  lesion, enterocolitis, small amount of gas in the urinary bladder, nonspecific and could be related to recent instrumentation, hepatic steatosis.  Venous US done 3/15/23--right lower extremity deep veins are patent and compressible w/o evidence of thrombosis, right saphenous vein treated today using sclerotherapy.     Treatment history:  RLE 2012 following DARCY/left oophorectomy, treated with Coumadin, changed to Xarelto  RLE 2017 recurrence following back surgery, changed to Eliquis  RLE 10/2020 recurrence, unprovoked while on Eliquis, changed to Lovenox bridge to Coumadin.   RLE 1/2021 recurrence, unprovoked while on Coumadin, changed back to Eliquis.  Venogram RLE with stents placed in right common and external iliac veins done 5/2022.    Procedures:  1. EGD done by Dr. Urbano Sandhu 3/2/21--normal duodenum, hiatal hernia, biopsy with mild reactive gastropathy, no active inflammation, no H. Pylori, Grade A esophagitis at GE junction, may be some blood loss from hiatal hernia/Eduardo's erosions.  2. Colonoscopy done by Dr. Urbano Sandhu 3/11/21--mild diverticulosis, otherwise normal and no source of GI bleeding.  3. Colonoscopy 10/3/22--diverticulosis throughout entire colon, hemorrhoids. Repeat recommended in 5 years.   4. EGD 10/3/22--non-bleeding gastric ulcers, biopsied with path mild chronic gastritis, H. Pylori negative, and treated with monopolar probe, exam otherwise normal.  3. Upper EUS done 11/9/22--normal esophagus, evidence of sleeve gastrectomy in gastric body, widely patent duodenoenterostomy, few benign lymph nodes in fareed hepatis, few cystic lesion in pancreatic head, genu of pancreas and pancreatic body, highly suspicious for branched intraductal papillary mucinous neoplasm, no mass or worrisome features, quite small for sampling, no mass in pancreatic head or tail. Small bowel biopsy with benign small bowel mucosa with focally increased epithelial lymphocytes, stomach biopsy with mild chronic  "inactive gastritis, no H. Pylori.  4. EGD 5/22/23 Dr. Urbano Sandhu--normal esophagus, vertical banded gastroplasty, normal duodenum, biopsy with no specific pathologic changes.  5. Colonoscopy 5/22/23--diverticulosis of sigmoid colon, examination was otherwise normal, random right and left colon biopsies with no specific pathologic changes.     Treatment history:  Injectafer x 2 doses on 1/14/21 and 1/21/21.     Current treatment:   Eliquis 5mg PO BID  Oral iron twice daily.   Folic acid 1mg daily started 3/22/21--was off, restarted 5/2023, still not sure if she is taking.     Chief Complaint: Other Misc (Pt reports rectal bleeding since Saturday. Pt denies pain.)    HPI  Patient presents for follow-up of anemia. She has had some rectal bleeding over the weekend. She continues with weakness, also continues with LE edema and takes lasix as needed. Discussed anemia work-up and starting on folic acid vitamin, which she does not know if she started yet. Also discussed starting on Procrit. During the visit, her daughter was on Face time. She was very rude which is not the first time. She is insistent that she thinks her mother has some kind of cancer. I explained that her mother has had multiple scans and testing since she was losing weight back in November and she did have work-up that showed no definite signs of cancer. I explained that she has chronic kidney disease, as proven on a biopsy, glomeruloscerosis, which is the main cause of her anemia as well has all her swelling/edema that is causing low albumin. Recent GI work-up was negative aside from diverticulosis, so I did recommended if her rectal bleeding continues, for patient to get back with her GI doctor. Patient's daughter continued to be rude and accusative, frustrated because she says no one can figure out what is going on with her mom. She also said that I was "not doing anything for her mom's anemia", even though we had just discussed starting her on the " Procrit injections. I told the patient's daughter that I would no longer take her verbal abuse. She has been rude and demeaning to me on multiple occasions since I started seeing her mother again. I have taken a lot of time and gone through her multiple medical records. I also offered to repeat another CT scan since she continues to be concerned about her mother having a cancer. I am not sure what else she wants me to do. I started entering the orders for the Procrit, but the patient does not want to start the shots now, as she would like to discuss with her nephrologist, and asked for her records. I also discussed checking a CBC today with the rectal bleeding, but patient also did not want to do that.  I directed her to our medical records department to obtain all her records. My nurse Alexsander was in the room the entire time and can attest to the events.     Past Medical History:   Diagnosis Date    Anticoagulant long-term use     Arthritis     Atrial fibrillation     CHF (congestive heart failure)     Diabetes mellitus     Type2 resolved after weight loss surgery    DVT (deep venous thrombosis)     Encounter for blood transfusion     GERD (gastroesophageal reflux disease)     Glaucoma     Hypercholesterolemia     Hyperlipemia     Hypertension     Memory changes     Myocardial infarction     Renal failure     resolved    TIA (transient ischemic attack)       Review of patient's allergies indicates:  No Known Allergies   Current Outpatient Medications on File Prior to Visit   Medication Sig Dispense Refill    apixaban (ELIQUIS) 5 mg Tab Take 1 tablet (5 mg total) by mouth 2 (two) times daily.      B-complex with vitamin C (Z-BEC OR EQUIV) tablet Take 1 tablet by mouth once daily.      cholecalciferol, vitamin D3, (VITAMIN D3) 50 mcg (2,000 unit) Tab Take 1 tablet (2,000 Units total) by mouth once daily. 30 tablet 6    cyanocobalamin (VITAMIN B-12) 1000 MCG tablet Take 1 tablet (1,000 mcg total) by mouth once daily.  30 tablet 3    ergocalciferol (ERGOCALCIFEROL) 50,000 unit Cap Take by mouth.      ferrous sulfate (FEOSOL) 325 mg (65 mg iron) Tab tablet Take 325 mg by mouth daily with breakfast.      levETIRAcetam (KEPPRA) 500 MG Tab Take 500 mg by mouth 2 (two) times daily.      midodrine (PROAMATINE) 2.5 MG Tab Take 2.5 mg by mouth 2 (two) times daily.      multivitamin Tab Take 1 tablet by mouth once daily. 30 tablet 6    pantoprazole (PROTONIX) 40 MG tablet Take 40 mg by mouth once daily.      potassium chloride SA (K-DUR,KLOR-CON M) 10 MEQ tablet Take 2 tablets (20 mEq total) by mouth once daily. 90 tablet 3    timolol maleate 0.5% (TIMOPTIC) 0.5 % Drop 1 drop 2 (two) times daily.      torsemide (DEMADEX) 10 MG Tab Take 1 tablet (10 mg total) by mouth once daily. 30 tablet 11    aspirin (ECOTRIN) 81 MG EC tablet Take 81 mg by mouth once daily.      atorvastatin (LIPITOR) 40 MG tablet Take 1 tablet (40 mg total) by mouth every evening. (Patient not taking: Reported on 5/30/2023) 90 tablet 3    folic acid (FOLVITE) 1 MG tablet Take 1 tablet (1 mg total) by mouth once daily. (Patient not taking: Reported on 5/30/2023) 100 tablet 3     No current facility-administered medications on file prior to visit.      Review of Systems   All other systems reviewed and are negative.        Vitals:    05/30/23 0930   BP: 93/67   Pulse: (!) 117   Resp: 14   Temp: 98.4 °F (36.9 °C)        Wt Readings from Last 3 Encounters:   05/30/23 0930 75.7 kg (166 lb 12.8 oz)   05/22/23 0842 75.8 kg (167 lb)   05/08/23 1327 75.7 kg (166 lb 12.8 oz)       Physical Exam  Constitutional:       Comments: Pleasant female, appears chronically ill   HENT:      Head: Normocephalic and atraumatic.      Mouth/Throat:      Mouth: Mucous membranes are moist.   Eyes:      Extraocular Movements: Extraocular movements intact.   Cardiovascular:      Rate and Rhythm: Normal rate and regular rhythm.   Pulmonary:      Effort: Pulmonary effort is normal.      Breath  sounds: Normal breath sounds.   Abdominal:      Palpations: Abdomen is soft.      Comments: obese   Musculoskeletal:      Right lower leg: Edema present.      Left lower leg: Edema present.   Skin:     General: Skin is warm and dry.   Neurological:      General: No focal deficit present.      Mental Status: She is oriented to person, place, and time.       Admission on 05/22/2023, Discharged on 05/22/2023   Component Date Value    POCT Glucose 05/22/2023 75     Pathology Result 05/22/2023           Assessment:       1. History of DVT (deep vein thrombosis)    2. Anemia in other chronic diseases classified elsewhere    3. Anemia in stage 4 chronic kidney disease         Plan:       Patient with recurrent RLE DVT.  Hypercoagulable work-up showed Protein C and S levels low, this was off Coumadin.  Repeat levels showed Protein S levels very low. Suspected she likely has Protein S deficiency though difficult to tell while on Coumadin.  Patient was changed back over to Eliquis at some point, but she was lost to f/u here.  S/p right iliac stents in 5/2022. Venous US repeated with no evidence of DVT.   Recommend to continue Eliquis.     CT done for weight loss 12/4/20 and r/o occult malignancy showed no cancer but +hiatal hernia.  EGD/colonoscopy done 3/2020 by Dr. Urbano Sandhu showed hiatal hernia and mild diverticulosis, possible some blood loss from Eduardo erosions but no definite source.  Completed Injectafer x 2 doses on 1/21/21 with no response.  Further work-up at that time showed Mark negative hemolysis.  PNH work-up and  SPEP all negative at that time.  Patient was placed on folic acid 1mg daily.    Patient lost to follow-up.     Now with recurrent anemia. Renal function is now worse.  LDH/haptoglobin and retic c/w some ongoing mild hemolysis. Folic acid restarted and prescription sent, but I don't think she is taking.   But suspect her progressive anemia is all likely related to her known CKD/s/p kidney biopsy  in 10/2022 c/w glomerulosclerosis.  Repeat GI work-up unrevealing, aside from sigmoid diverticulosis. Patient instructed to get back with GI if her rectal bleeding continues.    Discussed starting Procrit for anemia and checking CBCdiff today but patient and her daughter would like to discuss with her kidney doctor first.  Patient's daughter has been rude to me with initial visit, and over the phone, as well as the visit today. In addition, in between visits, when I called to give patient her test results, her daughter pretended to be her initially on the call before she revealed who she was. She thinks her mom has cancer, but patient has had multiple tests that have not reached that conclusion.  Her weight loss seems to have now resolved.   I will schedule her a follow-up appointment in 3 months to recheck her CBC to see if she would like to start on Procrit at that time.    She did have IPMN on the EUS that was too small for sampling. Patient needs GI follow-up for this for repeat EUS at some point, maybe in a year.    Of note, she also had a vaginal cyst and was evaluated by Dr. Lala. He could not visualize it on exam, and recommended a follow-up US in 6 months, which would be due at the end of this month.     All questions answered at this time.     Rebecca Soliman MD      Supportive Plan Information  IV FLUIDS AND ELECTROLYTES   Yokasta Hernandez MD   Upcoming Treatment Dates - IV FLUIDS AND ELECTROLYTES    No upcoming days in selected categories.    Therapy Plan Information  sodium chloride 0.9% 500 mL with potassium chloride 10 mEq infusion  500 mL/hr, Intravenous, PRN

## 2023-05-22 ENCOUNTER — ANESTHESIA EVENT (OUTPATIENT)
Dept: ENDOSCOPY | Facility: HOSPITAL | Age: 68
End: 2023-05-22
Payer: MEDICARE

## 2023-05-22 ENCOUNTER — HOSPITAL ENCOUNTER (OUTPATIENT)
Facility: HOSPITAL | Age: 68
Discharge: HOME | End: 2023-05-22
Attending: INTERNAL MEDICINE | Admitting: INTERNAL MEDICINE
Payer: MEDICARE

## 2023-05-22 ENCOUNTER — ANESTHESIA (OUTPATIENT)
Dept: ENDOSCOPY | Facility: HOSPITAL | Age: 68
End: 2023-05-22
Payer: MEDICARE

## 2023-05-22 VITALS
HEIGHT: 65 IN | TEMPERATURE: 97 F | BODY MASS INDEX: 27.82 KG/M2 | HEART RATE: 82 BPM | SYSTOLIC BLOOD PRESSURE: 119 MMHG | OXYGEN SATURATION: 100 % | WEIGHT: 167 LBS | RESPIRATION RATE: 24 BRPM | DIASTOLIC BLOOD PRESSURE: 82 MMHG

## 2023-05-22 DIAGNOSIS — R19.7 DIARRHEA, UNSPECIFIED TYPE: ICD-10-CM

## 2023-05-22 DIAGNOSIS — R11.0 NAUSEA: ICD-10-CM

## 2023-05-22 LAB — POCT GLUCOSE: 75 MG/DL (ref 70–110)

## 2023-05-22 PROCEDURE — D9220A PRA ANESTHESIA: ICD-10-PCS | Mod: CRNA,,, | Performed by: NURSE ANESTHETIST, CERTIFIED REGISTERED

## 2023-05-22 PROCEDURE — 88305 TISSUE EXAM BY PATHOLOGIST: CPT | Performed by: INTERNAL MEDICINE

## 2023-05-22 PROCEDURE — D9220A PRA ANESTHESIA: Mod: CRNA,,, | Performed by: NURSE ANESTHETIST, CERTIFIED REGISTERED

## 2023-05-22 PROCEDURE — 45380 COLONOSCOPY AND BIOPSY: CPT | Performed by: INTERNAL MEDICINE

## 2023-05-22 PROCEDURE — 25000003 PHARM REV CODE 250: Performed by: NURSE ANESTHETIST, CERTIFIED REGISTERED

## 2023-05-22 PROCEDURE — 37000008 HC ANESTHESIA 1ST 15 MINUTES: Performed by: INTERNAL MEDICINE

## 2023-05-22 PROCEDURE — 63600175 PHARM REV CODE 636 W HCPCS: Performed by: NURSE ANESTHETIST, CERTIFIED REGISTERED

## 2023-05-22 PROCEDURE — 43239 EGD BIOPSY SINGLE/MULTIPLE: CPT | Performed by: INTERNAL MEDICINE

## 2023-05-22 PROCEDURE — D9220A PRA ANESTHESIA: ICD-10-PCS | Mod: ANES,,, | Performed by: STUDENT IN AN ORGANIZED HEALTH CARE EDUCATION/TRAINING PROGRAM

## 2023-05-22 PROCEDURE — 27201423 OPTIME MED/SURG SUP & DEVICES STERILE SUPPLY: Performed by: INTERNAL MEDICINE

## 2023-05-22 PROCEDURE — 37000009 HC ANESTHESIA EA ADD 15 MINS: Performed by: INTERNAL MEDICINE

## 2023-05-22 PROCEDURE — D9220A PRA ANESTHESIA: Mod: ANES,,, | Performed by: STUDENT IN AN ORGANIZED HEALTH CARE EDUCATION/TRAINING PROGRAM

## 2023-05-22 RX ORDER — PROPOFOL 10 MG/ML
VIAL (ML) INTRAVENOUS
Status: COMPLETED
Start: 2023-05-22 | End: 2023-05-22

## 2023-05-22 RX ORDER — GLYCOPYRROLATE 0.2 MG/ML
INJECTION INTRAMUSCULAR; INTRAVENOUS
Status: COMPLETED
Start: 2023-05-22 | End: 2023-05-22

## 2023-05-22 RX ORDER — LIDOCAINE HYDROCHLORIDE 20 MG/ML
INJECTION, SOLUTION EPIDURAL; INFILTRATION; INTRACAUDAL; PERINEURAL
Status: DISCONTINUED | OUTPATIENT
Start: 2023-05-22 | End: 2023-05-22

## 2023-05-22 RX ORDER — PROPOFOL 10 MG/ML
VIAL (ML) INTRAVENOUS CONTINUOUS PRN
Status: DISCONTINUED | OUTPATIENT
Start: 2023-05-22 | End: 2023-05-22

## 2023-05-22 RX ORDER — GLYCOPYRROLATE 0.2 MG/ML
INJECTION INTRAMUSCULAR; INTRAVENOUS
Status: DISCONTINUED | OUTPATIENT
Start: 2023-05-22 | End: 2023-05-22

## 2023-05-22 RX ORDER — LIDOCAINE HYDROCHLORIDE 20 MG/ML
INJECTION, SOLUTION EPIDURAL; INFILTRATION; INTRACAUDAL; PERINEURAL
Status: COMPLETED
Start: 2023-05-22 | End: 2023-05-22

## 2023-05-22 RX ORDER — SODIUM CHLORIDE 9 MG/ML
0-999 INJECTION, SOLUTION INTRAVENOUS CONTINUOUS
Status: DISCONTINUED | OUTPATIENT
Start: 2023-05-22 | End: 2023-05-22 | Stop reason: HOSPADM

## 2023-05-22 RX ADMIN — SODIUM CHLORIDE: 9 INJECTION, SOLUTION INTRAVENOUS at 09:05

## 2023-05-22 RX ADMIN — PROPOFOL 100 MCG/KG/MIN: 10 INJECTION, EMULSION INTRAVENOUS at 09:05

## 2023-05-22 RX ADMIN — GLYCOPYRROLATE 0.1 MG: 0.2 INJECTION INTRAMUSCULAR; INTRAVENOUS at 09:05

## 2023-05-22 RX ADMIN — LIDOCAINE HYDROCHLORIDE 50 MG: 20 INJECTION, SOLUTION INTRAVENOUS at 09:05

## 2023-05-22 NOTE — PROVATION PATIENT INSTRUCTIONS
Discharge Summary/Instructions after an Endoscopic Procedure  Patient Name: Cherry Santiago  Patient MRN: [id number]  Patient YOB: 1955  Monday, May 22, 2023  Mark Sandhu MD  Dear patient,  As a result of recent federal legislation (The Federal Cures Act), you may   receive lab or pathology results from your procedure in your [facility]   account before your physician is able to contact you. Your physician or   their representative will relay the results to you with their   recommendations at their soonest availability.  Thank you,  RESTRICTIONS:  During your procedure today, you received medications for sedation.  These   medications may affect your judgment, balance and coordination.  Therefore,   for 24 hours, you have the following restrictions:   - DO NOT drive a car, operate machinery, make legal/financial decisions,   sign important papers or drink alcohol.    ACTIVITY:  Today: no heavy lifting, straining or running due to procedural   sedation/anesthesia.  The following day: return to full activity including work.  DIET:  Eat and drink normally unless instructed otherwise.     TREATMENT FOR COMMON SIDE EFFECTS:  - Mild abdominal pain, nausea, belching, bloating or excessive gas:  rest,   eat lightly and use a heating pad.  - Sore Throat: treat with throat lozenges and/or gargle with warm salt   water.  - Because air was used during the procedure, expelling large amounts of air   from your rectum or belching is normal.  - If a bowel prep was taken, you may not have a bowel movement for 1-3 days.    This is normal.  SYMPTOMS TO WATCH FOR AND REPORT TO YOUR PHYSICIAN:  1. Abdominal pain or bloating, other than gas cramps.  2. Chest pain.  3. Back pain.  4. Signs of infection such as: chills or fever occurring within 24 hours   after the procedure.  5. Rectal bleeding, which would show as bright red, maroon, or black stools.   (A tablespoon of blood from the rectum is not serious, especially if    hemorrhoids are present.)  6. Vomiting.  7. Weakness or dizziness.  GO DIRECTLY TO THE NEAREST EMERGENCY ROOM IF YOU HAVE ANY OF THE FOLLOWING:      Difficulty breathing              Chills and/or fever over 101 F   Persistent vomiting and/or vomiting blood   Severe abdominal pain   Severe chest pain   Black, tarry stools   Bleeding- more than one tablespoon   Any other symptom or condition that you feel may need urgent attention  Your doctor recommends these additional instructions:  If any biopsies were taken, your doctors clinic will contact you in 1 to 2   weeks with any results.  - Discharge patient to home (ambulatory).   - Resume previous diet.   - Continue present medications.   - Await pathology results.   - Repeat colonoscopy in 10 years for screening purposes.   - Return to GI office PRN.   - Consider trial of Lotronex if biopsies negative  For questions, problems or results please call your physician - Mark Sandhu MD at Work:  [phone].  [facility], EMERGENCY ROOM PHONE NUMBER: [phone]  IF A COMPLICATION OR EMERGENCY SITUATION ARISES AND YOU ARE UNABLE TO REACH   YOUR PHYSICIAN - GO DIRECTLY TO THE EMERGENCY ROOM.  Mark Sandhu MD  5/22/2023 9:33:51 AM  This report has been verified and signed electronically.  Dear patient,  As a result of recent federal legislation (The Federal Cures Act), you may   receive lab or pathology results from your procedure in your [facility]   account before your physician is able to contact you. Your physician or   their representative will relay the results to you with their   recommendations at their soonest availability.  Thank you,  PROVATION

## 2023-05-22 NOTE — ANESTHESIA POSTPROCEDURE EVALUATION
Anesthesia Post Evaluation    Patient: Cherry Santiago    Procedure(s) Performed: Procedure(s) (LRB):  DOUBLE (N/A)  COLON (N/A)  EGD, WITH CLOSED BIOPSY  COLONOSCOPY, WITH 1 OR MORE BIOPSIES    Final Anesthesia Type: general      Patient location during evaluation: PACU  Patient participation: Yes- Able to Participate  Level of consciousness: awake and alert  Post-procedure vital signs: reviewed and stable  Pain management: adequate  Airway patency: patent    PONV status at discharge: No PONV  Anesthetic complications: no      Respiratory status: unassisted  Hydration status: euvolemic  Follow-up not needed.          Vitals Value Taken Time   /82 05/22/23 0953   Temp nl 05/22/23 1443   Pulse 82 05/22/23 0946   Resp 24 05/22/23 0946   SpO2 100 % 05/22/23 0946         No case tracking events are documented in the log.      Pain/Olena Score: Olena Score: 10 (5/22/2023  9:54 AM)

## 2023-05-22 NOTE — H&P
"[facility]    CC:  Diarrhea    HPI 67 y.o. female with a history of chronic diarrhea.  She was admitted to [facility] in November and had a thorough workup including nuclear medicine PET-CT as well as endoscopic ultrasound to search for potential carcinoid tumor.  All of this was unrevealing.  As she has since had intermittent diarrhea that she describes as nonbloody.  No other new complaints.    Past Medical History  Past Medical History:   Diagnosis Date    Anticoagulant long-term use     Arthritis     Atrial fibrillation     CHF (congestive heart failure)     Diabetes mellitus     Type2 resolved after weight loss surgery    DVT (deep venous thrombosis)     Encounter for blood transfusion     GERD (gastroesophageal reflux disease)     Glaucoma     Hypercholesterolemia     Hyperlipemia     Hypertension     Memory changes     Myocardial infarction     Renal failure     resolved    TIA (transient ischemic attack)          Review of Systems  General ROS: negative for chills, fever or weight loss  Cardiovascular ROS: no chest pain or dyspnea on exertion  Gastrointestinal ROS: no abdominal pain, change in bowel habits, or black/ bloody stools    Physical Examination  /77 (BP Location: Left arm, Patient Position: Lying)   Pulse 82   Temp 96.8 °F (36 °C) (Temporal)   Resp 17   Ht 5' 5" (1.651 m)   Wt 75.8 kg (167 lb)   LMP  (LMP Unknown)   SpO2 96%   BMI 27.79 kg/m²   General appearance: alert, cooperative, no distress  HENT: Normocephalic, atraumatic, neck symmetrical, no nasal discharge   Lungs: clear to auscultation bilaterally, no dullness to percussion bilaterally  Heart: regular rate and rhythm without rub; no displacement of the PMI   Abdomen: soft, non-tender; bowel sounds normoactive; no organomegaly  Extremities: extremities symmetric; no clubbing, cyanosis, or edema  Neurologic: Alert and oriented X 3, normal strength, normal coordination and " gait    Labs:    Imaging:    I have personally reviewed and interpreted these images.    Assessment:   67-year-old female with intermittent diarrhea with previous positional concern for carcinoid tumor although thorough workup including endoscopic ultrasound and PET-CT were negative.  Plan EGD and colonoscopy to assess for other source of diarrhea.    Plan:  EGD/colonoscopy      ARYAN Sandhu Jr., M.D.  [facility]

## 2023-05-22 NOTE — TRANSFER OF CARE
"Anesthesia Transfer of Care Note    Patient: Cherry Santiago    Procedure(s) Performed: Procedure(s) (LRB):  DOUBLE (N/A)  COLON (N/A)  EGD, WITH CLOSED BIOPSY    Patient location: GI    Anesthesia Type: general    Transport from OR: Transported from OR on room air with adequate spontaneous ventilation    Post pain: adequate analgesia    Post assessment: no apparent anesthetic complications    Post vital signs: stable    Level of consciousness: awake    Nausea/Vomiting: no nausea/vomiting    Complications: none    Transfer of care protocol was followed      Last vitals:   Visit Vitals  BP 99/68   Pulse 82   Temp 36 °C (96.8 °F) (Temporal)   Resp 10   Ht 5' 5" (1.651 m)   Wt 75.8 kg (167 lb)   LMP  (LMP Unknown)   SpO2 99%   BMI 27.79 kg/m²     "

## 2023-05-22 NOTE — ANESTHESIA PREPROCEDURE EVALUATION
05/22/2023  Cherry Santaigo is a 67 y.o., female.    Other Medical History   Hypertension Hypercholesterolemia   Renal failure Atrial fibrillation   GERD (gastroesophageal reflux disease) DVT (deep venous thrombosis)   Hyperlipemia TIA (transient ischemic attack)   CHF (congestive heart failure) Arthritis   Anticoagulant long-term use Encounter for blood transfusion   Glaucoma Memory changes   Diabetes mellitus      Surgical History  CHOLECYSTECTOMY HYSTERECTOMY   GASTRIC BYPASS TOTAL ABDOMINAL HYSTERECTOMY W/ BILATERAL SALPINGOOPHORECTOMY   BACK SURGERY JOINT REPLACEMENT   PELVIC LAPAROSCOPY RIGHT HEART CATHETERIZATION   LEFT HEART CATHETERIZATION ESOPHAGOGASTRODUODENOSCOPY   COLONOSCOPY RENAL BIOPSY   COLONOSCOPY ENDOSCOPIC ULTRASOUND OF UPPER GASTROINTESTINAL TRACT   ESOPHAGOGASTRODUODENOSCOPY ESOPHAGOGASTRODUODENOSCOPY     Chronic diarrhea and nausea, egd / colon for evaluation    Summary     The left ventricle is normal in size with normal systolic function.   The estimated ejection fraction is 70%.   Left ventricular diastolic dysfunction.   Small circumferential pericardial effusion.   There is a moderate left pleural effusion.   Normal right ventricular size with normal right ventricular systolic function.   Mild right atrial enlargement.   Mild left atrial enlargement.   Mild tricuspid regurgitation.   Normal central venous pressure (3 mmHg).   The estimated PA systolic pressure is 29 mmHg.    Pre-op Assessment    I have reviewed the Patient Summary Reports.     I have reviewed the Nursing Notes.    I have reviewed the Medications.     Review of Systems  Anesthesia Hx:  No problems with previous Anesthesia  History of prior surgery of interest to airway management or planning: Denies Family Hx of Anesthesia complications.   Denies Personal Hx of Anesthesia complications.    Social:  Non-Smoker    Hematology/Oncology:  Hematology Normal   Oncology Normal     EENT/Dental:EENT/Dental Normal   Cardiovascular:   Hypertension Past MI  Angina CHF    Pulmonary:  Pulmonary Normal    Renal/:   Chronic Renal Disease    Hepatic/GI:  Hepatic/GI Normal    Musculoskeletal:  Musculoskeletal Normal    Neurological:   TIA, Seizures    Endocrine:   Diabetes    Psych:  Psychiatric Normal           Physical Exam  General: Well nourished and Cooperative    Airway:  Mallampati: II   Mouth Opening: Normal  TM Distance: Normal  Tongue: Normal  Neck ROM: Normal ROM    Dental:  Intact        Anesthesia Plan  Type of Anesthesia, risks & benefits discussed:    Anesthesia Type: Gen Natural Airway  Intra-op Monitoring Plan: Standard ASA Monitors  Post Op Pain Control Plan: IV/PO Opioids PRN  Induction:  IV  Informed Consent: Informed consent signed with the Patient and all parties understand the risks and agree with anesthesia plan.  All questions answered.   ASA Score: 3  Day of Surgery Review of History & Physical: H&P Update referred to the surgeon/provider.    Ready For Surgery From Anesthesia Perspective.     .

## 2023-05-23 LAB — PSYCHE PATHOLOGY RESULT: NORMAL

## 2023-05-30 ENCOUNTER — CLINICAL SUPPORT (OUTPATIENT)
Dept: HEMATOLOGY/ONCOLOGY | Facility: CLINIC | Age: 68
End: 2023-05-30
Payer: MEDICARE

## 2023-05-30 ENCOUNTER — PATIENT MESSAGE (OUTPATIENT)
Dept: BEHAVIORAL HEALTH | Facility: CLINIC | Age: 68
End: 2023-05-30
Payer: MEDICARE

## 2023-05-30 ENCOUNTER — OFFICE VISIT (OUTPATIENT)
Dept: HEMATOLOGY/ONCOLOGY | Facility: CLINIC | Age: 68
End: 2023-05-30
Payer: MEDICARE

## 2023-05-30 VITALS
HEIGHT: 66 IN | SYSTOLIC BLOOD PRESSURE: 93 MMHG | OXYGEN SATURATION: 100 % | TEMPERATURE: 98 F | HEART RATE: 117 BPM | WEIGHT: 166.81 LBS | RESPIRATION RATE: 14 BRPM | BODY MASS INDEX: 26.81 KG/M2 | DIASTOLIC BLOOD PRESSURE: 67 MMHG

## 2023-05-30 DIAGNOSIS — N18.4 ANEMIA IN STAGE 4 CHRONIC KIDNEY DISEASE: ICD-10-CM

## 2023-05-30 DIAGNOSIS — Z86.718 HISTORY OF DVT (DEEP VEIN THROMBOSIS): Primary | ICD-10-CM

## 2023-05-30 DIAGNOSIS — D63.1 ANEMIA IN STAGE 4 CHRONIC KIDNEY DISEASE: ICD-10-CM

## 2023-05-30 DIAGNOSIS — D63.8 ANEMIA IN OTHER CHRONIC DISEASES CLASSIFIED ELSEWHERE: ICD-10-CM

## 2023-05-30 DIAGNOSIS — D59.8 OTHER ACQUIRED HEMOLYTIC ANEMIAS: ICD-10-CM

## 2023-05-30 DIAGNOSIS — I82.531 CHRONIC DEEP VEIN THROMBOSIS (DVT) OF POPLITEAL VEIN OF RIGHT LOWER EXTREMITY: ICD-10-CM

## 2023-05-30 PROCEDURE — 3288F FALL RISK ASSESSMENT DOCD: CPT | Mod: CPTII,S$GLB,, | Performed by: INTERNAL MEDICINE

## 2023-05-30 PROCEDURE — 1100F PR PT FALLS ASSESS DOC 2+ FALLS/FALL W/INJURY/YR: ICD-10-PCS | Mod: CPTII,S$GLB,, | Performed by: INTERNAL MEDICINE

## 2023-05-30 PROCEDURE — 99999 PR PBB SHADOW E&M-EST. PATIENT-LVL I: ICD-10-PCS | Mod: PBBFAC,,,

## 2023-05-30 PROCEDURE — 1159F MED LIST DOCD IN RCRD: CPT | Mod: CPTII,S$GLB,, | Performed by: INTERNAL MEDICINE

## 2023-05-30 PROCEDURE — 99999 PR PBB SHADOW E&M-EST. PATIENT-LVL IV: ICD-10-PCS | Mod: PBBFAC,,, | Performed by: INTERNAL MEDICINE

## 2023-05-30 PROCEDURE — 99214 OFFICE O/P EST MOD 30 MIN: CPT | Mod: S$GLB,,, | Performed by: INTERNAL MEDICINE

## 2023-05-30 PROCEDURE — 99999 PR PBB SHADOW E&M-EST. PATIENT-LVL I: CPT | Mod: PBBFAC,,,

## 2023-05-30 PROCEDURE — 1125F AMNT PAIN NOTED PAIN PRSNT: CPT | Mod: CPTII,S$GLB,, | Performed by: INTERNAL MEDICINE

## 2023-05-30 PROCEDURE — 99214 PR OFFICE/OUTPT VISIT, EST, LEVL IV, 30-39 MIN: ICD-10-PCS | Mod: S$GLB,,, | Performed by: INTERNAL MEDICINE

## 2023-05-30 PROCEDURE — 3008F PR BODY MASS INDEX (BMI) DOCUMENTED: ICD-10-PCS | Mod: CPTII,S$GLB,, | Performed by: INTERNAL MEDICINE

## 2023-05-30 PROCEDURE — 1100F PTFALLS ASSESS-DOCD GE2>/YR: CPT | Mod: CPTII,S$GLB,, | Performed by: INTERNAL MEDICINE

## 2023-05-30 PROCEDURE — 3074F PR MOST RECENT SYSTOLIC BLOOD PRESSURE < 130 MM HG: ICD-10-PCS | Mod: CPTII,S$GLB,, | Performed by: INTERNAL MEDICINE

## 2023-05-30 PROCEDURE — 1160F RVW MEDS BY RX/DR IN RCRD: CPT | Mod: CPTII,S$GLB,, | Performed by: INTERNAL MEDICINE

## 2023-05-30 PROCEDURE — 3008F BODY MASS INDEX DOCD: CPT | Mod: CPTII,S$GLB,, | Performed by: INTERNAL MEDICINE

## 2023-05-30 PROCEDURE — 3288F PR FALLS RISK ASSESSMENT DOCUMENTED: ICD-10-PCS | Mod: CPTII,S$GLB,, | Performed by: INTERNAL MEDICINE

## 2023-05-30 PROCEDURE — 3078F PR MOST RECENT DIASTOLIC BLOOD PRESSURE < 80 MM HG: ICD-10-PCS | Mod: CPTII,S$GLB,, | Performed by: INTERNAL MEDICINE

## 2023-05-30 PROCEDURE — 3078F DIAST BP <80 MM HG: CPT | Mod: CPTII,S$GLB,, | Performed by: INTERNAL MEDICINE

## 2023-05-30 PROCEDURE — 1159F PR MEDICATION LIST DOCUMENTED IN MEDICAL RECORD: ICD-10-PCS | Mod: CPTII,S$GLB,, | Performed by: INTERNAL MEDICINE

## 2023-05-30 PROCEDURE — 1125F PR PAIN SEVERITY QUANTIFIED, PAIN PRESENT: ICD-10-PCS | Mod: CPTII,S$GLB,, | Performed by: INTERNAL MEDICINE

## 2023-05-30 PROCEDURE — 1160F PR REVIEW ALL MEDS BY PRESCRIBER/CLIN PHARMACIST DOCUMENTED: ICD-10-PCS | Mod: CPTII,S$GLB,, | Performed by: INTERNAL MEDICINE

## 2023-05-30 PROCEDURE — 99999 PR PBB SHADOW E&M-EST. PATIENT-LVL IV: CPT | Mod: PBBFAC,,, | Performed by: INTERNAL MEDICINE

## 2023-05-30 PROCEDURE — 3074F SYST BP LT 130 MM HG: CPT | Mod: CPTII,S$GLB,, | Performed by: INTERNAL MEDICINE

## 2023-05-30 NOTE — PROGRESS NOTES
"  Met with pt briefly before MD visit 2/2 positive nutrition screen for wt loss. Review of EMR indicates 15-20# wt loss since February however pt is edematous in JOYA lower extremities and is on Lasix prn. Also has CKD. Her wt seems stable over the past 2 months. Today she reports decreased appetite and "taste is off." She is not receiving any type of oncologic/hematologic treatment at [facility] at this time. I gave her my contact information but advised I do not have any suggestions at this time.   "

## 2023-06-01 ENCOUNTER — PATIENT MESSAGE (OUTPATIENT)
Dept: BEHAVIORAL HEALTH | Facility: CLINIC | Age: 68
End: 2023-06-01

## 2023-06-01 ENCOUNTER — OFFICE VISIT (OUTPATIENT)
Dept: BEHAVIORAL HEALTH | Facility: CLINIC | Age: 68
End: 2023-06-01
Payer: MEDICARE

## 2023-06-01 DIAGNOSIS — F33.1 MODERATE EPISODE OF RECURRENT MAJOR DEPRESSIVE DISORDER: Primary | ICD-10-CM

## 2023-06-01 PROCEDURE — 90834 PR PSYCHOTHERAPY W/PATIENT, 45 MIN: ICD-10-PCS | Mod: 95,,, | Performed by: SOCIAL WORKER

## 2023-06-01 PROCEDURE — 90834 PSYTX W PT 45 MINUTES: CPT | Mod: 95,,, | Performed by: SOCIAL WORKER

## 2023-06-05 PROBLEM — F33.1 MODERATE EPISODE OF RECURRENT MAJOR DEPRESSIVE DISORDER: Status: ACTIVE | Noted: 2023-06-05

## 2023-06-05 NOTE — PROGRESS NOTES
The patient location is: [place]  The chief complaint leading to consultation is: depression about health    Visit type: audiovisual    Face to Face time with patient: 40  45 minutes of total time spent on the encounter, which includes face to face time and non-face to face time preparing to see the patient (eg, review of tests), Obtaining and/or reviewing separately obtained history, Documenting clinical information in the electronic or other health record, Independently interpreting results (not separately reported) and communicating results to the patient/family/caregiver, or Care coordination (not separately reported).     Individual Psychotherapy (LCSW/PhD)  Cherry Santiago,  6/1/2023    REFERRAL SOURCE:  Familia Joe MD  TYPE OF VISIT:  Video Session  LENGTH OF SESSION: 45  Site:  [facility]    Therapeutic Intervention: Met with patient for individual psychotherapy.    Chief complaint/reason for encounter: anxiety     Interval history and content of current session: PT stated she got sick in July and can no longer do the things she used to do. PT is currently still dealing with weak kidney functions and still does not have an explanation. PT has an upcoming appointment and is hoping to have some answers. PT stated not know what is going on with her health causes extreme anxiety and depression. SW encouraged PT to focus on the things she can control and the new life adjustments she currently have to make. PT mentioned wanting to plant some green onion since they grow pretty fast and the upkeep is not strenuous. SW encouraged PT to plant green onion so she can have something else to focus on besides her health. PT agreed and will report progress/outcome at the next visit.             Treatment plan:  Target symptoms: depression  Why chosen therapy is appropriate versus another modality: relevant to diagnosis  Outcome monitoring methods: self-report  Therapeutic intervention type:  insight oriented psychotherapy, behavior modifying psychotherapy, supportive psychotherapy    Risk parameters:  Patient reports no suicidal ideation  Patient reports no homicidal ideation  Patient reports no self-injurious behavior  Patient reports no violent behavior    Verbal deficits: None    Patient's response to intervention:  The patient's response to intervention is accepting.    Progress toward goals and other mental status changes:  The patient's progress toward goals is fair .  Results of the PHQ8 6/1/2023 5/5/2023 3/24/2023   1. Little interest or pleasure in doing things Not at all Not at all Not at all   2. Feeling down, depressed, or hopeless More than half the days Not at all More than half the days   3. Trouble falling or staying asleep, or sleeping too much Not at all Not at all Not at all   4. Feeling tired or having little energy Not at all Not at all More than half the days   5. poor appetite or overeating More than half the days More than half the days More than half the days   6. Feeling bad about yourself - or that you are a failure or have let yourself or your family down Not at all Not at all More than half the days   7. Trouble concentrating on things, such as reading the newspaper or watching television Not at all Not at all Not at all   8. Moving or speaking so slowly that other people could have noticed? Or the opposite - being so fidgety or restless that you have been moving around a lot more than usual Not at all Not at all Not at all   Total Score  4 2 8      GAD7 6/1/2023 5/5/2023 3/24/2023   1. Feeling nervous, anxious, or on edge? 2 2 0   2. Not being able to stop or control worrying? 2 0 0   3. Worrying too much about different things? 2 0 0   4. Trouble relaxing? 0 0 0   5. Being so restless that it is hard to sit still? 0 0 0   6. Becoming easily annoyed or irritable? 0 0 0   7. Feeling afraid as if something awful might happen? 0 0 0   8. If you checked off any problems, how  difficult have these problems made it for you to do your work, take care of things at home, or get along with other people? - - -   WILMA-7 Score 6 2 0        Diagnosis:     ICD-10-CM ICD-9-CM   1. Moderate episode of recurrent major depressive disorder  F33.1 296.32           Plan: Pt plans to continue individual psychotherapy    Return to clinic: 2 weeks    Length of Service (minutes): 45       Each patient to whom he or she provides medical services by telemedicine is:  (1) informed of the relationship between the physician and patient and the respective role of any other health care provider with respect to management of the patient; and (2) notified that he or she may decline to receive medical services by telemedicine and may withdraw from such care at any time.

## 2023-06-06 ENCOUNTER — TELEPHONE (OUTPATIENT)
Dept: BEHAVIORAL HEALTH | Facility: CLINIC | Age: 68
End: 2023-06-06
Payer: MEDICARE

## 2023-06-06 NOTE — TELEPHONE ENCOUNTER
PT was scheduled with SW 6/6/23 however, DAGOBERTO spoke with PT daughter and learned PT is in the hospital due to low blood pressure, kidney failure and a uti that turned septic. DAGOBERTO cancelled today's session and will see PT 6/16 if PT health permits.

## 2023-06-14 ENCOUNTER — LAB VISIT (OUTPATIENT)
Dept: LAB | Facility: HOSPITAL | Age: 68
End: 2023-06-14
Attending: NURSE PRACTITIONER
Payer: MEDICARE

## 2023-06-14 DIAGNOSIS — K92.1 HEMATOCHEZIA: ICD-10-CM

## 2023-06-14 LAB
BASOPHILS # BLD AUTO: 0.02 K/UL (ref 0–0.2)
BASOPHILS NFR BLD: 0.3 % (ref 0–1.9)
DIFFERENTIAL METHOD: ABNORMAL
EOSINOPHIL # BLD AUTO: 0.1 K/UL (ref 0–0.5)
EOSINOPHIL NFR BLD: 1.7 % (ref 0–8)
ERYTHROCYTE [DISTWIDTH] IN BLOOD BY AUTOMATED COUNT: 16.8 % (ref 11.5–14.5)
HCT VFR BLD AUTO: 31.9 % (ref 37–48.5)
HGB BLD-MCNC: 10.2 G/DL (ref 12–16)
IMM GRANULOCYTES # BLD AUTO: 0.02 K/UL (ref 0–0.04)
IMM GRANULOCYTES NFR BLD AUTO: 0.3 % (ref 0–0.5)
LYMPHOCYTES # BLD AUTO: 2.1 K/UL (ref 1–4.8)
LYMPHOCYTES NFR BLD: 37.2 % (ref 18–48)
MCH RBC QN AUTO: 31.4 PG (ref 27–31)
MCHC RBC AUTO-ENTMCNC: 32 G/DL (ref 32–36)
MCV RBC AUTO: 98 FL (ref 82–98)
MONOCYTES # BLD AUTO: 0.6 K/UL (ref 0.3–1)
MONOCYTES NFR BLD: 10.1 % (ref 4–15)
NEUTROPHILS # BLD AUTO: 2.9 K/UL (ref 1.8–7.7)
NEUTROPHILS NFR BLD: 50.4 % (ref 38–73)
NRBC BLD-RTO: 0 /100 WBC
PLATELET # BLD AUTO: 241 K/UL (ref 150–450)
PMV BLD AUTO: 9.3 FL (ref 9.2–12.9)
RBC # BLD AUTO: 3.25 M/UL (ref 4–5.4)
WBC # BLD AUTO: 5.72 K/UL (ref 3.9–12.7)

## 2023-06-14 PROCEDURE — 85025 COMPLETE CBC W/AUTO DIFF WBC: CPT | Performed by: NURSE PRACTITIONER

## 2023-06-14 PROCEDURE — 36415 COLL VENOUS BLD VENIPUNCTURE: CPT | Performed by: NURSE PRACTITIONER

## 2023-06-16 ENCOUNTER — PATIENT MESSAGE (OUTPATIENT)
Dept: BEHAVIORAL HEALTH | Facility: CLINIC | Age: 68
End: 2023-06-16

## 2023-06-16 ENCOUNTER — OFFICE VISIT (OUTPATIENT)
Dept: BEHAVIORAL HEALTH | Facility: CLINIC | Age: 68
End: 2023-06-16
Payer: MEDICAID

## 2023-06-16 DIAGNOSIS — F33.1 MODERATE EPISODE OF RECURRENT MAJOR DEPRESSIVE DISORDER: Primary | ICD-10-CM

## 2023-06-16 PROCEDURE — 90834 PSYTX W PT 45 MINUTES: CPT | Mod: 95,,, | Performed by: SOCIAL WORKER

## 2023-06-16 PROCEDURE — 90834 PR PSYCHOTHERAPY W/PATIENT, 45 MIN: ICD-10-PCS | Mod: 95,,, | Performed by: SOCIAL WORKER

## 2023-06-16 NOTE — Clinical Note
Dr. Joe,   This patient is interested in trying medication for her depression.  I know you don't see her until August, however she was interested in starting something sooner.  Could you talk with her about medication?  I was thinking low dose Zoloft could be a good option for her.  Let me know your thoughts.  Shira Cardoso

## 2023-06-20 ENCOUNTER — DOCUMENT SCAN (OUTPATIENT)
Dept: HOME HEALTH SERVICES | Facility: HOSPITAL | Age: 68
End: 2023-06-20
Payer: MEDICARE

## 2023-06-21 NOTE — PROGRESS NOTES
The patient location is: [facility]  The chief complaint leading to consultation is: depression about health    Visit type: audiovisual    Face to Face time with patient: 40  45 minutes of total time spent on the encounter, which includes face to face time and non-face to face time preparing to see the patient (eg, review of tests), Obtaining and/or reviewing separately obtained history, Documenting clinical information in the electronic or other health record, Independently interpreting results (not separately reported) and communicating results to the patient/family/caregiver, or Care coordination (not separately reported).     Individual Psychotherapy (LCSW/PhD)  Cherry Santiago,  6/16/2023    REFERRAL SOURCE:  Familia Joe MD  TYPE OF VISIT:  Video Session  LENGTH OF SESSION: 45  Site:  [facility]    Therapeutic Intervention: Met with patient for individual psychotherapy.    Chief complaint/reason for encounter: anxiety     Interval history and content of current session: PT was home and no longer hospitalized during the visit. PT still does not have any concrete answers as it relates to her decreased kidney function and low blood pressure. PT is still depressed and crying daily. SW will consult with the psychiatrist to see if medication management is an option for PT.      Treatment plan:  Target symptoms: depression  Why chosen therapy is appropriate versus another modality: relevant to diagnosis  Outcome monitoring methods: self-report  Therapeutic intervention type: insight oriented psychotherapy, behavior modifying psychotherapy, supportive psychotherapy    Risk parameters:  Patient reports no suicidal ideation  Patient reports no homicidal ideation  Patient reports no self-injurious behavior  Patient reports no violent behavior    Verbal deficits: None    Patient's response to intervention:  The patient's response to intervention is accepting.    Progress toward goals and other  mental status changes:  The patient's progress toward goals is fair .  Results of the PHQ8 6/16/2023 6/1/2023 5/5/2023 3/24/2023   1. Little interest or pleasure in doing things Not at all Not at all Not at all Not at all   2. Feeling down, depressed, or hopeless Several days More than half the days Not at all More than half the days   3. Trouble falling or staying asleep, or sleeping too much Not at all Not at all Not at all Not at all   4. Feeling tired or having little energy Several days Not at all Not at all More than half the days   5. poor appetite or overeating Several days More than half the days More than half the days More than half the days   6. Feeling bad about yourself - or that you are a failure or have let yourself or your family down Several days Not at all Not at all More than half the days   7. Trouble concentrating on things, such as reading the newspaper or watching television Not at all Not at all Not at all Not at all   8. Moving or speaking so slowly that other people could have noticed? Or the opposite - being so fidgety or restless that you have been moving around a lot more than usual Not at all Not at all Not at all Not at all   Total Score  4 4 2 8      GAD7 6/16/2023 6/1/2023 5/5/2023   1. Feeling nervous, anxious, or on edge? 2 2 2   2. Not being able to stop or control worrying? 2 2 0   3. Worrying too much about different things? 1 2 0   4. Trouble relaxing? 0 0 0   5. Being so restless that it is hard to sit still? 0 0 0   6. Becoming easily annoyed or irritable? 0 0 0   7. Feeling afraid as if something awful might happen? 0 0 0   8. If you checked off any problems, how difficult have these problems made it for you to do your work, take care of things at home, or get along with other people? - - -   WILMA-7 Score 5 6 2        Diagnosis:   No diagnosis found.          Plan: Pt plans to continue individual psychotherapy    Return to clinic: 2 weeks    Length of Service (minutes): 45        Each patient to whom he or she provides medical services by telemedicine is:  (1) informed of the relationship between the physician and patient and the respective role of any other health care provider with respect to management of the patient; and (2) notified that he or she may decline to receive medical services by telemedicine and may withdraw from such care at any time.

## 2023-06-23 NOTE — ASSESSMENT & PLAN NOTE
- Patient with potassium   Recent Labs   Lab 11/10/22  0408 11/11/22  0316 11/12/22  1253   K 3.6 3.1* 3.5   . Replaced 11/11.  Monitor  Replace 11/13, 11/14       Wendy,    Patient's  is upset because of the delay of the PA.  PA was sent to the Prior Authorization team around 2 weeks ago.

## 2023-06-26 ENCOUNTER — DOCUMENT SCAN (OUTPATIENT)
Dept: HOME HEALTH SERVICES | Facility: HOSPITAL | Age: 68
End: 2023-06-26
Payer: MEDICARE

## 2023-06-27 ENCOUNTER — EXTERNAL HOME HEALTH (OUTPATIENT)
Dept: HOME HEALTH SERVICES | Facility: HOSPITAL | Age: 68
End: 2023-06-27

## 2023-06-27 NOTE — PROGRESS NOTES
Patient discussed during [facility] meeting today, 06/27/2023.  Patient with poorly controlled depression in the setting of multiple medical issues.  I would starting low dose SSRI, such as Zoloft 12.5mg daily x 1 week and then 25mg daily.  I would also recommend long term psychotherapy for this patient since symptoms are not improving with short term interventions.      Time: 10 minutes    The above treatment considerations and suggestions are based on consultations with the patients Behavioral Health Integration therapist and a review of information available in the patient's chart.  I have not personally examined the patient.  All recommendations should be implemented with consideration of the patients relevant prior history and current clinical status.  It remains the responsibility of the patient's Primary Care Physician to prescribe medications and to educate the patient on risks versus benefits, alternative treatments, side effect profile and the inherent unpredictability of individual responses to medications.  Please feel free to call me with any questions about the care of this patient.

## 2023-06-30 ENCOUNTER — OFFICE VISIT (OUTPATIENT)
Dept: BEHAVIORAL HEALTH | Facility: CLINIC | Age: 68
End: 2023-06-30
Payer: MEDICARE

## 2023-06-30 DIAGNOSIS — F33.1 MODERATE EPISODE OF RECURRENT MAJOR DEPRESSIVE DISORDER: Primary | ICD-10-CM

## 2023-06-30 PROCEDURE — 90834 PR PSYCHOTHERAPY W/PATIENT, 45 MIN: ICD-10-PCS | Mod: 95,,, | Performed by: SOCIAL WORKER

## 2023-06-30 PROCEDURE — 90834 PSYTX W PT 45 MINUTES: CPT | Mod: 95,,, | Performed by: SOCIAL WORKER

## 2023-07-05 ENCOUNTER — PATIENT MESSAGE (OUTPATIENT)
Dept: BEHAVIORAL HEALTH | Facility: CLINIC | Age: 68
End: 2023-07-05
Payer: MEDICARE

## 2023-07-18 NOTE — PROGRESS NOTES
The patient location is: [facility]  The chief complaint leading to consultation is: depression about health    Visit type: audiovisual    Face to Face time with patient: 40  45 minutes of total time spent on the encounter, which includes face to face time and non-face to face time preparing to see the patient (eg, review of tests), Obtaining and/or reviewing separately obtained history, Documenting clinical information in the electronic or other health record, Independently interpreting results (not separately reported) and communicating results to the patient/family/caregiver, or Care coordination (not separately reported).     Individual Psychotherapy (LCSW/PhD)  Cherry Santiago,  6/30/2023    REFERRAL SOURCE:  Familia Joe MD  TYPE OF VISIT:  Video Session  LENGTH OF SESSION: 45  Site:  [facility]    Therapeutic Intervention: Met with patient for individual psychotherapy.    Chief complaint/reason for encounter: anxiety     Interval history and content of current session:  PT still does not have any concrete answers as it relates to her decreased kidney function and low blood pressure. PT is still depressed and crying daily. SW will consulted with the psychiatrist to see if medication management is an option for PT. The psychiatrist recommended starting a low dose SSRI. However, PT PCP has not responded to the recommendation. SW submitted a referral to Psych department for long term talk therapy and med management and provided PT with the phone number to schedule her initial visit.      Treatment plan:  Target symptoms: depression  Why chosen therapy is appropriate versus another modality: relevant to diagnosis  Outcome monitoring methods: self-report  Therapeutic intervention type: insight oriented psychotherapy, behavior modifying psychotherapy, supportive psychotherapy    Risk parameters:  Patient reports no suicidal ideation  Patient reports no homicidal ideation  Patient reports  no self-injurious behavior  Patient reports no violent behavior    Verbal deficits: None    Patient's response to intervention:  The patient's response to intervention is accepting.    Progress toward goals and other mental status changes:  The patient's progress toward goals is fair .  Results of the PHQ8 6/30/2023 6/16/2023 6/1/2023 5/5/2023 3/24/2023   1. Little interest or pleasure in doing things Not at all Not at all Not at all Not at all Not at all   2. Feeling down, depressed, or hopeless Not at all Several days More than half the days Not at all More than half the days   3. Trouble falling or staying asleep, or sleeping too much Not at all Not at all Not at all Not at all Not at all   4. Feeling tired or having little energy Not at all Several days Not at all Not at all More than half the days   5. poor appetite or overeating Nearly every day Several days More than half the days More than half the days More than half the days   6. Feeling bad about yourself - or that you are a failure or have let yourself or your family down Not at all Several days Not at all Not at all More than half the days   7. Trouble concentrating on things, such as reading the newspaper or watching television Not at all Not at all Not at all Not at all Not at all   8. Moving or speaking so slowly that other people could have noticed? Or the opposite - being so fidgety or restless that you have been moving around a lot more than usual Not at all Not at all Not at all Not at all Not at all   Total Score  3 4 4 2 8      GAD7 6/30/2023 6/16/2023 6/1/2023   1. Feeling nervous, anxious, or on edge? 0 2 2   2. Not being able to stop or control worrying? 0 2 2   3. Worrying too much about different things? 0 1 2   4. Trouble relaxing? 0 0 0   5. Being so restless that it is hard to sit still? 0 0 0   6. Becoming easily annoyed or irritable? 0 0 0   7. Feeling afraid as if something awful might happen? 0 0 0   8. If you checked off any  problems, how difficult have these problems made it for you to do your work, take care of things at home, or get along with other people? - - -   WILMA-7 Score 0 5 6        Diagnosis:   No diagnosis found.          Plan: Pt plans to continue individual psychotherapy    Return to clinic: 2 weeks    Length of Service (minutes): 45       Each patient to whom he or she provides medical services by telemedicine is:  (1) informed of the relationship between the physician and patient and the respective role of any other health care provider with respect to management of the patient; and (2) notified that he or she may decline to receive medical services by telemedicine and may withdraw from such care at any time.

## 2023-07-19 ENCOUNTER — TELEPHONE (OUTPATIENT)
Dept: BEHAVIORAL HEALTH | Facility: CLINIC | Age: 68
End: 2023-07-19
Payer: MEDICARE

## 2023-07-19 RX ORDER — SERTRALINE HYDROCHLORIDE 25 MG/1
25 TABLET, FILM COATED ORAL DAILY
Qty: 30 TABLET | Refills: 11 | Status: SHIPPED | OUTPATIENT
Start: 2023-07-19 | End: 2024-07-18

## 2023-07-19 NOTE — PROGRESS NOTES
Zoloft 25 mg qd sent to pharmacy per recommendation of psychiatrist overseeing primary care behavioral health which patient has been attending.

## 2023-07-19 NOTE — PROGRESS NOTES
Reached out to patient and spoke with her daughter. Daughter advised her mom is still feeling low, but that they got our message about outside resources for long term psychology. They are working to find a therapist. Patient and daughter were advised that Dr. oJe sent in a new medication, zoloft, to their pharmacy. Advised to schedule an office visit with Dr. Joe in 2-4 weeks to assess response to medication. Daughter verbalized understanding and will reach out to schedule, and will call back if she has any questions/concerns.

## 2023-07-21 ENCOUNTER — TELEPHONE (OUTPATIENT)
Dept: SURGERY | Facility: CLINIC | Age: 68
End: 2023-07-21
Payer: MEDICARE

## 2023-08-17 ENCOUNTER — TELEPHONE (OUTPATIENT)
Dept: SURGERY | Facility: CLINIC | Age: 68
End: 2023-08-17
Payer: MEDICARE

## 2023-08-17 NOTE — TELEPHONE ENCOUNTER
----- Message from Shira Yancey RN sent at 8/17/2023 11:18 AM CDT -----  Regarding: FW: appt access  Contact: Kavya  [phone]    ----- Message -----  From: Amarilys Soilman  Sent: 8/17/2023  11:15 AM CDT  To: Micha GARY Staff  Subject: appt access                                      Kavya [facility] is calling to speak with someone in provider office in regards to getting pt scheduled with this provider for a dx of  intraductal papillary mucinous neoplasm Kavya is asking for a return call please call her at  [phone]

## 2024-02-21 DIAGNOSIS — Z78.0 MENOPAUSE: ICD-10-CM

## 2024-05-23 RX ORDER — ATORVASTATIN CALCIUM 40 MG/1
1 TABLET, FILM COATED ORAL NIGHTLY
COMMUNITY

## 2024-06-10 ENCOUNTER — PATIENT MESSAGE (OUTPATIENT)
Dept: INTERNAL MEDICINE | Facility: CLINIC | Age: 69
End: 2024-06-10
Payer: MEDICARE

## 2024-09-24 NOTE — PROGRESS NOTES
[facility]    Reason for visit: The primary encounter diagnosis was Chronic diarrhea. A diagnosis of Weight loss was also pertinent to this visit.  Referring Provider/PCP: Amarilys Romero MD    History of Present Illness:  Cherry Santiago is a 67 y.o. female with a history of AF (on eliquis), sleeve gastrectomy, CHF, arthritis, GERD, HLD, HTN, CAD, TIA  who is presenting for post-hospitalization follow-up of chronic diarrhea. Patient reports that she has only 1-2 BM daily, without needing Lomotil or Imodium. She does have bothersome nausea which has caused her to have early satiety and decreased appetite. Patient on PPI. Denies fever, rectal bleeding, abdominal pain, or reflux. Family is concerned she is not meeting her nutritional requirements.     She was seen by our inpatient service in November 2022 for chronic diarrhea since August. Evaluation has revealed a few different abnormalities including a PET scan with 2 areas of increased uptake in the pancreatic head and pancreatic tail but endoscopic ultrasound did not reveal any significant lesions.  Has an elevated chromogranin a level and gastrin level but this is in the setting of taking a PPI. Colonoscopy was normal.  Stool studies were negative.Serum VIP and somatostatin levels were normal. Small bowel biopsies from November 2022 showed increased intraepithelial lymphocytosis. She was started on Lomotil & Imodium, which drastically improved her diarrhea.     PEndoHx:  EGD (11/17/22):                        - A sleeve gastrectomy was found with a widely                          patent duodenal switch, characterized by healthy                          appearing mucosa was found. Stomach and small                          bowel biopsied.     EUS (11/9/22):                        - Normal esophagus.                          - A sleeve gastrectomy was found, characterized by                          healthy appearing mucosa.                           - Widely patent duodenoenterostomy, characterized                          by healthy appearing mucosa was found.                          - A few benign lymph nodes were visualized in the                          fareed hepatis region.                          - A few cystic lesions were seen in the pancreatic                          head, genu of the pancreas and pancreatic body.                          Tissue has not been obtained. However, the                          endosonographic appearance is highly suspicious                          for a branched intraductal papillary mucinous                          neoplasm. No mass or worrisome endosonographic                          features. Quite small for sampling.                          - Endosonographic imaging in the pancreatic head                          and pancreatic tail showed no mass.     Colonoscopy (10/11/22):                         - The examined portion of the ileum was normal.                          - Diverticulosis in the sigmoid colon, in the                          descending colon, in the transverse colon and in                          the ascending colon.                          - The examination was otherwise normal on direct                          and retroflexion views.                          - Biopsies were taken with a cold forceps for                          histology in the ascending colon.                          - Biopsies were taken with a cold forceps for                          histology in the descending colon.     ROS    Medical History:  Past Medical History:   Diagnosis Date    Anticoagulant long-term use     Arthritis     Atrial fibrillation     CHF (congestive heart failure)     Diabetes mellitus     Type2 resolved after weight loss surgery    DVT (deep venous thrombosis)     Encounter for blood transfusion     GERD (gastroesophageal reflux disease)     Glaucoma     Hypercholesterolemia      24-Sep-2024 01:06 Hyperlipemia     Hypertension     Memory changes     Myocardial infarction     Renal failure     resolved    TIA (transient ischemic attack)        Past Surgical History:   Procedure Laterality Date    BACK SURGERY  06/21/2017    lumbar fusion 6/21/17 and surgery for hematoma to site on 6/26/17    CHOLECYSTECTOMY  1978    COLONOSCOPY N/A 10/3/2022    Procedure: COLONOSCOPY;  Surgeon: Jourdan Parks MD;  Location: [facility] ENDO;  Service: General;  Laterality: N/A;    COLONOSCOPY N/A 10/11/2022    Procedure: COLONOSCOPY;  Surgeon: Stepehn Cooper MD;  Location: [facility] ENDO;  Service: Endoscopy;  Laterality: N/A;    ENDOSCOPIC ULTRASOUND OF UPPER GASTROINTESTINAL TRACT N/A 11/9/2022    Procedure: ULTRASOUND, UPPER GI TRACT, ENDOSCOPIC;  Surgeon: Jake Corona MD;  Location: [facility] (2ND FLR);  Service: Endoscopy;  Laterality: N/A;    ESOPHAGOGASTRODUODENOSCOPY N/A 10/3/2022    Procedure: EGD (ESOPHAGOGASTRODUODENOSCOPY);  Surgeon: Jourdan Parks MD;  Location: [facility] ENDO;  Service: General;  Laterality: N/A;    ESOPHAGOGASTRODUODENOSCOPY N/A 11/15/2022    Procedure: EGD (ESOPHAGOGASTRODUODENOSCOPY);  Surgeon: Roque Soliman MD;  Location: [facility] ENDO (2ND FLR);  Service: Endoscopy;  Laterality: N/A;    ESOPHAGOGASTRODUODENOSCOPY N/A 11/17/2022    Procedure: EGD (ESOPHAGOGASTRODUODENOSCOPY);  Surgeon: Roque Soliman MD;  Location: [facility] ENDO (2ND FLR);  Service: Endoscopy;  Laterality: N/A;    GASTRIC BYPASS      HYSTERECTOMY  2012    JOINT REPLACEMENT      TKR    LEFT HEART CATHETERIZATION Left 2/5/2021    Procedure: Left heart cath;  Surgeon: Shane Pacheco MD;  Location: [facility] CATH;  Service: Cardiovascular;  Laterality: Left;    PELVIC LAPAROSCOPY Left     OOPH    RENAL BIOPSY N/A 10/5/2022    Procedure: BIOPSY, KIDNEY;  Surgeon: Velia Diagnostic Provider;  Location: [facility] OR;  Service: General;  Laterality: N/A;    RIGHT HEART CATHETERIZATION Right 2/5/2021    Procedure: INSERTION, CATHETER, RIGHT HEART.  via left radial and left brachial;  Surgeon: Shane Pacheco MD;  Location: [facility];  Service: Cardiovascular;  Laterality: Right;    TOTAL ABDOMINAL HYSTERECTOMY W/ BILATERAL SALPINGOOPHORECTOMY         Family History   Problem Relation Age of Onset    Arthritis Mother     Breast cancer Mother     Heart disease Mother     Hypertension Mother     Cancer Father     Heart disease Father     Hypertension Father     Diabetes Daughter        Social History     Socioeconomic History    Marital status: Single   Tobacco Use    Smoking status: Never    Smokeless tobacco: Never   Substance and Sexual Activity    Alcohol use: Yes     Comment: occasional    Drug use: No    Sexual activity: Not Currently     Social Determinants of Health     Financial Resource Strain: Low Risk     Difficulty of Paying Living Expenses: Not hard at all   Food Insecurity: No Food Insecurity    Worried About Running Out of Food in the Last Year: Never true    Ran Out of Food in the Last Year: Never true   Transportation Needs: No Transportation Needs    Lack of Transportation (Medical): No    Lack of Transportation (Non-Medical): No   Physical Activity: Inactive    Days of Exercise per Week: 0 days    Minutes of Exercise per Session: 0 min   Stress: No Stress Concern Present    Feeling of Stress : Not at all   Social Connections: Moderately Integrated    Frequency of Communication with Friends and Family: More than three times a week    Frequency of Social Gatherings with Friends and Family: More than three times a week    Attends [employer] Services: More than 4 times per year    Active Member of Clubs or Organizations: Yes    Attends Club or Organization Meetings: More than 4 times per year    Marital Status: Never    Housing Stability: High Risk    Unable to Pay for Housing in the Last Year: Yes    Number of Places Lived in the Last Year: 1    Unstable Housing in the Last Year: No       Current Outpatient Medications on File Prior to  Visit   Medication Sig Dispense Refill    apixaban (ELIQUIS) 5 mg Tab Take 1 tablet (5 mg total) by mouth 2 (two) times daily.      levETIRAcetam (KEPPRA) 500 MG Tab Take 500 mg by mouth 2 (two) times daily.      multivitamin Tab Take 1 tablet by mouth once daily. 30 tablet 6    pantoprazole (PROTONIX) 40 MG tablet Take 40 mg by mouth once daily.      timolol maleate 0.5% (TIMOPTIC) 0.5 % Drop 1 drop 2 (two) times daily.      aspirin (ECOTRIN) 81 MG EC tablet Take 81 mg by mouth once daily.      atorvastatin (LIPITOR) 40 MG tablet Take 1 tablet (40 mg total) by mouth every evening. (Patient not taking: Reported on 1/23/2023) 90 tablet 3    cholecalciferol, vitamin D3, (VITAMIN D3) 50 mcg (2,000 unit) Tab Take 1 tablet (2,000 Units total) by mouth once daily. (Patient not taking: Reported on 1/23/2023) 30 tablet 6    cyanocobalamin (VITAMIN B-12) 1000 MCG tablet Take 1 tablet (1,000 mcg total) by mouth once daily. (Patient not taking: Reported on 1/23/2023) 30 tablet 3    ferrous sulfate (FEOSOL) 325 mg (65 mg iron) Tab tablet Take 325 mg by mouth daily with breakfast.      torsemide (DEMADEX) 20 MG Tab Take 2 tablets (40 mg total) by mouth once daily. (Patient not taking: Reported on 1/23/2023) 60 tablet 11     No current facility-administered medications on file prior to visit.       Review of patient's allergies indicates:  No Known Allergies    Physical Exam:  Constitutional:  alert,  not in acute distress, oriented to time, place, and person, and well developed  HENT: Head: Normal, normocephalic, atraumatic.  Eyes: conjunctiva clear and sclera nonicteric  Cardiovascular: regular rate and rhythm and no murmur  Respiratory: normal chest expansion & respiratory effort   and no accessory muscle use  GI: soft, non-tender, without masses or organomegaly, nondistended, normal bowel sounds, without guarding, and without rebound  Musculoskeletal: no muscular tenderness noted  Skin: normal color and no  jaundice  Neurological: alert, oriented x3  speech normal in context and clarity  . Patient slow to reply to questions.   Psychiatric: oriented to time, place and person, mood and affect are within normal limits, pt is a good historian; questionable memory (did not recollect phone call with GI doctor 1 month ago)    Laboratory:  Lab Results   Component Value Date     11/25/2022    K 4.4 11/25/2022     11/25/2022    CO2 32 (H) 11/25/2022    BUN 13 11/25/2022    CREATININE 0.7 11/25/2022    CALCIUM 7.7 (L) 11/25/2022    ANIONGAP 5 (L) 11/25/2022    ESTGFRAFRICA 51 (A) 05/11/2022    EGFRNONAA 44 (A) 05/11/2022       Lab Results   Component Value Date    ALT 9 (L) 11/19/2022    AST 58 (H) 11/19/2022    ALKPHOS 75 11/19/2022    BILITOT 0.5 11/21/2022       Lab Results   Component Value Date    WBC 5.08 11/25/2022    HGB 7.1 (L) 11/25/2022    HCT 23.7 (L) 11/25/2022    MCV 98 11/25/2022     (L) 11/25/2022       Microbiology:  No Pertinent Microbiology    Imaging:  - CT abdomen and pelvis (11/4/22):  1. Motion on some images.  2. No pancreatic lesion.  3. Enterocolitis.  4. Small amount of gas in the urinary bladder, nonspecific and could be related to recent instrumentation.  5. Hepatic steatosis.    Assessment:  Cherry Santiago is a 67 y.o. female who is presenting for post-hospitalization follow-up of diarrhea & nausea. Extensive testing so far has returned negative - r/o infection, VIPoma, carcinoid syndrome. Low fecal elastase but may have been decreased due to loose stool. Small bowel biopsies showed intraepithelial lymphocytosis - nonspecific and can be seen in multiple conditions including celiac disease (although she has negative celiac serum testing), medication use (NSAIDS), SIBO, microscopic colitis (negative colon biopsies), among others. Colonoscopy with biopsies normal. Serum gastrin was elevated but in the setting of PPI. EUS concerning for IPMN, but no malignancy.     Her diarrhea has  "now resolved & she does not need antidiarrheals. Has occasional nausea & vomiting. Family concerned she is becoming malnourished.     Problems:  Chronic diarrhea, resolved  Nausea & vomiting  Duodenal lymphocytosis, non-specific  Weight loss  Low albumin, chronic        Pertinent labs  ==============  - Pancreatic elastase (11/13/22): 55 (low)  - Chromogranin A (10/15/22): 2000 (elevated)  - Serum gastrin (11/4/22): 873 - on PPI  - VIP (11/8/22): <50  - Somatostatin (11/8/22): 27  - Urine 5-HIAA (10/21/22): 0.6  - Giardia/Cryptosporidium (11/7/22): Negative  - WBC stool (11/6/22): Negative  - Vitamin B12 :927      Plan:  PRN Zofran prescribed for nausea today.   2. Repeat colonoscopy in 2027 due to family hx of CRC in 1st degree relative.   3. Will repeat fecal elastase today since previous level may have been low due to loose stools.   4. Will refer patient to dietitian to ensure she is meeting her nutritional requirements. Will trend serum albumin.  4. Follow up in about 3 months (around 4/23/2023) for Chronic diarrhea & weight loss.    Tung Haley MD  Gastroenterology Fellow    Orders Placed This Encounter   Procedures    Pancreatic elastase, fecal    Ambulatory referral/consult to Nutrition Services      NOTE: Cousin accompanied patient. Patient's daughter was present via FaceTime during the encounter. I explained the plan of action to the best of my abilities. I heard her say "all this is bullshit".  "

## (undated) DEVICE — UNDERPAD DISPOSABLE 30X30IN

## (undated) DEVICE — CONTAINER SPEC STRL PATH 4OZ

## (undated) DEVICE — KIT SURGICAL COLON .25 1.1OZ

## (undated) DEVICE — FORCEP ALLIGATOR 2.8MM W/NDL

## (undated) DEVICE — COLLECTION SPECIMEN NEPTUNE

## (undated) DEVICE — BLOCK BLOX BITE DENT RIM 54FR

## (undated) DEVICE — KIT CANIST SUCTION 1200CC

## (undated) DEVICE — BAG LABGUARD BIOHAZARD 6X9IN

## (undated) DEVICE — TIP SUCTION YANKAUER

## (undated) DEVICE — TUBING O2 FEMALE CONN 13FT

## (undated) DEVICE — ADAPTER DUAL NSL LUER M-M 7FT

## (undated) DEVICE — SOL IRRI STRL WATER 1000ML

## (undated) DEVICE — CONTAINER SPECIMEN SCREW 4OZ

## (undated) DEVICE — FORCEP BX LG CAP 2.8MMX240CM